# Patient Record
Sex: FEMALE | Race: BLACK OR AFRICAN AMERICAN | NOT HISPANIC OR LATINO | Employment: OTHER | URBAN - METROPOLITAN AREA
[De-identification: names, ages, dates, MRNs, and addresses within clinical notes are randomized per-mention and may not be internally consistent; named-entity substitution may affect disease eponyms.]

---

## 2017-01-17 ENCOUNTER — LAB VISIT (OUTPATIENT)
Dept: LAB | Facility: HOSPITAL | Age: 72
End: 2017-01-17
Attending: INTERNAL MEDICINE
Payer: MEDICARE

## 2017-01-17 DIAGNOSIS — M33.20 POLYMYOSITIS ASSOCIATED WITH AUTOIMMUNE DISEASE: ICD-10-CM

## 2017-01-17 DIAGNOSIS — M35.9 POLYMYOSITIS ASSOCIATED WITH AUTOIMMUNE DISEASE: ICD-10-CM

## 2017-01-17 LAB
ALBUMIN SERPL BCP-MCNC: 3.5 G/DL
ALP SERPL-CCNC: 73 U/L
ALT SERPL W/O P-5'-P-CCNC: 15 U/L
ANION GAP SERPL CALC-SCNC: 9 MMOL/L
AST SERPL-CCNC: 20 U/L
BASOPHILS # BLD AUTO: 0.02 K/UL
BASOPHILS NFR BLD: 0.6 %
BILIRUB SERPL-MCNC: 0.6 MG/DL
BUN SERPL-MCNC: 13 MG/DL
CALCIUM SERPL-MCNC: 9.5 MG/DL
CHLORIDE SERPL-SCNC: 107 MMOL/L
CK SERPL-CCNC: 89 U/L
CO2 SERPL-SCNC: 25 MMOL/L
CREAT SERPL-MCNC: 0.7 MG/DL
DIFFERENTIAL METHOD: ABNORMAL
EOSINOPHIL # BLD AUTO: 0.2 K/UL
EOSINOPHIL NFR BLD: 4.5 %
ERYTHROCYTE [DISTWIDTH] IN BLOOD BY AUTOMATED COUNT: 13.9 %
EST. GFR  (AFRICAN AMERICAN): >60 ML/MIN/1.73 M^2
EST. GFR  (NON AFRICAN AMERICAN): >60 ML/MIN/1.73 M^2
GLUCOSE SERPL-MCNC: 105 MG/DL
HCT VFR BLD AUTO: 39.7 %
HGB BLD-MCNC: 13.4 G/DL
LYMPHOCYTES # BLD AUTO: 1.2 K/UL
LYMPHOCYTES NFR BLD: 33 %
MCH RBC QN AUTO: 29.3 PG
MCHC RBC AUTO-ENTMCNC: 33.8 %
MCV RBC AUTO: 87 FL
MONOCYTES # BLD AUTO: 0.4 K/UL
MONOCYTES NFR BLD: 12.3 %
NEUTROPHILS # BLD AUTO: 1.8 K/UL
NEUTROPHILS NFR BLD: 49.3 %
PLATELET # BLD AUTO: 163 K/UL
PMV BLD AUTO: 10.9 FL
POTASSIUM SERPL-SCNC: 4 MMOL/L
PROT SERPL-MCNC: 7 G/DL
RBC # BLD AUTO: 4.58 M/UL
SODIUM SERPL-SCNC: 141 MMOL/L
WBC # BLD AUTO: 3.58 K/UL

## 2017-01-17 PROCEDURE — 80053 COMPREHEN METABOLIC PANEL: CPT

## 2017-01-17 PROCEDURE — 85025 COMPLETE CBC W/AUTO DIFF WBC: CPT

## 2017-01-17 PROCEDURE — 36415 COLL VENOUS BLD VENIPUNCTURE: CPT | Mod: PO

## 2017-01-17 PROCEDURE — 82550 ASSAY OF CK (CPK): CPT

## 2017-01-18 ENCOUNTER — TELEPHONE (OUTPATIENT)
Dept: RHEUMATOLOGY | Facility: CLINIC | Age: 72
End: 2017-01-18

## 2017-03-09 ENCOUNTER — OFFICE VISIT (OUTPATIENT)
Dept: DERMATOLOGY | Facility: CLINIC | Age: 72
End: 2017-03-09
Payer: MEDICARE

## 2017-03-09 DIAGNOSIS — L21.9 SEBORRHEIC DERMATITIS: Primary | ICD-10-CM

## 2017-03-09 DIAGNOSIS — L65.9 HAIR LOSS: ICD-10-CM

## 2017-03-09 PROCEDURE — 1157F ADVNC CARE PLAN IN RCRD: CPT | Mod: S$GLB,,, | Performed by: DERMATOLOGY

## 2017-03-09 PROCEDURE — 99999 PR PBB SHADOW E&M-EST. PATIENT-LVL II: CPT | Mod: PBBFAC,,, | Performed by: DERMATOLOGY

## 2017-03-09 PROCEDURE — 1159F MED LIST DOCD IN RCRD: CPT | Mod: S$GLB,,, | Performed by: DERMATOLOGY

## 2017-03-09 PROCEDURE — 99213 OFFICE O/P EST LOW 20 MIN: CPT | Mod: S$GLB,,, | Performed by: DERMATOLOGY

## 2017-03-09 PROCEDURE — 1160F RVW MEDS BY RX/DR IN RCRD: CPT | Mod: S$GLB,,, | Performed by: DERMATOLOGY

## 2017-03-09 NOTE — MR AVS SNAPSHOT
[facility] Dermatology  [address] [facility] [place] [address]  Phone: [phone]  Fax: [phone]                  Anca Mcclellan   3/9/2017 2:00 PM   Office Visit    Description:  Female : 1945   Provider:  Evelina Mcclure MD   Department:  [facility] - Dermatology           Reason for Visit     Follow-up           Diagnoses this Visit        Comments    Seborrheic dermatitis    -  Primary     Hair loss                To Do List           Future Appointments        Provider Department Dept Phone    2017 9:55 AM LABORATORY, [facility] [phone]    2017 10:00 AM Chaz Lock MD [facility] Rheumatology [phone]      Goals (5 Years of Data)     None      Follow-Up and Disposition     Return if symptoms worsen or fail to improve.      [facility]     [facility] Care Line - / Assistance  Registered nurses in the [facility] provide clinical advisement, health education, appointment booking, and other advisory services.  Call for this free service at [phone].             Medications           Message regarding Medications     Verify the changes and/or additions to your medication regime listed below are the same as discussed with your clinician today.  If any of these changes or additions are incorrect, please notify your healthcare provider.        STOP taking these medications     fluocinolone (DERMA-SMOOTHE) 0.01 % external oil Three drops to each ear twice daily as needed for ear itching    omeprazole (PRILOSEC) 20 MG capsule     calcipotriene-betamethasone (TACLONEX) ointment Use on scalp once daily           Verify that the below list of medications is an accurate representation of the medications you are currently taking.  If none reported, the list may be blank. If incorrect, please contact your healthcare provider. Carry this list with you in case of emergency.           Current Medications     aspirin (ECOTRIN)  81 MG EC tablet Take 81 mg by mouth once daily.    atorvastatin (LIPITOR) 40 MG tablet Take 40 mg by mouth once daily.    azathioprine (IMURAN) 50 mg Tab Take 1 tablet (50 mg total) by mouth once daily.    BIOTIN ORAL Take 4,000 mcg by mouth once daily.     FOLIC ACID/MULTIVIT-MIN/LUTEIN (CENTRUM SILVER ORAL) Take by mouth once daily.     ketoconazole (NIZORAL) 2 % cream AAA bid    ketoconazole (NIZORAL) 2 % shampoo Wash hair with medicated shampoo at least 2x/week - let sit on scalp at least 5 minutes prior to rinsing    metformin (GLUCOPHAGE-XR) 750 MG 24 hr tablet Take 750 mg by mouth daily with breakfast.    triamcinolone acetonide 0.025% (KENALOG) 0.025 % cream AAA bid as needed for flares.  Mild steroid.           Clinical Reference Information           Allergies as of 3/9/2017     No Known Allergies      Immunizations Administered on Date of Encounter - 3/9/2017     None      [facility] Sign-Up     Activating your [facility] account is as easy as 1-2-3!     1) Visit [email or web address], select Sign Up Now, enter this activation code and your date of birth, then select Next.  [email or web address]  Expires: 4/23/2017  3:13 PM      2) Create a username and password to use when you visit [facility] in the future and select a security question in case you lose your password and select Next.    3) Enter your e-mail address and click Sign Up!    Additional Information  If you have questions, please e-mail [email or web address] or call [phone] to talk to our [facility] staff. Remember, [facility] is NOT to be used for urgent needs. For medical emergencies, dial 911.         Language Assistance Services     ATTENTION: Language assistance services are available, free of charge. Please call [phone].      ATENCIÓN: Si habla gideon, tiene a norton disposición servicios gratuitos de asistencia lingüística. Llame al [phone].     CHÚ Ý: N?u b?n nói Ti?ng Vi?t, có các d?ch v? h? tr? ngôn ng? mi?n phí dành cho b?n. G?i s?  [phone].         [facility] complies with applicable Federal civil rights laws and does not discriminate on the basis of race, color, national origin, age, disability, or sex.

## 2017-03-09 NOTE — PROGRESS NOTES
Subjective:       Patient ID:  Anca Mcclellan is a 71 y.o. female who presents for   Chief Complaint   Patient presents with    Follow-up     HPI Comments: Hx of traction alopecia, seb derm and pruritus, last seen on 12/1/16.  She discontinued TAC 0.025% cream twice daily prn and ketoconazole cream twice daily.  + improvement in scalp c/ decreased scaling and hair loss.  She is currently on fluocinolone scalp oil twice daily and ketoconazole shampoo once/week.  She uses rogaine x 10 years, biotin 4000 mcg daily, and topical finasteride 1% daily.       Review of Systems   Constitutional: Negative for fever and chills.   Gastrointestinal: Negative for nausea and vomiting.   Skin: Negative for itching, rash, daily sunscreen use, activity-related sunscreen use and recent sunburn.   Hematologic/Lymphatic: Does not bruise/bleed easily.        Objective:    Physical Exam   Constitutional: She appears well-developed and well-nourished. No distress.   Neurological: She is alert and oriented to person, place, and time. She is not disoriented.   Psychiatric: She has a normal mood and affect.   Skin:   Areas Examined (abnormalities noted in diagram):   Scalp / Hair Palpated and Inspected  Head / Face Inspection Performed  Neck Inspection Performed  Back Inspection Performed  RUE Inspected  LUE Inspection Performed  Nails and Digits Inspection Performed               Assessment / Plan:        Seborrheic dermatitis  Marked improvement. Restart ketoconazole and TAC 0.025% cream prn.  Continue ketoconazole shampoo once/week    Hair loss  Currently improved.  Continue fluocinolone scalp oil twice daily, rogaine, biotin 4000 mcg daily, and topical finasteride 1% daily           Return if symptoms worsen or fail to improve.

## 2017-06-01 ENCOUNTER — OFFICE VISIT (OUTPATIENT)
Dept: RHEUMATOLOGY | Facility: CLINIC | Age: 72
End: 2017-06-01
Payer: MEDICARE

## 2017-06-01 ENCOUNTER — LAB VISIT (OUTPATIENT)
Dept: LAB | Facility: HOSPITAL | Age: 72
End: 2017-06-01
Attending: INTERNAL MEDICINE
Payer: MEDICARE

## 2017-06-01 VITALS
BODY MASS INDEX: 29.57 KG/M2 | SYSTOLIC BLOOD PRESSURE: 130 MMHG | WEIGHT: 194.44 LBS | HEART RATE: 95 BPM | DIASTOLIC BLOOD PRESSURE: 73 MMHG

## 2017-06-01 DIAGNOSIS — M33.20 POLYMYOSITIS ASSOCIATED WITH AUTOIMMUNE DISEASE: Primary | ICD-10-CM

## 2017-06-01 DIAGNOSIS — Z79.899 HIGH RISK MEDICATION USE: ICD-10-CM

## 2017-06-01 DIAGNOSIS — M35.9 POLYMYOSITIS ASSOCIATED WITH AUTOIMMUNE DISEASE: Primary | ICD-10-CM

## 2017-06-01 DIAGNOSIS — M35.9 POLYMYOSITIS ASSOCIATED WITH AUTOIMMUNE DISEASE: ICD-10-CM

## 2017-06-01 DIAGNOSIS — M33.20 POLYMYOSITIS ASSOCIATED WITH AUTOIMMUNE DISEASE: ICD-10-CM

## 2017-06-01 LAB
ALBUMIN SERPL BCP-MCNC: 3.8 G/DL
ALP SERPL-CCNC: 78 U/L
ALT SERPL W/O P-5'-P-CCNC: 17 U/L
ANION GAP SERPL CALC-SCNC: 10 MMOL/L
AST SERPL-CCNC: 22 U/L
BASOPHILS # BLD AUTO: 0.03 K/UL
BASOPHILS NFR BLD: 0.7 %
BILIRUB SERPL-MCNC: 0.6 MG/DL
BUN SERPL-MCNC: 14 MG/DL
CALCIUM SERPL-MCNC: 9.5 MG/DL
CHLORIDE SERPL-SCNC: 105 MMOL/L
CK SERPL-CCNC: 123 U/L
CO2 SERPL-SCNC: 25 MMOL/L
CREAT SERPL-MCNC: 0.8 MG/DL
DIFFERENTIAL METHOD: NORMAL
EOSINOPHIL # BLD AUTO: 0.2 K/UL
EOSINOPHIL NFR BLD: 5.5 %
ERYTHROCYTE [DISTWIDTH] IN BLOOD BY AUTOMATED COUNT: 13.2 %
EST. GFR  (AFRICAN AMERICAN): >60 ML/MIN/1.73 M^2
EST. GFR  (NON AFRICAN AMERICAN): >60 ML/MIN/1.73 M^2
GLUCOSE SERPL-MCNC: 103 MG/DL
HCT VFR BLD AUTO: 41.9 %
HGB BLD-MCNC: 13.9 G/DL
LYMPHOCYTES # BLD AUTO: 1.3 K/UL
LYMPHOCYTES NFR BLD: 28.9 %
MCH RBC QN AUTO: 30.2 PG
MCHC RBC AUTO-ENTMCNC: 33.2 %
MCV RBC AUTO: 91 FL
MONOCYTES # BLD AUTO: 0.6 K/UL
MONOCYTES NFR BLD: 12.5 %
NEUTROPHILS # BLD AUTO: 2.3 K/UL
NEUTROPHILS NFR BLD: 52.2 %
PLATELET # BLD AUTO: 176 K/UL
PMV BLD AUTO: 11.4 FL
POTASSIUM SERPL-SCNC: 3.9 MMOL/L
PROT SERPL-MCNC: 7.4 G/DL
RBC # BLD AUTO: 4.61 M/UL
SODIUM SERPL-SCNC: 140 MMOL/L
WBC # BLD AUTO: 4.4 K/UL

## 2017-06-01 PROCEDURE — 1159F MED LIST DOCD IN RCRD: CPT | Mod: S$GLB,,, | Performed by: INTERNAL MEDICINE

## 2017-06-01 PROCEDURE — 82550 ASSAY OF CK (CPK): CPT

## 2017-06-01 PROCEDURE — 1126F AMNT PAIN NOTED NONE PRSNT: CPT | Mod: S$GLB,,, | Performed by: INTERNAL MEDICINE

## 2017-06-01 PROCEDURE — 85025 COMPLETE CBC W/AUTO DIFF WBC: CPT

## 2017-06-01 PROCEDURE — 36415 COLL VENOUS BLD VENIPUNCTURE: CPT | Mod: PO

## 2017-06-01 PROCEDURE — 80053 COMPREHEN METABOLIC PANEL: CPT

## 2017-06-01 PROCEDURE — 99999 PR PBB SHADOW E&M-EST. PATIENT-LVL III: CPT | Mod: PBBFAC,,, | Performed by: INTERNAL MEDICINE

## 2017-06-01 PROCEDURE — 99213 OFFICE O/P EST LOW 20 MIN: CPT | Mod: S$GLB,,, | Performed by: INTERNAL MEDICINE

## 2017-06-01 RX ORDER — AZATHIOPRINE 50 MG/1
50 TABLET ORAL EVERY OTHER DAY
Qty: 45 TABLET | Refills: 1 | Status: SHIPPED | OUTPATIENT
Start: 2017-06-01 | End: 2018-02-20

## 2017-06-01 NOTE — PROGRESS NOTES
RHEUMATOLOGY CLINIC FOLLOW UP VISIT  Chief complaints:-  To follow up for muscle disease.      HPI:-  Anca Kirkland a 72 y.o. pleasant female comes in for a follow up visit with above chief complaints.   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 She established care with me for worsening fatigue which improved since initiating prednisone and Imuran. Normal muscle enzymes always. She reports doing well . No muscle weakness chest pain, palpitations, diaphoresis or leg swelling. No orthopnea/PND. Have some fatigue.     Review of Systems   Constitutional: Negative for chills, fever, malaise/fatigue and weight loss.   HENT: Negative for ear discharge, ear pain, hearing loss, nosebleeds and sore throat.    Eyes: Negative for blurred vision, double vision, photophobia, discharge and redness.   Respiratory: Negative for cough, hemoptysis, sputum production and shortness of breath.    Cardiovascular: Negative for chest pain, palpitations and claudication.   Gastrointestinal: Negative for abdominal pain, constipation, diarrhea, melena, nausea and vomiting.   Genitourinary: Negative for dysuria, frequency, hematuria and urgency.   Musculoskeletal: Negative for back pain, falls, joint pain, myalgias and neck pain.   Skin: Negative for itching and rash.   Neurological: Negative for dizziness, tremors, sensory change, speech change, focal weakness, seizures, loss of consciousness, weakness and headaches.   Endo/Heme/Allergies: Negative for environmental allergies. Does not bruise/bleed easily.   Psychiatric/Behavioral: Negative for hallucinations and memory loss. The patient does not have insomnia.        History reviewed. No pertinent past medical history.    Past Surgical History:   Procedure Laterality Date    HYSTERECTOMY  1988        Social History   Substance Use Topics    Smoking status: Never Smoker     Smokeless tobacco: Never Used    Alcohol use Yes       Family History   Problem Relation Age of Onset    Cataracts Mother     Migraines Neg Hx     Melanoma Neg Hx     Lupus Neg Hx     Psoriasis Neg Hx        No Known Allergies        Physical examination:-    Vitals:    06/01/17 1536   BP: 130/73   Pulse: 95   Weight: 88.2 kg (194 lb 7.1 oz)   PainSc: 0-No pain       Physical Exam   Constitutional: She is oriented to person, place, and time and well-developed, well-nourished, and in no distress. No distress.   HENT:   Head: Normocephalic and atraumatic.   Nose: Nose normal.   Mouth/Throat: Oropharynx is clear and moist. No oropharyngeal exudate.   Eyes: Conjunctivae and EOM are normal. Pupils are equal, round, and reactive to light. Right eye exhibits no discharge. Left eye exhibits no discharge. No scleral icterus.   Neck: Normal range of motion. Neck supple.   Cardiovascular: Normal rate and intact distal pulses.    Pulmonary/Chest: Effort normal. No respiratory distress. She exhibits no tenderness.   Abdominal: Soft. There is no tenderness.   Musculoskeletal:   Muscle strength 4+/5 bilateral upper and lower proximal and distal extremities.  No synovitis or effusion in small joints of hands or feet.  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  Bilateral knees showed crepitus with right more than left and joint line tenderness in both knees.  No effusion.   Lymphadenopathy:     She has no cervical adenopathy.   Neurological: She is alert and oriented to person, place, and time. No cranial nerve deficit.   Skin: Skin is warm. No rash noted. She is not diaphoretic. No erythema. No pallor.   Psychiatric: Affect and judgment normal.   Nursing note and vitals reviewed.      Labs:-  Results for VIDYA ALVAREZ (MRN [id number]) as of 7/20/2016 13:36   Ref. Range 4/21/2016 15:43   VALDEZ HEP-2 Titer Unknown Positive >=1:2560...   Anti-SSA Antibody Latest Ref Range: 0.00 - 19.99 EU 1.64   Anti-SSA  "Interpretation Latest Ref Range: Negative  Negative   Anti-SSB Antibody Latest Ref Range: 0.00 - 19.99 EU 0.72   Anti-SSB Interpretation Latest Ref Range: Negative  Negative   ds DNA Ab Latest Ref Range: Negative 1:10  Positive >=1:5120 (A)   Anti Sm Antibody Latest Ref Range: 0.00 - 19.99 EU 2.94   Anti-Sm Interpretation Latest Ref Range: Negative  Negative   Anti Sm/RNP Antibody Latest Ref Range: 0.00 - 19.99 EU 0.00   Anti-Sm/RNP Interpretation Latest Ref Range: Negative  Negative   Anti-Emilia-1 Antibody Latest Ref Range: <20 Units <20   Anti-PM/Scl Ab Latest Ref Range: <20 Units <20   Anti-SS-A 52 kD Ab, IgG Latest Ref Range: <20 Units <20   Anti-U1-RNP  Ab Latest Ref Range: <20 Units <20   EJ Latest Ref Range: Negative  Negative   Fibrillarin (U3 RNP) Latest Ref Range: Negative  Negative   Ku Latest Ref Range: Negative  Negative   MDA-5 (P140) (CADM-140) Latest Ref Range: <20 Units <20   MI-2 Latest Ref Range: Negative  Weak Positive (A)   NXP-2 (P140) Latest Ref Range: <20 Units <20   OJ Latest Ref Range: Negative  Negative   PL-12 Latest Ref Range: Negative  Negative   PL-7 Latest Ref Range: Negative  Negative   SRP Latest Ref Range: Negative  Negative   TIF1 GAMMA (P155/140) Latest Ref Range: <20 Units <20   U2 snRNP Latest Ref Range: Negative  Negative       Assessment/Plans:-  # Polymyositis associated with autoimmune disease:-  Improved. No flare up after prednisone tapered completely. Reduce imuran to every other day dosing. Check labs for muscle enzymes.     # High risk medication - "Imuran" use  CBC/CMP every 12 weeks while on imuran therapy.        # RTC in 6 months. Labs in 3 months.  Disclaimer: This note was prepared using voice recognition system and is likely to have sound alike errors .  Please call me with any questions    "

## 2017-06-02 ENCOUNTER — TELEPHONE (OUTPATIENT)
Dept: RHEUMATOLOGY | Facility: CLINIC | Age: 72
End: 2017-06-02

## 2017-06-02 NOTE — TELEPHONE ENCOUNTER
----- Message from Chaz Lock MD sent at 6/2/2017  7:40 AM CDT -----  [facility] labs.

## 2017-06-05 ENCOUNTER — DOCUMENTATION ONLY (OUTPATIENT)
Dept: RHEUMATOLOGY | Facility: CLINIC | Age: 72
End: 2017-06-05

## 2017-08-08 ENCOUNTER — OFFICE VISIT (OUTPATIENT)
Dept: OTOLARYNGOLOGY | Facility: CLINIC | Age: 72
End: 2017-08-08
Payer: MEDICARE

## 2017-08-08 VITALS
BODY MASS INDEX: 29.23 KG/M2 | HEART RATE: 85 BPM | DIASTOLIC BLOOD PRESSURE: 76 MMHG | WEIGHT: 192.25 LBS | TEMPERATURE: 98 F | SYSTOLIC BLOOD PRESSURE: 119 MMHG

## 2017-08-08 DIAGNOSIS — H60.8X3 CHRONIC ECZEMATOUS OTITIS EXTERNA OF BOTH EARS: ICD-10-CM

## 2017-08-08 DIAGNOSIS — H91.90 PERCEIVED HEARING LOSS: Primary | ICD-10-CM

## 2017-08-08 PROCEDURE — 1159F MED LIST DOCD IN RCRD: CPT | Mod: S$GLB,,, | Performed by: ORTHOPAEDIC SURGERY

## 2017-08-08 PROCEDURE — 1126F AMNT PAIN NOTED NONE PRSNT: CPT | Mod: S$GLB,,, | Performed by: ORTHOPAEDIC SURGERY

## 2017-08-08 PROCEDURE — 99999 PR PBB SHADOW E&M-EST. PATIENT-LVL III: CPT | Mod: PBBFAC,,, | Performed by: ORTHOPAEDIC SURGERY

## 2017-08-08 PROCEDURE — 99213 OFFICE O/P EST LOW 20 MIN: CPT | Mod: S$GLB,,, | Performed by: ORTHOPAEDIC SURGERY

## 2017-08-08 PROCEDURE — 3008F BODY MASS INDEX DOCD: CPT | Mod: S$GLB,,, | Performed by: ORTHOPAEDIC SURGERY

## 2017-08-08 RX ORDER — FLUOCINOLONE ACETONIDE 0.11 MG/ML
3 OIL AURICULAR (OTIC) 2 TIMES DAILY
Qty: 1 BOTTLE | Refills: 3 | Status: SHIPPED | OUTPATIENT
Start: 2017-08-08 | End: 2017-12-07

## 2017-08-08 NOTE — PROGRESS NOTES
"Subjective:       Patient ID: Anca Mcclellan is a 72 y.o. female.    Chief Complaint: Cerumen Impaction (Not hearing well)    Patient is a very pleasant 72 y.o. female here to see me today for the first time for evaluation of hearing loss.  He reports hearing loss that has been gradually progressing over the last 1 years.  She has not noted any difference in hearing between the ears, with either ear being the better hearing ear.  She has not noted any tinnitus in either ear.  She has not had any recent issues with ear pain or ear drainage.  She denies a family history of hearing loss, and has not had any previous otologic surgery.  She denies any history of significant loud noise exposure.  She denies issues with dizziness.  Her last audiogram was in 2014 and was overall normal at that time.  She is interested in restarting her Dermotic, as that has worked for her in the past.      Review of Systems   Constitutional: Negative for chills, fatigue (has not "felt herself" for the last week), fever and unexpected weight change.   HENT: Positive for hearing loss. Negative for congestion, dental problem, ear discharge, ear pain, facial swelling, nosebleeds, postnasal drip, rhinorrhea, sinus pressure, sneezing, sore throat, tinnitus, trouble swallowing and voice change.    Eyes: Negative for redness, itching and visual disturbance.   Respiratory: Negative for cough, choking, shortness of breath and wheezing.    Cardiovascular: Negative for chest pain and palpitations.   Gastrointestinal: Negative for abdominal pain.        No reflux.   Musculoskeletal: Negative for gait problem.   Skin: Negative for rash.   Neurological: Positive for dizziness (slight) and weakness. Negative for light-headedness and headaches.       Objective:      Physical Exam   Constitutional: She is oriented to person, place, and time. She appears well-developed and well-nourished. No distress.   HENT:   Head: Normocephalic and atraumatic.   Right " Ear: Tympanic membrane, external ear and ear canal normal.   Left Ear: Tympanic membrane, external ear and ear canal normal.   Nose: Nose normal. No mucosal edema, rhinorrhea, nasal deformity or septal deviation. No epistaxis. Right sinus exhibits no maxillary sinus tenderness and no frontal sinus tenderness. Left sinus exhibits no maxillary sinus tenderness and no frontal sinus tenderness.   Mouth/Throat: Uvula is midline, oropharynx is clear and moist and mucous membranes are normal. Mucous membranes are not pale and not dry. No dental caries. No oropharyngeal exudate or posterior oropharyngeal erythema.   Eyes: Conjunctivae, EOM and lids are normal. Pupils are equal, round, and reactive to light. Right eye exhibits no chemosis. Left eye exhibits no chemosis. Right conjunctiva is not injected. Left conjunctiva is not injected. No scleral icterus. Right eye exhibits normal extraocular motion and no nystagmus. Left eye exhibits normal extraocular motion and no nystagmus.   Neck: Trachea normal and phonation normal. No tracheal tenderness present. No tracheal deviation present. No thyroid mass and no thyromegaly present.   Cardiovascular: Intact distal pulses.    Pulmonary/Chest: Effort normal. No stridor. No respiratory distress.   Abdominal: She exhibits no distension.   Lymphadenopathy:        Head (right side): No submental, no submandibular, no preauricular, no posterior auricular and no occipital adenopathy present.        Head (left side): No submental, no submandibular, no preauricular, no posterior auricular and no occipital adenopathy present.     She has no cervical adenopathy.   Neurological: She is alert and oriented to person, place, and time. No cranial nerve deficit.   Skin: Skin is warm and dry. No rash noted. No erythema.   Psychiatric: She has a normal mood and affect. Her behavior is normal.       Assessment:       1. Perceived hearing loss    2. Chronic eczematous otitis externa of both ears         Plan:       1.  Perceived hearing loss:  Reassured the patient that there is no cerumen on exam today to explain her recent hearing loss.  I would recommend an audiogram, will schedule at her convenience.  2.  Chronic eczematous OE of both ears:  Will start on Dermotic to her ears.  Prescription sent to pharmacy.

## 2017-08-15 ENCOUNTER — CLINICAL SUPPORT (OUTPATIENT)
Dept: AUDIOLOGY | Facility: CLINIC | Age: 72
End: 2017-08-15
Payer: MEDICARE

## 2017-08-15 DIAGNOSIS — H91.90 PERCEIVED HEARING LOSS: Primary | ICD-10-CM

## 2017-08-15 PROCEDURE — 92567 TYMPANOMETRY: CPT | Mod: S$GLB,,, | Performed by: AUDIOLOGIST

## 2017-08-15 PROCEDURE — 92557 COMPREHENSIVE HEARING TEST: CPT | Mod: S$GLB,,, | Performed by: AUDIOLOGIST

## 2017-08-15 NOTE — PROGRESS NOTES
Anca Mcclellan was seen 08/15/2017 for an audiological evaluation.  Patient complains of decreased hearing over the past year, but more noticeably over the summer.  She denies tinnitus and dizziness.  She denies family history of hearing loss.  She denies noise exposure.    Results reveal normal hearing sensitivity 250-8000 Hz bilaterally.  Speech Reception Thresholds were  5 dBHL for the right ear and 10 dBHL for the left ear.   Word recognition scores were excellent bilaterally.  Tympanograms were Type A for the right ear and Type Ad for the left ear.    Patient was counseled on the above findings.    REC:  ENT consult due to abnormal tympanogram AS

## 2017-08-17 ENCOUNTER — OFFICE VISIT (OUTPATIENT)
Dept: OPHTHALMOLOGY | Facility: CLINIC | Age: 72
End: 2017-08-17
Payer: MEDICARE

## 2017-08-17 DIAGNOSIS — Z83.511 FAMILY HISTORY OF GLAUCOMA IN MOTHER: ICD-10-CM

## 2017-08-17 DIAGNOSIS — Z96.1 PSEUDOPHAKIA OF BOTH EYES: ICD-10-CM

## 2017-08-17 DIAGNOSIS — H40.003 GLAUCOMA SUSPECT OF BOTH EYES: Primary | ICD-10-CM

## 2017-08-17 DIAGNOSIS — H52.4 MYOPIA WITH PRESBYOPIA, BILATERAL: ICD-10-CM

## 2017-08-17 DIAGNOSIS — H52.13 MYOPIA WITH PRESBYOPIA, BILATERAL: ICD-10-CM

## 2017-08-17 PROCEDURE — 92014 COMPRE OPH EXAM EST PT 1/>: CPT | Mod: S$GLB,,, | Performed by: OPTOMETRIST

## 2017-08-17 PROCEDURE — 76514 ECHO EXAM OF EYE THICKNESS: CPT | Mod: S$GLB,,, | Performed by: OPTOMETRIST

## 2017-08-17 PROCEDURE — 92015 DETERMINE REFRACTIVE STATE: CPT | Mod: S$GLB,,, | Performed by: OPTOMETRIST

## 2017-08-17 PROCEDURE — 92133 CPTRZD OPH DX IMG PST SGM ON: CPT | Mod: S$GLB,,, | Performed by: OPTOMETRIST

## 2017-08-17 PROCEDURE — 92083 EXTENDED VISUAL FIELD XM: CPT | Mod: S$GLB,,, | Performed by: OPTOMETRIST

## 2017-08-17 PROCEDURE — 99999 PR PBB SHADOW E&M-EST. PATIENT-LVL I: CPT | Mod: PBBFAC,,, | Performed by: OPTOMETRIST

## 2017-08-17 RX ORDER — ESCITALOPRAM OXALATE 10 MG/1
TABLET ORAL
COMMUNITY
Start: 2017-08-13 | End: 2017-12-07

## 2017-08-17 NOTE — PROGRESS NOTES
HPI     Eye Exam    Additional comments: Yearly           Glaucoma Suspect    Additional comments: HVF,gOCT, Pach           Comments   Last seen by DNL on 6/2/16 for PVD OS. Patient here today for yearly eye   exam and glaucoma screening.   1. PVD OS  2. Vitreous Degeneration OU  3. Glaucoma Suspect OU  4. Pseudophakia OU  PT was last seen on 6/2/16 with DNL for PVD OS. PT was told to RTC  1 month for HVF, gOCT, Pach and DFE.  Medication eye drops if any: None  Last HVF: 8/17/17  Last gOCT: 8/17/17  Last SDPs: 6/2/16  HPI    Any vision changes since last exam: Yes  Eye pain: No  Other ocular symptoms: No    Do you wear currently wear glasses or contacts? None    Interested in contacts today? No    Do you plan on getting new glasses today? If needed       Last edited by Cecy Kothari, PCT on 8/17/2017 10:57 AM. (History)              Assessment /Plan     For exam results, see Encounter Report.    Glaucoma suspect of both eyes  -     Guzman Visual Field - OU - Extended - Both Eyes  -     Posterior Segment OCT Optic Nerve- Both eyes    IOP borderline but adjusts to normal (upper normal) with pachs OU        OS: TS defect on gOCT corresponds with inferior nasal defect OS but         questionable results secondary to decreased reliability-repeat before initiating treatment    Family history of glaucoma in mother    Pseudophakia of both eyes  Stable OU, monitor 12 months    Myopia with presbyopia, bilateral  Eyeglass Final Rx     Eyeglass Final Rx       Sphere Cylinder Axis Add    Right -1.00 +0.25 135 +2.50    Left -0.75 +0.25 155 +2.50    Expiration Date:  8/18/2018          Eyeglass Final Rx #2       Sphere Cylinder Axis Add    Right +1.50 +0.25 135     Left +1.75 +0.25 155     Type:  SVL    Expiration Date:  8/18/2018    Comments:  Reading only                RTC 6 months to repeat 24-2VF and check IOP  Discussed above and all questions were answered.

## 2017-12-07 ENCOUNTER — OFFICE VISIT (OUTPATIENT)
Dept: RHEUMATOLOGY | Facility: CLINIC | Age: 72
End: 2017-12-07
Payer: MEDICARE

## 2017-12-07 ENCOUNTER — LAB VISIT (OUTPATIENT)
Dept: LAB | Facility: HOSPITAL | Age: 72
End: 2017-12-07
Attending: INTERNAL MEDICINE
Payer: MEDICARE

## 2017-12-07 VITALS
DIASTOLIC BLOOD PRESSURE: 80 MMHG | SYSTOLIC BLOOD PRESSURE: 130 MMHG | BODY MASS INDEX: 29.67 KG/M2 | WEIGHT: 195.13 LBS | HEART RATE: 79 BPM

## 2017-12-07 DIAGNOSIS — M35.9 POLYMYOSITIS ASSOCIATED WITH AUTOIMMUNE DISEASE: Primary | ICD-10-CM

## 2017-12-07 DIAGNOSIS — M33.20 POLYMYOSITIS ASSOCIATED WITH AUTOIMMUNE DISEASE: Primary | ICD-10-CM

## 2017-12-07 DIAGNOSIS — Z79.899 HIGH RISK MEDICATION USE: ICD-10-CM

## 2017-12-07 DIAGNOSIS — M33.20 POLYMYOSITIS ASSOCIATED WITH AUTOIMMUNE DISEASE: ICD-10-CM

## 2017-12-07 DIAGNOSIS — M35.9 POLYMYOSITIS ASSOCIATED WITH AUTOIMMUNE DISEASE: ICD-10-CM

## 2017-12-07 LAB
ALBUMIN SERPL BCP-MCNC: 3.6 G/DL
ALP SERPL-CCNC: 90 U/L
ALT SERPL W/O P-5'-P-CCNC: 17 U/L
ANION GAP SERPL CALC-SCNC: 8 MMOL/L
AST SERPL-CCNC: 20 U/L
BASOPHILS # BLD AUTO: 0.06 K/UL
BASOPHILS NFR BLD: 1.5 %
BILIRUB SERPL-MCNC: 0.5 MG/DL
BUN SERPL-MCNC: 13 MG/DL
CALCIUM SERPL-MCNC: 9.7 MG/DL
CHLORIDE SERPL-SCNC: 106 MMOL/L
CK SERPL-CCNC: 98 U/L
CO2 SERPL-SCNC: 27 MMOL/L
CREAT SERPL-MCNC: 0.7 MG/DL
DIFFERENTIAL METHOD: NORMAL
EOSINOPHIL # BLD AUTO: 0.2 K/UL
EOSINOPHIL NFR BLD: 4.1 %
ERYTHROCYTE [DISTWIDTH] IN BLOOD BY AUTOMATED COUNT: 13.7 %
EST. GFR  (AFRICAN AMERICAN): >60 ML/MIN/1.73 M^2
EST. GFR  (NON AFRICAN AMERICAN): >60 ML/MIN/1.73 M^2
GLUCOSE SERPL-MCNC: 123 MG/DL
HCT VFR BLD AUTO: 40.5 %
HGB BLD-MCNC: 13.2 G/DL
IMM GRANULOCYTES # BLD AUTO: 0.01 K/UL
IMM GRANULOCYTES NFR BLD AUTO: 0.2 %
LYMPHOCYTES # BLD AUTO: 1.4 K/UL
LYMPHOCYTES NFR BLD: 32.7 %
MCH RBC QN AUTO: 29.1 PG
MCHC RBC AUTO-ENTMCNC: 32.6 G/DL
MCV RBC AUTO: 89 FL
MONOCYTES # BLD AUTO: 0.5 K/UL
MONOCYTES NFR BLD: 12.8 %
NEUTROPHILS # BLD AUTO: 2 K/UL
NEUTROPHILS NFR BLD: 48.7 %
NRBC BLD-RTO: 0 /100 WBC
PLATELET # BLD AUTO: 175 K/UL
PMV BLD AUTO: 11 FL
POTASSIUM SERPL-SCNC: 4.2 MMOL/L
PROT SERPL-MCNC: 7.3 G/DL
RBC # BLD AUTO: 4.53 M/UL
SODIUM SERPL-SCNC: 141 MMOL/L
WBC # BLD AUTO: 4.13 K/UL

## 2017-12-07 PROCEDURE — 36415 COLL VENOUS BLD VENIPUNCTURE: CPT | Mod: PO

## 2017-12-07 PROCEDURE — 80053 COMPREHEN METABOLIC PANEL: CPT

## 2017-12-07 PROCEDURE — 82550 ASSAY OF CK (CPK): CPT

## 2017-12-07 PROCEDURE — 99214 OFFICE O/P EST MOD 30 MIN: CPT | Mod: S$GLB,,, | Performed by: INTERNAL MEDICINE

## 2017-12-07 PROCEDURE — 99999 PR PBB SHADOW E&M-EST. PATIENT-LVL III: CPT | Mod: PBBFAC,,, | Performed by: INTERNAL MEDICINE

## 2017-12-07 PROCEDURE — 85025 COMPLETE CBC W/AUTO DIFF WBC: CPT

## 2017-12-07 RX ORDER — MULTIVITAMIN
1 TABLET ORAL DAILY
COMMUNITY

## 2017-12-07 NOTE — PROGRESS NOTES
RHEUMATOLOGY CLINIC FOLLOW UP VISIT  Chief complaints:-  To follow up for muscle disease.      HPI:-  Anca Kirkland a 72 y.o. pleasant female comes in for a follow up visit with above chief complaints.   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 She established care with me for worsening fatigue which improved since initiating prednisone and Imuran. Normal muscle enzymes always. She reports doing well . No muscle weakness chest pain, palpitations, diaphoresis or leg swelling. No orthopnea/PND. Have some fatigue.     Review of Systems   Constitutional: Negative for chills, fever, malaise/fatigue and weight loss.   HENT: Negative for ear discharge, ear pain, hearing loss, nosebleeds and sore throat.    Eyes: Negative for blurred vision, double vision, photophobia, discharge and redness.   Respiratory: Negative for cough, hemoptysis, sputum production and shortness of breath.    Cardiovascular: Negative for chest pain, palpitations and claudication.   Gastrointestinal: Negative for abdominal pain, constipation, diarrhea, melena, nausea and vomiting.   Genitourinary: Negative for dysuria, frequency, hematuria and urgency.   Musculoskeletal: Negative for back pain, falls, joint pain, myalgias and neck pain.   Skin: Negative for itching and rash.   Neurological: Negative for dizziness, tremors, sensory change, speech change, focal weakness, seizures, loss of consciousness, weakness and headaches.   Endo/Heme/Allergies: Negative for environmental allergies. Does not bruise/bleed easily.   Psychiatric/Behavioral: Negative for hallucinations and memory loss. The patient does not have insomnia.        No past medical history on file.    Past Surgical History:   Procedure Laterality Date    HYSTERECTOMY  1988        Social History   Substance Use Topics    Smoking status: Never Smoker    Smokeless tobacco:  Never Used    Alcohol use Yes       Family History   Problem Relation Age of Onset    Cataracts Mother     Migraines Neg Hx     Melanoma Neg Hx     Lupus Neg Hx     Psoriasis Neg Hx        No Known Allergies        Physical examination:-    Vitals:    12/07/17 1027   BP: 130/80   Pulse: 79   Weight: 88.5 kg (195 lb 1.7 oz)   PainSc: 0-No pain       Physical Exam   Constitutional: She is oriented to person, place, and time and well-developed, well-nourished, and in no distress. No distress.   HENT:   Head: Normocephalic and atraumatic.   Nose: Nose normal.   Mouth/Throat: Oropharynx is clear and moist. No oropharyngeal exudate.   Eyes: Conjunctivae and EOM are normal. Pupils are equal, round, and reactive to light. Right eye exhibits no discharge. Left eye exhibits no discharge. No scleral icterus.   Neck: Normal range of motion. Neck supple.   Cardiovascular: Normal rate and intact distal pulses.    Pulmonary/Chest: Effort normal. No respiratory distress. She exhibits no tenderness.   Abdominal: Soft. There is no tenderness.   Musculoskeletal:   Muscle strength 4+/5 bilateral upper and lower proximal and distal extremities.  No synovitis or effusion in small joints of hands or feet.  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  Bilateral knees showed crepitus with right more than left and joint line tenderness in both knees.  No effusion.   Lymphadenopathy:     She has no cervical adenopathy.   Neurological: She is alert and oriented to person, place, and time. No cranial nerve deficit.   Skin: Skin is warm. No rash noted. She is not diaphoretic. No erythema. No pallor.   Psychiatric: Affect and judgment normal.   Nursing note and vitals reviewed.      Labs:-  Results for VIDYA ALVAREZ (MRN [id number]) as of 7/20/2016 13:36   Ref. Range 4/21/2016 15:43   VALDEZ HEP-2 Titer Unknown Positive >=1:2560...   Anti-SSA Antibody Latest Ref Range: 0.00 - 19.99 EU 1.64   Anti-SSA Interpretation  "Latest Ref Range: Negative  Negative   Anti-SSB Antibody Latest Ref Range: 0.00 - 19.99 EU 0.72   Anti-SSB Interpretation Latest Ref Range: Negative  Negative   ds DNA Ab Latest Ref Range: Negative 1:10  Positive >=1:5120 (A)   Anti Sm Antibody Latest Ref Range: 0.00 - 19.99 EU 2.94   Anti-Sm Interpretation Latest Ref Range: Negative  Negative   Anti Sm/RNP Antibody Latest Ref Range: 0.00 - 19.99 EU 0.00   Anti-Sm/RNP Interpretation Latest Ref Range: Negative  Negative   Anti-Emilia-1 Antibody Latest Ref Range: <20 Units <20   Anti-PM/Scl Ab Latest Ref Range: <20 Units <20   Anti-SS-A 52 kD Ab, IgG Latest Ref Range: <20 Units <20   Anti-U1-RNP  Ab Latest Ref Range: <20 Units <20   EJ Latest Ref Range: Negative  Negative   Fibrillarin (U3 RNP) Latest Ref Range: Negative  Negative   Ku Latest Ref Range: Negative  Negative   MDA-5 (P140) (CADM-140) Latest Ref Range: <20 Units <20   MI-2 Latest Ref Range: Negative  Weak Positive (A)   NXP-2 (P140) Latest Ref Range: <20 Units <20   OJ Latest Ref Range: Negative  Negative   PL-12 Latest Ref Range: Negative  Negative   PL-7 Latest Ref Range: Negative  Negative   SRP Latest Ref Range: Negative  Negative   TIF1 GAMMA (P155/140) Latest Ref Range: <20 Units <20   U2 snRNP Latest Ref Range: Negative  Negative       Assessment/Plans:-  # Polymyositis associated with autoimmune disease:-  Stable.  No flare up after prednisone tapered completely and dose reduction of imuran to every other day dosing. Check labs for muscle enzymes. Continue every other day imuran.     # High risk medication - "Imuran" use  CBC/CMP every 12 weeks while on imuran therapy.        # RTC in 6 months. Labs in 3 months.  Disclaimer: This note was prepared using voice recognition system and is likely to have sound alike errors .  Please call me with any questions    "

## 2018-01-26 DIAGNOSIS — L65.8 TRACTION ALOPECIA: ICD-10-CM

## 2018-01-29 RX ORDER — FLUOCINOLONE ACETONIDE 0.11 MG/ML
OIL TOPICAL
Qty: 118.28 ML | Refills: 5 | Status: SHIPPED | OUTPATIENT
Start: 2018-01-29 | End: 2019-02-28

## 2018-02-19 ENCOUNTER — OFFICE VISIT (OUTPATIENT)
Dept: OPHTHALMOLOGY | Facility: CLINIC | Age: 73
End: 2018-02-19
Payer: MEDICARE

## 2018-02-19 DIAGNOSIS — Z83.511 FAMILY HISTORY OF GLAUCOMA IN MOTHER: ICD-10-CM

## 2018-02-19 DIAGNOSIS — H40.003 GLAUCOMA SUSPECT OF BOTH EYES: Primary | ICD-10-CM

## 2018-02-19 PROCEDURE — 92083 EXTENDED VISUAL FIELD XM: CPT | Mod: S$GLB,,, | Performed by: OPTOMETRIST

## 2018-02-19 PROCEDURE — 99999 PR PBB SHADOW E&M-EST. PATIENT-LVL I: CPT | Mod: PBBFAC,,, | Performed by: OPTOMETRIST

## 2018-02-19 PROCEDURE — 92012 INTRM OPH EXAM EST PATIENT: CPT | Mod: S$GLB,,, | Performed by: OPTOMETRIST

## 2018-02-19 NOTE — PROGRESS NOTES
HPI     Glaucoma Suspect    Additional comments: 24-2VF and IOP check           Comments   PT was last seen on 8/17/17 with DNL for DFE, HVF, gOCT and pach. PT was   told to rtc 6 months for IOP check and repeat 24-2VF.  \Medication eye drops if any: none  Last HVF: 2/19/18  Last gOCT: 8/17/17  Last SDP: 6/2/16          Last edited by Rosa Morrison MA on 2/19/2018  1:17 PM. (History)            Assessment /Plan     For exam results, see Encounter Report.    Glaucoma suspect of both eyes  -     Guzman Visual Field - OU - Extended - Both Eyes; Future    Family history of glaucoma in mother      IOP stable today and within upper normal limits OU  Better VF reliability today  OS defect did not repeat  Monitor 6 months    RTC 6 months for DFE and gOCT or PRN  Discussed above and all questions were answered.

## 2018-02-20 ENCOUNTER — OFFICE VISIT (OUTPATIENT)
Dept: CARDIOLOGY | Facility: CLINIC | Age: 73
End: 2018-02-20
Payer: MEDICARE

## 2018-02-20 ENCOUNTER — CLINICAL SUPPORT (OUTPATIENT)
Dept: CARDIOLOGY | Facility: CLINIC | Age: 73
End: 2018-02-20
Payer: MEDICARE

## 2018-02-20 VITALS
DIASTOLIC BLOOD PRESSURE: 74 MMHG | WEIGHT: 198 LBS | SYSTOLIC BLOOD PRESSURE: 120 MMHG | HEIGHT: 68 IN | BODY MASS INDEX: 30.01 KG/M2 | HEART RATE: 77 BPM

## 2018-02-20 DIAGNOSIS — I49.9 IRREGULAR HEART BEAT: ICD-10-CM

## 2018-02-20 DIAGNOSIS — I49.9 IRREGULAR HEART BEAT: Primary | ICD-10-CM

## 2018-02-20 DIAGNOSIS — E78.49 OTHER HYPERLIPIDEMIA: ICD-10-CM

## 2018-02-20 DIAGNOSIS — R55 SYNCOPE AND COLLAPSE: ICD-10-CM

## 2018-02-20 PROCEDURE — 3008F BODY MASS INDEX DOCD: CPT | Mod: S$GLB,,, | Performed by: INTERNAL MEDICINE

## 2018-02-20 PROCEDURE — 1159F MED LIST DOCD IN RCRD: CPT | Mod: S$GLB,,, | Performed by: INTERNAL MEDICINE

## 2018-02-20 PROCEDURE — 99999 PR PBB SHADOW E&M-EST. PATIENT-LVL III: CPT | Mod: PBBFAC,,, | Performed by: INTERNAL MEDICINE

## 2018-02-20 PROCEDURE — 93000 ELECTROCARDIOGRAM COMPLETE: CPT | Mod: S$GLB,,, | Performed by: INTERNAL MEDICINE

## 2018-02-20 PROCEDURE — 99204 OFFICE O/P NEW MOD 45 MIN: CPT | Mod: S$GLB,,, | Performed by: INTERNAL MEDICINE
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
## 2018-02-26 ENCOUNTER — CLINICAL SUPPORT (OUTPATIENT)
Dept: CARDIOLOGY | Facility: CLINIC | Age: 73
End: 2018-02-26
Attending: INTERNAL MEDICINE
Payer: MEDICARE

## 2018-02-26 DIAGNOSIS — I49.9 IRREGULAR HEART BEAT: ICD-10-CM

## 2018-02-26 DIAGNOSIS — R55 SYNCOPE AND COLLAPSE: ICD-10-CM

## 2018-02-26 LAB
ESTIMATED PA SYSTOLIC PRESSURE: 31.94
RETIRED EF AND QEF - SEE NOTES: 60 (ref 55–65)
TRICUSPID VALVE REGURGITATION: NORMAL

## 2018-02-26 PROCEDURE — 93306 TTE W/DOPPLER COMPLETE: CPT | Mod: S$GLB,,, | Performed by: NUCLEAR MEDICINE

## 2018-03-06 ENCOUNTER — CLINICAL SUPPORT (OUTPATIENT)
Dept: CARDIOLOGY | Facility: CLINIC | Age: 73
End: 2018-03-06
Payer: MEDICARE

## 2018-03-06 DIAGNOSIS — I49.9 IRREGULAR HEART BEAT: ICD-10-CM

## 2018-03-06 DIAGNOSIS — R55 SYNCOPE AND COLLAPSE: ICD-10-CM

## 2018-03-06 PROCEDURE — 93224 XTRNL ECG REC UP TO 48 HRS: CPT | Mod: S$GLB,,, | Performed by: INTERNAL MEDICINE

## 2018-03-07 ENCOUNTER — LAB VISIT (OUTPATIENT)
Dept: LAB | Facility: HOSPITAL | Age: 73
End: 2018-03-07
Attending: SPECIALIST
Payer: MEDICARE

## 2018-03-07 DIAGNOSIS — M35.9 POLYMYOSITIS ASSOCIATED WITH AUTOIMMUNE DISEASE: ICD-10-CM

## 2018-03-07 DIAGNOSIS — M33.20 POLYMYOSITIS ASSOCIATED WITH AUTOIMMUNE DISEASE: ICD-10-CM

## 2018-03-07 LAB
BASOPHILS # BLD AUTO: 0.05 K/UL
BASOPHILS NFR BLD: 1.2 %
DIFFERENTIAL METHOD: NORMAL
EOSINOPHIL # BLD AUTO: 0.2 K/UL
EOSINOPHIL NFR BLD: 4.8 %
ERYTHROCYTE [DISTWIDTH] IN BLOOD BY AUTOMATED COUNT: 13.2 %
HCT VFR BLD AUTO: 39.2 %
HGB BLD-MCNC: 12.9 G/DL
IMM GRANULOCYTES # BLD AUTO: 0 K/UL
IMM GRANULOCYTES NFR BLD AUTO: 0 %
LYMPHOCYTES # BLD AUTO: 1.6 K/UL
LYMPHOCYTES NFR BLD: 37.6 %
MCH RBC QN AUTO: 29.4 PG
MCHC RBC AUTO-ENTMCNC: 32.9 G/DL
MCV RBC AUTO: 89 FL
MONOCYTES # BLD AUTO: 0.5 K/UL
MONOCYTES NFR BLD: 12.7 %
NEUTROPHILS # BLD AUTO: 1.8 K/UL
NEUTROPHILS NFR BLD: 43.7 %
NRBC BLD-RTO: 0 /100 WBC
PLATELET # BLD AUTO: 177 K/UL
PMV BLD AUTO: 10.7 FL
RBC # BLD AUTO: 4.39 M/UL
WBC # BLD AUTO: 4.18 K/UL

## 2018-03-07 PROCEDURE — 36415 COLL VENOUS BLD VENIPUNCTURE: CPT

## 2018-03-07 PROCEDURE — 85025 COMPLETE CBC W/AUTO DIFF WBC: CPT

## 2018-03-16 ENCOUNTER — TELEPHONE (OUTPATIENT)
Dept: CARDIOLOGY | Facility: CLINIC | Age: 73
End: 2018-03-16

## 2018-03-16 NOTE — TELEPHONE ENCOUNTER
----- Message from Lloyd Francis MD sent at 3/13/2018  9:29 AM CDT -----  Please call patient regarding normal result of Holter which did not reveal any arrhythmias. If he/she has any questions please let me know or have him/her schedule an appt to see me soon to discuss. Thank you

## 2018-05-23 ENCOUNTER — OFFICE VISIT (OUTPATIENT)
Dept: CARDIOLOGY | Facility: CLINIC | Age: 73
End: 2018-05-23
Payer: MEDICARE

## 2018-05-23 VITALS
DIASTOLIC BLOOD PRESSURE: 64 MMHG | HEART RATE: 81 BPM | SYSTOLIC BLOOD PRESSURE: 118 MMHG | HEIGHT: 68 IN | WEIGHT: 185.63 LBS | BODY MASS INDEX: 28.13 KG/M2

## 2018-05-23 DIAGNOSIS — I49.9 IRREGULAR HEART BEAT: ICD-10-CM

## 2018-05-23 DIAGNOSIS — R09.89 BRUIT: ICD-10-CM

## 2018-05-23 DIAGNOSIS — E78.49 OTHER HYPERLIPIDEMIA: ICD-10-CM

## 2018-05-23 DIAGNOSIS — R55 SYNCOPE AND COLLAPSE: Primary | ICD-10-CM

## 2018-05-23 DIAGNOSIS — M35.9 POLYMYOSITIS ASSOCIATED WITH AUTOIMMUNE DISEASE: ICD-10-CM

## 2018-05-23 DIAGNOSIS — M33.20 POLYMYOSITIS ASSOCIATED WITH AUTOIMMUNE DISEASE: ICD-10-CM

## 2018-05-23 PROCEDURE — 99214 OFFICE O/P EST MOD 30 MIN: CPT | Mod: S$GLB,,, | Performed by: INTERNAL MEDICINE

## 2018-05-23 PROCEDURE — 99999 PR PBB SHADOW E&M-EST. PATIENT-LVL III: CPT | Mod: PBBFAC,,, | Performed by: INTERNAL MEDICINE
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
## 2018-06-15 LAB — INTERNAL CAROTID STENOSIS: ABNORMAL

## 2018-06-19 ENCOUNTER — TELEPHONE (OUTPATIENT)
Dept: CARDIOLOGY | Facility: CLINIC | Age: 73
End: 2018-06-19

## 2018-06-19 NOTE — TELEPHONE ENCOUNTER
----- Message from Lloyd Francis MD sent at 6/15/2018  4:36 PM CDT -----  Please call the patient regarding her abnormal result. Mild plaque bilaterally, continue current medications will monitor.

## 2018-06-19 NOTE — TELEPHONE ENCOUNTER
Called to patient to give results of carotid ultrasound--  left voice message to call our office back at [phone].

## 2018-07-16 ENCOUNTER — OFFICE VISIT (OUTPATIENT)
Dept: RHEUMATOLOGY | Facility: CLINIC | Age: 73
End: 2018-07-16
Payer: MEDICARE

## 2018-07-16 VITALS
WEIGHT: 192.88 LBS | SYSTOLIC BLOOD PRESSURE: 124 MMHG | DIASTOLIC BLOOD PRESSURE: 70 MMHG | HEART RATE: 77 BPM | HEIGHT: 68 IN | BODY MASS INDEX: 29.23 KG/M2

## 2018-07-16 DIAGNOSIS — M17.12 PRIMARY OSTEOARTHRITIS OF LEFT KNEE: ICD-10-CM

## 2018-07-16 DIAGNOSIS — M35.9 POLYMYOSITIS ASSOCIATED WITH AUTOIMMUNE DISEASE: Primary | ICD-10-CM

## 2018-07-16 DIAGNOSIS — M33.20 POLYMYOSITIS ASSOCIATED WITH AUTOIMMUNE DISEASE: Primary | ICD-10-CM

## 2018-07-16 PROCEDURE — 99214 OFFICE O/P EST MOD 30 MIN: CPT | Mod: 25,S$GLB,, | Performed by: INTERNAL MEDICINE

## 2018-07-16 PROCEDURE — 99999 PR PBB SHADOW E&M-EST. PATIENT-LVL III: CPT | Mod: PBBFAC,,, | Performed by: INTERNAL MEDICINE

## 2018-07-16 PROCEDURE — 20610 DRAIN/INJ JOINT/BURSA W/O US: CPT | Mod: LT,S$GLB,, | Performed by: INTERNAL MEDICINE

## 2018-07-16 RX ORDER — TRIAMCINOLONE ACETONIDE 40 MG/ML
40 INJECTION, SUSPENSION INTRA-ARTICULAR; INTRAMUSCULAR ONCE
Status: COMPLETED | OUTPATIENT
Start: 2018-07-16 | End: 2018-07-16

## 2018-07-16 RX ORDER — LIDOCAINE HYDROCHLORIDE 20 MG/ML
2 INJECTION, SOLUTION INFILTRATION; PERINEURAL ONCE
Status: COMPLETED | OUTPATIENT
Start: 2018-07-16 | End: 2018-07-16

## 2018-07-16 RX ORDER — OMEPRAZOLE 20 MG/1
CAPSULE, DELAYED RELEASE ORAL
Refills: 2 | COMMUNITY
Start: 2018-06-15 | End: 2018-11-27

## 2018-07-16 RX ADMIN — TRIAMCINOLONE ACETONIDE 40 MG: 40 INJECTION, SUSPENSION INTRA-ARTICULAR; INTRAMUSCULAR at 11:07

## 2018-07-16 RX ADMIN — LIDOCAINE HYDROCHLORIDE 2 ML: 20 INJECTION, SOLUTION INFILTRATION; PERINEURAL at 10:07

## 2018-07-16 NOTE — ASSESSMENT & PLAN NOTE
Biopsy-proven polymyositis diagnosed in 1990's with a recent flare responding to Imuran.  No flare-up since tapering of Imuran in more than 5 months.  Continue to monitor off medications.

## 2018-07-16 NOTE — PROGRESS NOTES
RHEUMATOLOGY CLINIC FOLLOW UP VISIT  Chief complaints:-  My knee hurts.      HPI:-  Anca Kirkland a 73 y.o. pleasant female comes in for a follow up visit with above chief complaints.   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 She established care with me for worsening fatigue which improved since initiating prednisone and Imuran. Normal muscle enzymes always.  Since she was reporting well slowly the Imuran was tapered off and she has been off Imuran since February of 2018.  She denies any flare-up in the last 5 months.  Other than activity worsening left knee pain she denies any other problems today.  The left knee gets stiff and swollen usually around the end of the day.  No significant morning stiffness.. No muscle weakness chest pain, palpitations, diaphoresis or leg swelling. No orthopnea/PND.   Review of Systems   Constitutional: Negative for chills, fever, malaise/fatigue and weight loss.   HENT: Negative for ear discharge, ear pain, hearing loss, nosebleeds and sore throat.    Eyes: Negative for blurred vision, double vision, photophobia, discharge and redness.   Respiratory: Negative for cough, hemoptysis, sputum production and shortness of breath.    Cardiovascular: Negative for chest pain, palpitations and claudication.   Gastrointestinal: Negative for abdominal pain, constipation, diarrhea, melena, nausea and vomiting.   Genitourinary: Negative for dysuria, frequency, hematuria and urgency.   Musculoskeletal: Positive for joint pain. Negative for back pain, falls, myalgias and neck pain.   Skin: Negative for itching and rash.   Neurological: Negative for dizziness, tremors, sensory change, speech change, focal weakness, seizures, loss of consciousness, weakness and headaches.   Endo/Heme/Allergies: Negative for environmental allergies. Does not bruise/bleed easily.  "  Psychiatric/Behavioral: Negative for hallucinations and memory loss. The patient does not have insomnia.        Past Medical History:   Diagnosis Date    Hyperlipidemia        Past Surgical History:   Procedure Laterality Date    HYSTERECTOMY  1988        Social History   Substance Use Topics    Smoking status: Never Smoker    Smokeless tobacco: Never Used    Alcohol use Yes      Comment: seldom       Family History   Problem Relation Age of Onset    Cataracts Mother     Migraines Neg Hx     Melanoma Neg Hx     Lupus Neg Hx     Psoriasis Neg Hx        No Known Allergies        Physical examination:-    Vitals:    07/16/18 1010   BP: 124/70   Pulse: 77   Weight: 87.5 kg (192 lb 14.4 oz)   Height: 5' 8" (1.727 m)   PainSc:   7   PainLoc: Knee       Physical Exam   Constitutional: She is oriented to person, place, and time and well-developed, well-nourished, and in no distress. No distress.   HENT:   Head: Normocephalic and atraumatic.   Nose: Nose normal.   Mouth/Throat: Oropharynx is clear and moist. No oropharyngeal exudate.   Eyes: Conjunctivae and EOM are normal. Pupils are equal, round, and reactive to light. Right eye exhibits no discharge. Left eye exhibits no discharge. No scleral icterus.   Neck: Normal range of motion. Neck supple.   Cardiovascular: Normal rate and intact distal pulses.    Pulmonary/Chest: Effort normal. No respiratory distress. She exhibits no tenderness.   Abdominal: Soft. There is no tenderness.   Musculoskeletal:   Muscle strength 4+/5 bilateral upper and lower proximal and distal extremities.  No synovitis or effusion in small joints of hands or feet.  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  Bilateral knees showed crepitus with right more than left and joint line tenderness in both knees.  No effusion.   Lymphadenopathy:     She has no cervical adenopathy.   Neurological: She is alert and oriented to person, place, and time. No cranial nerve " deficit.   Skin: Skin is warm. No rash noted. She is not diaphoretic. No erythema. No pallor.   Psychiatric: Affect and judgment normal.   Nursing note and vitals reviewed.      Labs:-  Results for VIDYA ALVAREZ (MRN [id number]) as of 7/20/2016 13:36   Ref. Range 4/21/2016 15:43   VALDEZ HEP-2 Titer Unknown Positive >=1:2560...   Anti-SSA Antibody Latest Ref Range: 0.00 - 19.99 EU 1.64   Anti-SSA Interpretation Latest Ref Range: Negative  Negative   Anti-SSB Antibody Latest Ref Range: 0.00 - 19.99 EU 0.72   Anti-SSB Interpretation Latest Ref Range: Negative  Negative   ds DNA Ab Latest Ref Range: Negative 1:10  Positive >=1:5120 (A)   Anti Sm Antibody Latest Ref Range: 0.00 - 19.99 EU 2.94   Anti-Sm Interpretation Latest Ref Range: Negative  Negative   Anti Sm/RNP Antibody Latest Ref Range: 0.00 - 19.99 EU 0.00   Anti-Sm/RNP Interpretation Latest Ref Range: Negative  Negative   Anti-Emilia-1 Antibody Latest Ref Range: <20 Units <20   Anti-PM/Scl Ab Latest Ref Range: <20 Units <20   Anti-SS-A 52 kD Ab, IgG Latest Ref Range: <20 Units <20   Anti-U1-RNP  Ab Latest Ref Range: <20 Units <20   EJ Latest Ref Range: Negative  Negative   Fibrillarin (U3 RNP) Latest Ref Range: Negative  Negative   Ku Latest Ref Range: Negative  Negative   MDA-5 (P140) (CADM-140) Latest Ref Range: <20 Units <20   MI-2 Latest Ref Range: Negative  Weak Positive (A)   NXP-2 (P140) Latest Ref Range: <20 Units <20   OJ Latest Ref Range: Negative  Negative   PL-12 Latest Ref Range: Negative  Negative   PL-7 Latest Ref Range: Negative  Negative   SRP Latest Ref Range: Negative  Negative   TIF1 GAMMA (P155/140) Latest Ref Range: <20 Units <20   U2 snRNP Latest Ref Range: Negative  Negative     Assessment/Plans:-  # Primary osteoarthritis of left knee  Osteoarthritis of left knee with mild synovitis.  Inject Kenalog.  - triamcinolone acetonide injection 40 mg; Inject 1 mL (40 mg total) into the articular space once.  - lidocaine HCL 20 mg/ml (2%) injection  2 mL; 2 mLs by Other route once.    # Polymyositis associated with autoimmune disease  Biopsy-proven polymyositis diagnosed in 1990's with a recent flare responding to Imuran.  No flare-up since tapering of Imuran in more than 5 months.  Continue to monitor off medications.    PROCEDURE NOTE:-  Name of the procedure: Injection of left knee with kenalog .   Patient consent:   Indication, risks(including skin depigmentation, fat atrophy, infection, bleeding, nerve or tendon injury) and alternative to the procedure were explained to to the patient. Patient was given opportunity to ask questions . Then patient gave a verbal consent for the procedure.   Pertinent lab values: None indicated  Type of anesthesia: 2% Lidocaine   Description of procedure : Using sterile technique, the skin over left knee was cleaned with alcohol swab and then with chlorhexidine solution. After applying cold spray , the needle was inserted into the skin and into the joint space without any resistance with intermittent lidocaine injection and then 40 mg kenalog was injected into the joint space without any resistance.   Complication: None  Estimated blood loss: None  Disposition: Patient tolerated the procedure without any complains and the site was dressed.There were no complications.  Discharge instructions of icing and stretching given.  # RTC in 6 months.     Disclaimer: This note was prepared using voice recognition system and is likely to have sound alike errors .  Please call me with any questions

## 2018-08-21 ENCOUNTER — OFFICE VISIT (OUTPATIENT)
Dept: OPHTHALMOLOGY | Facility: CLINIC | Age: 73
End: 2018-08-21
Payer: MEDICARE

## 2018-08-21 DIAGNOSIS — Z83.511 FAMILY HISTORY OF GLAUCOMA IN MOTHER: ICD-10-CM

## 2018-08-21 DIAGNOSIS — H40.013 AT LOW RISK FOR OPEN-ANGLE GLAUCOMA IN BOTH EYES: Primary | ICD-10-CM

## 2018-08-21 DIAGNOSIS — Z96.1 PSEUDOPHAKIA OF BOTH EYES: ICD-10-CM

## 2018-08-21 PROCEDURE — 92014 COMPRE OPH EXAM EST PT 1/>: CPT | Mod: S$GLB,,, | Performed by: OPTOMETRIST

## 2018-08-21 PROCEDURE — 99999 PR PBB SHADOW E&M-EST. PATIENT-LVL I: CPT | Mod: PBBFAC,,, | Performed by: OPTOMETRIST

## 2018-08-21 PROCEDURE — 92133 CPTRZD OPH DX IMG PST SGM ON: CPT | Mod: S$GLB,,, | Performed by: OPTOMETRIST

## 2018-08-21 NOTE — PROGRESS NOTES
HPI     Glaucoma Suspect      Additional comments: DFE and gOCT              Comments     PT was last seen on 2/19/18 with DNL for IOP check and 24-2VF. PT was   told to rtc 6 months for DFE and gOCT.  Medication eye drops if any: none  Last HVF: 2/19/18  Last gOCT: 8/21/18  Last SDP: 6/2/16  HPI    Any vision changes since last exam: no  Eye pain: no  Other ocular symptoms: tearing, no gtts    Do you wear currently wear glasses or contacts? no    Interested in contacts today? no    Do you plan on getting new glasses today? If needed               Last edited by Rosa Morrison MA on 8/21/2018  1:25 PM. (History)            Assessment /Plan     For exam results, see Encounter Report.    At low risk for open-angle glaucoma in both eyes  -     Cancel: Guzman Visual Field - OU - Extended - Both Eyes  -     Posterior Segment OCT Optic Nerve- Both eyes  IOP is stable OU today and within acceptable range OU  No progression on gOCT  Monitor 12 months    Family history of glaucoma in mother  No evidence of disease at this time  Monitor 12 months    Pseudophakia of both eyes  Doing well with otc readers, declines spec rx at this time  Monitor 12 months    RTC 1 yr for dilated exam with 24-2VF and gOCT or PRN  Discussed above and all questions were answered.

## 2018-11-26 DIAGNOSIS — I49.9 IRREGULAR HEART BEAT: Primary | ICD-10-CM

## 2018-11-27 ENCOUNTER — OFFICE VISIT (OUTPATIENT)
Dept: CARDIOLOGY | Facility: CLINIC | Age: 73
End: 2018-11-27
Payer: MEDICARE

## 2018-11-27 ENCOUNTER — CLINICAL SUPPORT (OUTPATIENT)
Dept: CARDIOLOGY | Facility: CLINIC | Age: 73
End: 2018-11-27
Payer: MEDICARE

## 2018-11-27 VITALS
SYSTOLIC BLOOD PRESSURE: 130 MMHG | WEIGHT: 194.25 LBS | HEART RATE: 57 BPM | BODY MASS INDEX: 29.44 KG/M2 | HEIGHT: 68 IN | DIASTOLIC BLOOD PRESSURE: 80 MMHG

## 2018-11-27 DIAGNOSIS — I49.9 IRREGULAR HEART BEAT: ICD-10-CM

## 2018-11-27 DIAGNOSIS — M33.20 POLYMYOSITIS ASSOCIATED WITH AUTOIMMUNE DISEASE: ICD-10-CM

## 2018-11-27 DIAGNOSIS — I49.9 IRREGULAR HEART BEAT: Primary | ICD-10-CM

## 2018-11-27 DIAGNOSIS — R55 SYNCOPE AND COLLAPSE: ICD-10-CM

## 2018-11-27 DIAGNOSIS — E78.49 OTHER HYPERLIPIDEMIA: ICD-10-CM

## 2018-11-27 DIAGNOSIS — M35.9 POLYMYOSITIS ASSOCIATED WITH AUTOIMMUNE DISEASE: ICD-10-CM

## 2018-11-27 PROCEDURE — 93000 ELECTROCARDIOGRAM COMPLETE: CPT | Mod: S$GLB,,, | Performed by: INTERNAL MEDICINE

## 2018-11-27 PROCEDURE — 99214 OFFICE O/P EST MOD 30 MIN: CPT | Mod: S$GLB,,, | Performed by: INTERNAL MEDICINE

## 2018-11-27 PROCEDURE — 99999 PR PBB SHADOW E&M-EST. PATIENT-LVL III: CPT | Mod: PBBFAC,,, | Performed by: INTERNAL MEDICINE
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
## 2018-12-27 ENCOUNTER — CLINICAL SUPPORT (OUTPATIENT)
Dept: CARDIOLOGY | Facility: CLINIC | Age: 73
End: 2018-12-27
Attending: INTERNAL MEDICINE
Payer: MEDICARE

## 2018-12-27 DIAGNOSIS — R55 SYNCOPE AND COLLAPSE: ICD-10-CM

## 2018-12-27 DIAGNOSIS — I49.9 IRREGULAR HEART BEAT: ICD-10-CM

## 2018-12-27 PROCEDURE — 0296T HOLTER MONITOR - 3-14 DAY ADULT: CPT | Mod: S$GLB,,, | Performed by: INTERNAL MEDICINE

## 2018-12-27 PROCEDURE — 0298T HOLTER MONITOR - 3-14 DAY ADULT: CPT | Mod: S$GLB,,, | Performed by: INTERNAL MEDICINE

## 2019-01-16 ENCOUNTER — OFFICE VISIT (OUTPATIENT)
Dept: RHEUMATOLOGY | Facility: CLINIC | Age: 74
End: 2019-01-16
Payer: MEDICARE

## 2019-01-16 VITALS
WEIGHT: 197.31 LBS | BODY MASS INDEX: 29.9 KG/M2 | HEART RATE: 70 BPM | HEIGHT: 68 IN | SYSTOLIC BLOOD PRESSURE: 129 MMHG | DIASTOLIC BLOOD PRESSURE: 83 MMHG

## 2019-01-16 DIAGNOSIS — M33.20 POLYMYOSITIS ASSOCIATED WITH AUTOIMMUNE DISEASE: ICD-10-CM

## 2019-01-16 DIAGNOSIS — M35.9 POLYMYOSITIS ASSOCIATED WITH AUTOIMMUNE DISEASE: ICD-10-CM

## 2019-01-16 DIAGNOSIS — M17.12 PRIMARY OSTEOARTHRITIS OF LEFT KNEE: ICD-10-CM

## 2019-01-16 DIAGNOSIS — M25.511 SEVERE PAIN OF RIGHT SHOULDER: Primary | ICD-10-CM

## 2019-01-16 PROCEDURE — 20610 PR DRAIN/INJECT LARGE JOINT/BURSA: ICD-10-PCS | Mod: 59,RT,S$GLB, | Performed by: INTERNAL MEDICINE

## 2019-01-16 PROCEDURE — 1101F PT FALLS ASSESS-DOCD LE1/YR: CPT | Mod: CPTII,S$GLB,, | Performed by: INTERNAL MEDICINE

## 2019-01-16 PROCEDURE — 99214 OFFICE O/P EST MOD 30 MIN: CPT | Mod: 25,S$GLB,, | Performed by: INTERNAL MEDICINE

## 2019-01-16 PROCEDURE — 20610 DRAIN/INJ JOINT/BURSA W/O US: CPT | Mod: 59,RT,S$GLB, | Performed by: INTERNAL MEDICINE

## 2019-01-16 PROCEDURE — 1101F PR PT FALLS ASSESS DOC 0-1 FALLS W/OUT INJ PAST YR: ICD-10-PCS | Mod: CPTII,S$GLB,, | Performed by: INTERNAL MEDICINE

## 2019-01-16 PROCEDURE — 99214 PR OFFICE/OUTPT VISIT, EST, LEVL IV, 30-39 MIN: ICD-10-PCS | Mod: 25,S$GLB,, | Performed by: INTERNAL MEDICINE

## 2019-01-16 PROCEDURE — 99999 PR PBB SHADOW E&M-EST. PATIENT-LVL III: CPT | Mod: PBBFAC,,, | Performed by: INTERNAL MEDICINE

## 2019-01-16 PROCEDURE — 99999 PR PBB SHADOW E&M-EST. PATIENT-LVL III: ICD-10-PCS | Mod: PBBFAC,,, | Performed by: INTERNAL MEDICINE

## 2019-01-16 RX ORDER — TRIAMCINOLONE ACETONIDE 40 MG/ML
40 INJECTION, SUSPENSION INTRA-ARTICULAR; INTRAMUSCULAR ONCE
Status: COMPLETED | OUTPATIENT
Start: 2019-01-16 | End: 2019-01-16

## 2019-01-16 RX ADMIN — TRIAMCINOLONE ACETONIDE 40 MG: 40 INJECTION, SUSPENSION INTRA-ARTICULAR; INTRAMUSCULAR at 11:01

## 2019-01-16 NOTE — ASSESSMENT & PLAN NOTE
Worsening severe pain of right shoulder joint associated with restricted range of motion suggestive of synovitis without any other systemic arthritic disease.  Inject her right shoulder with Kenalog.

## 2019-01-16 NOTE — ASSESSMENT & PLAN NOTE
Significant improvement with the last corticosteroid injection.  No active synovitis on examination today.  Monitor.

## 2019-01-16 NOTE — PROGRESS NOTES
RHEUMATOLOGY CLINIC FOLLOW UP VISIT  Chief complaints:-  My right shoulder hurts.      HPI:-  Anca Kirkland a 73 y.o. pleasant female comes in for a follow up visit with above chief complaints.   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 She established care with me for worsening fatigue which improved since initiating prednisone and Imuran. Normal muscle enzymes always.  Since she was reporting well slowly the Imuran was tapered off and she has been off Imuran since February of 2018.  She denies any flare-up since last visit.  Other than activity worsening right shoulder joint pain she denies any other problems today.  The right shoulder gets stiff and swollen usually around the end of the day.  No significant morning stiffness.. No muscle weakness chest pain, palpitations, diaphoresis or leg swelling. No orthopnea/PND.   Review of Systems   Constitutional: Negative for chills, fever, malaise/fatigue and weight loss.   HENT: Negative for ear discharge, ear pain, hearing loss, nosebleeds and sore throat.    Eyes: Negative for blurred vision, double vision, photophobia, discharge and redness.   Respiratory: Negative for cough, hemoptysis, sputum production and shortness of breath.    Cardiovascular: Negative for chest pain, palpitations and claudication.   Gastrointestinal: Negative for abdominal pain, constipation, diarrhea, melena, nausea and vomiting.   Genitourinary: Negative for dysuria, frequency, hematuria and urgency.   Musculoskeletal: Positive for joint pain. Negative for back pain, falls, myalgias and neck pain.   Skin: Negative for itching and rash.   Neurological: Negative for dizziness, tremors, sensory change, speech change, focal weakness, seizures, loss of consciousness, weakness and headaches.   Endo/Heme/Allergies: Negative for environmental allergies. Does not bruise/bleed easily.    Psychiatric/Behavioral: Negative for hallucinations and memory loss. The patient does not have insomnia.        Past Medical History:   Diagnosis Date    Hyperlipidemia        Past Surgical History:   Procedure Laterality Date    HYSTERECTOMY  1988        Social History     Tobacco Use    Smoking status: Never Smoker    Smokeless tobacco: Never Used   Substance Use Topics    Alcohol use: Yes     Comment: seldom    Drug use: Not on file       Family History   Problem Relation Age of Onset    Cataracts Mother     Migraines Neg Hx     Melanoma Neg Hx     Lupus Neg Hx     Psoriasis Neg Hx        No Known Allergies        Physical examination:-    There were no vitals filed for this visit.    Physical Exam   Constitutional: She is oriented to person, place, and time and well-developed, well-nourished, and in no distress. No distress.   HENT:   Head: Normocephalic and atraumatic.   Nose: Nose normal.   Mouth/Throat: Oropharynx is clear and moist. No oropharyngeal exudate.   Eyes: Conjunctivae and EOM are normal. Pupils are equal, round, and reactive to light. Right eye exhibits no discharge. Left eye exhibits no discharge. No scleral icterus.   Neck: Normal range of motion. Neck supple.   Cardiovascular: Normal rate and intact distal pulses.   Pulmonary/Chest: Effort normal. No respiratory distress. She exhibits no tenderness.   Abdominal: Soft. There is no tenderness.   Musculoskeletal:   Muscle strength 4+/5 bilateral upper and lower proximal and distal extremities.  No synovitis or effusion in small joints of hands or feet.  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  Bilateral knees showed crepitus with right more than left and joint line tenderness in both knees.  No effusion.   Lymphadenopathy:     She has no cervical adenopathy.   Neurological: She is alert and oriented to person, place, and time. No cranial nerve deficit.   Skin: Skin is warm. No rash noted. She is not  diaphoretic. No erythema. No pallor.   Psychiatric: Affect and judgment normal.   Nursing note and vitals reviewed.      Labs:-  Results for VIDYA ALVAREZ (MRN [id number]) as of 7/20/2016 13:36   Ref. Range 4/21/2016 15:43   VALDEZ HEP-2 Titer Unknown Positive >=1:2560...   Anti-SSA Antibody Latest Ref Range: 0.00 - 19.99 EU 1.64   Anti-SSA Interpretation Latest Ref Range: Negative  Negative   Anti-SSB Antibody Latest Ref Range: 0.00 - 19.99 EU 0.72   Anti-SSB Interpretation Latest Ref Range: Negative  Negative   ds DNA Ab Latest Ref Range: Negative 1:10  Positive >=1:5120 (A)   Anti Sm Antibody Latest Ref Range: 0.00 - 19.99 EU 2.94   Anti-Sm Interpretation Latest Ref Range: Negative  Negative   Anti Sm/RNP Antibody Latest Ref Range: 0.00 - 19.99 EU 0.00   Anti-Sm/RNP Interpretation Latest Ref Range: Negative  Negative   Anti-Emilia-1 Antibody Latest Ref Range: <20 Units <20   Anti-PM/Scl Ab Latest Ref Range: <20 Units <20   Anti-SS-A 52 kD Ab, IgG Latest Ref Range: <20 Units <20   Anti-U1-RNP  Ab Latest Ref Range: <20 Units <20   EJ Latest Ref Range: Negative  Negative   Fibrillarin (U3 RNP) Latest Ref Range: Negative  Negative   Ku Latest Ref Range: Negative  Negative   MDA-5 (P140) (CADM-140) Latest Ref Range: <20 Units <20   MI-2 Latest Ref Range: Negative  Weak Positive (A)   NXP-2 (P140) Latest Ref Range: <20 Units <20   OJ Latest Ref Range: Negative  Negative   PL-12 Latest Ref Range: Negative  Negative   PL-7 Latest Ref Range: Negative  Negative   SRP Latest Ref Range: Negative  Negative   TIF1 GAMMA (P155/140) Latest Ref Range: <20 Units <20   U2 snRNP Latest Ref Range: Negative  Negative     Assessment/Plans:-  Severe pain of right shoulder  Worsening severe pain of right shoulder joint associated with restricted range of motion suggestive of synovitis without any other systemic arthritic disease.  Inject her right shoulder with Kenalog.  - triamcinolone acetonide injection 40 mg    Primary osteoarthritis of  left knee  Significant improvement with the last corticosteroid injection.  No active synovitis on examination today.  Monitor.    Polymyositis associated with autoimmune disease  Biopsy-proven polymyositis diagnosed in 1990s without any recent flare after stopping Imuran last year.  5/5 muscle strength on examination today except right shoulder muscles.  Continue to monitor without medications.    PROCEDURE NOTE:-  Name of the procedure: Injection of right shoulder joint with kenalog .   Patient consent:   Indication, risks(including skin depigmentation, fat atrophy, infection, bleeding, nerve or tendon injury) and alternative to the procedure were explained to to the patient. Patient was given opportunity to ask questions . Then patient gave a verbal consent for the procedure.   Pertinent lab values: None indicated  Type of anesthesia: 2% Lidocaine   Description of procedure : Using sterile technique, the skin over posterior right shoulder joint was cleaned with alcohol swab and then with chlorhexidine solution. After applying cold spray , the needle was inserted into the skin and into the joint space without any resistance with intermittent lidocaine injection and then 40 mg kenalog was injected into the joint space without any resistance.   Complication: None  Estimated blood loss: None  Disposition: Patient tolerated the procedure without any complains and the site was dressed.There were no complications.  Discharge instructions of icing and stretching given.  # RTC in 6 months.     Disclaimer: This note was prepared using voice recognition system and is likely to have sound alike errors .  Please call me with any questions

## 2019-01-16 NOTE — ASSESSMENT & PLAN NOTE
Biopsy-proven polymyositis diagnosed in 1990s without any recent flare after stopping Imuran last year.  5/5 muscle strength on examination today except right shoulder muscles.  Continue to monitor without medications.

## 2019-02-13 ENCOUNTER — TELEPHONE (OUTPATIENT)
Dept: CARDIOLOGY | Facility: CLINIC | Age: 74
End: 2019-02-13

## 2019-02-13 NOTE — TELEPHONE ENCOUNTER
The patient has been notified of this information and all questions answered. Voiced understand

## 2019-02-13 NOTE — TELEPHONE ENCOUNTER
----- Message from Lloyd Francis MD sent at 2/13/2019 12:18 PM CST -----  Please call patient regarding normal result of overall monitor, one one fast episode of heart rate at 4 beats, continue to monitor and follow up. If he/she has any questions please let me know or have him/her schedule an appt to see me soon to discuss. Thank you

## 2019-02-28 ENCOUNTER — LAB VISIT (OUTPATIENT)
Dept: LAB | Facility: HOSPITAL | Age: 74
End: 2019-02-28
Attending: INTERNAL MEDICINE
Payer: MEDICARE

## 2019-02-28 ENCOUNTER — OFFICE VISIT (OUTPATIENT)
Dept: CARDIOLOGY | Facility: CLINIC | Age: 74
End: 2019-02-28
Payer: MEDICARE

## 2019-02-28 VITALS
WEIGHT: 192 LBS | DIASTOLIC BLOOD PRESSURE: 82 MMHG | HEIGHT: 68 IN | BODY MASS INDEX: 29.1 KG/M2 | SYSTOLIC BLOOD PRESSURE: 126 MMHG | HEART RATE: 64 BPM

## 2019-02-28 DIAGNOSIS — R55 SYNCOPE AND COLLAPSE: ICD-10-CM

## 2019-02-28 DIAGNOSIS — I49.9 IRREGULAR HEART BEAT: Primary | ICD-10-CM

## 2019-02-28 DIAGNOSIS — I49.9 IRREGULAR HEART BEAT: ICD-10-CM

## 2019-02-28 DIAGNOSIS — M35.9 POLYMYOSITIS ASSOCIATED WITH AUTOIMMUNE DISEASE: ICD-10-CM

## 2019-02-28 DIAGNOSIS — M33.20 POLYMYOSITIS ASSOCIATED WITH AUTOIMMUNE DISEASE: ICD-10-CM

## 2019-02-28 DIAGNOSIS — E78.49 OTHER HYPERLIPIDEMIA: ICD-10-CM

## 2019-02-28 LAB
CHOLEST SERPL-MCNC: 140 MG/DL
CHOLEST/HDLC SERPL: 2.9 {RATIO}
HDLC SERPL-MCNC: 49 MG/DL
HDLC SERPL: 35 %
LDLC SERPL CALC-MCNC: 79.6 MG/DL
NONHDLC SERPL-MCNC: 91 MG/DL
T4 FREE SERPL-MCNC: 0.89 NG/DL
TRIGL SERPL-MCNC: 57 MG/DL
TSH SERPL DL<=0.005 MIU/L-ACNC: 0.49 UIU/ML

## 2019-02-28 PROCEDURE — 1101F PR PT FALLS ASSESS DOC 0-1 FALLS W/OUT INJ PAST YR: ICD-10-PCS | Mod: CPTII,S$GLB,, | Performed by: INTERNAL MEDICINE

## 2019-02-28 PROCEDURE — 99999 PR PBB SHADOW E&M-EST. PATIENT-LVL III: CPT | Mod: PBBFAC,,, | Performed by: INTERNAL MEDICINE

## 2019-02-28 PROCEDURE — 99999 PR PBB SHADOW E&M-EST. PATIENT-LVL III: ICD-10-PCS | Mod: PBBFAC,,, | Performed by: INTERNAL MEDICINE

## 2019-02-28 PROCEDURE — 1101F PT FALLS ASSESS-DOCD LE1/YR: CPT | Mod: CPTII,S$GLB,, | Performed by: INTERNAL MEDICINE

## 2019-02-28 PROCEDURE — 36415 COLL VENOUS BLD VENIPUNCTURE: CPT

## 2019-02-28 PROCEDURE — 84439 ASSAY OF FREE THYROXINE: CPT

## 2019-02-28 PROCEDURE — 99214 PR OFFICE/OUTPT VISIT, EST, LEVL IV, 30-39 MIN: ICD-10-PCS | Mod: S$GLB,,, | Performed by: INTERNAL MEDICINE

## 2019-02-28 PROCEDURE — 99214 OFFICE O/P EST MOD 30 MIN: CPT | Mod: S$GLB,,, | Performed by: INTERNAL MEDICINE

## 2019-02-28 PROCEDURE — 84443 ASSAY THYROID STIM HORMONE: CPT

## 2019-02-28 PROCEDURE — 80061 LIPID PANEL: CPT
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
## 2019-03-01 ENCOUNTER — TELEPHONE (OUTPATIENT)
Dept: CARDIOLOGY | Facility: CLINIC | Age: 74
End: 2019-03-01

## 2019-03-01 NOTE — TELEPHONE ENCOUNTER
----- Message from Lloyd Francis MD sent at 3/1/2019  8:22 AM CST -----  Please call patient regarding normal result of thyroid function . If he/she has any questions please let me know or have him/her schedule an appt to see me soon to discuss. Thank you

## 2019-07-16 ENCOUNTER — OFFICE VISIT (OUTPATIENT)
Dept: RHEUMATOLOGY | Facility: CLINIC | Age: 74
End: 2019-07-16
Payer: MEDICARE

## 2019-07-16 VITALS
BODY MASS INDEX: 28.5 KG/M2 | WEIGHT: 188.06 LBS | HEART RATE: 77 BPM | DIASTOLIC BLOOD PRESSURE: 78 MMHG | SYSTOLIC BLOOD PRESSURE: 115 MMHG | HEIGHT: 68 IN

## 2019-07-16 DIAGNOSIS — M47.22 CERVICAL RADICULOPATHY DUE TO DEGENERATIVE JOINT DISEASE OF SPINE: ICD-10-CM

## 2019-07-16 DIAGNOSIS — M35.9 POLYMYOSITIS ASSOCIATED WITH AUTOIMMUNE DISEASE: Primary | ICD-10-CM

## 2019-07-16 DIAGNOSIS — M33.20 POLYMYOSITIS ASSOCIATED WITH AUTOIMMUNE DISEASE: Primary | ICD-10-CM

## 2019-07-16 PROCEDURE — 1101F PR PT FALLS ASSESS DOC 0-1 FALLS W/OUT INJ PAST YR: ICD-10-PCS | Mod: CPTII,S$GLB,, | Performed by: INTERNAL MEDICINE

## 2019-07-16 PROCEDURE — 99214 PR OFFICE/OUTPT VISIT, EST, LEVL IV, 30-39 MIN: ICD-10-PCS | Mod: S$GLB,,, | Performed by: INTERNAL MEDICINE

## 2019-07-16 PROCEDURE — 99999 PR PBB SHADOW E&M-EST. PATIENT-LVL III: CPT | Mod: PBBFAC,,, | Performed by: INTERNAL MEDICINE

## 2019-07-16 PROCEDURE — 1101F PT FALLS ASSESS-DOCD LE1/YR: CPT | Mod: CPTII,S$GLB,, | Performed by: INTERNAL MEDICINE

## 2019-07-16 PROCEDURE — 99214 OFFICE O/P EST MOD 30 MIN: CPT | Mod: S$GLB,,, | Performed by: INTERNAL MEDICINE

## 2019-07-16 PROCEDURE — 99999 PR PBB SHADOW E&M-EST. PATIENT-LVL III: ICD-10-PCS | Mod: PBBFAC,,, | Performed by: INTERNAL MEDICINE

## 2019-07-16 RX ORDER — TIZANIDINE 2 MG/1
2 TABLET ORAL EVERY 8 HOURS PRN
Qty: 30 TABLET | Refills: 0 | Status: SHIPPED | OUTPATIENT
Start: 2019-07-16 | End: 2019-08-12 | Stop reason: SDUPTHER

## 2019-07-16 RX ORDER — TRIAMCINOLONE ACETONIDE 1 MG/G
OINTMENT TOPICAL
Refills: 0 | COMMUNITY
Start: 2019-06-25

## 2019-07-16 NOTE — PROGRESS NOTES
"                                                     RHEUMATOLOGY CLINIC FOLLOW UP VISIT  Chief complaints:-  To follow up for myositis.    HPI:-  Anca Kirkland a 74 y.o. pleasant female comes in for a follow up visit.  She denies any muscle weakness.  No recurrence of myositis symptoms since last visit.  No skin rash.  Only problem bothering her is pain around her right shoulder region with some numbness tingling around her right forearm.  No significant neck pain.  Has tightness around the neck.  No injury.    Review of Systems   Constitutional: Negative for chills and fever.   HENT: Negative for congestion and sore throat.    Eyes: Negative for blurred vision and redness.   Respiratory: Negative for cough and shortness of breath.    Cardiovascular: Negative for chest pain and leg swelling.   Gastrointestinal: Negative for abdominal pain.   Genitourinary: Negative for dysuria.   Musculoskeletal: Positive for joint pain and neck pain. Negative for back pain, falls and myalgias.   Skin: Negative for rash.   Neurological: Negative for headaches.   Endo/Heme/Allergies: Does not bruise/bleed easily.   Psychiatric/Behavioral: Negative for memory loss. The patient does not have insomnia.        Past Medical History:   Diagnosis Date    Hyperlipidemia        Past Surgical History:   Procedure Laterality Date    HYSTERECTOMY  1988        Social History     Tobacco Use    Smoking status: Never Smoker    Smokeless tobacco: Never Used   Substance Use Topics    Alcohol use: Yes     Comment: seldom    Drug use: Not on file       Family History   Problem Relation Age of Onset    Cataracts Mother     Migraines Neg Hx     Melanoma Neg Hx     Lupus Neg Hx     Psoriasis Neg Hx        Review of patient's allergies indicates:  No Known Allergies    Vitals:    07/16/19 1025   BP: 115/78   Pulse: 77   Weight: 85.3 kg (188 lb 0.8 oz)   Height: 5' 8" (1.727 m)   PainSc: 0-No pain       Physical Exam   Constitutional: She " is oriented to person, place, and time and well-developed, well-nourished, and in no distress. No distress.   HENT:   Head: Normocephalic.   Mouth/Throat: Oropharynx is clear and moist.   Eyes: Pupils are equal, round, and reactive to light. Conjunctivae and EOM are normal.   Neck: Normal range of motion. Neck supple.   Cardiovascular: Normal rate and intact distal pulses.   Pulmonary/Chest: Effort normal. No respiratory distress.   Abdominal: Soft. There is no tenderness.   Musculoskeletal:   No synovitis over small joints of hands or feet.  No effusion over large joints.  Mild tenderness over right cervical paraspinal region.   Neurological: She is alert and oriented to person, place, and time. No cranial nerve deficit.   Skin: Skin is warm. No rash noted. No erythema.   Psychiatric: Mood and affect normal.   Nursing note and vitals reviewed.      Medication List with Changes/Refills   New Medications    TIZANIDINE (ZANAFLEX) 2 MG TABLET    Take 1 tablet (2 mg total) by mouth every 8 (eight) hours as needed (Muscle spasm).   Current Medications    ASPIRIN (ECOTRIN) 81 MG EC TABLET    Take 81 mg by mouth once daily.    ATORVASTATIN (LIPITOR) 40 MG TABLET    Take 40 mg by mouth once daily.    BIOTIN ORAL    Take 4,000 mcg by mouth once daily.     METFORMIN (GLUCOPHAGE-XR) 750 MG 24 HR TABLET    Take 750 mg by mouth daily with breakfast.    MULTIVITAMIN (ONE DAILY MULTIVITAMIN) PER TABLET    Take 1 tablet by mouth once daily.    TRIAMCINOLONE ACETONIDE 0.1% (KENALOG) 0.1 % OINTMENT    APPLY TO RASH TOPICALLY TWICE DAILY       Assessment/Plans:-  1. Polymyositis associated with autoimmune disease    2. Cervical radiculopathy due to degenerative joint disease of spine      Problem List Items Addressed This Visit        Neuro    Cervical radiculopathy due to degenerative joint disease of spine    Current Assessment & Plan     Paraspinal muscle spasm.  Try tizanidine.         Relevant Medications    tiZANidine (ZANAFLEX)  2 MG tablet       Immunology/Multi System    Polymyositis associated with autoimmune disease - Primary    Current Assessment & Plan     In remission.  Normal muscle strength.  Continue to monitor clinically.             # No follow-ups on file.      Disclaimer: This note was prepared using voice recognition system and is likely to have sound alike errors and is not proof read.  Please call me with any questions.

## 2019-08-12 DIAGNOSIS — M47.22 CERVICAL RADICULOPATHY DUE TO DEGENERATIVE JOINT DISEASE OF SPINE: ICD-10-CM

## 2019-08-12 RX ORDER — TIZANIDINE 2 MG/1
2 TABLET ORAL EVERY 8 HOURS PRN
Qty: 30 TABLET | Refills: 0 | Status: SHIPPED | OUTPATIENT
Start: 2019-08-12 | End: 2019-08-22

## 2019-08-27 ENCOUNTER — OFFICE VISIT (OUTPATIENT)
Dept: OPHTHALMOLOGY | Facility: CLINIC | Age: 74
End: 2019-08-27
Payer: MEDICARE

## 2019-08-27 DIAGNOSIS — Z96.1 PSEUDOPHAKIA OF BOTH EYES: ICD-10-CM

## 2019-08-27 DIAGNOSIS — Z83.511 FAMILY HISTORY OF GLAUCOMA IN MOTHER: ICD-10-CM

## 2019-08-27 DIAGNOSIS — H40.013 OPEN ANGLE WITH BORDERLINE FINDINGS OF BOTH EYES: Primary | ICD-10-CM

## 2019-08-27 PROCEDURE — 99999 PR PBB SHADOW E&M-EST. PATIENT-LVL I: ICD-10-PCS | Mod: PBBFAC,,, | Performed by: OPTOMETRIST

## 2019-08-27 PROCEDURE — 92083 HUMPHREY VISUAL FIELD - OU - BOTH EYES: ICD-10-PCS | Mod: S$GLB,,, | Performed by: OPTOMETRIST

## 2019-08-27 PROCEDURE — 92133 POSTERIOR SEGMENT OCT OPTIC NERVE(OCULAR COHERENCE TOMOGRAPHY) - OU - BOTH EYES: ICD-10-PCS | Mod: S$GLB,,, | Performed by: OPTOMETRIST

## 2019-08-27 PROCEDURE — 92014 COMPRE OPH EXAM EST PT 1/>: CPT | Mod: S$GLB,,, | Performed by: OPTOMETRIST

## 2019-08-27 PROCEDURE — 92133 CPTRZD OPH DX IMG PST SGM ON: CPT | Mod: S$GLB,,, | Performed by: OPTOMETRIST

## 2019-08-27 PROCEDURE — 99999 PR PBB SHADOW E&M-EST. PATIENT-LVL I: CPT | Mod: PBBFAC,,, | Performed by: OPTOMETRIST

## 2019-08-27 PROCEDURE — 92014 PR EYE EXAM, EST PATIENT,COMPREHESV: ICD-10-PCS | Mod: S$GLB,,, | Performed by: OPTOMETRIST

## 2019-08-27 PROCEDURE — 92083 EXTENDED VISUAL FIELD XM: CPT | Mod: S$GLB,,, | Performed by: OPTOMETRIST

## 2019-08-27 NOTE — PROGRESS NOTES
HPI     PT was last seen on 8/21/18 with DNL for full exam. PT was told to rtc 1   yr for dilated exam with 24-2VF and gOCT  Medication eye drops if any: none  Last HVF: 8/27/19  Last gOCT: 8/27/19  Last SDP: 6/2/16  HPI    Any vision changes since last exam: decreased near va  Eye pain: no  Other ocular symptoms: tearing, ATs prn with relief     Do you wear currently wear glasses or contacts? otc readers, nothing right   now     Interested in contacts today? no     Do you plan on getting new glasses today? If needed    Last edited by Rosa Morrison MA on 8/27/2019  2:06 PM. (History)            Assessment /Plan     For exam results, see Encounter Report.    Open angle with borderline findings of both eyes  -     Guzman Visual Field - OU - Extended - Both Eyes  -     Posterior Segment OCT Optic Nerve- Both eyes  Good IOP today  VF and gOCT stable with no progression OD, OS  Monitor 12 months    Family history of glaucoma in mother    Pseudophakia of both eyes  Doing well with no va complaints at this time  Ok to c/w OTC readers PRN    RTC 1 yr for dilated eye exam with 24-2VF and gOCT or PRN if any problems.   Discussed above and answered questions.

## 2019-08-29 ENCOUNTER — CLINICAL SUPPORT (OUTPATIENT)
Dept: CARDIOLOGY | Facility: CLINIC | Age: 74
End: 2019-08-29
Attending: INTERNAL MEDICINE
Payer: MEDICARE

## 2019-08-29 ENCOUNTER — OFFICE VISIT (OUTPATIENT)
Dept: CARDIOLOGY | Facility: CLINIC | Age: 74
End: 2019-08-29
Payer: MEDICARE

## 2019-08-29 VITALS
SYSTOLIC BLOOD PRESSURE: 120 MMHG | HEIGHT: 68 IN | BODY MASS INDEX: 28.07 KG/M2 | HEART RATE: 76 BPM | WEIGHT: 185.19 LBS | DIASTOLIC BLOOD PRESSURE: 78 MMHG

## 2019-08-29 DIAGNOSIS — M33.20 POLYMYOSITIS ASSOCIATED WITH AUTOIMMUNE DISEASE: ICD-10-CM

## 2019-08-29 DIAGNOSIS — R55 SYNCOPE AND COLLAPSE: ICD-10-CM

## 2019-08-29 DIAGNOSIS — I49.9 IRREGULAR HEART BEAT: ICD-10-CM

## 2019-08-29 DIAGNOSIS — R55 SYNCOPE AND COLLAPSE: Primary | ICD-10-CM

## 2019-08-29 DIAGNOSIS — M35.9 POLYMYOSITIS ASSOCIATED WITH AUTOIMMUNE DISEASE: ICD-10-CM

## 2019-08-29 DIAGNOSIS — E78.49 OTHER HYPERLIPIDEMIA: ICD-10-CM

## 2019-08-29 PROCEDURE — 1101F PT FALLS ASSESS-DOCD LE1/YR: CPT | Mod: CPTII,S$GLB,, | Performed by: INTERNAL MEDICINE

## 2019-08-29 PROCEDURE — 93005 EKG 12-LEAD: ICD-10-PCS | Mod: S$GLB,,, | Performed by: INTERNAL MEDICINE

## 2019-08-29 PROCEDURE — 93010 ELECTROCARDIOGRAM REPORT: CPT | Mod: S$GLB,,, | Performed by: INTERNAL MEDICINE

## 2019-08-29 PROCEDURE — 99999 PR PBB SHADOW E&M-EST. PATIENT-LVL III: ICD-10-PCS | Mod: PBBFAC,,, | Performed by: INTERNAL MEDICINE

## 2019-08-29 PROCEDURE — 93268 ECG RECORD/REVIEW: CPT | Mod: S$GLB,,, | Performed by: INTERNAL MEDICINE

## 2019-08-29 PROCEDURE — 99214 PR OFFICE/OUTPT VISIT, EST, LEVL IV, 30-39 MIN: ICD-10-PCS | Mod: S$GLB,,, | Performed by: INTERNAL MEDICINE

## 2019-08-29 PROCEDURE — 93010 EKG 12-LEAD: ICD-10-PCS | Mod: S$GLB,,, | Performed by: INTERNAL MEDICINE

## 2019-08-29 PROCEDURE — 99214 OFFICE O/P EST MOD 30 MIN: CPT | Mod: S$GLB,,, | Performed by: INTERNAL MEDICINE

## 2019-08-29 PROCEDURE — 93268 CARDIAC EVENT MONITOR - 30 DAYS: ICD-10-PCS | Mod: S$GLB,,, | Performed by: INTERNAL MEDICINE

## 2019-08-29 PROCEDURE — 1101F PR PT FALLS ASSESS DOC 0-1 FALLS W/OUT INJ PAST YR: ICD-10-PCS | Mod: CPTII,S$GLB,, | Performed by: INTERNAL MEDICINE

## 2019-08-29 PROCEDURE — 93005 ELECTROCARDIOGRAM TRACING: CPT | Mod: S$GLB,,, | Performed by: INTERNAL MEDICINE

## 2019-08-29 PROCEDURE — 99999 PR PBB SHADOW E&M-EST. PATIENT-LVL III: CPT | Mod: PBBFAC,,, | Performed by: INTERNAL MEDICINE

## 2019-08-29 RX ORDER — PNV NO.95/FERROUS FUM/FOLIC AC 28MG-0.8MG
100 TABLET ORAL DAILY
COMMUNITY
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
## 2019-09-06 DIAGNOSIS — R55 SYNCOPE AND COLLAPSE: Primary | ICD-10-CM

## 2019-09-09 ENCOUNTER — OFFICE VISIT (OUTPATIENT)
Dept: OTOLARYNGOLOGY | Facility: CLINIC | Age: 74
End: 2019-09-09
Payer: MEDICARE

## 2019-09-09 VITALS
BODY MASS INDEX: 28.53 KG/M2 | SYSTOLIC BLOOD PRESSURE: 103 MMHG | HEART RATE: 89 BPM | DIASTOLIC BLOOD PRESSURE: 71 MMHG | WEIGHT: 187.63 LBS

## 2019-09-09 DIAGNOSIS — H61.23 BILATERAL IMPACTED CERUMEN: Primary | ICD-10-CM

## 2019-09-09 DIAGNOSIS — H69.92 ETD (EUSTACHIAN TUBE DYSFUNCTION), LEFT: ICD-10-CM

## 2019-09-09 PROCEDURE — 69210 REMOVE IMPACTED EAR WAX UNI: CPT | Mod: S$GLB,,, | Performed by: PHYSICIAN ASSISTANT

## 2019-09-09 PROCEDURE — 69210 PR REMOVAL IMPACTED CERUMEN REQUIRING INSTRUMENTATION, UNILATERAL: ICD-10-PCS | Mod: S$GLB,,, | Performed by: PHYSICIAN ASSISTANT

## 2019-09-09 PROCEDURE — 99999 PR PBB SHADOW E&M-EST. PATIENT-LVL III: ICD-10-PCS | Mod: PBBFAC,,, | Performed by: PHYSICIAN ASSISTANT

## 2019-09-09 PROCEDURE — 99213 OFFICE O/P EST LOW 20 MIN: CPT | Mod: 25,S$GLB,, | Performed by: PHYSICIAN ASSISTANT

## 2019-09-09 PROCEDURE — 1101F PT FALLS ASSESS-DOCD LE1/YR: CPT | Mod: CPTII,S$GLB,, | Performed by: PHYSICIAN ASSISTANT

## 2019-09-09 PROCEDURE — 99213 PR OFFICE/OUTPT VISIT, EST, LEVL III, 20-29 MIN: ICD-10-PCS | Mod: 25,S$GLB,, | Performed by: PHYSICIAN ASSISTANT

## 2019-09-09 PROCEDURE — 1101F PR PT FALLS ASSESS DOC 0-1 FALLS W/OUT INJ PAST YR: ICD-10-PCS | Mod: CPTII,S$GLB,, | Performed by: PHYSICIAN ASSISTANT

## 2019-09-09 PROCEDURE — 99999 PR PBB SHADOW E&M-EST. PATIENT-LVL III: CPT | Mod: PBBFAC,,, | Performed by: PHYSICIAN ASSISTANT

## 2019-09-10 NOTE — PROGRESS NOTES
Subjective:       Patient ID: Anca Mcclellan is a 74 y.o. female.    Chief Complaint: Ear Fullness    Patient is here to see me today for evaluation of a possible wax impaction in bilateral ears.  She has complaints of hearing loss in the affected ears, but denies pain or drainage.  This has been an issue in the past.  The patient has not been using any sort of ear drop to soften the wax.    Review of Systems   HENT: Positive for hearing loss.        Objective:      Physical Exam   HENT:   Left Ear: Tympanic membrane is retracted.   Mild wax bialterally         Procedure Note    CHIEF COMPLAINT:  Cerumen Impaction    Description:  The patient was seated in an exam chair.  An ear speculum was placed in the right EAC and was examined under the microscope.  Suction and/or loop curettes were used to remove a large cerumen impaction.  The tympanic membrane was visualized and was normal in appearance.  The procedure was repeated on the left side in a similar fashion.  The TM was intact and normal on this side as well.  The patient tolerated the procedure well.  Assessment:       1. Bilateral impacted cerumen    2. ETD (Eustachian tube dysfunction), left        Plan:        Cerumen impaction:  Removed today without difficulty.  I would recommend the use of a wax softening drop, either over the counter Debrox or mineral oil, on a weekly basis.  I also instructed the patient to avoid Qtips.    We had a long discussion regarding the anatomy and function of the eustachian tube.  We discussed that the eustachian tube acts as a pump to keep the appropriate amount of pressure behind the ear drum.  I gave the patient a prescription for a nasal steroid spray to be used on a daily basis, and we discussed that it will take 2-3 weeks of daily use to achieve maximal effectiveness.

## 2019-09-11 ENCOUNTER — CLINICAL SUPPORT (OUTPATIENT)
Dept: AUDIOLOGY | Facility: CLINIC | Age: 74
End: 2019-09-11
Payer: MEDICARE

## 2019-09-11 ENCOUNTER — LAB VISIT (OUTPATIENT)
Dept: LAB | Facility: HOSPITAL | Age: 74
End: 2019-09-11
Payer: MEDICARE

## 2019-09-11 DIAGNOSIS — H90.42 SENSORINEURAL HEARING LOSS (SNHL) OF LEFT EAR WITH UNRESTRICTED HEARING OF RIGHT EAR: Primary | ICD-10-CM

## 2019-09-11 DIAGNOSIS — H93.12 TINNITUS, LEFT: ICD-10-CM

## 2019-09-11 LAB
BUN SERPL-MCNC: 14 MG/DL (ref 8–23)
CREAT SERPL-MCNC: 0.7 MG/DL (ref 0.5–1.4)
EST. GFR  (AFRICAN AMERICAN): >60 ML/MIN/1.73 M^2
EST. GFR  (NON AFRICAN AMERICAN): >60 ML/MIN/1.73 M^2

## 2019-09-11 PROCEDURE — 92557 COMPREHENSIVE HEARING TEST: CPT | Mod: S$GLB,,, | Performed by: AUDIOLOGIST-HEARING AID FITTER

## 2019-09-11 PROCEDURE — 92567 PR TYMPA2METRY: ICD-10-PCS | Mod: S$GLB,,, | Performed by: AUDIOLOGIST-HEARING AID FITTER

## 2019-09-11 PROCEDURE — 92557 PR COMPREHENSIVE HEARING TEST: ICD-10-PCS | Mod: S$GLB,,, | Performed by: AUDIOLOGIST-HEARING AID FITTER

## 2019-09-11 PROCEDURE — 82565 ASSAY OF CREATININE: CPT

## 2019-09-11 PROCEDURE — 92567 TYMPANOMETRY: CPT | Mod: S$GLB,,, | Performed by: AUDIOLOGIST-HEARING AID FITTER

## 2019-09-11 PROCEDURE — 84520 ASSAY OF UREA NITROGEN: CPT

## 2019-09-11 PROCEDURE — 36415 COLL VENOUS BLD VENIPUNCTURE: CPT

## 2019-09-11 NOTE — PROGRESS NOTES
Referring provider: YOLIS Navarro [facility] was seen 09/11/2019 for an audiological evaluation.  Patient complains of gradual progressive hearing loss in the left ear.  She has occasional tinnitus in her left ear.  No vertigo.  Has had trouble with lightheadedness at times, lasting a few seconds. She recently saw ENT for wax removal.      Results reveal normal hearing for the right ear, and a moderate low-frequency and mild high-frequency sensorineural hearing loss 250-8000 Hz for the left ear.   Speech Reception Thresholds were 20 dBHL for the right ear and 25 dBHL for the left ear.   Word recognition scores were excellent for the right ear and excellent for the left ear.   Tympanograms were Type A for the right ear and Type A for the left ear.    Patient was counseled on the above findings.    Recommendations:  1. ENT review following audiogram.  MRI is ordered.  Patient had not picked up her Flonase prescription.  She denies aural pressure.  No evidence of ETD from today's testing.  Discussed wait to start until after MRI.   2. Recheck per ENT

## 2019-09-20 ENCOUNTER — HOSPITAL ENCOUNTER (OUTPATIENT)
Dept: RADIOLOGY | Facility: HOSPITAL | Age: 74
Discharge: HOME | End: 2019-09-20
Attending: PHYSICIAN ASSISTANT
Payer: MEDICARE

## 2019-09-20 PROCEDURE — A9585 GADOBUTROL INJECTION: HCPCS | Performed by: PHYSICIAN ASSISTANT

## 2019-09-20 PROCEDURE — 25500020 PHARM REV CODE 255: Performed by: PHYSICIAN ASSISTANT

## 2019-09-20 PROCEDURE — 70553 MRI BRAIN STEM W/O & W/DYE: CPT | Mod: TC

## 2019-09-20 RX ORDER — GADOBUTROL 604.72 MG/ML
10 INJECTION INTRAVENOUS
Status: COMPLETED | OUTPATIENT
Start: 2019-09-20 | End: 2019-09-20

## 2019-09-20 RX ADMIN — GADOBUTROL 8 ML: 604.72 INJECTION INTRAVENOUS at 07:09

## 2019-10-15 ENCOUNTER — OFFICE VISIT (OUTPATIENT)
Dept: CARDIOLOGY | Facility: CLINIC | Age: 74
End: 2019-10-15
Payer: MEDICARE

## 2019-10-15 ENCOUNTER — LAB VISIT (OUTPATIENT)
Dept: LAB | Facility: HOSPITAL | Age: 74
End: 2019-10-15
Attending: INTERNAL MEDICINE
Payer: MEDICARE

## 2019-10-15 VITALS
DIASTOLIC BLOOD PRESSURE: 60 MMHG | BODY MASS INDEX: 28.36 KG/M2 | WEIGHT: 186.5 LBS | HEART RATE: 92 BPM | SYSTOLIC BLOOD PRESSURE: 102 MMHG

## 2019-10-15 DIAGNOSIS — I49.9 IRREGULAR HEART BEAT: ICD-10-CM

## 2019-10-15 DIAGNOSIS — R42 POSTURAL DIZZINESS WITH PRESYNCOPE: ICD-10-CM

## 2019-10-15 DIAGNOSIS — R55 POSTURAL DIZZINESS WITH PRESYNCOPE: ICD-10-CM

## 2019-10-15 DIAGNOSIS — I65.23 BILATERAL CAROTID ARTERY STENOSIS: ICD-10-CM

## 2019-10-15 DIAGNOSIS — M35.9 POLYMYOSITIS ASSOCIATED WITH AUTOIMMUNE DISEASE: ICD-10-CM

## 2019-10-15 DIAGNOSIS — R55 SYNCOPE AND COLLAPSE: ICD-10-CM

## 2019-10-15 DIAGNOSIS — R53.83 FATIGUE, UNSPECIFIED TYPE: ICD-10-CM

## 2019-10-15 DIAGNOSIS — E55.9 VITAMIN D DEFICIENCY, UNSPECIFIED: ICD-10-CM

## 2019-10-15 DIAGNOSIS — R07.89 OTHER CHEST PAIN: Primary | ICD-10-CM

## 2019-10-15 DIAGNOSIS — M33.20 POLYMYOSITIS ASSOCIATED WITH AUTOIMMUNE DISEASE: ICD-10-CM

## 2019-10-15 DIAGNOSIS — E78.49 OTHER HYPERLIPIDEMIA: ICD-10-CM

## 2019-10-15 LAB
25(OH)D3+25(OH)D2 SERPL-MCNC: 39 NG/ML (ref 30–96)
BASOPHILS # BLD AUTO: 0.05 K/UL (ref 0–0.2)
BASOPHILS NFR BLD: 1.3 % (ref 0–1.9)
DIFFERENTIAL METHOD: ABNORMAL
EOSINOPHIL # BLD AUTO: 0.1 K/UL (ref 0–0.5)
EOSINOPHIL NFR BLD: 3.5 % (ref 0–8)
ERYTHROCYTE [DISTWIDTH] IN BLOOD BY AUTOMATED COUNT: 14.8 % (ref 11.5–14.5)
HCT VFR BLD AUTO: 37.1 % (ref 37–48.5)
HGB BLD-MCNC: 11.2 G/DL (ref 12–16)
IMM GRANULOCYTES # BLD AUTO: 0.01 K/UL (ref 0–0.04)
IMM GRANULOCYTES NFR BLD AUTO: 0.3 % (ref 0–0.5)
LYMPHOCYTES # BLD AUTO: 1.4 K/UL (ref 1–4.8)
LYMPHOCYTES NFR BLD: 34 % (ref 18–48)
MCH RBC QN AUTO: 27.3 PG (ref 27–31)
MCHC RBC AUTO-ENTMCNC: 30.2 G/DL (ref 32–36)
MCV RBC AUTO: 90 FL (ref 82–98)
MONOCYTES # BLD AUTO: 0.6 K/UL (ref 0.3–1)
MONOCYTES NFR BLD: 13.8 % (ref 4–15)
NEUTROPHILS # BLD AUTO: 1.9 K/UL (ref 1.8–7.7)
NEUTROPHILS NFR BLD: 47.1 % (ref 38–73)
NRBC BLD-RTO: 0 /100 WBC
PLATELET # BLD AUTO: 215 K/UL (ref 150–350)
PMV BLD AUTO: 10.8 FL (ref 9.2–12.9)
RBC # BLD AUTO: 4.11 M/UL (ref 4–5.4)
WBC # BLD AUTO: 4 K/UL (ref 3.9–12.7)

## 2019-10-15 PROCEDURE — 82652 VIT D 1 25-DIHYDROXY: CPT

## 2019-10-15 PROCEDURE — 82607 VITAMIN B-12: CPT

## 2019-10-15 PROCEDURE — 99215 OFFICE O/P EST HI 40 MIN: CPT | Mod: S$GLB,,, | Performed by: INTERNAL MEDICINE

## 2019-10-15 PROCEDURE — 1101F PR PT FALLS ASSESS DOC 0-1 FALLS W/OUT INJ PAST YR: ICD-10-PCS | Mod: CPTII,S$GLB,, | Performed by: INTERNAL MEDICINE

## 2019-10-15 PROCEDURE — 36415 COLL VENOUS BLD VENIPUNCTURE: CPT

## 2019-10-15 PROCEDURE — 99999 PR PBB SHADOW E&M-EST. PATIENT-LVL III: CPT | Mod: PBBFAC,,, | Performed by: INTERNAL MEDICINE

## 2019-10-15 PROCEDURE — 1101F PT FALLS ASSESS-DOCD LE1/YR: CPT | Mod: CPTII,S$GLB,, | Performed by: INTERNAL MEDICINE

## 2019-10-15 PROCEDURE — 99999 PR PBB SHADOW E&M-EST. PATIENT-LVL III: ICD-10-PCS | Mod: PBBFAC,,, | Performed by: INTERNAL MEDICINE

## 2019-10-15 PROCEDURE — 85025 COMPLETE CBC W/AUTO DIFF WBC: CPT

## 2019-10-15 PROCEDURE — 99215 PR OFFICE/OUTPT VISIT, EST, LEVL V, 40-54 MIN: ICD-10-PCS | Mod: S$GLB,,, | Performed by: INTERNAL MEDICINE

## 2019-10-15 PROCEDURE — 80053 COMPREHEN METABOLIC PANEL: CPT

## 2019-10-15 PROCEDURE — 82306 VITAMIN D 25 HYDROXY: CPT
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
## 2019-10-16 ENCOUNTER — TELEPHONE (OUTPATIENT)
Dept: RHEUMATOLOGY | Facility: CLINIC | Age: 74
End: 2019-10-16

## 2019-10-16 LAB
ALBUMIN SERPL BCP-MCNC: 3.9 G/DL (ref 3.5–5.2)
ALP SERPL-CCNC: 71 U/L (ref 55–135)
ALT SERPL W/O P-5'-P-CCNC: 13 U/L (ref 10–44)
ANION GAP SERPL CALC-SCNC: 6 MMOL/L (ref 8–16)
AST SERPL-CCNC: 20 U/L (ref 10–40)
BILIRUB SERPL-MCNC: 0.4 MG/DL (ref 0.1–1)
BUN SERPL-MCNC: 14 MG/DL (ref 8–23)
CALCIUM SERPL-MCNC: 9.3 MG/DL (ref 8.7–10.5)
CHLORIDE SERPL-SCNC: 106 MMOL/L (ref 95–110)
CO2 SERPL-SCNC: 27 MMOL/L (ref 23–29)
CREAT SERPL-MCNC: 0.8 MG/DL (ref 0.5–1.4)
EST. GFR  (AFRICAN AMERICAN): >60 ML/MIN/1.73 M^2
EST. GFR  (NON AFRICAN AMERICAN): >60 ML/MIN/1.73 M^2
GLUCOSE SERPL-MCNC: 91 MG/DL (ref 70–110)
POTASSIUM SERPL-SCNC: 3.9 MMOL/L (ref 3.5–5.1)
PROT SERPL-MCNC: 7.5 G/DL (ref 6–8.4)
SODIUM SERPL-SCNC: 139 MMOL/L (ref 136–145)

## 2019-10-16 NOTE — TELEPHONE ENCOUNTER
Called patient regarding appointment on 1.16.20 needing to be rescheduled due to Dr. Lock being out of clinic that day. Rescheduled appointment to 10.31.19 at 12.15 pm. Pt verbalized understanding.

## 2019-10-17 LAB
1,25(OH)2D3 SERPL-MCNC: 67 PG/ML (ref 20–79)
VIT B12 SERPL-MCNC: 471 NG/L (ref 180–914)

## 2019-10-31 ENCOUNTER — OFFICE VISIT (OUTPATIENT)
Dept: RHEUMATOLOGY | Facility: CLINIC | Age: 74
End: 2019-10-31
Payer: MEDICARE

## 2019-10-31 ENCOUNTER — LAB VISIT (OUTPATIENT)
Dept: LAB | Facility: HOSPITAL | Age: 74
End: 2019-10-31
Attending: INTERNAL MEDICINE
Payer: MEDICARE

## 2019-10-31 VITALS
DIASTOLIC BLOOD PRESSURE: 73 MMHG | HEIGHT: 68 IN | HEART RATE: 82 BPM | BODY MASS INDEX: 28.13 KG/M2 | WEIGHT: 185.63 LBS | SYSTOLIC BLOOD PRESSURE: 114 MMHG

## 2019-10-31 DIAGNOSIS — M25.511 CHRONIC RIGHT SHOULDER PAIN: ICD-10-CM

## 2019-10-31 DIAGNOSIS — M35.9 POLYMYOSITIS ASSOCIATED WITH AUTOIMMUNE DISEASE: ICD-10-CM

## 2019-10-31 DIAGNOSIS — R53.83 FATIGUE, UNSPECIFIED TYPE: ICD-10-CM

## 2019-10-31 DIAGNOSIS — M33.20 POLYMYOSITIS ASSOCIATED WITH AUTOIMMUNE DISEASE: ICD-10-CM

## 2019-10-31 DIAGNOSIS — G89.29 CHRONIC RIGHT SHOULDER PAIN: ICD-10-CM

## 2019-10-31 DIAGNOSIS — M33.20 POLYMYOSITIS ASSOCIATED WITH AUTOIMMUNE DISEASE: Primary | ICD-10-CM

## 2019-10-31 DIAGNOSIS — M17.12 PRIMARY OSTEOARTHRITIS OF LEFT KNEE: ICD-10-CM

## 2019-10-31 DIAGNOSIS — M35.9 POLYMYOSITIS ASSOCIATED WITH AUTOIMMUNE DISEASE: Primary | ICD-10-CM

## 2019-10-31 LAB
ALBUMIN SERPL BCP-MCNC: 4.1 G/DL (ref 3.5–5.2)
ALP SERPL-CCNC: 80 U/L (ref 55–135)
ALT SERPL W/O P-5'-P-CCNC: 18 U/L (ref 10–44)
ANION GAP SERPL CALC-SCNC: 9 MMOL/L (ref 8–16)
AST SERPL-CCNC: 25 U/L (ref 10–40)
BASOPHILS # BLD AUTO: 0.02 K/UL (ref 0–0.2)
BASOPHILS NFR BLD: 0.7 % (ref 0–1.9)
BILIRUB SERPL-MCNC: 0.5 MG/DL (ref 0.1–1)
BUN SERPL-MCNC: 13 MG/DL (ref 8–23)
C3 SERPL-MCNC: 139 MG/DL (ref 50–180)
C4 SERPL-MCNC: 35 MG/DL (ref 11–44)
CALCIUM SERPL-MCNC: 9.8 MG/DL (ref 8.7–10.5)
CHLORIDE SERPL-SCNC: 105 MMOL/L (ref 95–110)
CK SERPL-CCNC: 82 U/L (ref 20–180)
CO2 SERPL-SCNC: 26 MMOL/L (ref 23–29)
CREAT SERPL-MCNC: 0.7 MG/DL (ref 0.5–1.4)
CRP SERPL-MCNC: 0.7 MG/L (ref 0–8.2)
DIFFERENTIAL METHOD: ABNORMAL
EOSINOPHIL # BLD AUTO: 0.1 K/UL (ref 0–0.5)
EOSINOPHIL NFR BLD: 3.3 % (ref 0–8)
ERYTHROCYTE [DISTWIDTH] IN BLOOD BY AUTOMATED COUNT: 14.9 % (ref 11.5–14.5)
ERYTHROCYTE [SEDIMENTATION RATE] IN BLOOD BY WESTERGREN METHOD: 18 MM/HR (ref 0–36)
EST. GFR  (AFRICAN AMERICAN): >60 ML/MIN/1.73 M^2
EST. GFR  (NON AFRICAN AMERICAN): >60 ML/MIN/1.73 M^2
GLUCOSE SERPL-MCNC: 99 MG/DL (ref 70–110)
HCT VFR BLD AUTO: 38.7 % (ref 37–48.5)
HGB BLD-MCNC: 12.1 G/DL (ref 12–16)
IMM GRANULOCYTES # BLD AUTO: 0.01 K/UL (ref 0–0.04)
IMM GRANULOCYTES NFR BLD AUTO: 0.3 % (ref 0–0.5)
LYMPHOCYTES # BLD AUTO: 1.4 K/UL (ref 1–4.8)
LYMPHOCYTES NFR BLD: 44.4 % (ref 18–48)
MCH RBC QN AUTO: 27.1 PG (ref 27–31)
MCHC RBC AUTO-ENTMCNC: 31.3 G/DL (ref 32–36)
MCV RBC AUTO: 87 FL (ref 82–98)
MONOCYTES # BLD AUTO: 0.4 K/UL (ref 0.3–1)
MONOCYTES NFR BLD: 11.5 % (ref 4–15)
NEUTROPHILS # BLD AUTO: 1.2 K/UL (ref 1.8–7.7)
NEUTROPHILS NFR BLD: 40.1 % (ref 38–73)
NRBC BLD-RTO: 0 /100 WBC
PLATELET # BLD AUTO: 182 K/UL (ref 150–350)
PMV BLD AUTO: 9.3 FL (ref 9.2–12.9)
POTASSIUM SERPL-SCNC: 4.1 MMOL/L (ref 3.5–5.1)
PROT SERPL-MCNC: 7.9 G/DL (ref 6–8.4)
RBC # BLD AUTO: 4.47 M/UL (ref 4–5.4)
SODIUM SERPL-SCNC: 140 MMOL/L (ref 136–145)
WBC # BLD AUTO: 3.04 K/UL (ref 3.9–12.7)

## 2019-10-31 PROCEDURE — 82550 ASSAY OF CK (CPK): CPT

## 2019-10-31 PROCEDURE — 85652 RBC SED RATE AUTOMATED: CPT

## 2019-10-31 PROCEDURE — 86039 ANTINUCLEAR ANTIBODIES (ANA): CPT

## 2019-10-31 PROCEDURE — 86235 NUCLEAR ANTIGEN ANTIBODY: CPT | Mod: 59

## 2019-10-31 PROCEDURE — 86038 ANTINUCLEAR ANTIBODIES: CPT

## 2019-10-31 PROCEDURE — 1101F PR PT FALLS ASSESS DOC 0-1 FALLS W/OUT INJ PAST YR: ICD-10-PCS | Mod: CPTII,S$GLB,, | Performed by: INTERNAL MEDICINE

## 2019-10-31 PROCEDURE — 96372 THER/PROPH/DIAG INJ SC/IM: CPT | Mod: S$GLB,,, | Performed by: INTERNAL MEDICINE

## 2019-10-31 PROCEDURE — 86160 COMPLEMENT ANTIGEN: CPT

## 2019-10-31 PROCEDURE — 36415 COLL VENOUS BLD VENIPUNCTURE: CPT

## 2019-10-31 PROCEDURE — 99999 PR PBB SHADOW E&M-EST. PATIENT-LVL III: ICD-10-PCS | Mod: PBBFAC,,, | Performed by: INTERNAL MEDICINE

## 2019-10-31 PROCEDURE — 99214 OFFICE O/P EST MOD 30 MIN: CPT | Mod: 25,S$GLB,, | Performed by: INTERNAL MEDICINE

## 2019-10-31 PROCEDURE — 80053 COMPREHEN METABOLIC PANEL: CPT

## 2019-10-31 PROCEDURE — 86160 COMPLEMENT ANTIGEN: CPT | Mod: 59

## 2019-10-31 PROCEDURE — 99214 PR OFFICE/OUTPT VISIT, EST, LEVL IV, 30-39 MIN: ICD-10-PCS | Mod: 25,S$GLB,, | Performed by: INTERNAL MEDICINE

## 2019-10-31 PROCEDURE — 99999 PR PBB SHADOW E&M-EST. PATIENT-LVL III: CPT | Mod: PBBFAC,,, | Performed by: INTERNAL MEDICINE

## 2019-10-31 PROCEDURE — 85025 COMPLETE CBC W/AUTO DIFF WBC: CPT

## 2019-10-31 PROCEDURE — 1101F PT FALLS ASSESS-DOCD LE1/YR: CPT | Mod: CPTII,S$GLB,, | Performed by: INTERNAL MEDICINE

## 2019-10-31 PROCEDURE — 82085 ASSAY OF ALDOLASE: CPT

## 2019-10-31 PROCEDURE — 86140 C-REACTIVE PROTEIN: CPT

## 2019-10-31 PROCEDURE — 96372 PR INJECTION,THERAP/PROPH/DIAG2ST, IM OR SUBCUT: ICD-10-PCS | Mod: S$GLB,,, | Performed by: INTERNAL MEDICINE

## 2019-10-31 RX ORDER — FLUOCINOLONE ACETONIDE 0.11 MG/ML
OIL TOPICAL
COMMUNITY
Start: 2019-07-24 | End: 2022-08-05

## 2019-10-31 RX ORDER — KETOCONAZOLE 20 MG/ML
SHAMPOO, SUSPENSION TOPICAL
COMMUNITY
Start: 2019-08-27 | End: 2022-08-05

## 2019-10-31 RX ORDER — BETAMETHASONE SODIUM PHOSPHATE AND BETAMETHASONE ACETATE 3; 3 MG/ML; MG/ML
6 INJECTION, SUSPENSION INTRA-ARTICULAR; INTRALESIONAL; INTRAMUSCULAR; SOFT TISSUE
Status: COMPLETED | OUTPATIENT
Start: 2019-10-31 | End: 2019-10-31

## 2019-10-31 RX ADMIN — BETAMETHASONE SODIUM PHOSPHATE AND BETAMETHASONE ACETATE 6 MG: 3; 3 INJECTION, SUSPENSION INTRA-ARTICULAR; INTRALESIONAL; INTRAMUSCULAR; SOFT TISSUE at 01:10

## 2019-10-31 NOTE — ASSESSMENT & PLAN NOTE
Polymyositis in remission.  Last dose of Imuran was 18 months ago.  No suspicion for any recurrence other than as explained above in the fatigue.  Serologies and activity markers today.

## 2019-10-31 NOTE — ASSESSMENT & PLAN NOTE
Responded very well to intra-articular corticosteroid injection in the past.  No synovitis on examination today.  Reinject if needed in future.

## 2019-10-31 NOTE — PROGRESS NOTES
RHEUMATOLOGY CLINIC FOLLOW UP VISIT  Chief complaints:-  My knee hurts.  I have been having fatigue lately.    HPI:-  Anca Kirkland a 74 y.o. pleasant female comes in for a follow up visit.  She has been doing well except mild pain in her left knee and right shoulder.  Her granddaughter has moved in with her few weeks ago.  She has an infant and toddler.  This has changed her life significantly with disturbed sleep and excessive exertion.  She has been feeling fatigued lately.  She denies any significant pain in the knee like before that she needed injection but it still aches and pains after prolonged activities.  No swelling or stiffness.  No skin rash.  No sicca syndrome, Raynaud's phenomenon, treatment resistant headache, prolonged morning stiffness, muscle weakness, difficulty getting in and out of the car are difficulty washing her hair.  Mild pain around the right shoulder after prolonged activities.    Review of Systems   Constitutional: Positive for malaise/fatigue. Negative for chills and fever.   HENT: Negative for congestion and sore throat.    Eyes: Negative for blurred vision and redness.   Respiratory: Negative for cough and shortness of breath.    Cardiovascular: Negative for chest pain and leg swelling.   Gastrointestinal: Negative for abdominal pain.   Genitourinary: Negative for dysuria.   Musculoskeletal: Positive for joint pain. Negative for back pain, falls, myalgias and neck pain.   Skin: Negative for rash.   Neurological: Negative for headaches.   Endo/Heme/Allergies: Does not bruise/bleed easily.   Psychiatric/Behavioral: Negative for memory loss. The patient does not have insomnia.        Past Medical History:   Diagnosis Date    Bilateral carotid artery stenosis 10/15/2019    Hyperlipidemia        Past Surgical History:   Procedure Laterality Date    HYSTERECTOMY  1988        Social History     Tobacco Use    Smoking status:  "Never Smoker    Smokeless tobacco: Never Used   Substance Use Topics    Alcohol use: Yes     Comment: seldom    Drug use: Not on file       Family History   Problem Relation Age of Onset    Cataracts Mother     Migraines Neg Hx     Melanoma Neg Hx     Lupus Neg Hx     Psoriasis Neg Hx        Review of patient's allergies indicates:  No Known Allergies    Vitals:    10/31/19 1240   BP: 114/73   Pulse: 82   Weight: 84.2 kg (185 lb 10 oz)   Height: 5' 8" (1.727 m)   PainSc:   3   PainLoc: Knee       Physical Exam   Constitutional: She is oriented to person, place, and time and well-developed, well-nourished, and in no distress. No distress.   HENT:   Head: Normocephalic.   Mouth/Throat: Oropharynx is clear and moist.   Eyes: Pupils are equal, round, and reactive to light. Conjunctivae and EOM are normal.   Neck: Normal range of motion. Neck supple.   Cardiovascular: Normal rate and intact distal pulses.   Pulmonary/Chest: Effort normal. No respiratory distress.   Abdominal: Soft. There is no tenderness.   Musculoskeletal:   No synovitis over small joints of hands or feet.  No effusion over large joints.  Muscle strength 5 x 5 upper and lower proximal and distal extremities.   Neurological: She is alert and oriented to person, place, and time. No cranial nerve deficit.   Skin: Skin is warm. No rash noted. No erythema.   Psychiatric: Mood and affect normal.   Nursing note and vitals reviewed.      Medication List with Changes/Refills   Current Medications    ASPIRIN (ECOTRIN) 81 MG EC TABLET    Take 81 mg by mouth once daily.    ATORVASTATIN (LIPITOR) 40 MG TABLET    Take 40 mg by mouth once daily.    BIOTIN ORAL    Take 4,000 mcg by mouth once daily.     CYANOCOBALAMIN (VITAMIN B-12) 100 MCG TABLET    Take 100 mcg by mouth once daily.    FLUOCINOLONE AND SHOWER CAP 0.01 % OIL        KETOCONAZOLE (NIZORAL) 2 % SHAMPOO        METFORMIN (GLUCOPHAGE-XR) 750 MG 24 HR TABLET    Take 750 mg by mouth daily with " breakfast.    MULTIVITAMIN (ONE DAILY MULTIVITAMIN) PER TABLET    Take 1 tablet by mouth once daily.    TRIAMCINOLONE ACETONIDE 0.1% (KENALOG) 0.1 % OINTMENT    APPLY TO RASH TOPICALLY TWICE DAILY    VITAMIN E 100 UNIT CAPSULE    Take 100 Units by mouth once daily.       Assessment/Plans:-  1. Polymyositis associated with autoimmune disease    2. Primary osteoarthritis of left knee    3. Chronic right shoulder pain    4. Fatigue, unspecified type      Problem List Items Addressed This Visit        Immunology/Multi System    Polymyositis associated with autoimmune disease - Primary    Current Assessment & Plan     Polymyositis in remission.  Last dose of Imuran was 18 months ago.  No suspicion for any recurrence other than as explained above in the fatigue.  Serologies and activity markers today.         Relevant Medications    betamethasone acetate-betamethasone sodium phosphate injection 6 mg (Completed)    Other Relevant Orders    VALDEZ Screen w/Reflex    C3 complement    C4 complement    Sedimentation rate    C-reactive protein    Comprehensive metabolic panel    CBC auto differential    CK    Aldolase       Orthopedic    Primary osteoarthritis of left knee    Current Assessment & Plan     Responded very well to intra-articular corticosteroid injection in the past.  No synovitis on examination today.  Reinject if needed in future.         Chronic right shoulder pain    Current Assessment & Plan     Mild.  Secondary to osteoarthritis and excessive exertion.  Advised to take extra strength Tylenol p.r.n. for pain.  Monitor.            Other    Fatigue    Current Assessment & Plan     Recent fatigue likely secondary to excessive exertion and stress at home.  Her granddaughter has moved in with her infant and toddler.  Low suspicion for any recurrence of autoimmune inflammatory connective tissue disease.  Since she has been asymptomatic for several years, repeat serologies and activity markers just to make sure that  there is no recurrence.         Relevant Medications    betamethasone acetate-betamethasone sodium phosphate injection 6 mg (Completed)    Other Relevant Orders    VALDEZ Screen w/Reflex    C3 complement    C4 complement    Sedimentation rate    C-reactive protein    Comprehensive metabolic panel    CBC auto differential    CK    Aldolase        # Follow up in about 3 months (around 1/31/2020).      Disclaimer: This note was prepared using voice recognition system and is likely to have sound alike errors and is not proof read.  Please call me with any questions.

## 2019-10-31 NOTE — ASSESSMENT & PLAN NOTE
Mild.  Secondary to osteoarthritis and excessive exertion.  Advised to take extra strength Tylenol p.r.n. for pain.  Monitor.

## 2019-10-31 NOTE — ASSESSMENT & PLAN NOTE
Recent fatigue likely secondary to excessive exertion and stress at home.  Her granddaughter has moved in with her infant and toddler.  Low suspicion for any recurrence of autoimmune inflammatory connective tissue disease.  Since she has been asymptomatic for several years, repeat serologies and activity markers just to make sure that there is no recurrence.

## 2019-10-31 NOTE — PROGRESS NOTES
Administered 1 cc Betamethasone  6mg/cc toLeft Ventrogluteal. Pt tolerated well. No acute reaction noted to site. Pt instructed on S/S to report. Pt verbalized understanding. Patient waited 15 minutes post injection    Lot:[id number]  Exp:5/2020  Manu:[employer]

## 2019-11-01 LAB
ALDOLASE SERPL-CCNC: 3.1 U/L (ref 1.2–7.6)
ANA SER QL IF: POSITIVE
ANA TITR SER IF: NORMAL {TITER}

## 2019-11-05 LAB
ANTI SM ANTIBODY: 2.16 EU (ref 0–19.99)
ANTI SM/RNP ANTIBODY: 2.09 EU (ref 0–19.99)
ANTI-SM INTERPRETATION: NEGATIVE
ANTI-SM/RNP INTERPRETATION: NEGATIVE
ANTI-SSA ANTIBODY: 3.19 EU (ref 0–19.99)
ANTI-SSA INTERPRETATION: NEGATIVE
ANTI-SSB ANTIBODY: 3.36 EU (ref 0–19.99)
ANTI-SSB INTERPRETATION: NEGATIVE
DSDNA AB SER-ACNC: ABNORMAL [IU]/ML

## 2019-11-06 ENCOUNTER — CLINICAL SUPPORT (OUTPATIENT)
Dept: CARDIOLOGY | Facility: CLINIC | Age: 74
End: 2019-11-06
Attending: INTERNAL MEDICINE
Payer: MEDICARE

## 2019-11-06 DIAGNOSIS — R42 POSTURAL DIZZINESS WITH PRESYNCOPE: ICD-10-CM

## 2019-11-06 DIAGNOSIS — I65.23 BILATERAL CAROTID ARTERY STENOSIS: ICD-10-CM

## 2019-11-06 DIAGNOSIS — R55 POSTURAL DIZZINESS WITH PRESYNCOPE: ICD-10-CM

## 2019-11-06 LAB — INTERNAL CAROTID STENOSIS: ABNORMAL

## 2019-11-06 PROCEDURE — 93880 CAR US DOPPLER CAROTID BILATERAL: ICD-10-PCS | Mod: S$GLB,,, | Performed by: INTERNAL MEDICINE

## 2019-11-06 PROCEDURE — 93880 EXTRACRANIAL BILAT STUDY: CPT | Mod: S$GLB,,, | Performed by: INTERNAL MEDICINE

## 2019-11-12 ENCOUNTER — HOSPITAL ENCOUNTER (OUTPATIENT)
Dept: RADIOLOGY | Facility: HOSPITAL | Age: 74
Discharge: HOME | End: 2019-11-12
Attending: INTERNAL MEDICINE
Payer: MEDICARE

## 2019-11-12 ENCOUNTER — HOSPITAL ENCOUNTER (OUTPATIENT)
Dept: CARDIOLOGY | Facility: HOSPITAL | Age: 74
Discharge: HOME | End: 2019-11-12
Attending: INTERNAL MEDICINE
Payer: MEDICARE

## 2019-11-12 ENCOUNTER — HOSPITAL ENCOUNTER (OUTPATIENT)
Dept: PULMONOLOGY | Facility: HOSPITAL | Age: 74
Discharge: HOME | End: 2019-11-12
Attending: INTERNAL MEDICINE
Payer: MEDICARE

## 2019-11-12 DIAGNOSIS — R07.89 OTHER CHEST PAIN: ICD-10-CM

## 2019-11-12 DIAGNOSIS — R55 SYNCOPE AND COLLAPSE: ICD-10-CM

## 2019-11-12 DIAGNOSIS — R42 POSTURAL DIZZINESS WITH PRESYNCOPE: ICD-10-CM

## 2019-11-12 DIAGNOSIS — R55 POSTURAL DIZZINESS WITH PRESYNCOPE: ICD-10-CM

## 2019-11-12 DIAGNOSIS — R53.83 FATIGUE, UNSPECIFIED TYPE: ICD-10-CM

## 2019-11-12 LAB
DIASTOLIC DYSFUNCTION: NO
DIASTOLIC DYSFUNCTION: NO
RETIRED EF AND QEF - SEE NOTES: 55 (ref 55–65)
TRICUSPID VALVE REGURGITATION: NORMAL

## 2019-11-12 PROCEDURE — 93016 NM MULTI PHARM STRESS CARDIAC COMPONENT: ICD-10-PCS | Mod: ,,, | Performed by: INTERNAL MEDICINE

## 2019-11-12 PROCEDURE — 93306 2D ECHO WITH COLOR FLOW DOPPLER: ICD-10-PCS | Mod: 26,,, | Performed by: INTERNAL MEDICINE

## 2019-11-12 PROCEDURE — 78452 NM MULTI PHARM STRESS CARDIAC COMPONENT: ICD-10-PCS | Mod: 26,,, | Performed by: INTERNAL MEDICINE

## 2019-11-12 PROCEDURE — 78452 HT MUSCLE IMAGE SPECT MULT: CPT | Mod: 26,,, | Performed by: INTERNAL MEDICINE

## 2019-11-12 PROCEDURE — A9502 TC99M TETROFOSMIN: HCPCS

## 2019-11-12 PROCEDURE — 93306 TTE W/DOPPLER COMPLETE: CPT | Mod: 26,,, | Performed by: INTERNAL MEDICINE

## 2019-11-12 PROCEDURE — 93016 CV STRESS TEST SUPVJ ONLY: CPT | Mod: ,,, | Performed by: INTERNAL MEDICINE

## 2019-11-12 PROCEDURE — 93018 NM MULTI PHARM STRESS CARDIAC COMPONENT: ICD-10-PCS | Mod: ,,, | Performed by: INTERNAL MEDICINE

## 2019-11-12 PROCEDURE — 93306 TTE W/DOPPLER COMPLETE: CPT

## 2019-11-12 PROCEDURE — 93017 CV STRESS TEST TRACING ONLY: CPT

## 2019-11-12 PROCEDURE — 93018 CV STRESS TEST I&R ONLY: CPT | Mod: ,,, | Performed by: INTERNAL MEDICINE

## 2019-11-12 PROCEDURE — 63600175 PHARM REV CODE 636 W HCPCS: Performed by: PHYSICIAN ASSISTANT

## 2019-11-12 RX ORDER — REGADENOSON 0.08 MG/ML
0.4 INJECTION, SOLUTION INTRAVENOUS ONCE
Status: COMPLETED | OUTPATIENT
Start: 2019-11-12 | End: 2019-11-12

## 2019-11-12 RX ADMIN — REGADENOSON 0.4 MG: 0.08 INJECTION, SOLUTION INTRAVENOUS at 04:11

## 2019-12-12 ENCOUNTER — OFFICE VISIT (OUTPATIENT)
Dept: CARDIOLOGY | Facility: CLINIC | Age: 74
End: 2019-12-12
Payer: MEDICARE

## 2019-12-12 VITALS
DIASTOLIC BLOOD PRESSURE: 80 MMHG | HEIGHT: 68 IN | SYSTOLIC BLOOD PRESSURE: 118 MMHG | HEART RATE: 84 BPM | WEIGHT: 185.63 LBS | BODY MASS INDEX: 28.13 KG/M2

## 2019-12-12 DIAGNOSIS — R42 POSTURAL DIZZINESS WITH PRESYNCOPE: ICD-10-CM

## 2019-12-12 DIAGNOSIS — R55 POSTURAL DIZZINESS WITH PRESYNCOPE: ICD-10-CM

## 2019-12-12 DIAGNOSIS — R55 SYNCOPE AND COLLAPSE: ICD-10-CM

## 2019-12-12 DIAGNOSIS — M35.9 POLYMYOSITIS ASSOCIATED WITH AUTOIMMUNE DISEASE: ICD-10-CM

## 2019-12-12 DIAGNOSIS — E78.49 OTHER HYPERLIPIDEMIA: ICD-10-CM

## 2019-12-12 DIAGNOSIS — M33.20 POLYMYOSITIS ASSOCIATED WITH AUTOIMMUNE DISEASE: ICD-10-CM

## 2019-12-12 DIAGNOSIS — I65.23 BILATERAL CAROTID ARTERY STENOSIS: ICD-10-CM

## 2019-12-12 DIAGNOSIS — I49.9 IRREGULAR HEART BEAT: Primary | ICD-10-CM

## 2019-12-12 PROCEDURE — 99214 OFFICE O/P EST MOD 30 MIN: CPT | Mod: S$GLB,,, | Performed by: INTERNAL MEDICINE

## 2019-12-12 PROCEDURE — 99999 PR PBB SHADOW E&M-EST. PATIENT-LVL III: ICD-10-PCS | Mod: PBBFAC,,, | Performed by: INTERNAL MEDICINE

## 2019-12-12 PROCEDURE — 99999 PR PBB SHADOW E&M-EST. PATIENT-LVL III: CPT | Mod: PBBFAC,,, | Performed by: INTERNAL MEDICINE

## 2019-12-12 PROCEDURE — 1101F PT FALLS ASSESS-DOCD LE1/YR: CPT | Mod: CPTII,S$GLB,, | Performed by: INTERNAL MEDICINE

## 2019-12-12 PROCEDURE — 1101F PR PT FALLS ASSESS DOC 0-1 FALLS W/OUT INJ PAST YR: ICD-10-PCS | Mod: CPTII,S$GLB,, | Performed by: INTERNAL MEDICINE

## 2019-12-12 PROCEDURE — 1159F PR MEDICATION LIST DOCUMENTED IN MEDICAL RECORD: ICD-10-PCS | Mod: S$GLB,,, | Performed by: INTERNAL MEDICINE

## 2019-12-12 PROCEDURE — 1126F AMNT PAIN NOTED NONE PRSNT: CPT | Mod: S$GLB,,, | Performed by: INTERNAL MEDICINE

## 2019-12-12 PROCEDURE — 99214 PR OFFICE/OUTPT VISIT, EST, LEVL IV, 30-39 MIN: ICD-10-PCS | Mod: S$GLB,,, | Performed by: INTERNAL MEDICINE

## 2019-12-12 PROCEDURE — 1159F MED LIST DOCD IN RCRD: CPT | Mod: S$GLB,,, | Performed by: INTERNAL MEDICINE

## 2019-12-12 PROCEDURE — 1126F PR PAIN SEVERITY QUANTIFIED, NO PAIN PRESENT: ICD-10-PCS | Mod: S$GLB,,, | Performed by: INTERNAL MEDICINE
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
## 2020-01-27 ENCOUNTER — TELEPHONE (OUTPATIENT)
Dept: CARDIOLOGY | Facility: CLINIC | Age: 75
End: 2020-01-27

## 2020-02-05 ENCOUNTER — TELEPHONE (OUTPATIENT)
Dept: CARDIOLOGY | Facility: CLINIC | Age: 75
End: 2020-02-05

## 2020-02-11 ENCOUNTER — OFFICE VISIT (OUTPATIENT)
Dept: CARDIOLOGY | Facility: CLINIC | Age: 75
End: 2020-02-11
Payer: MEDICARE

## 2020-02-11 VITALS
WEIGHT: 182.13 LBS | SYSTOLIC BLOOD PRESSURE: 112 MMHG | BODY MASS INDEX: 27.6 KG/M2 | OXYGEN SATURATION: 98 % | HEIGHT: 68 IN | HEART RATE: 87 BPM | DIASTOLIC BLOOD PRESSURE: 62 MMHG

## 2020-02-11 DIAGNOSIS — R55 SYNCOPE AND COLLAPSE: ICD-10-CM

## 2020-02-11 DIAGNOSIS — M33.20 POLYMYOSITIS ASSOCIATED WITH AUTOIMMUNE DISEASE: ICD-10-CM

## 2020-02-11 DIAGNOSIS — R42 POSTURAL DIZZINESS WITH PRESYNCOPE: ICD-10-CM

## 2020-02-11 DIAGNOSIS — E78.49 OTHER HYPERLIPIDEMIA: ICD-10-CM

## 2020-02-11 DIAGNOSIS — I65.23 BILATERAL CAROTID ARTERY STENOSIS: ICD-10-CM

## 2020-02-11 DIAGNOSIS — R53.83 FATIGUE, UNSPECIFIED TYPE: ICD-10-CM

## 2020-02-11 DIAGNOSIS — Z01.810 PREOP CARDIOVASCULAR EXAM: Primary | ICD-10-CM

## 2020-02-11 DIAGNOSIS — I49.9 IRREGULAR HEART BEAT: ICD-10-CM

## 2020-02-11 DIAGNOSIS — M35.9 POLYMYOSITIS ASSOCIATED WITH AUTOIMMUNE DISEASE: ICD-10-CM

## 2020-02-11 DIAGNOSIS — R55 POSTURAL DIZZINESS WITH PRESYNCOPE: ICD-10-CM

## 2020-02-11 PROCEDURE — 99999 PR PBB SHADOW E&M-EST. PATIENT-LVL III: CPT | Mod: PBBFAC,,, | Performed by: INTERNAL MEDICINE

## 2020-02-11 PROCEDURE — 1101F PR PT FALLS ASSESS DOC 0-1 FALLS W/OUT INJ PAST YR: ICD-10-PCS | Mod: CPTII,S$GLB,, | Performed by: INTERNAL MEDICINE

## 2020-02-11 PROCEDURE — 99214 OFFICE O/P EST MOD 30 MIN: CPT | Mod: S$GLB,,, | Performed by: INTERNAL MEDICINE

## 2020-02-11 PROCEDURE — 99214 PR OFFICE/OUTPT VISIT, EST, LEVL IV, 30-39 MIN: ICD-10-PCS | Mod: S$GLB,,, | Performed by: INTERNAL MEDICINE

## 2020-02-11 PROCEDURE — 99999 PR PBB SHADOW E&M-EST. PATIENT-LVL III: ICD-10-PCS | Mod: PBBFAC,,, | Performed by: INTERNAL MEDICINE

## 2020-02-11 PROCEDURE — 1159F PR MEDICATION LIST DOCUMENTED IN MEDICAL RECORD: ICD-10-PCS | Mod: S$GLB,,, | Performed by: INTERNAL MEDICINE

## 2020-02-11 PROCEDURE — 1159F MED LIST DOCD IN RCRD: CPT | Mod: S$GLB,,, | Performed by: INTERNAL MEDICINE

## 2020-02-11 PROCEDURE — 1101F PT FALLS ASSESS-DOCD LE1/YR: CPT | Mod: CPTII,S$GLB,, | Performed by: INTERNAL MEDICINE

## 2020-02-11 RX ORDER — FERROUS SULFATE 325(65) MG
TABLET ORAL
COMMUNITY
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
## 2020-06-16 ENCOUNTER — OFFICE VISIT (OUTPATIENT)
Dept: CARDIOLOGY | Facility: CLINIC | Age: 75
End: 2020-06-16
Payer: MEDICARE

## 2020-06-16 VITALS
HEART RATE: 83 BPM | DIASTOLIC BLOOD PRESSURE: 70 MMHG | OXYGEN SATURATION: 97 % | BODY MASS INDEX: 27.11 KG/M2 | WEIGHT: 178.88 LBS | HEIGHT: 68 IN | SYSTOLIC BLOOD PRESSURE: 112 MMHG

## 2020-06-16 DIAGNOSIS — I65.23 BILATERAL CAROTID ARTERY STENOSIS: ICD-10-CM

## 2020-06-16 DIAGNOSIS — E78.5 HYPERLIPIDEMIA: ICD-10-CM

## 2020-06-16 DIAGNOSIS — E78.49 OTHER HYPERLIPIDEMIA: ICD-10-CM

## 2020-06-16 DIAGNOSIS — M33.20 POLYMYOSITIS ASSOCIATED WITH AUTOIMMUNE DISEASE: ICD-10-CM

## 2020-06-16 DIAGNOSIS — R55 POSTURAL DIZZINESS WITH PRESYNCOPE: ICD-10-CM

## 2020-06-16 DIAGNOSIS — R53.83 FATIGUE, UNSPECIFIED TYPE: ICD-10-CM

## 2020-06-16 DIAGNOSIS — R42 POSTURAL DIZZINESS WITH PRESYNCOPE: ICD-10-CM

## 2020-06-16 DIAGNOSIS — M35.9 POLYMYOSITIS ASSOCIATED WITH AUTOIMMUNE DISEASE: ICD-10-CM

## 2020-06-16 DIAGNOSIS — I49.9 IRREGULAR HEART BEAT: Primary | ICD-10-CM

## 2020-06-16 PROCEDURE — 1159F MED LIST DOCD IN RCRD: CPT | Mod: S$GLB,,, | Performed by: INTERNAL MEDICINE

## 2020-06-16 PROCEDURE — 93010 ELECTROCARDIOGRAM REPORT: CPT | Mod: S$GLB,,, | Performed by: INTERNAL MEDICINE

## 2020-06-16 PROCEDURE — 93010 EKG 12-LEAD: ICD-10-PCS | Mod: S$GLB,,, | Performed by: INTERNAL MEDICINE

## 2020-06-16 PROCEDURE — 93005 ELECTROCARDIOGRAM TRACING: CPT

## 2020-06-16 PROCEDURE — 99999 PR PBB SHADOW E&M-EST. PATIENT-LVL IV: ICD-10-PCS | Mod: PBBFAC,,, | Performed by: INTERNAL MEDICINE

## 2020-06-16 PROCEDURE — 99999 PR PBB SHADOW E&M-EST. PATIENT-LVL IV: CPT | Mod: PBBFAC,,, | Performed by: INTERNAL MEDICINE

## 2020-06-16 PROCEDURE — 1101F PT FALLS ASSESS-DOCD LE1/YR: CPT | Mod: CPTII,S$GLB,, | Performed by: INTERNAL MEDICINE

## 2020-06-16 PROCEDURE — 1126F PR PAIN SEVERITY QUANTIFIED, NO PAIN PRESENT: ICD-10-PCS | Mod: S$GLB,,, | Performed by: INTERNAL MEDICINE

## 2020-06-16 PROCEDURE — 99214 PR OFFICE/OUTPT VISIT, EST, LEVL IV, 30-39 MIN: ICD-10-PCS | Mod: S$GLB,,, | Performed by: INTERNAL MEDICINE

## 2020-06-16 PROCEDURE — 99214 OFFICE O/P EST MOD 30 MIN: CPT | Mod: S$GLB,,, | Performed by: INTERNAL MEDICINE

## 2020-06-16 PROCEDURE — 1126F AMNT PAIN NOTED NONE PRSNT: CPT | Mod: S$GLB,,, | Performed by: INTERNAL MEDICINE

## 2020-06-16 PROCEDURE — 1101F PR PT FALLS ASSESS DOC 0-1 FALLS W/OUT INJ PAST YR: ICD-10-PCS | Mod: CPTII,S$GLB,, | Performed by: INTERNAL MEDICINE

## 2020-06-16 PROCEDURE — 1159F PR MEDICATION LIST DOCUMENTED IN MEDICAL RECORD: ICD-10-PCS | Mod: S$GLB,,, | Performed by: INTERNAL MEDICINE
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
## 2020-07-01 NOTE — PROGRESS NOTES
Referring Provider:    Lloyd Francis Md  [address] [facility]  [place]  [place] [address]  Subjective:   Patient: Anca Mcclellan [id number], :1945   Visit date:2020 9:23 AM    Chief Complaint:  Dizziness    HPI:  Anca is a 75 y.o. female who I was asked to see in consultation for evaluation of the following issue(s):    For the 1st time I am seeing the patient.  Reports that she has had a few months intermittent dizziness.  This less her for fairly brief periods of time.  She reports this occurs when she gets overheated.  Additionally can be while she is sitting still.  She has a remote history of syncope.  She reports that the symptoms or lightheaded in nature.  She denies any spinning or in sensation that the room is moving.  She denies any postural precipitating affects.  She does have a history anemia and states that she started on iron last week.  She has had extensive cardiology workup was not found to have a source of her dizziness from this.  Review of her audiogram demonstrates stable hearing with mild asymmetry on the left.  She previously underwent MRI for evaluation this.  There was no evidence of a vestibular neuroma or other inner ear pathology.      Review of Systems:  -     Allergic/Immunologic: is allergic to amoxicillin-pot clavulanate..  -     Constitutional: Current temp: 97.9 °F (36.6 °C) (Tympanic)      Her meds, allergies, medical, surgical, social & family histories were reviewed & updated:  -     She has a current medication list which includes the following prescription(s): aspirin, atorvastatin, biotin, ferrous sulfate, fluocinolone and shower cap, fluticasone propionate, metformin, multivitamin, vitamin e, cyanocobalamin, ketoconazole, and triamcinolone acetonide 0.1%.  -     She  has a past medical history of Bilateral carotid artery stenosis (10/15/2019) and Hyperlipidemia.   -     She does not have any pertinent problems on file.   -     She  has a past surgical history that  includes Hysterectomy (1988).  -     She  reports that she has never smoked. She has never used smokeless tobacco. She reports current alcohol use.  -     Her family history includes Cataracts in her mother.  -     She is allergic to amoxicillin-pot clavulanate.    Objective:     Physical Exam: (abnormal findings indicated in BOLD)    Physical Exam:  Vitals:  /80   Pulse 79   Temp 97.9 °F (36.6 °C) (Tympanic)   Wt 82 kg (180 lb 12.4 oz)   BMI 27.49 kg/m²   General appearance:  Well developed, well nourished    Eyes:  Extraocular motions intact, PERRL    Communication:  no hoarseness, no dysphonia    Ears:  Otoscopy of external auditory canals and tympanic membranes was normal, clinical speech reception thresholds grossly intact, no mass/lesion of auricle.  Nose:  No masses/lesions of external nose, nasal mucosa, septum, and turbinates were within normal limits.  Mouth:  No mass/lesion of lips, teeth, gums, hard/soft palate, tongue, tonsils, or oropharynx.    Cardiovascular:  No pedal edema; Radial Pulses +2     Neck & Lymphatics:  No cervical lymphadenopathy, no neck mass/crepitus/ asymmetry, trachea is midline, no thyroid enlargement/tenderness/mass.    Psych: Oriented x3,  Alert with normal mood and affect.     Respiration/Chest:  Symmetric expansion during respiration, normal respiratory effort.    Skin:  Warm and intact. No ulcerations of face, scalp, neck.    CRANIAL NERVES:  III, IV, VI:  Extraocular muscles intact.  Pupils equally reactive to light and accommodation.  V: Facial sensory function to light touch intact and symmetrical.  No evidence of atrophy of the masseter and temporal muscles.  VII:  Facial strength intact and symmetrical.  IX:  Soft palate elevates in the midline.  XI:  Shoulders motility and strength intact and symmetrical. No atrophy of sternocleidomastoid and trapezius muscles.  XII:  Tongue motility and strength intact.  No spontaneous or gaze nystagmus.    Carine-Hallpike test:   No vertigo or nystagmus.    Head-shaking test:  No nystagmus.  Vestibulo-ocular reflex:  No evidence of catch-up saccades to bilateral head turns.  Oculo-counter torsion:  Intact to bilateral head tilts.  Romberg test:  No abnormal sway or falls with eyes open and closed.    Fukuda stepping test:  No abnormal sway.    Gait:  No ataxia and can tandem gait 6 steps.    Finger-to-nose testing intact without dysmetria.  Deviation of index.  Eyes closed:  No deviation.  MOTOR STRENGTH:  Strength 5/5 throughout.  SENSORY:  Sensory function to light touch and proprioception intact throughout.      Assessment & Plan:   Anca was seen today for dizziness.    Diagnoses and all orders for this visit:    Dizziness          DIZZINESS-   Dizziness is an extremely complex problem that may arise from many sources.  I requires the coordination between the visual system, the vestibular system as well as the proprioreceptive system.  Additionally, balance is compromised in the setting of musculoskeletal, cerebral, cardiac, and numerous physiologic disorders.  The complex interplay between these systems may also lead to dizziness if there is dysynchrony between the bilateral vestibular symptoms.    Central vestibular symptoms can generally be distinguished from peripheral vestibulopathies by the presence of other non vestibular neurologic symptoms (focal weakness, headache), light headedness (rather than true vertigo), near syncope, weak limbs, panic, fuzziness/cloudiness in mentation, and clumsiness.  Peripheral vestibulopathies are generally, at some point during their course, characterized by a true vertiginous sensation of movement, difficulty with sudden head movements, nausea, difficulty with sudden head movement, and possibly oscicllopsia.    BPPV is the most common cause of episodic vertigo.  Symptoms generally last for seconds then resolve when motionless, otitic symptoms are absent and may be provoked with sudden head motion.   This may occur spontaneously, but frequently follow head trauma or recent vestibular neuronitis.  Nystagmus is typically geotropic and directed toward the affected ear (hyperactive input to the inferior vestibular nerve via the Singular nerve). Canalith repositioning maneuvers are the method of choice for treating this condition.  Mild imbalance following is common and may last 1-2 weeks.    Vestibular neuronitis and labyrinthitis can be distinguished by the presence of sensorineural hearing loss in labyrinthitis, but during their active phase, vertigo is constant.   MRI may be necessary during this phase to exclude CNS pathology.  Frequently this is preceded by a viral illness. Both result in permanent partial end organ weakness of the affected vestibular nerve (usually superior).  Otherwise, they are essentially the same.  The early (vertiginous, 1-3 days) phase is characterized by acute onset of vertigo that is essentially constant and present even in the absence of motion.  Nystagmus is directed away from the affected ear (hypoactive).  In the acute phase, steroids, limited use of vestibular suppressants and hydration are the most effective treatment. Patients then enter the second phase of uncompensated vestibulopathy where they have a general sense of imbalance.  The duration of this phase depends on several factors, but is generally delayed in the presence of vestibular suppressants, advanced age, central/systemic balance issues and sessile behavior.   Phase 3 is compensated vestibulopathy where symptoms generally only occur with sudden head movements and can be seen with catch up saccades on head thrust.   However, in the presence of bilateral VN, oscillopsia is the hallmark of the disease and compensation is frequently very delayed and poor.    Other causes of vertigo that are typically constant are vestibulotoxic medications and autoimmune inner ear disease and these are generally bilateral in  nature.    Meniere's disease is typically (85%) unilateral and defined by 2 spontaneous vertigo attacks lasting 20 min or more, documented hearing loss (typically low tone, frequently fluctuating) and otic fullness or tinnitus in the affected ear. However, the presence of endolymphatic hydrops may result in similar symptoms, not meeting these strict criteria.  This disease can be difficult to distinguish from vestibular migraines, which are not always associated with headaches.    Superior canal dehiscence presents with episodic vertigo lasting seconds to minutes, aural fullness, autophony, hyperacusis and tinnitus (soham pulsatile).  Aural pressure is the most common presenting symptom.  Symptoms can be provoked with Valsalva.  Bone conduction thresholds are supranormal.    Perilymph fistula presents with fluctuating hearing loss, episodic vertigo lasting seconds to minutes and frequently is associated with prior barotrauma or otologic surgery.  Conservative measures such as stool softeners and bedrest frequently lead to resolution.  In cases of persistent symptoms, unfortunately there is no noninvasive diagnostic test with high specificity, and surgical exploration is sometimes necessary when this is expected.    Vestibular schwannomas may have constant or episodic vertigo, frequently SNHL and sometimes may be accompanied by headache or facial numbness.    The diagnosis and options of management were discussed at length with the patient, including hearing, balance function and the risks associated with my recommendations. We spent a considerable amount of time discussing strategies to cope with dizziness. I emphasized the great importance of fall avoidance and activities that may be dangerous if Anca has an episode of dizziness.  I answered all questions in layman's terms. Based on the history, physical exam and imaging studies there is no evidence of a vestibular dysfunction.  I recommend further evaluation with  neurology if symptoms persist.       We discussed her medical conditions, treatments and plan.  Anca should return to clinic if any issues arise (symptoms worsen or persist), otherwise we will see her back in the clinic only as needed.      Thank you for allowing me to participate in the care of Anca.         Mauro Washburn MD, FACS [facility]   [facility]  [address]  [place] [address]  P: [phone]  F: [phone]

## 2020-07-02 ENCOUNTER — CLINICAL SUPPORT (OUTPATIENT)
Dept: AUDIOLOGY | Facility: CLINIC | Age: 75
End: 2020-07-02
Payer: MEDICARE

## 2020-07-02 ENCOUNTER — OFFICE VISIT (OUTPATIENT)
Dept: OTOLARYNGOLOGY | Facility: CLINIC | Age: 75
End: 2020-07-02
Payer: MEDICARE

## 2020-07-02 VITALS
BODY MASS INDEX: 27.49 KG/M2 | HEART RATE: 79 BPM | DIASTOLIC BLOOD PRESSURE: 80 MMHG | TEMPERATURE: 98 F | WEIGHT: 180.75 LBS | SYSTOLIC BLOOD PRESSURE: 121 MMHG

## 2020-07-02 DIAGNOSIS — R55 POSTURAL DIZZINESS WITH PRESYNCOPE: ICD-10-CM

## 2020-07-02 DIAGNOSIS — R42 DIZZINESS: Primary | ICD-10-CM

## 2020-07-02 DIAGNOSIS — H90.5 HEARING LOSS, SENSORINEURAL, COMBINED TYPES: Primary | ICD-10-CM

## 2020-07-02 DIAGNOSIS — R42 POSTURAL DIZZINESS WITH PRESYNCOPE: ICD-10-CM

## 2020-07-02 PROCEDURE — 1101F PT FALLS ASSESS-DOCD LE1/YR: CPT | Mod: CPTII,S$GLB,, | Performed by: OTOLARYNGOLOGY

## 2020-07-02 PROCEDURE — 99999 PR PBB SHADOW E&M-EST. PATIENT-LVL III: CPT | Mod: PBBFAC,,, | Performed by: OTOLARYNGOLOGY

## 2020-07-02 PROCEDURE — 1101F PR PT FALLS ASSESS DOC 0-1 FALLS W/OUT INJ PAST YR: ICD-10-PCS | Mod: CPTII,S$GLB,, | Performed by: OTOLARYNGOLOGY

## 2020-07-02 PROCEDURE — 99214 OFFICE O/P EST MOD 30 MIN: CPT | Mod: S$GLB,,, | Performed by: OTOLARYNGOLOGY

## 2020-07-02 PROCEDURE — 99999 PR PBB SHADOW E&M-EST. PATIENT-LVL III: ICD-10-PCS | Mod: PBBFAC,,, | Performed by: OTOLARYNGOLOGY

## 2020-07-02 PROCEDURE — 1159F MED LIST DOCD IN RCRD: CPT | Mod: S$GLB,,, | Performed by: OTOLARYNGOLOGY

## 2020-07-02 PROCEDURE — 92557 COMPREHENSIVE HEARING TEST: CPT | Mod: S$GLB,,, | Performed by: AUDIOLOGIST

## 2020-07-02 PROCEDURE — 92567 TYMPANOMETRY: CPT | Mod: S$GLB,,, | Performed by: AUDIOLOGIST

## 2020-07-02 PROCEDURE — 1126F AMNT PAIN NOTED NONE PRSNT: CPT | Mod: S$GLB,,, | Performed by: OTOLARYNGOLOGY

## 2020-07-02 PROCEDURE — 99999 PR PBB SHADOW E&M-EST. PATIENT-LVL I: CPT | Mod: PBBFAC,,, | Performed by: AUDIOLOGIST

## 2020-07-02 PROCEDURE — 92567 PR TYMPA2METRY: ICD-10-PCS | Mod: S$GLB,,, | Performed by: AUDIOLOGIST

## 2020-07-02 PROCEDURE — 99214 PR OFFICE/OUTPT VISIT, EST, LEVL IV, 30-39 MIN: ICD-10-PCS | Mod: S$GLB,,, | Performed by: OTOLARYNGOLOGY

## 2020-07-02 PROCEDURE — 1159F PR MEDICATION LIST DOCUMENTED IN MEDICAL RECORD: ICD-10-PCS | Mod: S$GLB,,, | Performed by: OTOLARYNGOLOGY

## 2020-07-02 PROCEDURE — 1126F PR PAIN SEVERITY QUANTIFIED, NO PAIN PRESENT: ICD-10-PCS | Mod: S$GLB,,, | Performed by: OTOLARYNGOLOGY

## 2020-07-02 PROCEDURE — 99999 PR PBB SHADOW E&M-EST. PATIENT-LVL I: ICD-10-PCS | Mod: PBBFAC,,, | Performed by: AUDIOLOGIST

## 2020-07-02 PROCEDURE — 92557 PR COMPREHENSIVE HEARING TEST: ICD-10-PCS | Mod: S$GLB,,, | Performed by: AUDIOLOGIST

## 2020-07-02 RX ORDER — FLUTICASONE PROPIONATE 50 MCG
SPRAY, SUSPENSION (ML) NASAL
COMMUNITY
Start: 2020-06-18 | End: 2022-08-05

## 2020-07-02 NOTE — PROGRESS NOTES
Anca Mcclellan was seen 2020 for an audiological evaluation.  Patient complains of almost daily lightheadedness with onset years ago but more frequent in the last 6 months. It occurs upon rising or siting. She denies  in hearing and roomspinning dizziness.  She reports a mild tinnitus in her left ear. She also reports that she is anemic.     Results reveal a mild sensorineural hearing loss 8000 Hz for the right ear, and  mild sensorineural hearing loss 2000 and 5684-1935 Hz for the left ear.   Speech Reception Thresholds were  15 dBHL for the right ear and 20 dBHL for the left ear.   Word recognition scores were excellent for the right ear and excellent for the left ear.   Tympanograms were Type A, normal for the right ear and Type Ad, hypermobile for the left ear.    Patient was counseled on the above findings.    Recommendations include:    1.  ENT followup  2.  Wear hearing protective devices around loud noise  3.  Annual audiograms

## 2020-07-02 NOTE — LETTER
July 2, 2020      Lloyd Francis MD  [address] [facility] [address]           [facility]  [address]  [place] [address]  Phone: [phone]  Fax: [phone]          Patient: Anca Mcclellan   MR Number: [id number]   YOB: 1945   Date of Visit: 7/2/2020       Dear Dr. Lloyd Francis:    Thank you for referring Anca Davidson to me for evaluation. Attached you will find relevant portions of my assessment and plan of care.    If you have questions, please do not hesitate to call me. I look forward to following Anca Davidson along with you.    Sincerely,    Mauro Washburn MD    Enclosure  CC:  No Recipients    If you would like to receive this communication electronically, please contact [email or web address] or [phone] to request more information on [facility] access.    For providers and/or their staff who would like to refer a patient to [facility], please contact us through our one-stop-shop provider referral line, [facility], at [phone].    If you feel you have received this communication in error or would no longer like to receive these types of communications, please e-mail [email or web address]

## 2020-08-11 ENCOUNTER — OFFICE VISIT (OUTPATIENT)
Dept: CARDIOLOGY | Facility: CLINIC | Age: 75
End: 2020-08-11
Payer: MEDICARE

## 2020-08-11 VITALS
SYSTOLIC BLOOD PRESSURE: 124 MMHG | HEART RATE: 92 BPM | DIASTOLIC BLOOD PRESSURE: 70 MMHG | BODY MASS INDEX: 24.07 KG/M2 | OXYGEN SATURATION: 98 % | WEIGHT: 158.31 LBS

## 2020-08-11 DIAGNOSIS — M33.20 POLYMYOSITIS ASSOCIATED WITH AUTOIMMUNE DISEASE: ICD-10-CM

## 2020-08-11 DIAGNOSIS — R55 SYNCOPE AND COLLAPSE: ICD-10-CM

## 2020-08-11 DIAGNOSIS — E78.49 OTHER HYPERLIPIDEMIA: ICD-10-CM

## 2020-08-11 DIAGNOSIS — I65.23 BILATERAL CAROTID ARTERY STENOSIS: ICD-10-CM

## 2020-08-11 DIAGNOSIS — I49.9 IRREGULAR HEART BEAT: Primary | ICD-10-CM

## 2020-08-11 DIAGNOSIS — R55 POSTURAL DIZZINESS WITH PRESYNCOPE: ICD-10-CM

## 2020-08-11 DIAGNOSIS — M35.9 POLYMYOSITIS ASSOCIATED WITH AUTOIMMUNE DISEASE: ICD-10-CM

## 2020-08-11 DIAGNOSIS — R42 POSTURAL DIZZINESS WITH PRESYNCOPE: ICD-10-CM

## 2020-08-11 PROCEDURE — 99999 PR PBB SHADOW E&M-EST. PATIENT-LVL III: CPT | Mod: PBBFAC,,, | Performed by: INTERNAL MEDICINE

## 2020-08-11 PROCEDURE — 99214 OFFICE O/P EST MOD 30 MIN: CPT | Mod: S$GLB,,, | Performed by: INTERNAL MEDICINE

## 2020-08-11 PROCEDURE — 1101F PT FALLS ASSESS-DOCD LE1/YR: CPT | Mod: CPTII,S$GLB,, | Performed by: INTERNAL MEDICINE

## 2020-08-11 PROCEDURE — 99214 PR OFFICE/OUTPT VISIT, EST, LEVL IV, 30-39 MIN: ICD-10-PCS | Mod: S$GLB,,, | Performed by: INTERNAL MEDICINE

## 2020-08-11 PROCEDURE — 1101F PR PT FALLS ASSESS DOC 0-1 FALLS W/OUT INJ PAST YR: ICD-10-PCS | Mod: CPTII,S$GLB,, | Performed by: INTERNAL MEDICINE

## 2020-08-11 PROCEDURE — 1159F PR MEDICATION LIST DOCUMENTED IN MEDICAL RECORD: ICD-10-PCS | Mod: S$GLB,,, | Performed by: INTERNAL MEDICINE

## 2020-08-11 PROCEDURE — 99999 PR PBB SHADOW E&M-EST. PATIENT-LVL III: ICD-10-PCS | Mod: PBBFAC,,, | Performed by: INTERNAL MEDICINE

## 2020-08-11 PROCEDURE — 1159F MED LIST DOCD IN RCRD: CPT | Mod: S$GLB,,, | Performed by: INTERNAL MEDICINE
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
## 2020-08-31 ENCOUNTER — OFFICE VISIT (OUTPATIENT)
Dept: OPHTHALMOLOGY | Facility: CLINIC | Age: 75
End: 2020-08-31
Payer: MEDICARE

## 2020-08-31 DIAGNOSIS — H40.013 OPEN ANGLE WITH BORDERLINE FINDINGS OF BOTH EYES: Primary | ICD-10-CM

## 2020-08-31 DIAGNOSIS — Z96.1 PSEUDOPHAKIA OF BOTH EYES: ICD-10-CM

## 2020-08-31 DIAGNOSIS — Z83.511 FAMILY HISTORY OF GLAUCOMA IN MOTHER: ICD-10-CM

## 2020-08-31 PROCEDURE — 92083 HUMPHREY VISUAL FIELD - OU - BOTH EYES: ICD-10-PCS | Mod: S$GLB,,, | Performed by: OPTOMETRIST

## 2020-08-31 PROCEDURE — 92014 PR EYE EXAM, EST PATIENT,COMPREHESV: ICD-10-PCS | Mod: S$GLB,,, | Performed by: OPTOMETRIST

## 2020-08-31 PROCEDURE — 92083 EXTENDED VISUAL FIELD XM: CPT | Mod: S$GLB,,, | Performed by: OPTOMETRIST

## 2020-08-31 PROCEDURE — 92133 POSTERIOR SEGMENT OCT OPTIC NERVE(OCULAR COHERENCE TOMOGRAPHY) - OU - BOTH EYES: ICD-10-PCS | Mod: S$GLB,,, | Performed by: OPTOMETRIST

## 2020-08-31 PROCEDURE — 99999 PR PBB SHADOW E&M-EST. PATIENT-LVL II: CPT | Mod: PBBFAC,,, | Performed by: OPTOMETRIST

## 2020-08-31 PROCEDURE — 92014 COMPRE OPH EXAM EST PT 1/>: CPT | Mod: S$GLB,,, | Performed by: OPTOMETRIST

## 2020-08-31 PROCEDURE — 99999 PR PBB SHADOW E&M-EST. PATIENT-LVL II: ICD-10-PCS | Mod: PBBFAC,,, | Performed by: OPTOMETRIST

## 2020-08-31 PROCEDURE — 92133 CPTRZD OPH DX IMG PST SGM ON: CPT | Mod: S$GLB,,, | Performed by: OPTOMETRIST

## 2020-09-02 NOTE — PROGRESS NOTES
HPI     Patient last visit with DNL on 08/27/2019 for 1 year dilated exam,   24-2VF, and gOCT.  Was told to rtc 1 year for dilated eye exam, 24-2VF, and gOCT.  Medication eye drops if any: None   Last HVF: 08/31/2020  Last gOCT: 08/31/2020  Last SDP: 6/2/16    HPI    Any vision changes since last exam: Yes, decrease with overall vision  Eye pain: No  Other ocular symptoms: Watery and itchy eyes, using otc tears prn    Do you wear currently wear glasses or contacts? None    Interested in contacts today? No    Do you plan on getting new glasses today? If Needed        Last edited by Saida Gaspar on 8/31/2020  2:39 PM. (History)            Assessment /Plan     For exam results, see Encounter Report.    Open angle with borderline findings of both eyes  -     Guzman Visual Field - OU - Extended - Both Eyes  -     Posterior Segment OCT Optic Nerve- Both eyes    Family history of glaucoma in mother  Stable gOCT and VF today with good IOP  No evidence of disease at this time  Monitor 12 months      Pseudophakia of both eyes  Doing well with excellent va OU  Monitor 12 months    RTC 1 yr for dilated eye exam and gOCT or PRN if any problems.   Discussed above and answered questions.

## 2020-12-01 ENCOUNTER — LAB VISIT (OUTPATIENT)
Dept: LAB | Facility: HOSPITAL | Age: 75
End: 2020-12-01
Attending: INTERNAL MEDICINE
Payer: MEDICARE

## 2020-12-01 ENCOUNTER — OFFICE VISIT (OUTPATIENT)
Dept: CARDIOLOGY | Facility: CLINIC | Age: 75
End: 2020-12-01
Payer: MEDICARE

## 2020-12-01 VITALS
HEIGHT: 68 IN | OXYGEN SATURATION: 99 % | HEART RATE: 78 BPM | BODY MASS INDEX: 27.93 KG/M2 | SYSTOLIC BLOOD PRESSURE: 122 MMHG | DIASTOLIC BLOOD PRESSURE: 80 MMHG | WEIGHT: 184.31 LBS

## 2020-12-01 DIAGNOSIS — I65.23 BILATERAL CAROTID ARTERY STENOSIS: ICD-10-CM

## 2020-12-01 DIAGNOSIS — R94.31 ABNORMAL ECG: ICD-10-CM

## 2020-12-01 DIAGNOSIS — I49.9 IRREGULAR HEART BEAT: ICD-10-CM

## 2020-12-01 DIAGNOSIS — I45.10 RBBB: ICD-10-CM

## 2020-12-01 DIAGNOSIS — R06.09 DYSPNEA ON EXERTION: ICD-10-CM

## 2020-12-01 DIAGNOSIS — R55 SYNCOPE AND COLLAPSE: ICD-10-CM

## 2020-12-01 DIAGNOSIS — R06.09 DYSPNEA ON EXERTION: Primary | ICD-10-CM

## 2020-12-01 DIAGNOSIS — I44.4 LAFB (LEFT ANTERIOR FASCICULAR BLOCK): ICD-10-CM

## 2020-12-01 DIAGNOSIS — I44.0 FIRST DEGREE AV BLOCK: ICD-10-CM

## 2020-12-01 DIAGNOSIS — E78.49 OTHER HYPERLIPIDEMIA: ICD-10-CM

## 2020-12-01 LAB
ALBUMIN SERPL BCP-MCNC: 3.9 G/DL (ref 3.5–5.2)
ALP SERPL-CCNC: 89 U/L (ref 55–135)
ALT SERPL W/O P-5'-P-CCNC: 22 U/L (ref 10–44)
ANION GAP SERPL CALC-SCNC: 8 MMOL/L (ref 8–16)
AST SERPL-CCNC: 32 U/L (ref 10–40)
BASOPHILS # BLD AUTO: 0.04 K/UL (ref 0–0.2)
BASOPHILS NFR BLD: 1 % (ref 0–1.9)
BILIRUB SERPL-MCNC: 0.4 MG/DL (ref 0.1–1)
BNP SERPL-MCNC: 26 PG/ML (ref 0–99)
BUN SERPL-MCNC: 17 MG/DL (ref 8–23)
CALCIUM SERPL-MCNC: 9.7 MG/DL (ref 8.7–10.5)
CHLORIDE SERPL-SCNC: 104 MMOL/L (ref 95–110)
CO2 SERPL-SCNC: 28 MMOL/L (ref 23–29)
CREAT SERPL-MCNC: 0.7 MG/DL (ref 0.5–1.4)
DIFFERENTIAL METHOD: ABNORMAL
EOSINOPHIL # BLD AUTO: 0.2 K/UL (ref 0–0.5)
EOSINOPHIL NFR BLD: 4.6 % (ref 0–8)
ERYTHROCYTE [DISTWIDTH] IN BLOOD BY AUTOMATED COUNT: 14 % (ref 11.5–14.5)
EST. GFR  (AFRICAN AMERICAN): >60 ML/MIN/1.73 M^2
EST. GFR  (NON AFRICAN AMERICAN): >60 ML/MIN/1.73 M^2
GLUCOSE SERPL-MCNC: 107 MG/DL (ref 70–110)
HCT VFR BLD AUTO: 44.4 % (ref 37–48.5)
HGB BLD-MCNC: 14 G/DL (ref 12–16)
IMM GRANULOCYTES # BLD AUTO: 0 K/UL (ref 0–0.04)
IMM GRANULOCYTES NFR BLD AUTO: 0 % (ref 0–0.5)
LYMPHOCYTES # BLD AUTO: 1.5 K/UL (ref 1–4.8)
LYMPHOCYTES NFR BLD: 36.9 % (ref 18–48)
MAGNESIUM SERPL-MCNC: 2.1 MG/DL (ref 1.6–2.6)
MCH RBC QN AUTO: 29.6 PG (ref 27–31)
MCHC RBC AUTO-ENTMCNC: 31.5 G/DL (ref 32–36)
MCV RBC AUTO: 94 FL (ref 82–98)
MONOCYTES # BLD AUTO: 0.5 K/UL (ref 0.3–1)
MONOCYTES NFR BLD: 11.3 % (ref 4–15)
NEUTROPHILS # BLD AUTO: 1.9 K/UL (ref 1.8–7.7)
NEUTROPHILS NFR BLD: 46.2 % (ref 38–73)
NRBC BLD-RTO: 0 /100 WBC
PLATELET # BLD AUTO: 165 K/UL (ref 150–350)
PMV BLD AUTO: 11.9 FL (ref 9.2–12.9)
POTASSIUM SERPL-SCNC: 4.2 MMOL/L (ref 3.5–5.1)
PROT SERPL-MCNC: 8 G/DL (ref 6–8.4)
RBC # BLD AUTO: 4.73 M/UL (ref 4–5.4)
SODIUM SERPL-SCNC: 140 MMOL/L (ref 136–145)
TSH SERPL DL<=0.005 MIU/L-ACNC: 0.65 UIU/ML (ref 0.4–4)
WBC # BLD AUTO: 4.15 K/UL (ref 3.9–12.7)

## 2020-12-01 PROCEDURE — 1126F PR PAIN SEVERITY QUANTIFIED, NO PAIN PRESENT: ICD-10-PCS | Mod: S$GLB,,, | Performed by: INTERNAL MEDICINE

## 2020-12-01 PROCEDURE — 85025 COMPLETE CBC W/AUTO DIFF WBC: CPT

## 2020-12-01 PROCEDURE — 99999 PR PBB SHADOW E&M-EST. PATIENT-LVL III: CPT | Mod: PBBFAC,,, | Performed by: INTERNAL MEDICINE

## 2020-12-01 PROCEDURE — 80053 COMPREHEN METABOLIC PANEL: CPT

## 2020-12-01 PROCEDURE — 1126F AMNT PAIN NOTED NONE PRSNT: CPT | Mod: S$GLB,,, | Performed by: INTERNAL MEDICINE

## 2020-12-01 PROCEDURE — 1159F PR MEDICATION LIST DOCUMENTED IN MEDICAL RECORD: ICD-10-PCS | Mod: S$GLB,,, | Performed by: INTERNAL MEDICINE

## 2020-12-01 PROCEDURE — 99214 PR OFFICE/OUTPT VISIT, EST, LEVL IV, 30-39 MIN: ICD-10-PCS | Mod: S$GLB,,, | Performed by: INTERNAL MEDICINE

## 2020-12-01 PROCEDURE — 1159F MED LIST DOCD IN RCRD: CPT | Mod: S$GLB,,, | Performed by: INTERNAL MEDICINE

## 2020-12-01 PROCEDURE — 83735 ASSAY OF MAGNESIUM: CPT

## 2020-12-01 PROCEDURE — 99999 PR PBB SHADOW E&M-EST. PATIENT-LVL III: ICD-10-PCS | Mod: PBBFAC,,, | Performed by: INTERNAL MEDICINE

## 2020-12-01 PROCEDURE — 84443 ASSAY THYROID STIM HORMONE: CPT

## 2020-12-01 PROCEDURE — 83880 ASSAY OF NATRIURETIC PEPTIDE: CPT

## 2020-12-01 PROCEDURE — 36415 COLL VENOUS BLD VENIPUNCTURE: CPT

## 2020-12-01 PROCEDURE — 99214 OFFICE O/P EST MOD 30 MIN: CPT | Mod: S$GLB,,, | Performed by: INTERNAL MEDICINE
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
## 2020-12-02 ENCOUNTER — TELEPHONE (OUTPATIENT)
Dept: CARDIOLOGY | Facility: HOSPITAL | Age: 75
End: 2020-12-02

## 2020-12-03 NOTE — TELEPHONE ENCOUNTER
Spoke with pt and informed her,    Labs all look good.   F/u with consult with Dr. Martinez.    Message sent to Dr. Francis staff to schedule 1 mon f/u    Understanding was verbalized with no questions/concerns.

## 2020-12-03 NOTE — TELEPHONE ENCOUNTER
Please call pt.  Labs all look good.  F/u with consult with Dr. Martinez.  Needs to f/u with Dr. Francis next month.  Please schedule appt.    Dr Taylor

## 2021-01-13 DIAGNOSIS — I65.23 BILATERAL CAROTID ARTERY STENOSIS: ICD-10-CM

## 2021-01-13 DIAGNOSIS — I44.4 LAFB (LEFT ANTERIOR FASCICULAR BLOCK): Primary | ICD-10-CM

## 2021-01-13 DIAGNOSIS — I45.10 RBBB: ICD-10-CM

## 2021-01-14 ENCOUNTER — IMMUNIZATION (OUTPATIENT)
Dept: INTERNAL MEDICINE | Facility: CLINIC | Age: 76
End: 2021-01-14
Payer: MEDICARE

## 2021-01-14 DIAGNOSIS — Z23 NEED FOR VACCINATION: ICD-10-CM

## 2021-01-14 PROCEDURE — 91300 COVID-19, MRNA, LNP-S, PF, 30 MCG/0.3 ML DOSE VACCINE: CPT | Mod: PBBFAC | Performed by: FAMILY MEDICINE

## 2021-02-04 ENCOUNTER — IMMUNIZATION (OUTPATIENT)
Dept: INTERNAL MEDICINE | Facility: CLINIC | Age: 76
End: 2021-02-04
Payer: MEDICARE

## 2021-02-04 DIAGNOSIS — Z23 NEED FOR VACCINATION: Primary | ICD-10-CM

## 2021-02-04 PROCEDURE — 91300 COVID-19, MRNA, LNP-S, PF, 30 MCG/0.3 ML DOSE VACCINE: CPT | Mod: PBBFAC | Performed by: FAMILY MEDICINE

## 2021-02-04 PROCEDURE — 0002A COVID-19, MRNA, LNP-S, PF, 30 MCG/0.3 ML DOSE VACCINE: CPT | Mod: PBBFAC | Performed by: FAMILY MEDICINE

## 2021-02-09 ENCOUNTER — OFFICE VISIT (OUTPATIENT)
Dept: CARDIOLOGY | Facility: CLINIC | Age: 76
End: 2021-02-09
Payer: MEDICARE

## 2021-02-09 ENCOUNTER — TELEPHONE (OUTPATIENT)
Dept: CARDIOLOGY | Facility: CLINIC | Age: 76
End: 2021-02-09

## 2021-02-09 DIAGNOSIS — I65.23 BILATERAL CAROTID ARTERY STENOSIS: ICD-10-CM

## 2021-02-09 DIAGNOSIS — M33.20 POLYMYOSITIS ASSOCIATED WITH AUTOIMMUNE DISEASE: ICD-10-CM

## 2021-02-09 DIAGNOSIS — I44.4 LAFB (LEFT ANTERIOR FASCICULAR BLOCK): ICD-10-CM

## 2021-02-09 DIAGNOSIS — R55 NEAR SYNCOPE: Primary | ICD-10-CM

## 2021-02-09 DIAGNOSIS — R55 SYNCOPE AND COLLAPSE: ICD-10-CM

## 2021-02-09 DIAGNOSIS — E78.49 OTHER HYPERLIPIDEMIA: ICD-10-CM

## 2021-02-09 DIAGNOSIS — M35.9 POLYMYOSITIS ASSOCIATED WITH AUTOIMMUNE DISEASE: ICD-10-CM

## 2021-02-09 DIAGNOSIS — I44.0 FIRST DEGREE AV BLOCK: ICD-10-CM

## 2021-02-09 DIAGNOSIS — R00.2 PALPITATIONS: ICD-10-CM

## 2021-02-09 DIAGNOSIS — I45.10 RBBB: ICD-10-CM

## 2021-02-09 DIAGNOSIS — I35.9 AORTIC ANNULAR CALCIFICATION: ICD-10-CM

## 2021-02-09 PROCEDURE — 99205 OFFICE O/P NEW HI 60 MIN: CPT | Mod: 95,,, | Performed by: INTERNAL MEDICINE

## 2021-02-09 PROCEDURE — 1159F MED LIST DOCD IN RCRD: CPT | Mod: 95,,, | Performed by: INTERNAL MEDICINE

## 2021-02-09 PROCEDURE — 1159F PR MEDICATION LIST DOCUMENTED IN MEDICAL RECORD: ICD-10-PCS | Mod: 95,,, | Performed by: INTERNAL MEDICINE

## 2021-02-09 PROCEDURE — 99205 PR OFFICE/OUTPT VISIT, NEW, LEVL V, 60-74 MIN: ICD-10-PCS | Mod: 95,,, | Performed by: INTERNAL MEDICINE

## 2021-02-09 RX ORDER — METFORMIN HYDROCHLORIDE 500 MG/1
500 TABLET, EXTENDED RELEASE ORAL DAILY
COMMUNITY
Start: 2020-09-17

## 2021-02-10 ENCOUNTER — TELEPHONE (OUTPATIENT)
Dept: RHEUMATOLOGY | Facility: CLINIC | Age: 76
End: 2021-02-10

## 2021-02-10 ENCOUNTER — TELEPHONE (OUTPATIENT)
Dept: CARDIOLOGY | Facility: CLINIC | Age: 76
End: 2021-02-10

## 2021-02-10 DIAGNOSIS — R55 NEAR SYNCOPE: ICD-10-CM

## 2021-02-10 DIAGNOSIS — I44.4 LAFB (LEFT ANTERIOR FASCICULAR BLOCK): Primary | ICD-10-CM

## 2021-02-11 ENCOUNTER — TELEPHONE (OUTPATIENT)
Dept: RHEUMATOLOGY | Facility: CLINIC | Age: 76
End: 2021-02-11

## 2021-02-12 ENCOUNTER — OFFICE VISIT (OUTPATIENT)
Dept: RHEUMATOLOGY | Facility: CLINIC | Age: 76
End: 2021-02-12
Payer: MEDICARE

## 2021-02-12 VITALS
SYSTOLIC BLOOD PRESSURE: 126 MMHG | BODY MASS INDEX: 28.06 KG/M2 | WEIGHT: 184.5 LBS | HEART RATE: 84 BPM | DIASTOLIC BLOOD PRESSURE: 80 MMHG

## 2021-02-12 DIAGNOSIS — M33.20 POLYMYOSITIS ASSOCIATED WITH AUTOIMMUNE DISEASE: ICD-10-CM

## 2021-02-12 DIAGNOSIS — Z71.89 COUNSELING ON HEALTH PROMOTION AND DISEASE PREVENTION: ICD-10-CM

## 2021-02-12 DIAGNOSIS — M15.9 PRIMARY OSTEOARTHRITIS INVOLVING MULTIPLE JOINTS: Primary | ICD-10-CM

## 2021-02-12 DIAGNOSIS — M35.9 POLYMYOSITIS ASSOCIATED WITH AUTOIMMUNE DISEASE: ICD-10-CM

## 2021-02-12 PROCEDURE — 1101F PR PT FALLS ASSESS DOC 0-1 FALLS W/OUT INJ PAST YR: ICD-10-PCS | Mod: CPTII,S$GLB,, | Performed by: INTERNAL MEDICINE

## 2021-02-12 PROCEDURE — 99215 PR OFFICE/OUTPT VISIT, EST, LEVL V, 40-54 MIN: ICD-10-PCS | Mod: 25,S$GLB,, | Performed by: INTERNAL MEDICINE

## 2021-02-12 PROCEDURE — 20610 LARGE JOINT ASPIRATION/INJECTION: R KNEE: ICD-10-PCS | Mod: RT,S$GLB,, | Performed by: INTERNAL MEDICINE

## 2021-02-12 PROCEDURE — 99215 OFFICE O/P EST HI 40 MIN: CPT | Mod: 25,S$GLB,, | Performed by: INTERNAL MEDICINE

## 2021-02-12 PROCEDURE — 20610 DRAIN/INJ JOINT/BURSA W/O US: CPT | Mod: RT,S$GLB,, | Performed by: INTERNAL MEDICINE

## 2021-02-12 PROCEDURE — 99999 PR PBB SHADOW E&M-EST. PATIENT-LVL III: ICD-10-PCS | Mod: PBBFAC,,, | Performed by: INTERNAL MEDICINE

## 2021-02-12 PROCEDURE — 1125F PR PAIN SEVERITY QUANTIFIED, PAIN PRESENT: ICD-10-PCS | Mod: S$GLB,,, | Performed by: INTERNAL MEDICINE

## 2021-02-12 PROCEDURE — 1159F MED LIST DOCD IN RCRD: CPT | Mod: S$GLB,,, | Performed by: INTERNAL MEDICINE

## 2021-02-12 PROCEDURE — 3288F PR FALLS RISK ASSESSMENT DOCUMENTED: ICD-10-PCS | Mod: CPTII,S$GLB,, | Performed by: INTERNAL MEDICINE

## 2021-02-12 PROCEDURE — 1159F PR MEDICATION LIST DOCUMENTED IN MEDICAL RECORD: ICD-10-PCS | Mod: S$GLB,,, | Performed by: INTERNAL MEDICINE

## 2021-02-12 PROCEDURE — 1101F PT FALLS ASSESS-DOCD LE1/YR: CPT | Mod: CPTII,S$GLB,, | Performed by: INTERNAL MEDICINE

## 2021-02-12 PROCEDURE — 1125F AMNT PAIN NOTED PAIN PRSNT: CPT | Mod: S$GLB,,, | Performed by: INTERNAL MEDICINE

## 2021-02-12 PROCEDURE — 3288F FALL RISK ASSESSMENT DOCD: CPT | Mod: CPTII,S$GLB,, | Performed by: INTERNAL MEDICINE

## 2021-02-12 PROCEDURE — 99999 PR PBB SHADOW E&M-EST. PATIENT-LVL III: CPT | Mod: PBBFAC,,, | Performed by: INTERNAL MEDICINE

## 2021-02-12 RX ORDER — TRIAMCINOLONE ACETONIDE 40 MG/ML
40 INJECTION, SUSPENSION INTRA-ARTICULAR; INTRAMUSCULAR
Status: DISCONTINUED | OUTPATIENT
Start: 2021-02-12 | End: 2021-02-12 | Stop reason: HOSPADM

## 2021-02-12 RX ADMIN — TRIAMCINOLONE ACETONIDE 40 MG: 40 INJECTION, SUSPENSION INTRA-ARTICULAR; INTRAMUSCULAR at 07:02

## 2021-02-23 ENCOUNTER — OFFICE VISIT (OUTPATIENT)
Dept: RHEUMATOLOGY | Facility: CLINIC | Age: 76
End: 2021-02-23
Payer: MEDICARE

## 2021-02-23 ENCOUNTER — HOSPITAL ENCOUNTER (OUTPATIENT)
Dept: RADIOLOGY | Facility: HOSPITAL | Age: 76
Discharge: HOME | End: 2021-02-23
Attending: PHYSICIAN ASSISTANT
Payer: MEDICARE

## 2021-02-23 ENCOUNTER — TELEPHONE (OUTPATIENT)
Dept: RHEUMATOLOGY | Facility: CLINIC | Age: 76
End: 2021-02-23

## 2021-02-23 VITALS
SYSTOLIC BLOOD PRESSURE: 134 MMHG | HEART RATE: 78 BPM | HEIGHT: 68 IN | BODY MASS INDEX: 27.46 KG/M2 | WEIGHT: 181.19 LBS | DIASTOLIC BLOOD PRESSURE: 82 MMHG

## 2021-02-23 DIAGNOSIS — M54.9 DORSALGIA, UNSPECIFIED: ICD-10-CM

## 2021-02-23 DIAGNOSIS — M54.50 ACUTE BILATERAL LOW BACK PAIN WITHOUT SCIATICA: Primary | ICD-10-CM

## 2021-02-23 DIAGNOSIS — M35.9 POLYMYOSITIS ASSOCIATED WITH AUTOIMMUNE DISEASE: ICD-10-CM

## 2021-02-23 DIAGNOSIS — M85.89 OTHER SPECIFIED DISORDERS OF BONE DENSITY AND STRUCTURE, MULTIPLE SITES: Primary | ICD-10-CM

## 2021-02-23 DIAGNOSIS — M15.9 PRIMARY OSTEOARTHRITIS INVOLVING MULTIPLE JOINTS: ICD-10-CM

## 2021-02-23 DIAGNOSIS — M33.20 POLYMYOSITIS ASSOCIATED WITH AUTOIMMUNE DISEASE: ICD-10-CM

## 2021-02-23 PROCEDURE — 99999 PR PBB SHADOW E&M-EST. PATIENT-LVL III: CPT | Mod: PBBFAC,,, | Performed by: PHYSICIAN ASSISTANT

## 2021-02-23 PROCEDURE — 1159F MED LIST DOCD IN RCRD: CPT | Mod: S$GLB,,, | Performed by: PHYSICIAN ASSISTANT

## 2021-02-23 PROCEDURE — 99214 PR OFFICE/OUTPT VISIT, EST, LEVL IV, 30-39 MIN: ICD-10-PCS | Mod: 25,S$GLB,, | Performed by: PHYSICIAN ASSISTANT

## 2021-02-23 PROCEDURE — 99999 PR PBB SHADOW E&M-EST. PATIENT-LVL III: ICD-10-PCS | Mod: PBBFAC,,, | Performed by: PHYSICIAN ASSISTANT

## 2021-02-23 PROCEDURE — 72110 X-RAY EXAM L-2 SPINE 4/>VWS: CPT | Mod: TC

## 2021-02-23 PROCEDURE — 1159F PR MEDICATION LIST DOCUMENTED IN MEDICAL RECORD: ICD-10-PCS | Mod: S$GLB,,, | Performed by: PHYSICIAN ASSISTANT

## 2021-02-23 PROCEDURE — 96372 THER/PROPH/DIAG INJ SC/IM: CPT | Mod: S$GLB,,, | Performed by: PHYSICIAN ASSISTANT

## 2021-02-23 PROCEDURE — 72110 X-RAY EXAM L-2 SPINE 4/>VWS: CPT | Mod: 26,,, | Performed by: RADIOLOGY

## 2021-02-23 PROCEDURE — 72110 XR LUMBAR SPINE COMPLETE 5 VIEW: ICD-10-PCS | Mod: 26,,, | Performed by: RADIOLOGY

## 2021-02-23 PROCEDURE — 99214 OFFICE O/P EST MOD 30 MIN: CPT | Mod: 25,S$GLB,, | Performed by: PHYSICIAN ASSISTANT

## 2021-02-23 PROCEDURE — 96372 PR INJECTION,THERAP/PROPH/DIAG2ST, IM OR SUBCUT: ICD-10-PCS | Mod: S$GLB,,, | Performed by: PHYSICIAN ASSISTANT

## 2021-02-23 RX ORDER — KETOROLAC TROMETHAMINE 30 MG/ML
30 INJECTION, SOLUTION INTRAMUSCULAR; INTRAVENOUS ONCE
Status: COMPLETED | OUTPATIENT
Start: 2021-02-23 | End: 2021-02-23

## 2021-02-23 RX ORDER — KETOROLAC TROMETHAMINE 30 MG/ML
30 INJECTION, SOLUTION INTRAMUSCULAR; INTRAVENOUS
Status: DISCONTINUED | OUTPATIENT
Start: 2021-02-23 | End: 2021-02-23

## 2021-02-23 RX ORDER — TIZANIDINE 2 MG/1
4 TABLET ORAL EVERY 8 HOURS PRN
Qty: 60 TABLET | Refills: 0 | Status: SHIPPED | OUTPATIENT
Start: 2021-02-23 | End: 2021-03-17

## 2021-02-23 RX ORDER — METHYLPREDNISOLONE 4 MG/1
TABLET ORAL
Qty: 1 PACKAGE | Refills: 0 | Status: SHIPPED | OUTPATIENT
Start: 2021-02-23 | End: 2021-03-10

## 2021-02-23 RX ADMIN — KETOROLAC TROMETHAMINE 30 MG: 30 INJECTION, SOLUTION INTRAMUSCULAR; INTRAVENOUS at 08:02

## 2021-03-04 ENCOUNTER — TELEPHONE (OUTPATIENT)
Dept: CARDIOLOGY | Facility: CLINIC | Age: 76
End: 2021-03-04

## 2021-03-04 DIAGNOSIS — R55 SYNCOPE AND COLLAPSE: ICD-10-CM

## 2021-03-04 DIAGNOSIS — Z95.0 CARDIAC PACEMAKER IN SITU: Primary | ICD-10-CM

## 2021-03-04 DIAGNOSIS — I44.0 FIRST DEGREE AV BLOCK: ICD-10-CM

## 2021-03-09 ENCOUNTER — TELEPHONE (OUTPATIENT)
Dept: RHEUMATOLOGY | Facility: CLINIC | Age: 76
End: 2021-03-09

## 2021-03-10 ENCOUNTER — APPOINTMENT (OUTPATIENT)
Dept: RADIOLOGY | Facility: HOSPITAL | Age: 76
End: 2021-03-10
Attending: PHYSICIAN ASSISTANT
Payer: MEDICARE

## 2021-03-10 ENCOUNTER — OFFICE VISIT (OUTPATIENT)
Dept: RHEUMATOLOGY | Facility: CLINIC | Age: 76
End: 2021-03-10
Payer: MEDICARE

## 2021-03-10 VITALS — WEIGHT: 184.06 LBS | BODY MASS INDEX: 27.9 KG/M2 | HEIGHT: 68 IN

## 2021-03-10 DIAGNOSIS — M17.12 PRIMARY OSTEOARTHRITIS OF LEFT KNEE: ICD-10-CM

## 2021-03-10 DIAGNOSIS — M33.20 POLYMYOSITIS ASSOCIATED WITH AUTOIMMUNE DISEASE: Primary | ICD-10-CM

## 2021-03-10 DIAGNOSIS — M17.0 BILATERAL PRIMARY OSTEOARTHRITIS OF KNEE: ICD-10-CM

## 2021-03-10 DIAGNOSIS — M35.9 POLYMYOSITIS ASSOCIATED WITH AUTOIMMUNE DISEASE: Primary | ICD-10-CM

## 2021-03-10 DIAGNOSIS — M47.816 LUMBAR SPONDYLOSIS: ICD-10-CM

## 2021-03-10 DIAGNOSIS — M85.89 OTHER SPECIFIED DISORDERS OF BONE DENSITY AND STRUCTURE, MULTIPLE SITES: ICD-10-CM

## 2021-03-10 PROCEDURE — 77080 DXA BONE DENSITY AXIAL: CPT | Mod: 26,,, | Performed by: RADIOLOGY

## 2021-03-10 PROCEDURE — 1101F PR PT FALLS ASSESS DOC 0-1 FALLS W/OUT INJ PAST YR: ICD-10-PCS | Mod: CPTII,S$GLB,, | Performed by: PHYSICIAN ASSISTANT

## 2021-03-10 PROCEDURE — 20610 PR DRAIN/INJECT LARGE JOINT/BURSA: ICD-10-PCS | Mod: 50,S$GLB,, | Performed by: PHYSICIAN ASSISTANT

## 2021-03-10 PROCEDURE — 1159F MED LIST DOCD IN RCRD: CPT | Mod: S$GLB,,, | Performed by: PHYSICIAN ASSISTANT

## 2021-03-10 PROCEDURE — 77080 DXA BONE DENSITY AXIAL: CPT | Mod: TC

## 2021-03-10 PROCEDURE — 99999 PR PBB SHADOW E&M-EST. PATIENT-LVL III: CPT | Mod: PBBFAC,,, | Performed by: PHYSICIAN ASSISTANT

## 2021-03-10 PROCEDURE — 1125F AMNT PAIN NOTED PAIN PRSNT: CPT | Mod: S$GLB,,, | Performed by: PHYSICIAN ASSISTANT

## 2021-03-10 PROCEDURE — 20610 DRAIN/INJ JOINT/BURSA W/O US: CPT | Mod: 50,S$GLB,, | Performed by: PHYSICIAN ASSISTANT

## 2021-03-10 PROCEDURE — 77080 DEXA BONE DENSITY SPINE HIP: ICD-10-PCS | Mod: 26,,, | Performed by: RADIOLOGY

## 2021-03-10 PROCEDURE — 99214 PR OFFICE/OUTPT VISIT, EST, LEVL IV, 30-39 MIN: ICD-10-PCS | Mod: 25,S$GLB,, | Performed by: PHYSICIAN ASSISTANT

## 2021-03-10 PROCEDURE — 3288F FALL RISK ASSESSMENT DOCD: CPT | Mod: CPTII,S$GLB,, | Performed by: PHYSICIAN ASSISTANT

## 2021-03-10 PROCEDURE — 1159F PR MEDICATION LIST DOCUMENTED IN MEDICAL RECORD: ICD-10-PCS | Mod: S$GLB,,, | Performed by: PHYSICIAN ASSISTANT

## 2021-03-10 PROCEDURE — 3288F PR FALLS RISK ASSESSMENT DOCUMENTED: ICD-10-PCS | Mod: CPTII,S$GLB,, | Performed by: PHYSICIAN ASSISTANT

## 2021-03-10 PROCEDURE — 1125F PR PAIN SEVERITY QUANTIFIED, PAIN PRESENT: ICD-10-PCS | Mod: S$GLB,,, | Performed by: PHYSICIAN ASSISTANT

## 2021-03-10 PROCEDURE — 99999 PR PBB SHADOW E&M-EST. PATIENT-LVL III: ICD-10-PCS | Mod: PBBFAC,,, | Performed by: PHYSICIAN ASSISTANT

## 2021-03-10 PROCEDURE — 99214 OFFICE O/P EST MOD 30 MIN: CPT | Mod: 25,S$GLB,, | Performed by: PHYSICIAN ASSISTANT

## 2021-03-10 PROCEDURE — 1101F PT FALLS ASSESS-DOCD LE1/YR: CPT | Mod: CPTII,S$GLB,, | Performed by: PHYSICIAN ASSISTANT

## 2021-03-10 RX ORDER — TRIAMCINOLONE ACETONIDE 40 MG/ML
40 INJECTION, SUSPENSION INTRA-ARTICULAR; INTRAMUSCULAR
Status: COMPLETED | OUTPATIENT
Start: 2021-03-10 | End: 2021-03-10

## 2021-03-10 RX ADMIN — TRIAMCINOLONE ACETONIDE 40 MG: 40 INJECTION, SUSPENSION INTRA-ARTICULAR; INTRAMUSCULAR at 02:03

## 2021-05-06 ENCOUNTER — TELEPHONE (OUTPATIENT)
Dept: CARDIOLOGY | Facility: CLINIC | Age: 76
End: 2021-05-06

## 2021-05-17 DIAGNOSIS — I44.0 FIRST DEGREE AV BLOCK: ICD-10-CM

## 2021-05-17 DIAGNOSIS — I44.4 LAFB (LEFT ANTERIOR FASCICULAR BLOCK): ICD-10-CM

## 2021-05-17 DIAGNOSIS — I44.0 AV BLOCK, 1ST DEGREE: ICD-10-CM

## 2021-05-17 DIAGNOSIS — R55 SYNCOPE AND COLLAPSE: Primary | ICD-10-CM

## 2021-05-18 ENCOUNTER — LAB VISIT (OUTPATIENT)
Dept: LAB | Facility: HOSPITAL | Age: 76
End: 2021-05-18
Attending: INTERNAL MEDICINE
Payer: MEDICARE

## 2021-05-18 DIAGNOSIS — R55 NEAR SYNCOPE: ICD-10-CM

## 2021-05-18 DIAGNOSIS — I44.4 LAFB (LEFT ANTERIOR FASCICULAR BLOCK): ICD-10-CM

## 2021-05-18 LAB
ANION GAP SERPL CALC-SCNC: 9 MMOL/L (ref 8–16)
APTT BLDCRRT: 25.2 SEC (ref 21–32)
BASOPHILS # BLD AUTO: 0.05 K/UL (ref 0–0.2)
BASOPHILS NFR BLD: 1.1 % (ref 0–1.9)
BUN SERPL-MCNC: 12 MG/DL (ref 8–23)
CALCIUM SERPL-MCNC: 9.7 MG/DL (ref 8.7–10.5)
CHLORIDE SERPL-SCNC: 105 MMOL/L (ref 95–110)
CO2 SERPL-SCNC: 25 MMOL/L (ref 23–29)
CREAT SERPL-MCNC: 0.7 MG/DL (ref 0.5–1.4)
DIFFERENTIAL METHOD: ABNORMAL
EOSINOPHIL # BLD AUTO: 0.2 K/UL (ref 0–0.5)
EOSINOPHIL NFR BLD: 4.2 % (ref 0–8)
ERYTHROCYTE [DISTWIDTH] IN BLOOD BY AUTOMATED COUNT: 13.9 % (ref 11.5–14.5)
EST. GFR  (AFRICAN AMERICAN): >60 ML/MIN/1.73 M^2
EST. GFR  (NON AFRICAN AMERICAN): >60 ML/MIN/1.73 M^2
GLUCOSE SERPL-MCNC: 158 MG/DL (ref 70–110)
HCT VFR BLD AUTO: 40.6 % (ref 37–48.5)
HGB BLD-MCNC: 12.8 G/DL (ref 12–16)
IMM GRANULOCYTES # BLD AUTO: 0.02 K/UL (ref 0–0.04)
IMM GRANULOCYTES NFR BLD AUTO: 0.4 % (ref 0–0.5)
INR PPP: 1 (ref 0.8–1.2)
LYMPHOCYTES # BLD AUTO: 1.3 K/UL (ref 1–4.8)
LYMPHOCYTES NFR BLD: 26.6 % (ref 18–48)
MCH RBC QN AUTO: 28.7 PG (ref 27–31)
MCHC RBC AUTO-ENTMCNC: 31.5 G/DL (ref 32–36)
MCV RBC AUTO: 91 FL (ref 82–98)
MONOCYTES # BLD AUTO: 0.4 K/UL (ref 0.3–1)
MONOCYTES NFR BLD: 8 % (ref 4–15)
NEUTROPHILS # BLD AUTO: 2.8 K/UL (ref 1.8–7.7)
NEUTROPHILS NFR BLD: 59.7 % (ref 38–73)
NRBC BLD-RTO: 0 /100 WBC
PLATELET # BLD AUTO: 261 K/UL (ref 150–450)
PMV BLD AUTO: 10.6 FL (ref 9.2–12.9)
POTASSIUM SERPL-SCNC: 4 MMOL/L (ref 3.5–5.1)
PROTHROMBIN TIME: 11 SEC (ref 9–12.5)
RBC # BLD AUTO: 4.46 M/UL (ref 4–5.4)
SODIUM SERPL-SCNC: 139 MMOL/L (ref 136–145)
WBC # BLD AUTO: 4.73 K/UL (ref 3.9–12.7)

## 2021-05-18 PROCEDURE — 85610 PROTHROMBIN TIME: CPT | Performed by: INTERNAL MEDICINE

## 2021-05-18 PROCEDURE — 85730 THROMBOPLASTIN TIME PARTIAL: CPT | Performed by: INTERNAL MEDICINE

## 2021-05-18 PROCEDURE — 36415 COLL VENOUS BLD VENIPUNCTURE: CPT | Performed by: INTERNAL MEDICINE

## 2021-05-18 PROCEDURE — 80048 BASIC METABOLIC PNL TOTAL CA: CPT | Performed by: INTERNAL MEDICINE

## 2021-05-18 PROCEDURE — 85025 COMPLETE CBC W/AUTO DIFF WBC: CPT | Performed by: INTERNAL MEDICINE

## 2021-05-24 DIAGNOSIS — R55 NEAR SYNCOPE: ICD-10-CM

## 2021-05-24 DIAGNOSIS — R55 SYNCOPE AND COLLAPSE: Primary | ICD-10-CM

## 2021-05-24 DIAGNOSIS — I44.4 LAFB (LEFT ANTERIOR FASCICULAR BLOCK): ICD-10-CM

## 2021-05-24 DIAGNOSIS — R06.09 DYSPNEA ON EXERTION: ICD-10-CM

## 2021-05-27 RX ORDER — METFORMIN HYDROCHLORIDE 500 MG/1
500 TABLET ORAL DAILY
COMMUNITY
Start: 2021-03-05 | End: 2021-10-06 | Stop reason: SDUPTHER

## 2021-06-03 ENCOUNTER — ANESTHESIA (OUTPATIENT)
Dept: CARDIOLOGY | Facility: HOSPITAL | Age: 76
End: 2021-06-03
Payer: MEDICARE

## 2021-06-03 ENCOUNTER — HOSPITAL ENCOUNTER (OUTPATIENT)
Facility: HOSPITAL | Age: 76
Discharge: HOME | End: 2021-06-03
Attending: INTERNAL MEDICINE | Admitting: INTERNAL MEDICINE
Payer: MEDICARE

## 2021-06-03 ENCOUNTER — ANESTHESIA EVENT (OUTPATIENT)
Dept: CARDIOLOGY | Facility: HOSPITAL | Age: 76
End: 2021-06-03
Payer: MEDICARE

## 2021-06-03 VITALS
HEART RATE: 73 BPM | RESPIRATION RATE: 17 BRPM | HEIGHT: 68 IN | DIASTOLIC BLOOD PRESSURE: 82 MMHG | TEMPERATURE: 98 F | SYSTOLIC BLOOD PRESSURE: 125 MMHG | WEIGHT: 184 LBS | OXYGEN SATURATION: 100 % | BODY MASS INDEX: 27.89 KG/M2

## 2021-06-03 DIAGNOSIS — Z95.0 PACEMAKER: ICD-10-CM

## 2021-06-03 DIAGNOSIS — I44.0 FIRST DEGREE AV BLOCK: ICD-10-CM

## 2021-06-03 DIAGNOSIS — R55 SYNCOPE AND COLLAPSE: ICD-10-CM

## 2021-06-03 DIAGNOSIS — I44.0 AV BLOCK, 1ST DEGREE: Primary | ICD-10-CM

## 2021-06-03 DIAGNOSIS — I44.4 LAFB (LEFT ANTERIOR FASCICULAR BLOCK): ICD-10-CM

## 2021-06-03 DIAGNOSIS — R55 NEAR SYNCOPE: ICD-10-CM

## 2021-06-03 PROBLEM — I45.3 TRIFASCICULAR BLOCK: Status: ACTIVE | Noted: 2021-06-03

## 2021-06-03 LAB — POCT GLUCOSE: 101 MG/DL (ref 70–110)

## 2021-06-03 PROCEDURE — C1898 LEAD, PMKR, OTHER THAN TRANS: HCPCS | Performed by: INTERNAL MEDICINE

## 2021-06-03 PROCEDURE — 99220 PR INITIAL OBSERVATION CARE,LEVL III: ICD-10-PCS | Mod: ,,, | Performed by: INTERNAL MEDICINE

## 2021-06-03 PROCEDURE — 63600175 PHARM REV CODE 636 W HCPCS: Performed by: ANESTHESIOLOGY

## 2021-06-03 PROCEDURE — C1892 INTRO/SHEATH,FIXED,PEEL-AWAY: HCPCS | Performed by: INTERNAL MEDICINE

## 2021-06-03 PROCEDURE — C1785 PMKR, DUAL, RATE-RESP: HCPCS | Performed by: INTERNAL MEDICINE

## 2021-06-03 PROCEDURE — 33208 INSRT HEART PM ATRIAL & VENT: CPT | Mod: KX | Performed by: INTERNAL MEDICINE

## 2021-06-03 PROCEDURE — C1769 GUIDE WIRE: HCPCS | Performed by: INTERNAL MEDICINE

## 2021-06-03 PROCEDURE — 25500020 PHARM REV CODE 255: Performed by: INTERNAL MEDICINE

## 2021-06-03 PROCEDURE — 63600175 PHARM REV CODE 636 W HCPCS: Performed by: INTERNAL MEDICINE

## 2021-06-03 PROCEDURE — 93010 ELECTROCARDIOGRAM REPORT: CPT | Mod: ,,, | Performed by: INTERNAL MEDICINE

## 2021-06-03 PROCEDURE — 63600175 PHARM REV CODE 636 W HCPCS: Performed by: NURSE ANESTHETIST, CERTIFIED REGISTERED

## 2021-06-03 PROCEDURE — 99024 PR POST-OP FOLLOW-UP VISIT: ICD-10-PCS | Mod: ,,, | Performed by: INTERNAL MEDICINE

## 2021-06-03 PROCEDURE — 25000003 PHARM REV CODE 250: Performed by: NURSE ANESTHETIST, CERTIFIED REGISTERED

## 2021-06-03 PROCEDURE — 93010 EKG 12-LEAD: ICD-10-PCS | Mod: 76,,, | Performed by: INTERNAL MEDICINE

## 2021-06-03 PROCEDURE — 99024 POSTOP FOLLOW-UP VISIT: CPT | Mod: ,,, | Performed by: INTERNAL MEDICINE

## 2021-06-03 PROCEDURE — C1894 INTRO/SHEATH, NON-LASER: HCPCS | Performed by: INTERNAL MEDICINE

## 2021-06-03 PROCEDURE — 25000003 PHARM REV CODE 250: Performed by: INTERNAL MEDICINE

## 2021-06-03 PROCEDURE — 33208 INSRT HEART PM ATRIAL & VENT: CPT | Mod: KX,,, | Performed by: INTERNAL MEDICINE

## 2021-06-03 PROCEDURE — 33208 PR INSER HART PACER XVENOUS ATR/VENTR: ICD-10-PCS | Mod: KX,,, | Performed by: INTERNAL MEDICINE

## 2021-06-03 PROCEDURE — 37000009 HC ANESTHESIA EA ADD 15 MINS: Performed by: INTERNAL MEDICINE

## 2021-06-03 PROCEDURE — 99220 PR INITIAL OBSERVATION CARE,LEVL III: CPT | Mod: ,,, | Performed by: INTERNAL MEDICINE

## 2021-06-03 PROCEDURE — 93005 ELECTROCARDIOGRAM TRACING: CPT | Mod: 59

## 2021-06-03 PROCEDURE — 37000008 HC ANESTHESIA 1ST 15 MINUTES: Performed by: INTERNAL MEDICINE

## 2021-06-03 DEVICE — IMPLANTABLE DEVICE
Type: IMPLANTABLE DEVICE | Site: HEART | Status: FUNCTIONAL
Brand: SOLIA

## 2021-06-03 DEVICE — LEAD SOLIA S 45 PROMRI: Type: IMPLANTABLE DEVICE | Site: HEART | Status: FUNCTIONAL

## 2021-06-03 DEVICE — IMPLANTABLE DEVICE
Type: IMPLANTABLE DEVICE | Site: CHEST  WALL | Status: FUNCTIONAL
Brand: EDORA 8 DR-T

## 2021-06-03 RX ORDER — DICLOFENAC POTASSIUM 50 MG/1
50 TABLET, FILM COATED ORAL 3 TIMES DAILY
Qty: 30 TABLET | Refills: 0 | Status: SHIPPED | OUTPATIENT
Start: 2021-06-03 | End: 2021-08-03

## 2021-06-03 RX ORDER — FENTANYL CITRATE 50 UG/ML
INJECTION, SOLUTION INTRAMUSCULAR; INTRAVENOUS
Status: DISCONTINUED | OUTPATIENT
Start: 2021-06-03 | End: 2021-06-03

## 2021-06-03 RX ORDER — ONDANSETRON 2 MG/ML
INJECTION INTRAMUSCULAR; INTRAVENOUS
Status: DISCONTINUED | OUTPATIENT
Start: 2021-06-03 | End: 2021-06-03

## 2021-06-03 RX ORDER — VANCOMYCIN HYDROCHLORIDE 1 G/20ML
INJECTION, POWDER, LYOPHILIZED, FOR SOLUTION INTRAVENOUS
Status: DISCONTINUED | OUTPATIENT
Start: 2021-06-03 | End: 2021-06-03 | Stop reason: HOSPADM

## 2021-06-03 RX ORDER — HYDROCODONE BITARTRATE AND ACETAMINOPHEN 10; 325 MG/1; MG/1
1 TABLET ORAL EVERY 4 HOURS PRN
Status: DISCONTINUED | OUTPATIENT
Start: 2021-06-03 | End: 2021-06-03 | Stop reason: HOSPADM

## 2021-06-03 RX ORDER — PROPOFOL 10 MG/ML
VIAL (ML) INTRAVENOUS CONTINUOUS PRN
Status: DISCONTINUED | OUTPATIENT
Start: 2021-06-03 | End: 2021-06-03

## 2021-06-03 RX ORDER — ACETAMINOPHEN 325 MG/1
650 TABLET ORAL EVERY 4 HOURS PRN
Status: DISCONTINUED | OUTPATIENT
Start: 2021-06-03 | End: 2021-06-03 | Stop reason: HOSPADM

## 2021-06-03 RX ORDER — HYDROCODONE BITARTRATE AND ACETAMINOPHEN 5; 325 MG/1; MG/1
1 TABLET ORAL EVERY 4 HOURS PRN
Status: DISCONTINUED | OUTPATIENT
Start: 2021-06-03 | End: 2021-06-03 | Stop reason: HOSPADM

## 2021-06-03 RX ORDER — PHENYLEPHRINE HYDROCHLORIDE 10 MG/ML
INJECTION INTRAVENOUS
Status: DISCONTINUED | OUTPATIENT
Start: 2021-06-03 | End: 2021-06-03

## 2021-06-03 RX ORDER — LIDOCAINE HYDROCHLORIDE 10 MG/ML
INJECTION, SOLUTION EPIDURAL; INFILTRATION; INTRACAUDAL; PERINEURAL
Status: DISCONTINUED | OUTPATIENT
Start: 2021-06-03 | End: 2021-06-03

## 2021-06-03 RX ORDER — PROPOFOL 10 MG/ML
VIAL (ML) INTRAVENOUS
Status: DISCONTINUED | OUTPATIENT
Start: 2021-06-03 | End: 2021-06-03

## 2021-06-03 RX ORDER — KETAMINE HYDROCHLORIDE 50 MG/ML
INJECTION, SOLUTION INTRAMUSCULAR; INTRAVENOUS
Status: DISCONTINUED | OUTPATIENT
Start: 2021-06-03 | End: 2021-06-03

## 2021-06-03 RX ADMIN — FENTANYL CITRATE 25 MCG: 50 INJECTION, SOLUTION INTRAMUSCULAR; INTRAVENOUS at 09:06

## 2021-06-03 RX ADMIN — PROPOFOL 30 MG: 10 INJECTION, EMULSION INTRAVENOUS at 08:06

## 2021-06-03 RX ADMIN — PROPOFOL 35 MCG/KG/MIN: 10 INJECTION, EMULSION INTRAVENOUS at 08:06

## 2021-06-03 RX ADMIN — VANCOMYCIN HYDROCHLORIDE 1750 MG: 10 INJECTION, POWDER, LYOPHILIZED, FOR SOLUTION INTRAVENOUS at 08:06

## 2021-06-03 RX ADMIN — PHENYLEPHRINE HYDROCHLORIDE 100 MCG: 10 INJECTION INTRAVENOUS at 09:06

## 2021-06-03 RX ADMIN — GLYCOPYRROLATE 0.2 MCG: 0.2 INJECTION, SOLUTION INTRAMUSCULAR; INTRAVITREAL at 08:06

## 2021-06-03 RX ADMIN — KETAMINE HYDROCHLORIDE 20 MG: 50 INJECTION INTRAMUSCULAR; INTRAVENOUS at 08:06

## 2021-06-03 RX ADMIN — FENTANYL CITRATE 25 MCG: 50 INJECTION, SOLUTION INTRAMUSCULAR; INTRAVENOUS at 08:06

## 2021-06-03 RX ADMIN — KETAMINE HYDROCHLORIDE 10 MG: 50 INJECTION INTRAMUSCULAR; INTRAVENOUS at 08:06

## 2021-06-03 RX ADMIN — LIDOCAINE HYDROCHLORIDE 50 MG: 10 INJECTION, SOLUTION EPIDURAL; INFILTRATION; INTRACAUDAL; PERINEURAL at 08:06

## 2021-06-03 RX ADMIN — SODIUM CHLORIDE: 9 INJECTION, SOLUTION INTRAVENOUS at 08:06

## 2021-06-03 RX ADMIN — ONDANSETRON 4 MG: 2 INJECTION, SOLUTION INTRAMUSCULAR; INTRAVENOUS at 09:06

## 2021-06-03 RX ADMIN — PHENYLEPHRINE HYDROCHLORIDE 50 MCG: 10 INJECTION INTRAVENOUS at 09:06

## 2021-06-07 ENCOUNTER — OFFICE VISIT (OUTPATIENT)
Dept: CARDIOLOGY | Facility: CLINIC | Age: 76
End: 2021-06-07
Payer: MEDICARE

## 2021-06-07 ENCOUNTER — TELEPHONE (OUTPATIENT)
Dept: CARDIOLOGY | Facility: CLINIC | Age: 76
End: 2021-06-07

## 2021-06-07 ENCOUNTER — CLINICAL SUPPORT (OUTPATIENT)
Dept: CARDIOLOGY | Facility: HOSPITAL | Age: 76
End: 2021-06-07
Attending: INTERNAL MEDICINE
Payer: MEDICARE

## 2021-06-07 VITALS
DIASTOLIC BLOOD PRESSURE: 78 MMHG | WEIGHT: 182.31 LBS | OXYGEN SATURATION: 96 % | BODY MASS INDEX: 27.72 KG/M2 | SYSTOLIC BLOOD PRESSURE: 122 MMHG | HEART RATE: 77 BPM

## 2021-06-07 DIAGNOSIS — Z95.0 CARDIAC PACEMAKER IN SITU: ICD-10-CM

## 2021-06-07 DIAGNOSIS — R55 SYNCOPE AND COLLAPSE: Primary | ICD-10-CM

## 2021-06-07 DIAGNOSIS — R94.31 ABNORMAL ECG: ICD-10-CM

## 2021-06-07 DIAGNOSIS — I44.0 FIRST DEGREE AV BLOCK: ICD-10-CM

## 2021-06-07 DIAGNOSIS — R55 SYNCOPE AND COLLAPSE: ICD-10-CM

## 2021-06-07 PROCEDURE — 99214 OFFICE O/P EST MOD 30 MIN: CPT | Mod: S$GLB,,, | Performed by: NURSE PRACTITIONER

## 2021-06-07 PROCEDURE — 3288F FALL RISK ASSESSMENT DOCD: CPT | Mod: CPTII,S$GLB,, | Performed by: NURSE PRACTITIONER

## 2021-06-07 PROCEDURE — 99999 PR PBB SHADOW E&M-EST. PATIENT-LVL I: CPT | Mod: PBBFAC,,,

## 2021-06-07 PROCEDURE — 93280 CARDIAC DEVICE CHECK - IN CLINIC & HOSPITAL: ICD-10-PCS | Mod: 26,,, | Performed by: INTERNAL MEDICINE

## 2021-06-07 PROCEDURE — 1101F PR PT FALLS ASSESS DOC 0-1 FALLS W/OUT INJ PAST YR: ICD-10-PCS | Mod: CPTII,S$GLB,, | Performed by: NURSE PRACTITIONER

## 2021-06-07 PROCEDURE — 99999 PR PBB SHADOW E&M-EST. PATIENT-LVL III: ICD-10-PCS | Mod: PBBFAC,,, | Performed by: NURSE PRACTITIONER

## 2021-06-07 PROCEDURE — 1159F MED LIST DOCD IN RCRD: CPT | Mod: S$GLB,,, | Performed by: NURSE PRACTITIONER

## 2021-06-07 PROCEDURE — 99214 PR OFFICE/OUTPT VISIT, EST, LEVL IV, 30-39 MIN: ICD-10-PCS | Mod: S$GLB,,, | Performed by: NURSE PRACTITIONER

## 2021-06-07 PROCEDURE — 93280 PM DEVICE PROGR EVAL DUAL: CPT | Mod: 26,,, | Performed by: INTERNAL MEDICINE

## 2021-06-07 PROCEDURE — 3288F PR FALLS RISK ASSESSMENT DOCUMENTED: ICD-10-PCS | Mod: CPTII,S$GLB,, | Performed by: NURSE PRACTITIONER

## 2021-06-07 PROCEDURE — 99999 PR PBB SHADOW E&M-EST. PATIENT-LVL I: ICD-10-PCS | Mod: PBBFAC,,,

## 2021-06-07 PROCEDURE — 99999 PR PBB SHADOW E&M-EST. PATIENT-LVL III: CPT | Mod: PBBFAC,,, | Performed by: NURSE PRACTITIONER

## 2021-06-07 PROCEDURE — 1101F PT FALLS ASSESS-DOCD LE1/YR: CPT | Mod: CPTII,S$GLB,, | Performed by: NURSE PRACTITIONER

## 2021-06-07 PROCEDURE — 1159F PR MEDICATION LIST DOCUMENTED IN MEDICAL RECORD: ICD-10-PCS | Mod: S$GLB,,, | Performed by: NURSE PRACTITIONER

## 2021-06-07 PROCEDURE — 93280 PM DEVICE PROGR EVAL DUAL: CPT

## 2021-06-15 ENCOUNTER — TELEPHONE (OUTPATIENT)
Dept: CARDIOLOGY | Facility: CLINIC | Age: 76
End: 2021-06-15

## 2021-06-16 ENCOUNTER — OFFICE VISIT (OUTPATIENT)
Dept: CARDIOLOGY | Facility: CLINIC | Age: 76
End: 2021-06-16
Payer: MEDICARE

## 2021-06-16 ENCOUNTER — HOSPITAL ENCOUNTER (OUTPATIENT)
Dept: RADIOLOGY | Facility: HOSPITAL | Age: 76
Discharge: HOME | End: 2021-06-16
Attending: INTERNAL MEDICINE
Payer: MEDICARE

## 2021-06-16 VITALS
HEART RATE: 79 BPM | DIASTOLIC BLOOD PRESSURE: 76 MMHG | HEIGHT: 68 IN | OXYGEN SATURATION: 97 % | BODY MASS INDEX: 27.1 KG/M2 | WEIGHT: 178.81 LBS | SYSTOLIC BLOOD PRESSURE: 112 MMHG | RESPIRATION RATE: 16 BRPM

## 2021-06-16 DIAGNOSIS — E61.1 IRON DEFICIENCY: ICD-10-CM

## 2021-06-16 DIAGNOSIS — I45.10 RBBB: ICD-10-CM

## 2021-06-16 DIAGNOSIS — M35.9 POLYMYOSITIS ASSOCIATED WITH AUTOIMMUNE DISEASE: ICD-10-CM

## 2021-06-16 DIAGNOSIS — I44.0 FIRST DEGREE AV BLOCK: ICD-10-CM

## 2021-06-16 DIAGNOSIS — Z95.0 CARDIAC PACEMAKER IN SITU: ICD-10-CM

## 2021-06-16 DIAGNOSIS — R42 POSTURAL DIZZINESS WITH PRESYNCOPE: ICD-10-CM

## 2021-06-16 DIAGNOSIS — R55 POSTURAL DIZZINESS WITH PRESYNCOPE: ICD-10-CM

## 2021-06-16 DIAGNOSIS — R53.83 FATIGUE, UNSPECIFIED TYPE: ICD-10-CM

## 2021-06-16 DIAGNOSIS — I49.9 IRREGULAR HEART BEAT: ICD-10-CM

## 2021-06-16 DIAGNOSIS — I45.3 TRIFASCICULAR BLOCK: ICD-10-CM

## 2021-06-16 DIAGNOSIS — R55 NEAR SYNCOPE: ICD-10-CM

## 2021-06-16 DIAGNOSIS — R94.31 ABNORMAL ECG: ICD-10-CM

## 2021-06-16 DIAGNOSIS — R55 SYNCOPE AND COLLAPSE: ICD-10-CM

## 2021-06-16 DIAGNOSIS — G47.30 SLEEP APNEA, UNSPECIFIED TYPE: ICD-10-CM

## 2021-06-16 DIAGNOSIS — I44.4 LAFB (LEFT ANTERIOR FASCICULAR BLOCK): ICD-10-CM

## 2021-06-16 DIAGNOSIS — M33.20 POLYMYOSITIS ASSOCIATED WITH AUTOIMMUNE DISEASE: ICD-10-CM

## 2021-06-16 DIAGNOSIS — Z95.0 CARDIAC PACEMAKER IN SITU: Primary | ICD-10-CM

## 2021-06-16 PROCEDURE — 99214 OFFICE O/P EST MOD 30 MIN: CPT | Mod: S$GLB,,, | Performed by: INTERNAL MEDICINE

## 2021-06-16 PROCEDURE — 99999 PR PBB SHADOW E&M-EST. PATIENT-LVL V: CPT | Mod: PBBFAC,,, | Performed by: INTERNAL MEDICINE

## 2021-06-16 PROCEDURE — 1126F AMNT PAIN NOTED NONE PRSNT: CPT | Mod: S$GLB,,, | Performed by: INTERNAL MEDICINE

## 2021-06-16 PROCEDURE — 71046 XR CHEST PA AND LATERAL: ICD-10-PCS | Mod: 26,,, | Performed by: RADIOLOGY

## 2021-06-16 PROCEDURE — 1159F MED LIST DOCD IN RCRD: CPT | Mod: S$GLB,,, | Performed by: INTERNAL MEDICINE

## 2021-06-16 PROCEDURE — 1126F PR PAIN SEVERITY QUANTIFIED, NO PAIN PRESENT: ICD-10-PCS | Mod: S$GLB,,, | Performed by: INTERNAL MEDICINE

## 2021-06-16 PROCEDURE — 71046 X-RAY EXAM CHEST 2 VIEWS: CPT | Mod: TC

## 2021-06-16 PROCEDURE — 71046 X-RAY EXAM CHEST 2 VIEWS: CPT | Mod: 26,,, | Performed by: RADIOLOGY

## 2021-06-16 PROCEDURE — 1159F PR MEDICATION LIST DOCUMENTED IN MEDICAL RECORD: ICD-10-PCS | Mod: S$GLB,,, | Performed by: INTERNAL MEDICINE

## 2021-06-16 PROCEDURE — 99214 PR OFFICE/OUTPT VISIT, EST, LEVL IV, 30-39 MIN: ICD-10-PCS | Mod: S$GLB,,, | Performed by: INTERNAL MEDICINE

## 2021-06-16 PROCEDURE — 99999 PR PBB SHADOW E&M-EST. PATIENT-LVL V: ICD-10-PCS | Mod: PBBFAC,,, | Performed by: INTERNAL MEDICINE

## 2021-06-17 ENCOUNTER — TELEPHONE (OUTPATIENT)
Dept: HEMATOLOGY/ONCOLOGY | Facility: CLINIC | Age: 76
End: 2021-06-17

## 2021-06-17 DIAGNOSIS — D50.9 IRON DEFICIENCY ANEMIA, UNSPECIFIED IRON DEFICIENCY ANEMIA TYPE: Primary | ICD-10-CM

## 2021-06-18 ENCOUNTER — TELEPHONE (OUTPATIENT)
Dept: CARDIOLOGY | Facility: CLINIC | Age: 76
End: 2021-06-18

## 2021-07-07 ENCOUNTER — LAB VISIT (OUTPATIENT)
Dept: LAB | Facility: HOSPITAL | Age: 76
End: 2021-07-07
Attending: INTERNAL MEDICINE
Payer: MEDICARE

## 2021-07-07 ENCOUNTER — OFFICE VISIT (OUTPATIENT)
Dept: HEMATOLOGY/ONCOLOGY | Facility: CLINIC | Age: 76
End: 2021-07-07
Payer: MEDICARE

## 2021-07-07 VITALS
OXYGEN SATURATION: 99 % | HEIGHT: 68 IN | SYSTOLIC BLOOD PRESSURE: 120 MMHG | TEMPERATURE: 97 F | HEART RATE: 77 BPM | DIASTOLIC BLOOD PRESSURE: 72 MMHG | BODY MASS INDEX: 27.93 KG/M2 | WEIGHT: 184.31 LBS

## 2021-07-07 DIAGNOSIS — D50.9 IRON DEFICIENCY ANEMIA, UNSPECIFIED IRON DEFICIENCY ANEMIA TYPE: ICD-10-CM

## 2021-07-07 LAB
BASOPHILS # BLD AUTO: 0.03 K/UL (ref 0–0.2)
BASOPHILS NFR BLD: 0.8 % (ref 0–1.9)
DIFFERENTIAL METHOD: ABNORMAL
EOSINOPHIL # BLD AUTO: 0.2 K/UL (ref 0–0.5)
EOSINOPHIL NFR BLD: 6.2 % (ref 0–8)
ERYTHROCYTE [DISTWIDTH] IN BLOOD BY AUTOMATED COUNT: 14.9 % (ref 11.5–14.5)
FERRITIN SERPL-MCNC: 80 NG/ML (ref 20–300)
HCT VFR BLD AUTO: 39.8 % (ref 37–48.5)
HGB BLD-MCNC: 12.8 G/DL (ref 12–16)
IMM GRANULOCYTES # BLD AUTO: 0.01 K/UL (ref 0–0.04)
IMM GRANULOCYTES NFR BLD AUTO: 0.3 % (ref 0–0.5)
IRON SERPL-MCNC: 225 UG/DL (ref 30–160)
LYMPHOCYTES # BLD AUTO: 1.3 K/UL (ref 1–4.8)
LYMPHOCYTES NFR BLD: 35.5 % (ref 18–48)
MCH RBC QN AUTO: 29.3 PG (ref 27–31)
MCHC RBC AUTO-ENTMCNC: 32.2 G/DL (ref 32–36)
MCV RBC AUTO: 91 FL (ref 82–98)
MONOCYTES # BLD AUTO: 0.5 K/UL (ref 0.3–1)
MONOCYTES NFR BLD: 13.8 % (ref 4–15)
NEUTROPHILS # BLD AUTO: 1.6 K/UL (ref 1.8–7.7)
NEUTROPHILS NFR BLD: 43.4 % (ref 38–73)
NRBC BLD-RTO: 0 /100 WBC
PLATELET # BLD AUTO: 142 K/UL (ref 150–450)
PMV BLD AUTO: 9.6 FL (ref 9.2–12.9)
RBC # BLD AUTO: 4.37 M/UL (ref 4–5.4)
SATURATED IRON: 58 % (ref 20–50)
TOTAL IRON BINDING CAPACITY: 386 UG/DL (ref 250–450)
TRANSFERRIN SERPL-MCNC: 261 MG/DL (ref 200–375)
WBC # BLD AUTO: 3.69 K/UL (ref 3.9–12.7)

## 2021-07-07 PROCEDURE — 1126F AMNT PAIN NOTED NONE PRSNT: CPT | Mod: S$GLB,,, | Performed by: INTERNAL MEDICINE

## 2021-07-07 PROCEDURE — 83540 ASSAY OF IRON: CPT | Performed by: INTERNAL MEDICINE

## 2021-07-07 PROCEDURE — 1159F PR MEDICATION LIST DOCUMENTED IN MEDICAL RECORD: ICD-10-PCS | Mod: S$GLB,,, | Performed by: INTERNAL MEDICINE

## 2021-07-07 PROCEDURE — 1159F MED LIST DOCD IN RCRD: CPT | Mod: S$GLB,,, | Performed by: INTERNAL MEDICINE

## 2021-07-07 PROCEDURE — 85025 COMPLETE CBC W/AUTO DIFF WBC: CPT | Performed by: INTERNAL MEDICINE

## 2021-07-07 PROCEDURE — 36415 COLL VENOUS BLD VENIPUNCTURE: CPT | Performed by: INTERNAL MEDICINE

## 2021-07-07 PROCEDURE — 82728 ASSAY OF FERRITIN: CPT | Performed by: INTERNAL MEDICINE

## 2021-07-07 PROCEDURE — 99204 PR OFFICE/OUTPT VISIT, NEW, LEVL IV, 45-59 MIN: ICD-10-PCS | Mod: S$GLB,,, | Performed by: INTERNAL MEDICINE

## 2021-07-07 PROCEDURE — 99204 OFFICE O/P NEW MOD 45 MIN: CPT | Mod: S$GLB,,, | Performed by: INTERNAL MEDICINE

## 2021-07-07 PROCEDURE — 99999 PR PBB SHADOW E&M-EST. PATIENT-LVL IV: CPT | Mod: PBBFAC,,, | Performed by: INTERNAL MEDICINE

## 2021-07-07 PROCEDURE — 1126F PR PAIN SEVERITY QUANTIFIED, NO PAIN PRESENT: ICD-10-PCS | Mod: S$GLB,,, | Performed by: INTERNAL MEDICINE

## 2021-07-07 PROCEDURE — 99999 PR PBB SHADOW E&M-EST. PATIENT-LVL IV: ICD-10-PCS | Mod: PBBFAC,,, | Performed by: INTERNAL MEDICINE

## 2021-07-12 ENCOUNTER — LAB VISIT (OUTPATIENT)
Dept: LAB | Facility: HOSPITAL | Age: 76
End: 2021-07-12
Attending: INTERNAL MEDICINE
Payer: MEDICARE

## 2021-07-12 DIAGNOSIS — D50.9 IRON DEFICIENCY ANEMIA, UNSPECIFIED IRON DEFICIENCY ANEMIA TYPE: ICD-10-CM

## 2021-07-12 PROCEDURE — 82272 OCCULT BLD FECES 1-3 TESTS: CPT | Performed by: INTERNAL MEDICINE

## 2021-07-13 ENCOUNTER — TELEPHONE (OUTPATIENT)
Dept: RHEUMATOLOGY | Facility: CLINIC | Age: 76
End: 2021-07-13

## 2021-07-13 LAB
OB PNL STL: NEGATIVE
OB PNL STL: POSITIVE
OB PNL STL: POSITIVE

## 2021-07-14 ENCOUNTER — HOSPITAL ENCOUNTER (OUTPATIENT)
Dept: RADIOLOGY | Facility: HOSPITAL | Age: 76
Discharge: HOME | End: 2021-07-14
Attending: INTERNAL MEDICINE
Payer: MEDICARE

## 2021-07-14 ENCOUNTER — TELEPHONE (OUTPATIENT)
Dept: HEMATOLOGY/ONCOLOGY | Facility: CLINIC | Age: 76
End: 2021-07-14

## 2021-07-14 ENCOUNTER — HOSPITAL ENCOUNTER (OUTPATIENT)
Dept: CARDIOLOGY | Facility: HOSPITAL | Age: 76
Discharge: HOME | End: 2021-07-14
Attending: INTERNAL MEDICINE
Payer: MEDICARE

## 2021-07-14 ENCOUNTER — OFFICE VISIT (OUTPATIENT)
Dept: RHEUMATOLOGY | Facility: CLINIC | Age: 76
End: 2021-07-14
Payer: MEDICARE

## 2021-07-14 VITALS
WEIGHT: 183.44 LBS | SYSTOLIC BLOOD PRESSURE: 118 MMHG | HEIGHT: 68 IN | HEART RATE: 80 BPM | BODY MASS INDEX: 27.8 KG/M2 | DIASTOLIC BLOOD PRESSURE: 79 MMHG

## 2021-07-14 VITALS — HEIGHT: 68 IN | BODY MASS INDEX: 27.89 KG/M2 | WEIGHT: 184 LBS

## 2021-07-14 DIAGNOSIS — R55 POSTURAL DIZZINESS WITH PRESYNCOPE: ICD-10-CM

## 2021-07-14 DIAGNOSIS — M17.12 PRIMARY OSTEOARTHRITIS OF LEFT KNEE: ICD-10-CM

## 2021-07-14 DIAGNOSIS — R55 SYNCOPE AND COLLAPSE: ICD-10-CM

## 2021-07-14 DIAGNOSIS — R53.83 FATIGUE, UNSPECIFIED TYPE: ICD-10-CM

## 2021-07-14 DIAGNOSIS — M33.20 POLYMYOSITIS ASSOCIATED WITH AUTOIMMUNE DISEASE: ICD-10-CM

## 2021-07-14 DIAGNOSIS — M35.9 POLYMYOSITIS ASSOCIATED WITH AUTOIMMUNE DISEASE: Primary | ICD-10-CM

## 2021-07-14 DIAGNOSIS — M35.9 POLYMYOSITIS ASSOCIATED WITH AUTOIMMUNE DISEASE: ICD-10-CM

## 2021-07-14 DIAGNOSIS — M33.20 POLYMYOSITIS ASSOCIATED WITH AUTOIMMUNE DISEASE: Primary | ICD-10-CM

## 2021-07-14 DIAGNOSIS — R42 POSTURAL DIZZINESS WITH PRESYNCOPE: ICD-10-CM

## 2021-07-14 LAB
AORTIC ROOT ANNULUS: 3.46 CM
AV INDEX (PROSTH): 0.4
AV MEAN GRADIENT: 6 MMHG
AV PEAK GRADIENT: 10 MMHG
AV VALVE AREA: 1.31 CM2
AV VELOCITY RATIO: 0.44
BSA FOR ECHO PROCEDURE: 2 M2
CV ECHO LV RWT: 0.67 CM
DOP CALC AO PEAK VEL: 1.6 M/S
DOP CALC AO VTI: 33.52 CM
DOP CALC LVOT AREA: 3.3 CM2
DOP CALC LVOT DIAMETER: 2.05 CM
DOP CALC LVOT PEAK VEL: 0.7 M/S
DOP CALC LVOT STROKE VOLUME: 43.81 CM3
DOP CALC RVOT PEAK VEL: 0.67 M/S
DOP CALC RVOT VTI: 11.35 CM
DOP CALCLVOT PEAK VEL VTI: 13.28 CM
E WAVE DECELERATION TIME: 228.63 MSEC
E/A RATIO: 0.84
E/E' RATIO: 8.67 M/S
ECHO LV POSTERIOR WALL: 1.26 CM (ref 0.6–1.1)
EJECTION FRACTION: 60 %
FRACTIONAL SHORTENING: 22 % (ref 28–44)
INTERVENTRICULAR SEPTUM: 1.21 CM (ref 0.6–1.1)
IVRT: 91.34 MSEC
LA MAJOR: 4.81 CM
LA MINOR: 5.24 CM
LA WIDTH: 3.62 CM
LEFT ATRIUM SIZE: 4.67 CM
LEFT ATRIUM VOLUME INDEX: 36.6 ML/M2
LEFT ATRIUM VOLUME: 72.08 CM3
LEFT INTERNAL DIMENSION IN SYSTOLE: 2.93 CM (ref 2.1–4)
LEFT VENTRICLE DIASTOLIC VOLUME INDEX: 30.76 ML/M2
LEFT VENTRICLE DIASTOLIC VOLUME: 60.6 ML
LEFT VENTRICLE MASS INDEX: 80 G/M2
LEFT VENTRICLE SYSTOLIC VOLUME INDEX: 16.8 ML/M2
LEFT VENTRICLE SYSTOLIC VOLUME: 33.12 ML
LEFT VENTRICULAR INTERNAL DIMENSION IN DIASTOLE: 3.77 CM (ref 3.5–6)
LEFT VENTRICULAR MASS: 158.17 G
LV LATERAL E/E' RATIO: 10.83 M/S
LV SEPTAL E/E' RATIO: 7.22 M/S
MV A" WAVE DURATION": 11.13 MSEC
MV PEAK A VEL: 0.77 M/S
MV PEAK E VEL: 0.65 M/S
PISA TR MAX VEL: 2.83 M/S
PULM VEIN S/D RATIO: 1.04
PV MEAN GRADIENT: 1 MMHG
PV PEAK D VEL: 0.48 M/S
PV PEAK S VEL: 0.5 M/S
PV PEAK VELOCITY: 0.88 CM/S
RA MAJOR: 4.67 CM
RA PRESSURE: 3 MMHG
RA WIDTH: 3.61 CM
SINUS: 2.82 CM
STJ: 2.98 CM
TDI LATERAL: 0.06 M/S
TDI SEPTAL: 0.09 M/S
TDI: 0.08 M/S
TR MAX PG: 32 MMHG
TRICUSPID ANNULAR PLANE SYSTOLIC EXCURSION: 1.79 CM
TV REST PULMONARY ARTERY PRESSURE: 35 MMHG

## 2021-07-14 PROCEDURE — 93306 TTE W/DOPPLER COMPLETE: CPT

## 2021-07-14 PROCEDURE — 93306 TTE W/DOPPLER COMPLETE: CPT | Mod: 26,,, | Performed by: INTERNAL MEDICINE

## 2021-07-14 PROCEDURE — 73562 XR KNEE ORTHO BILAT: ICD-10-PCS | Mod: 26,50,, | Performed by: RADIOLOGY

## 2021-07-14 PROCEDURE — 20610 DRAIN/INJ JOINT/BURSA W/O US: CPT | Mod: LT,S$GLB,, | Performed by: INTERNAL MEDICINE

## 2021-07-14 PROCEDURE — 3288F PR FALLS RISK ASSESSMENT DOCUMENTED: ICD-10-PCS | Mod: CPTII,S$GLB,, | Performed by: INTERNAL MEDICINE

## 2021-07-14 PROCEDURE — 93306 ECHO (CUPID ONLY): ICD-10-PCS | Mod: 26,,, | Performed by: INTERNAL MEDICINE

## 2021-07-14 PROCEDURE — 1159F MED LIST DOCD IN RCRD: CPT | Mod: S$GLB,,, | Performed by: INTERNAL MEDICINE

## 2021-07-14 PROCEDURE — 73562 X-RAY EXAM OF KNEE 3: CPT | Mod: TC,50

## 2021-07-14 PROCEDURE — 99214 PR OFFICE/OUTPT VISIT, EST, LEVL IV, 30-39 MIN: ICD-10-PCS | Mod: 25,S$GLB,, | Performed by: INTERNAL MEDICINE

## 2021-07-14 PROCEDURE — 1101F PR PT FALLS ASSESS DOC 0-1 FALLS W/OUT INJ PAST YR: ICD-10-PCS | Mod: CPTII,S$GLB,, | Performed by: INTERNAL MEDICINE

## 2021-07-14 PROCEDURE — 73562 X-RAY EXAM OF KNEE 3: CPT | Mod: 26,50,, | Performed by: RADIOLOGY

## 2021-07-14 PROCEDURE — 20610 LARGE JOINT ASPIRATION/INJECTION: L KNEE: ICD-10-PCS | Mod: LT,S$GLB,, | Performed by: INTERNAL MEDICINE

## 2021-07-14 PROCEDURE — 1126F AMNT PAIN NOTED NONE PRSNT: CPT | Mod: S$GLB,,, | Performed by: INTERNAL MEDICINE

## 2021-07-14 PROCEDURE — 99999 PR PBB SHADOW E&M-EST. PATIENT-LVL III: CPT | Mod: PBBFAC,,, | Performed by: INTERNAL MEDICINE

## 2021-07-14 PROCEDURE — 1126F PR PAIN SEVERITY QUANTIFIED, NO PAIN PRESENT: ICD-10-PCS | Mod: S$GLB,,, | Performed by: INTERNAL MEDICINE

## 2021-07-14 PROCEDURE — 1159F PR MEDICATION LIST DOCUMENTED IN MEDICAL RECORD: ICD-10-PCS | Mod: S$GLB,,, | Performed by: INTERNAL MEDICINE

## 2021-07-14 PROCEDURE — 99999 PR PBB SHADOW E&M-EST. PATIENT-LVL III: ICD-10-PCS | Mod: PBBFAC,,, | Performed by: INTERNAL MEDICINE

## 2021-07-14 PROCEDURE — 99214 OFFICE O/P EST MOD 30 MIN: CPT | Mod: 25,S$GLB,, | Performed by: INTERNAL MEDICINE

## 2021-07-14 PROCEDURE — 1101F PT FALLS ASSESS-DOCD LE1/YR: CPT | Mod: CPTII,S$GLB,, | Performed by: INTERNAL MEDICINE

## 2021-07-14 PROCEDURE — 3288F FALL RISK ASSESSMENT DOCD: CPT | Mod: CPTII,S$GLB,, | Performed by: INTERNAL MEDICINE

## 2021-07-14 RX ORDER — NEOMYCIN SULFATE, POLYMYXIN B SULFATE, HYDROCORTISONE 3.5; 10000; 1 MG/ML; [USP'U]/ML; MG/ML
SOLUTION/ DROPS AURICULAR (OTIC)
COMMUNITY
Start: 2021-04-27 | End: 2022-10-04

## 2021-07-14 RX ORDER — TRIAMCINOLONE ACETONIDE 40 MG/ML
40 INJECTION, SUSPENSION INTRA-ARTICULAR; INTRAMUSCULAR
Status: DISCONTINUED | OUTPATIENT
Start: 2021-07-14 | End: 2021-07-14 | Stop reason: HOSPADM

## 2021-07-14 RX ADMIN — TRIAMCINOLONE ACETONIDE 40 MG: 40 INJECTION, SUSPENSION INTRA-ARTICULAR; INTRAMUSCULAR at 11:07

## 2021-07-16 ENCOUNTER — OFFICE VISIT (OUTPATIENT)
Dept: HEMATOLOGY/ONCOLOGY | Facility: CLINIC | Age: 76
End: 2021-07-16
Payer: MEDICARE

## 2021-07-16 VITALS
WEIGHT: 182.56 LBS | SYSTOLIC BLOOD PRESSURE: 121 MMHG | TEMPERATURE: 98 F | BODY MASS INDEX: 27.67 KG/M2 | OXYGEN SATURATION: 98 % | DIASTOLIC BLOOD PRESSURE: 73 MMHG | HEIGHT: 68 IN | HEART RATE: 82 BPM

## 2021-07-16 DIAGNOSIS — D50.9 IRON DEFICIENCY ANEMIA, UNSPECIFIED IRON DEFICIENCY ANEMIA TYPE: Primary | ICD-10-CM

## 2021-07-16 PROCEDURE — 99214 PR OFFICE/OUTPT VISIT, EST, LEVL IV, 30-39 MIN: ICD-10-PCS | Mod: S$GLB,,, | Performed by: INTERNAL MEDICINE

## 2021-07-16 PROCEDURE — 3288F PR FALLS RISK ASSESSMENT DOCUMENTED: ICD-10-PCS | Mod: CPTII,S$GLB,, | Performed by: INTERNAL MEDICINE

## 2021-07-16 PROCEDURE — 99214 OFFICE O/P EST MOD 30 MIN: CPT | Mod: S$GLB,,, | Performed by: INTERNAL MEDICINE

## 2021-07-16 PROCEDURE — 1159F PR MEDICATION LIST DOCUMENTED IN MEDICAL RECORD: ICD-10-PCS | Mod: S$GLB,,, | Performed by: INTERNAL MEDICINE

## 2021-07-16 PROCEDURE — 1101F PR PT FALLS ASSESS DOC 0-1 FALLS W/OUT INJ PAST YR: ICD-10-PCS | Mod: CPTII,S$GLB,, | Performed by: INTERNAL MEDICINE

## 2021-07-16 PROCEDURE — 99499 UNLISTED E&M SERVICE: CPT | Mod: S$GLB,,, | Performed by: INTERNAL MEDICINE

## 2021-07-16 PROCEDURE — 1126F PR PAIN SEVERITY QUANTIFIED, NO PAIN PRESENT: ICD-10-PCS | Mod: S$GLB,,, | Performed by: INTERNAL MEDICINE

## 2021-07-16 PROCEDURE — 99999 PR PBB SHADOW E&M-EST. PATIENT-LVL IV: CPT | Mod: PBBFAC,,, | Performed by: INTERNAL MEDICINE

## 2021-07-16 PROCEDURE — 1126F AMNT PAIN NOTED NONE PRSNT: CPT | Mod: S$GLB,,, | Performed by: INTERNAL MEDICINE

## 2021-07-16 PROCEDURE — 1159F MED LIST DOCD IN RCRD: CPT | Mod: S$GLB,,, | Performed by: INTERNAL MEDICINE

## 2021-07-16 PROCEDURE — 3288F FALL RISK ASSESSMENT DOCD: CPT | Mod: CPTII,S$GLB,, | Performed by: INTERNAL MEDICINE

## 2021-07-16 PROCEDURE — 1101F PT FALLS ASSESS-DOCD LE1/YR: CPT | Mod: CPTII,S$GLB,, | Performed by: INTERNAL MEDICINE

## 2021-07-16 PROCEDURE — 99499 RISK ADDL DX/OHS AUDIT: ICD-10-PCS | Mod: S$GLB,,, | Performed by: INTERNAL MEDICINE

## 2021-07-16 PROCEDURE — 99999 PR PBB SHADOW E&M-EST. PATIENT-LVL IV: ICD-10-PCS | Mod: PBBFAC,,, | Performed by: INTERNAL MEDICINE

## 2021-07-16 RX ORDER — IRON,CARBONYL/ASCORBIC ACID 100-250 MG
1 TABLET ORAL DAILY
Qty: 30 TABLET | Refills: 3 | Status: SHIPPED | OUTPATIENT
Start: 2021-07-16 | End: 2021-08-03

## 2021-07-21 ENCOUNTER — TELEPHONE (OUTPATIENT)
Dept: HEMATOLOGY/ONCOLOGY | Facility: CLINIC | Age: 76
End: 2021-07-21

## 2021-08-03 ENCOUNTER — OFFICE VISIT (OUTPATIENT)
Dept: GASTROENTEROLOGY | Facility: CLINIC | Age: 76
End: 2021-08-03
Payer: MEDICARE

## 2021-08-03 VITALS
WEIGHT: 182.19 LBS | HEART RATE: 74 BPM | SYSTOLIC BLOOD PRESSURE: 122 MMHG | BODY MASS INDEX: 27.61 KG/M2 | OXYGEN SATURATION: 98 % | HEIGHT: 68 IN | DIASTOLIC BLOOD PRESSURE: 70 MMHG

## 2021-08-03 DIAGNOSIS — D50.0 IRON DEFICIENCY ANEMIA DUE TO CHRONIC BLOOD LOSS: Primary | ICD-10-CM

## 2021-08-03 DIAGNOSIS — D50.9 IRON DEFICIENCY ANEMIA, UNSPECIFIED IRON DEFICIENCY ANEMIA TYPE: ICD-10-CM

## 2021-08-03 DIAGNOSIS — Z86.010 HX OF ADENOMATOUS COLONIC POLYPS: ICD-10-CM

## 2021-08-03 PROCEDURE — 1126F PR PAIN SEVERITY QUANTIFIED, NO PAIN PRESENT: ICD-10-PCS | Mod: CPTII,S$GLB,, | Performed by: INTERNAL MEDICINE

## 2021-08-03 PROCEDURE — 1160F PR REVIEW ALL MEDS BY PRESCRIBER/CLIN PHARMACIST DOCUMENTED: ICD-10-PCS | Mod: CPTII,S$GLB,, | Performed by: INTERNAL MEDICINE

## 2021-08-03 PROCEDURE — 1101F PR PT FALLS ASSESS DOC 0-1 FALLS W/OUT INJ PAST YR: ICD-10-PCS | Mod: CPTII,S$GLB,, | Performed by: INTERNAL MEDICINE

## 2021-08-03 PROCEDURE — 1159F MED LIST DOCD IN RCRD: CPT | Mod: CPTII,S$GLB,, | Performed by: INTERNAL MEDICINE

## 2021-08-03 PROCEDURE — 1126F AMNT PAIN NOTED NONE PRSNT: CPT | Mod: CPTII,S$GLB,, | Performed by: INTERNAL MEDICINE

## 2021-08-03 PROCEDURE — 3288F FALL RISK ASSESSMENT DOCD: CPT | Mod: CPTII,S$GLB,, | Performed by: INTERNAL MEDICINE

## 2021-08-03 PROCEDURE — 99999 PR PBB SHADOW E&M-EST. PATIENT-LVL IV: CPT | Mod: PBBFAC,,, | Performed by: INTERNAL MEDICINE

## 2021-08-03 PROCEDURE — 99203 OFFICE O/P NEW LOW 30 MIN: CPT | Mod: S$GLB,,, | Performed by: INTERNAL MEDICINE

## 2021-08-03 PROCEDURE — 3078F PR MOST RECENT DIASTOLIC BLOOD PRESSURE < 80 MM HG: ICD-10-PCS | Mod: CPTII,S$GLB,, | Performed by: INTERNAL MEDICINE

## 2021-08-03 PROCEDURE — 1159F PR MEDICATION LIST DOCUMENTED IN MEDICAL RECORD: ICD-10-PCS | Mod: CPTII,S$GLB,, | Performed by: INTERNAL MEDICINE

## 2021-08-03 PROCEDURE — 99203 PR OFFICE/OUTPT VISIT, NEW, LEVL III, 30-44 MIN: ICD-10-PCS | Mod: S$GLB,,, | Performed by: INTERNAL MEDICINE

## 2021-08-03 PROCEDURE — 3074F PR MOST RECENT SYSTOLIC BLOOD PRESSURE < 130 MM HG: ICD-10-PCS | Mod: CPTII,S$GLB,, | Performed by: INTERNAL MEDICINE

## 2021-08-03 PROCEDURE — 99999 PR PBB SHADOW E&M-EST. PATIENT-LVL IV: ICD-10-PCS | Mod: PBBFAC,,, | Performed by: INTERNAL MEDICINE

## 2021-08-03 PROCEDURE — 1101F PT FALLS ASSESS-DOCD LE1/YR: CPT | Mod: CPTII,S$GLB,, | Performed by: INTERNAL MEDICINE

## 2021-08-03 PROCEDURE — 1160F RVW MEDS BY RX/DR IN RCRD: CPT | Mod: CPTII,S$GLB,, | Performed by: INTERNAL MEDICINE

## 2021-08-03 PROCEDURE — 3074F SYST BP LT 130 MM HG: CPT | Mod: CPTII,S$GLB,, | Performed by: INTERNAL MEDICINE

## 2021-08-03 PROCEDURE — 3288F PR FALLS RISK ASSESSMENT DOCUMENTED: ICD-10-PCS | Mod: CPTII,S$GLB,, | Performed by: INTERNAL MEDICINE

## 2021-08-03 PROCEDURE — 3078F DIAST BP <80 MM HG: CPT | Mod: CPTII,S$GLB,, | Performed by: INTERNAL MEDICINE

## 2021-08-31 ENCOUNTER — OFFICE VISIT (OUTPATIENT)
Dept: OPHTHALMOLOGY | Facility: CLINIC | Age: 76
End: 2021-08-31
Payer: MEDICARE

## 2021-08-31 DIAGNOSIS — Z83.511 FAMILY HISTORY OF GLAUCOMA IN MOTHER: ICD-10-CM

## 2021-08-31 DIAGNOSIS — H04.123 DRY EYES, BILATERAL: ICD-10-CM

## 2021-08-31 DIAGNOSIS — Z96.1 PSEUDOPHAKIA OF BOTH EYES: ICD-10-CM

## 2021-08-31 DIAGNOSIS — H40.013 OPEN ANGLE WITH BORDERLINE FINDINGS OF BOTH EYES: Primary | ICD-10-CM

## 2021-08-31 PROCEDURE — 92014 COMPRE OPH EXAM EST PT 1/>: CPT | Mod: S$GLB,,, | Performed by: OPTOMETRIST

## 2021-08-31 PROCEDURE — 99999 PR PBB SHADOW E&M-EST. PATIENT-LVL III: CPT | Mod: PBBFAC,,, | Performed by: OPTOMETRIST

## 2021-08-31 PROCEDURE — 92133 CPTRZD OPH DX IMG PST SGM ON: CPT | Mod: S$GLB,,, | Performed by: OPTOMETRIST

## 2021-08-31 PROCEDURE — 92014 PR EYE EXAM, EST PATIENT,COMPREHESV: ICD-10-PCS | Mod: S$GLB,,, | Performed by: OPTOMETRIST

## 2021-08-31 PROCEDURE — 99999 PR PBB SHADOW E&M-EST. PATIENT-LVL III: ICD-10-PCS | Mod: PBBFAC,,, | Performed by: OPTOMETRIST

## 2021-08-31 PROCEDURE — 92133 POSTERIOR SEGMENT OCT OPTIC NERVE(OCULAR COHERENCE TOMOGRAPHY) - OU - BOTH EYES: ICD-10-PCS | Mod: S$GLB,,, | Performed by: OPTOMETRIST

## 2021-08-31 PROCEDURE — 1159F MED LIST DOCD IN RCRD: CPT | Mod: CPTII,S$GLB,, | Performed by: OPTOMETRIST

## 2021-08-31 PROCEDURE — 1159F PR MEDICATION LIST DOCUMENTED IN MEDICAL RECORD: ICD-10-PCS | Mod: CPTII,S$GLB,, | Performed by: OPTOMETRIST

## 2021-09-10 ENCOUNTER — OFFICE VISIT (OUTPATIENT)
Dept: OPHTHALMOLOGY | Facility: CLINIC | Age: 76
End: 2021-09-10
Payer: MEDICARE

## 2021-09-10 DIAGNOSIS — H04.123 DRY EYES, BILATERAL: ICD-10-CM

## 2021-09-10 DIAGNOSIS — H40.013 OPEN ANGLE WITH BORDERLINE FINDINGS OF BOTH EYES: Primary | ICD-10-CM

## 2021-09-10 DIAGNOSIS — Z83.511 FAMILY HISTORY OF GLAUCOMA IN MOTHER: ICD-10-CM

## 2021-09-10 PROCEDURE — 1159F PR MEDICATION LIST DOCUMENTED IN MEDICAL RECORD: ICD-10-PCS | Mod: CPTII,S$GLB,, | Performed by: OPTOMETRIST

## 2021-09-10 PROCEDURE — 99213 PR OFFICE/OUTPT VISIT, EST, LEVL III, 20-29 MIN: ICD-10-PCS | Mod: S$GLB,,, | Performed by: OPTOMETRIST

## 2021-09-10 PROCEDURE — 99213 OFFICE O/P EST LOW 20 MIN: CPT | Mod: S$GLB,,, | Performed by: OPTOMETRIST

## 2021-09-10 PROCEDURE — 99999 PR PBB SHADOW E&M-EST. PATIENT-LVL III: CPT | Mod: PBBFAC,,, | Performed by: OPTOMETRIST

## 2021-09-10 PROCEDURE — 1159F MED LIST DOCD IN RCRD: CPT | Mod: CPTII,S$GLB,, | Performed by: OPTOMETRIST

## 2021-09-10 PROCEDURE — 99999 PR PBB SHADOW E&M-EST. PATIENT-LVL III: ICD-10-PCS | Mod: PBBFAC,,, | Performed by: OPTOMETRIST

## 2021-09-14 ENCOUNTER — LAB VISIT (OUTPATIENT)
Dept: LAB | Facility: HOSPITAL | Age: 76
End: 2021-09-14
Attending: FAMILY MEDICINE
Payer: MEDICARE

## 2021-09-14 DIAGNOSIS — D50.9 IRON DEFICIENCY ANEMIA, UNSPECIFIED IRON DEFICIENCY ANEMIA TYPE: ICD-10-CM

## 2021-09-14 LAB
BASOPHILS # BLD AUTO: 0.03 K/UL (ref 0–0.2)
BASOPHILS NFR BLD: 0.8 % (ref 0–1.9)
DIFFERENTIAL METHOD: ABNORMAL
EOSINOPHIL # BLD AUTO: 0.2 K/UL (ref 0–0.5)
EOSINOPHIL NFR BLD: 5.4 % (ref 0–8)
ERYTHROCYTE [DISTWIDTH] IN BLOOD BY AUTOMATED COUNT: 14.5 % (ref 11.5–14.5)
HCT VFR BLD AUTO: 43.3 % (ref 37–48.5)
HGB BLD-MCNC: 14.2 G/DL (ref 12–16)
IMM GRANULOCYTES # BLD AUTO: 0.01 K/UL (ref 0–0.04)
IMM GRANULOCYTES NFR BLD AUTO: 0.3 % (ref 0–0.5)
LYMPHOCYTES # BLD AUTO: 1.6 K/UL (ref 1–4.8)
LYMPHOCYTES NFR BLD: 43.8 % (ref 18–48)
MCH RBC QN AUTO: 29.9 PG (ref 27–31)
MCHC RBC AUTO-ENTMCNC: 32.8 G/DL (ref 32–36)
MCV RBC AUTO: 91 FL (ref 82–98)
MONOCYTES # BLD AUTO: 0.4 K/UL (ref 0.3–1)
MONOCYTES NFR BLD: 10.9 % (ref 4–15)
NEUTROPHILS # BLD AUTO: 1.4 K/UL (ref 1.8–7.7)
NEUTROPHILS NFR BLD: 38.8 % (ref 38–73)
NRBC BLD-RTO: 0 /100 WBC
PLATELET # BLD AUTO: 153 K/UL (ref 150–450)
PMV BLD AUTO: 10.1 FL (ref 9.2–12.9)
RBC # BLD AUTO: 4.75 M/UL (ref 4–5.4)
WBC # BLD AUTO: 3.68 K/UL (ref 3.9–12.7)

## 2021-09-14 PROCEDURE — 36415 COLL VENOUS BLD VENIPUNCTURE: CPT | Performed by: INTERNAL MEDICINE

## 2021-09-14 PROCEDURE — 84466 ASSAY OF TRANSFERRIN: CPT | Performed by: INTERNAL MEDICINE

## 2021-09-14 PROCEDURE — 82728 ASSAY OF FERRITIN: CPT | Performed by: INTERNAL MEDICINE

## 2021-09-14 PROCEDURE — 85025 COMPLETE CBC W/AUTO DIFF WBC: CPT | Performed by: INTERNAL MEDICINE

## 2021-09-15 ENCOUNTER — TELEPHONE (OUTPATIENT)
Dept: GASTROENTEROLOGY | Facility: CLINIC | Age: 76
End: 2021-09-15

## 2021-09-15 ENCOUNTER — OFFICE VISIT (OUTPATIENT)
Dept: HEMATOLOGY/ONCOLOGY | Facility: CLINIC | Age: 76
End: 2021-09-15
Payer: MEDICARE

## 2021-09-15 VITALS
HEART RATE: 94 BPM | DIASTOLIC BLOOD PRESSURE: 73 MMHG | WEIGHT: 184.31 LBS | BODY MASS INDEX: 27.93 KG/M2 | HEIGHT: 68 IN | TEMPERATURE: 98 F | OXYGEN SATURATION: 98 % | SYSTOLIC BLOOD PRESSURE: 104 MMHG

## 2021-09-15 DIAGNOSIS — Z01.818 PRE-OP TESTING: ICD-10-CM

## 2021-09-15 DIAGNOSIS — D50.8 OTHER IRON DEFICIENCY ANEMIA: ICD-10-CM

## 2021-09-15 DIAGNOSIS — D50.8 OTHER IRON DEFICIENCY ANEMIA: Primary | ICD-10-CM

## 2021-09-15 PROBLEM — D50.9 IRON DEFICIENCY ANEMIA: Status: ACTIVE | Noted: 2021-09-15

## 2021-09-15 LAB
FERRITIN SERPL-MCNC: 110 NG/ML (ref 20–300)
IRON SERPL-MCNC: 91 UG/DL (ref 30–160)
SATURATED IRON: 25 % (ref 20–50)
TOTAL IRON BINDING CAPACITY: 366 UG/DL (ref 250–450)
TRANSFERRIN SERPL-MCNC: 247 MG/DL (ref 200–375)

## 2021-09-15 PROCEDURE — 1159F PR MEDICATION LIST DOCUMENTED IN MEDICAL RECORD: ICD-10-PCS | Mod: CPTII,S$GLB,, | Performed by: INTERNAL MEDICINE

## 2021-09-15 PROCEDURE — 1101F PT FALLS ASSESS-DOCD LE1/YR: CPT | Mod: CPTII,S$GLB,, | Performed by: INTERNAL MEDICINE

## 2021-09-15 PROCEDURE — 3288F FALL RISK ASSESSMENT DOCD: CPT | Mod: CPTII,S$GLB,, | Performed by: INTERNAL MEDICINE

## 2021-09-15 PROCEDURE — 1126F AMNT PAIN NOTED NONE PRSNT: CPT | Mod: CPTII,S$GLB,, | Performed by: INTERNAL MEDICINE

## 2021-09-15 PROCEDURE — 99214 OFFICE O/P EST MOD 30 MIN: CPT | Mod: S$GLB,,, | Performed by: INTERNAL MEDICINE

## 2021-09-15 PROCEDURE — 1126F PR PAIN SEVERITY QUANTIFIED, NO PAIN PRESENT: ICD-10-PCS | Mod: CPTII,S$GLB,, | Performed by: INTERNAL MEDICINE

## 2021-09-15 PROCEDURE — 1101F PR PT FALLS ASSESS DOC 0-1 FALLS W/OUT INJ PAST YR: ICD-10-PCS | Mod: CPTII,S$GLB,, | Performed by: INTERNAL MEDICINE

## 2021-09-15 PROCEDURE — 3288F PR FALLS RISK ASSESSMENT DOCUMENTED: ICD-10-PCS | Mod: CPTII,S$GLB,, | Performed by: INTERNAL MEDICINE

## 2021-09-15 PROCEDURE — 99999 PR PBB SHADOW E&M-EST. PATIENT-LVL III: CPT | Mod: PBBFAC,,, | Performed by: INTERNAL MEDICINE

## 2021-09-15 PROCEDURE — 3074F SYST BP LT 130 MM HG: CPT | Mod: CPTII,S$GLB,, | Performed by: INTERNAL MEDICINE

## 2021-09-15 PROCEDURE — 99214 PR OFFICE/OUTPT VISIT, EST, LEVL IV, 30-39 MIN: ICD-10-PCS | Mod: S$GLB,,, | Performed by: INTERNAL MEDICINE

## 2021-09-15 PROCEDURE — 3078F DIAST BP <80 MM HG: CPT | Mod: CPTII,S$GLB,, | Performed by: INTERNAL MEDICINE

## 2021-09-15 PROCEDURE — 99999 PR PBB SHADOW E&M-EST. PATIENT-LVL III: ICD-10-PCS | Mod: PBBFAC,,, | Performed by: INTERNAL MEDICINE

## 2021-09-15 PROCEDURE — 1159F MED LIST DOCD IN RCRD: CPT | Mod: CPTII,S$GLB,, | Performed by: INTERNAL MEDICINE

## 2021-09-15 PROCEDURE — 3078F PR MOST RECENT DIASTOLIC BLOOD PRESSURE < 80 MM HG: ICD-10-PCS | Mod: CPTII,S$GLB,, | Performed by: INTERNAL MEDICINE

## 2021-09-15 PROCEDURE — 3074F PR MOST RECENT SYSTOLIC BLOOD PRESSURE < 130 MM HG: ICD-10-PCS | Mod: CPTII,S$GLB,, | Performed by: INTERNAL MEDICINE

## 2021-09-15 RX ORDER — CHOLECALCIFEROL (VITAMIN D3) 50 MCG
TABLET ORAL
COMMUNITY

## 2021-09-17 ENCOUNTER — HOSPITAL ENCOUNTER (OUTPATIENT)
Facility: HOSPITAL | Age: 76
Discharge: HOME | End: 2021-09-17
Attending: INTERNAL MEDICINE | Admitting: INTERNAL MEDICINE
Payer: MEDICARE

## 2021-09-17 ENCOUNTER — ANESTHESIA EVENT (OUTPATIENT)
Dept: ENDOSCOPY | Facility: HOSPITAL | Age: 76
End: 2021-09-17
Payer: MEDICARE

## 2021-09-17 ENCOUNTER — ANESTHESIA (OUTPATIENT)
Dept: ENDOSCOPY | Facility: HOSPITAL | Age: 76
End: 2021-09-17
Payer: MEDICARE

## 2021-09-17 VITALS
HEART RATE: 68 BPM | DIASTOLIC BLOOD PRESSURE: 67 MMHG | TEMPERATURE: 98 F | RESPIRATION RATE: 16 BRPM | SYSTOLIC BLOOD PRESSURE: 110 MMHG | OXYGEN SATURATION: 98 % | WEIGHT: 182 LBS | BODY MASS INDEX: 27.67 KG/M2

## 2021-09-17 DIAGNOSIS — D50.0 IRON DEFICIENCY ANEMIA DUE TO CHRONIC BLOOD LOSS: Primary | ICD-10-CM

## 2021-09-17 LAB — SARS-COV-2 RDRP RESP QL NAA+PROBE: NEGATIVE

## 2021-09-17 PROCEDURE — 88342 CHG IMMUNOCYTOCHEMISTRY: ICD-10-PCS | Mod: 26,,, | Performed by: STUDENT IN AN ORGANIZED HEALTH CARE EDUCATION/TRAINING PROGRAM

## 2021-09-17 PROCEDURE — 25000003 PHARM REV CODE 250: Performed by: NURSE ANESTHETIST, CERTIFIED REGISTERED

## 2021-09-17 PROCEDURE — 63600175 PHARM REV CODE 636 W HCPCS: Performed by: NURSE ANESTHETIST, CERTIFIED REGISTERED

## 2021-09-17 PROCEDURE — 88342 IMHCHEM/IMCYTCHM 1ST ANTB: CPT | Mod: 26,,, | Performed by: STUDENT IN AN ORGANIZED HEALTH CARE EDUCATION/TRAINING PROGRAM

## 2021-09-17 PROCEDURE — 37000008 HC ANESTHESIA 1ST 15 MINUTES: Performed by: INTERNAL MEDICINE

## 2021-09-17 PROCEDURE — 43239 PR EGD, FLEX, W/BIOPSY, SGL/MULTI: ICD-10-PCS | Mod: ,,, | Performed by: INTERNAL MEDICINE

## 2021-09-17 PROCEDURE — 43239 EGD BIOPSY SINGLE/MULTIPLE: CPT | Performed by: INTERNAL MEDICINE

## 2021-09-17 PROCEDURE — 88305 TISSUE EXAM BY PATHOLOGIST: CPT | Performed by: STUDENT IN AN ORGANIZED HEALTH CARE EDUCATION/TRAINING PROGRAM

## 2021-09-17 PROCEDURE — U0002 COVID-19 LAB TEST NON-CDC: HCPCS | Performed by: INTERNAL MEDICINE

## 2021-09-17 PROCEDURE — 88305 TISSUE EXAM BY PATHOLOGIST: ICD-10-PCS | Mod: 26,,, | Performed by: STUDENT IN AN ORGANIZED HEALTH CARE EDUCATION/TRAINING PROGRAM

## 2021-09-17 PROCEDURE — 88342 IMHCHEM/IMCYTCHM 1ST ANTB: CPT | Performed by: STUDENT IN AN ORGANIZED HEALTH CARE EDUCATION/TRAINING PROGRAM

## 2021-09-17 PROCEDURE — 43239 EGD BIOPSY SINGLE/MULTIPLE: CPT | Mod: ,,, | Performed by: INTERNAL MEDICINE

## 2021-09-17 PROCEDURE — 88305 TISSUE EXAM BY PATHOLOGIST: CPT | Mod: 26,,, | Performed by: STUDENT IN AN ORGANIZED HEALTH CARE EDUCATION/TRAINING PROGRAM

## 2021-09-17 PROCEDURE — 27201012 HC FORCEPS, HOT/COLD, DISP: Performed by: INTERNAL MEDICINE

## 2021-09-17 RX ORDER — PROPOFOL 10 MG/ML
VIAL (ML) INTRAVENOUS
Status: DISCONTINUED | OUTPATIENT
Start: 2021-09-17 | End: 2021-09-17

## 2021-09-17 RX ORDER — SODIUM CHLORIDE 9 MG/ML
INJECTION, SOLUTION INTRAVENOUS CONTINUOUS
Status: DISCONTINUED | OUTPATIENT
Start: 2021-09-17 | End: 2021-09-17 | Stop reason: HOSPADM

## 2021-09-17 RX ORDER — SODIUM CHLORIDE, SODIUM LACTATE, POTASSIUM CHLORIDE, CALCIUM CHLORIDE 600; 310; 30; 20 MG/100ML; MG/100ML; MG/100ML; MG/100ML
INJECTION, SOLUTION INTRAVENOUS CONTINUOUS PRN
Status: DISCONTINUED | OUTPATIENT
Start: 2021-09-17 | End: 2021-09-17

## 2021-09-17 RX ORDER — LIDOCAINE HYDROCHLORIDE 10 MG/ML
INJECTION, SOLUTION EPIDURAL; INFILTRATION; INTRACAUDAL; PERINEURAL
Status: DISCONTINUED | OUTPATIENT
Start: 2021-09-17 | End: 2021-09-17

## 2021-09-17 RX ADMIN — SODIUM CHLORIDE, SODIUM LACTATE, POTASSIUM CHLORIDE, AND CALCIUM CHLORIDE: .6; .31; .03; .02 INJECTION, SOLUTION INTRAVENOUS at 10:09

## 2021-09-17 RX ADMIN — LIDOCAINE HYDROCHLORIDE 50 MG: 10 INJECTION, SOLUTION EPIDURAL; INFILTRATION; INTRACAUDAL; PERINEURAL at 10:09

## 2021-09-17 RX ADMIN — PROPOFOL 160 MG: 10 INJECTION, EMULSION INTRAVENOUS at 10:09

## 2021-09-21 ENCOUNTER — OFFICE VISIT (OUTPATIENT)
Dept: CARDIOLOGY | Facility: CLINIC | Age: 76
End: 2021-09-21
Payer: MEDICARE

## 2021-09-21 VITALS
RESPIRATION RATE: 16 BRPM | SYSTOLIC BLOOD PRESSURE: 108 MMHG | WEIGHT: 185.19 LBS | HEIGHT: 68 IN | OXYGEN SATURATION: 96 % | HEART RATE: 107 BPM | BODY MASS INDEX: 28.07 KG/M2 | DIASTOLIC BLOOD PRESSURE: 72 MMHG

## 2021-09-21 DIAGNOSIS — R53.83 FATIGUE, UNSPECIFIED TYPE: ICD-10-CM

## 2021-09-21 DIAGNOSIS — M35.9 POLYMYOSITIS ASSOCIATED WITH AUTOIMMUNE DISEASE: ICD-10-CM

## 2021-09-21 DIAGNOSIS — I49.9 IRREGULAR HEART BEAT: ICD-10-CM

## 2021-09-21 DIAGNOSIS — G47.30 SLEEP APNEA, UNSPECIFIED TYPE: ICD-10-CM

## 2021-09-21 DIAGNOSIS — I45.10 RBBB: ICD-10-CM

## 2021-09-21 DIAGNOSIS — R42 POSTURAL DIZZINESS WITH PRESYNCOPE: ICD-10-CM

## 2021-09-21 DIAGNOSIS — M33.20 POLYMYOSITIS ASSOCIATED WITH AUTOIMMUNE DISEASE: ICD-10-CM

## 2021-09-21 DIAGNOSIS — I44.4 LAFB (LEFT ANTERIOR FASCICULAR BLOCK): Primary | ICD-10-CM

## 2021-09-21 DIAGNOSIS — R06.09 DYSPNEA ON EXERTION: ICD-10-CM

## 2021-09-21 DIAGNOSIS — I65.23 BILATERAL CAROTID ARTERY STENOSIS: ICD-10-CM

## 2021-09-21 DIAGNOSIS — R00.2 PALPITATIONS: ICD-10-CM

## 2021-09-21 DIAGNOSIS — D50.0 IRON DEFICIENCY ANEMIA DUE TO CHRONIC BLOOD LOSS: ICD-10-CM

## 2021-09-21 DIAGNOSIS — E78.49 OTHER HYPERLIPIDEMIA: ICD-10-CM

## 2021-09-21 DIAGNOSIS — I35.9 AORTIC ANNULAR CALCIFICATION: ICD-10-CM

## 2021-09-21 DIAGNOSIS — R55 POSTURAL DIZZINESS WITH PRESYNCOPE: ICD-10-CM

## 2021-09-21 PROCEDURE — 3078F DIAST BP <80 MM HG: CPT | Mod: CPTII,S$GLB,, | Performed by: INTERNAL MEDICINE

## 2021-09-21 PROCEDURE — 1160F RVW MEDS BY RX/DR IN RCRD: CPT | Mod: CPTII,S$GLB,, | Performed by: INTERNAL MEDICINE

## 2021-09-21 PROCEDURE — 99999 PR PBB SHADOW E&M-EST. PATIENT-LVL V: CPT | Mod: PBBFAC,,, | Performed by: INTERNAL MEDICINE

## 2021-09-21 PROCEDURE — 3078F PR MOST RECENT DIASTOLIC BLOOD PRESSURE < 80 MM HG: ICD-10-PCS | Mod: CPTII,S$GLB,, | Performed by: INTERNAL MEDICINE

## 2021-09-21 PROCEDURE — 1159F MED LIST DOCD IN RCRD: CPT | Mod: CPTII,S$GLB,, | Performed by: INTERNAL MEDICINE

## 2021-09-21 PROCEDURE — 1160F PR REVIEW ALL MEDS BY PRESCRIBER/CLIN PHARMACIST DOCUMENTED: ICD-10-PCS | Mod: CPTII,S$GLB,, | Performed by: INTERNAL MEDICINE

## 2021-09-21 PROCEDURE — 1126F AMNT PAIN NOTED NONE PRSNT: CPT | Mod: CPTII,S$GLB,, | Performed by: INTERNAL MEDICINE

## 2021-09-21 PROCEDURE — 1126F PR PAIN SEVERITY QUANTIFIED, NO PAIN PRESENT: ICD-10-PCS | Mod: CPTII,S$GLB,, | Performed by: INTERNAL MEDICINE

## 2021-09-21 PROCEDURE — 1159F PR MEDICATION LIST DOCUMENTED IN MEDICAL RECORD: ICD-10-PCS | Mod: CPTII,S$GLB,, | Performed by: INTERNAL MEDICINE

## 2021-09-21 PROCEDURE — 99999 PR PBB SHADOW E&M-EST. PATIENT-LVL V: ICD-10-PCS | Mod: PBBFAC,,, | Performed by: INTERNAL MEDICINE

## 2021-09-21 PROCEDURE — 99214 PR OFFICE/OUTPT VISIT, EST, LEVL IV, 30-39 MIN: ICD-10-PCS | Mod: S$GLB,,, | Performed by: INTERNAL MEDICINE

## 2021-09-21 PROCEDURE — 99214 OFFICE O/P EST MOD 30 MIN: CPT | Mod: S$GLB,,, | Performed by: INTERNAL MEDICINE

## 2021-09-21 PROCEDURE — 3074F SYST BP LT 130 MM HG: CPT | Mod: CPTII,S$GLB,, | Performed by: INTERNAL MEDICINE

## 2021-09-21 PROCEDURE — 3074F PR MOST RECENT SYSTOLIC BLOOD PRESSURE < 130 MM HG: ICD-10-PCS | Mod: CPTII,S$GLB,, | Performed by: INTERNAL MEDICINE

## 2021-09-22 LAB
FINAL PATHOLOGIC DIAGNOSIS: NORMAL
GROSS: NORMAL
Lab: NORMAL
MICROSCOPIC EXAM: NORMAL

## 2021-09-29 ENCOUNTER — LAB VISIT (OUTPATIENT)
Dept: LAB | Facility: HOSPITAL | Age: 76
End: 2021-09-29
Attending: INTERNAL MEDICINE
Payer: MEDICARE

## 2021-09-29 DIAGNOSIS — E78.49 OTHER HYPERLIPIDEMIA: ICD-10-CM

## 2021-09-29 LAB
CHOLEST SERPL-MCNC: 148 MG/DL (ref 120–199)
CHOLEST/HDLC SERPL: 3 {RATIO} (ref 2–5)
HDLC SERPL-MCNC: 49 MG/DL (ref 40–75)
HDLC SERPL: 33.1 % (ref 20–50)
LDLC SERPL CALC-MCNC: 82.2 MG/DL (ref 63–159)
NONHDLC SERPL-MCNC: 99 MG/DL
TRIGL SERPL-MCNC: 84 MG/DL (ref 30–150)

## 2021-09-29 PROCEDURE — 80061 LIPID PANEL: CPT | Performed by: INTERNAL MEDICINE

## 2021-09-29 PROCEDURE — 36415 COLL VENOUS BLD VENIPUNCTURE: CPT | Performed by: INTERNAL MEDICINE

## 2021-10-06 ENCOUNTER — OFFICE VISIT (OUTPATIENT)
Dept: CARDIOLOGY | Facility: CLINIC | Age: 76
End: 2021-10-06
Payer: MEDICARE

## 2021-10-06 ENCOUNTER — HOSPITAL ENCOUNTER (OUTPATIENT)
Dept: CARDIOLOGY | Facility: HOSPITAL | Age: 76
Discharge: HOME | End: 2021-10-06
Attending: INTERNAL MEDICINE
Payer: MEDICARE

## 2021-10-06 VITALS
BODY MASS INDEX: 28.37 KG/M2 | HEART RATE: 83 BPM | HEIGHT: 68 IN | DIASTOLIC BLOOD PRESSURE: 74 MMHG | WEIGHT: 187.19 LBS | SYSTOLIC BLOOD PRESSURE: 116 MMHG

## 2021-10-06 DIAGNOSIS — Z95.0 CARDIAC PACEMAKER IN SITU: ICD-10-CM

## 2021-10-06 DIAGNOSIS — I35.9 AORTIC ANNULAR CALCIFICATION: ICD-10-CM

## 2021-10-06 DIAGNOSIS — R55 NEAR SYNCOPE: ICD-10-CM

## 2021-10-06 DIAGNOSIS — R55 SYNCOPE AND COLLAPSE: ICD-10-CM

## 2021-10-06 DIAGNOSIS — R06.09 DYSPNEA ON EXERTION: ICD-10-CM

## 2021-10-06 DIAGNOSIS — R53.83 FATIGUE, UNSPECIFIED TYPE: ICD-10-CM

## 2021-10-06 DIAGNOSIS — R55 SYNCOPE AND COLLAPSE: Primary | ICD-10-CM

## 2021-10-06 DIAGNOSIS — I44.0 FIRST DEGREE AV BLOCK: ICD-10-CM

## 2021-10-06 DIAGNOSIS — I65.23 BILATERAL CAROTID ARTERY STENOSIS: ICD-10-CM

## 2021-10-06 DIAGNOSIS — I45.3 TRIFASCICULAR BLOCK: ICD-10-CM

## 2021-10-06 PROCEDURE — 93280 PM DEVICE PROGR EVAL DUAL: CPT | Mod: 26,,, | Performed by: INTERNAL MEDICINE

## 2021-10-06 PROCEDURE — 3078F DIAST BP <80 MM HG: CPT | Mod: CPTII,95,, | Performed by: INTERNAL MEDICINE

## 2021-10-06 PROCEDURE — 93280 PM DEVICE PROGR EVAL DUAL: CPT

## 2021-10-06 PROCEDURE — 3074F PR MOST RECENT SYSTOLIC BLOOD PRESSURE < 130 MM HG: ICD-10-PCS | Mod: CPTII,95,, | Performed by: INTERNAL MEDICINE

## 2021-10-06 PROCEDURE — 3078F PR MOST RECENT DIASTOLIC BLOOD PRESSURE < 80 MM HG: ICD-10-PCS | Mod: CPTII,95,, | Performed by: INTERNAL MEDICINE

## 2021-10-06 PROCEDURE — 1159F MED LIST DOCD IN RCRD: CPT | Mod: CPTII,95,, | Performed by: INTERNAL MEDICINE

## 2021-10-06 PROCEDURE — 99215 PR OFFICE/OUTPT VISIT, EST, LEVL V, 40-54 MIN: ICD-10-PCS | Mod: 95,,, | Performed by: INTERNAL MEDICINE

## 2021-10-06 PROCEDURE — 1160F PR REVIEW ALL MEDS BY PRESCRIBER/CLIN PHARMACIST DOCUMENTED: ICD-10-PCS | Mod: CPTII,95,, | Performed by: INTERNAL MEDICINE

## 2021-10-06 PROCEDURE — 93280 CARDIAC DEVICE CHECK - IN CLINIC & HOSPITAL: ICD-10-PCS | Mod: 26,,, | Performed by: INTERNAL MEDICINE

## 2021-10-06 PROCEDURE — 1159F PR MEDICATION LIST DOCUMENTED IN MEDICAL RECORD: ICD-10-PCS | Mod: CPTII,95,, | Performed by: INTERNAL MEDICINE

## 2021-10-06 PROCEDURE — 99215 OFFICE O/P EST HI 40 MIN: CPT | Mod: 95,,, | Performed by: INTERNAL MEDICINE

## 2021-10-06 PROCEDURE — 1160F RVW MEDS BY RX/DR IN RCRD: CPT | Mod: CPTII,95,, | Performed by: INTERNAL MEDICINE

## 2021-10-06 PROCEDURE — 3074F SYST BP LT 130 MM HG: CPT | Mod: CPTII,95,, | Performed by: INTERNAL MEDICINE

## 2021-10-07 ENCOUNTER — IMMUNIZATION (OUTPATIENT)
Dept: PRIMARY CARE CLINIC | Facility: CLINIC | Age: 76
End: 2021-10-07
Payer: MEDICARE

## 2021-10-07 DIAGNOSIS — Z23 NEED FOR VACCINATION: Primary | ICD-10-CM

## 2021-10-07 PROCEDURE — 0003A COVID-19, MRNA, LNP-S, PF, 30 MCG/0.3 ML DOSE VACCINE: CPT | Mod: CV19,PBBFAC | Performed by: FAMILY MEDICINE

## 2021-10-07 PROCEDURE — 91300 COVID-19, MRNA, LNP-S, PF, 30 MCG/0.3 ML DOSE VACCINE: CPT | Mod: PBBFAC | Performed by: FAMILY MEDICINE

## 2021-10-14 LAB
AV DELAY - LONGEST: 340 MSEC
AV DELAY - SHORTEST: 300 MSEC
IMPEDANCE RA LEAD (NATIVE): 682 OHMS
IMPEDANCE RA LEAD: 429 OHMS
OHS CV DC PP MS1: 0.4 MS
OHS CV DC PP MS2: 0.4 MS
OHS CV DC PP V1: NORMAL V
OHS CV DC PP V2: NORMAL V
P/R-WAVE RA LEAD (NATIVE): 9.7 MV
P/R-WAVE RA LEAD: 2.3 MV
THRESHOLD MS RA LEAD (DONOR): 0.4 MS
THRESHOLD MS RA LEAD (NATIVE): 0.4 MS
THRESHOLD MS RA LEAD: 0.4 MS
THRESHOLD MS RV LEAD: 0.4 V
THRESHOLD V RA LEAD (DONOR): 1.4 V
THRESHOLD V RA LEAD (NATIVE): 1.2 V
THRESHOLD V RA LEAD: 0.6 V
THRESHOLD V RV LEAD: 1.4 V

## 2021-10-18 ENCOUNTER — OFFICE VISIT (OUTPATIENT)
Dept: SURGERY | Facility: CLINIC | Age: 76
End: 2021-10-18
Payer: MEDICARE

## 2021-10-18 VITALS
WEIGHT: 187 LBS | HEART RATE: 87 BPM | HEIGHT: 68 IN | BODY MASS INDEX: 28.34 KG/M2 | DIASTOLIC BLOOD PRESSURE: 84 MMHG | SYSTOLIC BLOOD PRESSURE: 134 MMHG

## 2021-10-18 DIAGNOSIS — K44.9 HIATAL HERNIA: ICD-10-CM

## 2021-10-18 DIAGNOSIS — K25.9 CAMERON ULCER, UNSPECIFIED ULCER CHRONICITY: ICD-10-CM

## 2021-10-18 DIAGNOSIS — K21.9 GASTROESOPHAGEAL REFLUX DISEASE, UNSPECIFIED WHETHER ESOPHAGITIS PRESENT: Primary | ICD-10-CM

## 2021-10-18 PROBLEM — I20.9 ANGINA PECTORIS: Status: ACTIVE | Noted: 2021-10-18

## 2021-10-18 PROBLEM — Z79.82 LONG TERM (CURRENT) USE OF ASPIRIN: Status: ACTIVE | Noted: 2021-10-18

## 2021-10-18 PROBLEM — H91.90 HEARING LOSS: Status: ACTIVE | Noted: 2021-10-18

## 2021-10-18 PROBLEM — E11.69 TYPE 2 DIABETES MELLITUS WITH OTHER SPECIFIED COMPLICATION: Status: ACTIVE | Noted: 2021-10-18

## 2021-10-18 PROBLEM — M35.9 DISORDER OF CONNECTIVE TISSUE: Status: ACTIVE | Noted: 2021-02-09

## 2021-10-18 PROBLEM — J30.2 SEASONAL ALLERGIES: Status: ACTIVE | Noted: 2021-10-18

## 2021-10-18 PROBLEM — I70.0 ATHEROSCLEROSIS OF AORTA: Status: ACTIVE | Noted: 2021-10-18

## 2021-10-18 PROBLEM — I49.5 SINUS NODE DYSFUNCTION: Status: ACTIVE | Noted: 2021-10-18

## 2021-10-18 PROBLEM — E78.5 HYPERLIPIDEMIA: Status: ACTIVE | Noted: 2018-02-20

## 2021-10-18 PROBLEM — D64.9 ANEMIA: Status: ACTIVE | Noted: 2021-10-18

## 2021-10-18 PROCEDURE — 1126F AMNT PAIN NOTED NONE PRSNT: CPT | Mod: CPTII,S$GLB,, | Performed by: SURGERY

## 2021-10-18 PROCEDURE — 3075F PR MOST RECENT SYSTOLIC BLOOD PRESS GE 130-139MM HG: ICD-10-PCS | Mod: CPTII,S$GLB,, | Performed by: SURGERY

## 2021-10-18 PROCEDURE — 99999 PR PBB SHADOW E&M-EST. PATIENT-LVL III: CPT | Mod: PBBFAC,,, | Performed by: SURGERY

## 2021-10-18 PROCEDURE — 3079F PR MOST RECENT DIASTOLIC BLOOD PRESSURE 80-89 MM HG: ICD-10-PCS | Mod: CPTII,S$GLB,, | Performed by: SURGERY

## 2021-10-18 PROCEDURE — 3075F SYST BP GE 130 - 139MM HG: CPT | Mod: CPTII,S$GLB,, | Performed by: SURGERY

## 2021-10-18 PROCEDURE — 99204 OFFICE O/P NEW MOD 45 MIN: CPT | Mod: S$GLB,,, | Performed by: SURGERY

## 2021-10-18 PROCEDURE — 99999 PR PBB SHADOW E&M-EST. PATIENT-LVL III: ICD-10-PCS | Mod: PBBFAC,,, | Performed by: SURGERY

## 2021-10-18 PROCEDURE — 3079F DIAST BP 80-89 MM HG: CPT | Mod: CPTII,S$GLB,, | Performed by: SURGERY

## 2021-10-18 PROCEDURE — 1126F PR PAIN SEVERITY QUANTIFIED, NO PAIN PRESENT: ICD-10-PCS | Mod: CPTII,S$GLB,, | Performed by: SURGERY

## 2021-10-18 PROCEDURE — 99204 PR OFFICE/OUTPT VISIT, NEW, LEVL IV, 45-59 MIN: ICD-10-PCS | Mod: S$GLB,,, | Performed by: SURGERY

## 2021-10-18 PROCEDURE — 1159F PR MEDICATION LIST DOCUMENTED IN MEDICAL RECORD: ICD-10-PCS | Mod: CPTII,S$GLB,, | Performed by: SURGERY

## 2021-10-18 PROCEDURE — 1159F MED LIST DOCD IN RCRD: CPT | Mod: CPTII,S$GLB,, | Performed by: SURGERY

## 2021-10-21 ENCOUNTER — TELEPHONE (OUTPATIENT)
Dept: CARDIOLOGY | Facility: CLINIC | Age: 76
End: 2021-10-21

## 2021-12-05 ENCOUNTER — CLINICAL SUPPORT (OUTPATIENT)
Dept: CARDIOLOGY | Facility: HOSPITAL | Age: 76
End: 2021-12-05
Payer: MEDICARE

## 2021-12-05 DIAGNOSIS — Z95.0 PRESENCE OF CARDIAC PACEMAKER: ICD-10-CM

## 2021-12-05 PROCEDURE — 93296 REM INTERROG EVL PM/IDS: CPT | Performed by: INTERNAL MEDICINE

## 2021-12-05 PROCEDURE — 93294 REM INTERROG EVL PM/LDLS PM: CPT | Mod: S$GLB,,, | Performed by: INTERNAL MEDICINE

## 2021-12-05 PROCEDURE — 93294 CARDIAC DEVICE CHECK - REMOTE: ICD-10-PCS | Mod: S$GLB,,, | Performed by: INTERNAL MEDICINE

## 2022-01-11 DIAGNOSIS — I44.4 LAFB (LEFT ANTERIOR FASCICULAR BLOCK): ICD-10-CM

## 2022-01-11 DIAGNOSIS — I49.9 IRREGULAR HEART BEAT: Primary | ICD-10-CM

## 2022-01-11 DIAGNOSIS — R94.31 ABNORMAL ECG: ICD-10-CM

## 2022-01-12 ENCOUNTER — HOSPITAL ENCOUNTER (OUTPATIENT)
Dept: CARDIOLOGY | Facility: HOSPITAL | Age: 77
Discharge: HOME | End: 2022-01-12
Attending: INTERNAL MEDICINE
Payer: MEDICARE

## 2022-01-12 ENCOUNTER — OFFICE VISIT (OUTPATIENT)
Dept: CARDIOLOGY | Facility: CLINIC | Age: 77
End: 2022-01-12
Payer: MEDICARE

## 2022-01-12 VITALS
HEART RATE: 94 BPM | WEIGHT: 185 LBS | DIASTOLIC BLOOD PRESSURE: 72 MMHG | SYSTOLIC BLOOD PRESSURE: 112 MMHG | OXYGEN SATURATION: 99 % | BODY MASS INDEX: 28.13 KG/M2

## 2022-01-12 DIAGNOSIS — R55 POSTURAL DIZZINESS WITH PRESYNCOPE: ICD-10-CM

## 2022-01-12 DIAGNOSIS — R94.31 ABNORMAL ECG: ICD-10-CM

## 2022-01-12 DIAGNOSIS — R53.83 FATIGUE, UNSPECIFIED TYPE: ICD-10-CM

## 2022-01-12 DIAGNOSIS — I45.3 TRIFASCICULAR BLOCK: ICD-10-CM

## 2022-01-12 DIAGNOSIS — I45.10 RBBB: ICD-10-CM

## 2022-01-12 DIAGNOSIS — Z95.0 CARDIAC PACEMAKER IN SITU: ICD-10-CM

## 2022-01-12 DIAGNOSIS — R55 NEAR SYNCOPE: ICD-10-CM

## 2022-01-12 DIAGNOSIS — I49.9 IRREGULAR HEART BEAT: ICD-10-CM

## 2022-01-12 DIAGNOSIS — I65.23 BILATERAL CAROTID ARTERY STENOSIS: ICD-10-CM

## 2022-01-12 DIAGNOSIS — G47.30 SLEEP APNEA, UNSPECIFIED TYPE: ICD-10-CM

## 2022-01-12 DIAGNOSIS — I44.4 LAFB (LEFT ANTERIOR FASCICULAR BLOCK): Primary | ICD-10-CM

## 2022-01-12 DIAGNOSIS — R55 SYNCOPE AND COLLAPSE: ICD-10-CM

## 2022-01-12 DIAGNOSIS — E11.69 TYPE 2 DIABETES MELLITUS WITH OTHER SPECIFIED COMPLICATION, UNSPECIFIED WHETHER LONG TERM INSULIN USE: ICD-10-CM

## 2022-01-12 DIAGNOSIS — R42 POSTURAL DIZZINESS WITH PRESYNCOPE: ICD-10-CM

## 2022-01-12 DIAGNOSIS — Z95.0 PRESENCE OF CARDIAC PACEMAKER: ICD-10-CM

## 2022-01-12 DIAGNOSIS — R06.09 DYSPNEA ON EXERTION: ICD-10-CM

## 2022-01-12 DIAGNOSIS — I44.0 FIRST DEGREE AV BLOCK: ICD-10-CM

## 2022-01-12 PROCEDURE — 99214 OFFICE O/P EST MOD 30 MIN: CPT | Mod: S$GLB,,, | Performed by: INTERNAL MEDICINE

## 2022-01-12 PROCEDURE — 3078F DIAST BP <80 MM HG: CPT | Mod: CPTII,S$GLB,, | Performed by: INTERNAL MEDICINE

## 2022-01-12 PROCEDURE — 93005 ELECTROCARDIOGRAM TRACING: CPT

## 2022-01-12 PROCEDURE — 1160F PR REVIEW ALL MEDS BY PRESCRIBER/CLIN PHARMACIST DOCUMENTED: ICD-10-PCS | Mod: CPTII,S$GLB,, | Performed by: INTERNAL MEDICINE

## 2022-01-12 PROCEDURE — 3078F PR MOST RECENT DIASTOLIC BLOOD PRESSURE < 80 MM HG: ICD-10-PCS | Mod: CPTII,S$GLB,, | Performed by: INTERNAL MEDICINE

## 2022-01-12 PROCEDURE — 1159F PR MEDICATION LIST DOCUMENTED IN MEDICAL RECORD: ICD-10-PCS | Mod: CPTII,S$GLB,, | Performed by: INTERNAL MEDICINE

## 2022-01-12 PROCEDURE — 1126F AMNT PAIN NOTED NONE PRSNT: CPT | Mod: CPTII,S$GLB,, | Performed by: INTERNAL MEDICINE

## 2022-01-12 PROCEDURE — 93010 EKG 12-LEAD: ICD-10-PCS | Mod: ,,, | Performed by: STUDENT IN AN ORGANIZED HEALTH CARE EDUCATION/TRAINING PROGRAM

## 2022-01-12 PROCEDURE — 3074F PR MOST RECENT SYSTOLIC BLOOD PRESSURE < 130 MM HG: ICD-10-PCS | Mod: CPTII,S$GLB,, | Performed by: INTERNAL MEDICINE

## 2022-01-12 PROCEDURE — 93010 ELECTROCARDIOGRAM REPORT: CPT | Mod: ,,, | Performed by: STUDENT IN AN ORGANIZED HEALTH CARE EDUCATION/TRAINING PROGRAM

## 2022-01-12 PROCEDURE — 3074F SYST BP LT 130 MM HG: CPT | Mod: CPTII,S$GLB,, | Performed by: INTERNAL MEDICINE

## 2022-01-12 PROCEDURE — 99214 PR OFFICE/OUTPT VISIT, EST, LEVL IV, 30-39 MIN: ICD-10-PCS | Mod: S$GLB,,, | Performed by: INTERNAL MEDICINE

## 2022-01-12 PROCEDURE — 1160F RVW MEDS BY RX/DR IN RCRD: CPT | Mod: CPTII,S$GLB,, | Performed by: INTERNAL MEDICINE

## 2022-01-12 PROCEDURE — 99999 PR PBB SHADOW E&M-EST. PATIENT-LVL IV: CPT | Mod: PBBFAC,,, | Performed by: INTERNAL MEDICINE

## 2022-01-12 PROCEDURE — 1159F MED LIST DOCD IN RCRD: CPT | Mod: CPTII,S$GLB,, | Performed by: INTERNAL MEDICINE

## 2022-01-12 PROCEDURE — 1126F PR PAIN SEVERITY QUANTIFIED, NO PAIN PRESENT: ICD-10-PCS | Mod: CPTII,S$GLB,, | Performed by: INTERNAL MEDICINE

## 2022-01-12 PROCEDURE — 99999 PR PBB SHADOW E&M-EST. PATIENT-LVL IV: ICD-10-PCS | Mod: PBBFAC,,, | Performed by: INTERNAL MEDICINE
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
## 2022-01-18 ENCOUNTER — OFFICE VISIT (OUTPATIENT)
Dept: RHEUMATOLOGY | Facility: CLINIC | Age: 77
End: 2022-01-18
Payer: MEDICARE

## 2022-01-18 VITALS
BODY MASS INDEX: 28.1 KG/M2 | WEIGHT: 185.44 LBS | SYSTOLIC BLOOD PRESSURE: 111 MMHG | HEIGHT: 68 IN | DIASTOLIC BLOOD PRESSURE: 77 MMHG | HEART RATE: 105 BPM

## 2022-01-18 DIAGNOSIS — M35.9 POLYMYOSITIS ASSOCIATED WITH AUTOIMMUNE DISEASE: Primary | ICD-10-CM

## 2022-01-18 DIAGNOSIS — M47.816 LUMBAR SPONDYLOSIS: ICD-10-CM

## 2022-01-18 DIAGNOSIS — M33.20 POLYMYOSITIS ASSOCIATED WITH AUTOIMMUNE DISEASE: Primary | ICD-10-CM

## 2022-01-18 DIAGNOSIS — M17.12 PRIMARY OSTEOARTHRITIS OF LEFT KNEE: ICD-10-CM

## 2022-01-18 DIAGNOSIS — M11.20 CHONDROCALCINOSIS: ICD-10-CM

## 2022-01-18 PROCEDURE — 3288F FALL RISK ASSESSMENT DOCD: CPT | Mod: CPTII,S$GLB,, | Performed by: INTERNAL MEDICINE

## 2022-01-18 PROCEDURE — 99214 OFFICE O/P EST MOD 30 MIN: CPT | Mod: S$GLB,,, | Performed by: INTERNAL MEDICINE

## 2022-01-18 PROCEDURE — 3074F SYST BP LT 130 MM HG: CPT | Mod: CPTII,S$GLB,, | Performed by: INTERNAL MEDICINE

## 2022-01-18 PROCEDURE — 3288F PR FALLS RISK ASSESSMENT DOCUMENTED: ICD-10-PCS | Mod: CPTII,S$GLB,, | Performed by: INTERNAL MEDICINE

## 2022-01-18 PROCEDURE — 1160F RVW MEDS BY RX/DR IN RCRD: CPT | Mod: CPTII,S$GLB,, | Performed by: INTERNAL MEDICINE

## 2022-01-18 PROCEDURE — 3078F PR MOST RECENT DIASTOLIC BLOOD PRESSURE < 80 MM HG: ICD-10-PCS | Mod: CPTII,S$GLB,, | Performed by: INTERNAL MEDICINE

## 2022-01-18 PROCEDURE — 3074F PR MOST RECENT SYSTOLIC BLOOD PRESSURE < 130 MM HG: ICD-10-PCS | Mod: CPTII,S$GLB,, | Performed by: INTERNAL MEDICINE

## 2022-01-18 PROCEDURE — 1126F PR PAIN SEVERITY QUANTIFIED, NO PAIN PRESENT: ICD-10-PCS | Mod: CPTII,S$GLB,, | Performed by: INTERNAL MEDICINE

## 2022-01-18 PROCEDURE — 99214 PR OFFICE/OUTPT VISIT, EST, LEVL IV, 30-39 MIN: ICD-10-PCS | Mod: S$GLB,,, | Performed by: INTERNAL MEDICINE

## 2022-01-18 PROCEDURE — 99999 PR PBB SHADOW E&M-EST. PATIENT-LVL III: CPT | Mod: PBBFAC,,, | Performed by: INTERNAL MEDICINE

## 2022-01-18 PROCEDURE — 1160F PR REVIEW ALL MEDS BY PRESCRIBER/CLIN PHARMACIST DOCUMENTED: ICD-10-PCS | Mod: CPTII,S$GLB,, | Performed by: INTERNAL MEDICINE

## 2022-01-18 PROCEDURE — 1159F MED LIST DOCD IN RCRD: CPT | Mod: CPTII,S$GLB,, | Performed by: INTERNAL MEDICINE

## 2022-01-18 PROCEDURE — 3078F DIAST BP <80 MM HG: CPT | Mod: CPTII,S$GLB,, | Performed by: INTERNAL MEDICINE

## 2022-01-18 PROCEDURE — 99999 PR PBB SHADOW E&M-EST. PATIENT-LVL III: ICD-10-PCS | Mod: PBBFAC,,, | Performed by: INTERNAL MEDICINE

## 2022-01-18 PROCEDURE — 1126F AMNT PAIN NOTED NONE PRSNT: CPT | Mod: CPTII,S$GLB,, | Performed by: INTERNAL MEDICINE

## 2022-01-18 PROCEDURE — 1159F PR MEDICATION LIST DOCUMENTED IN MEDICAL RECORD: ICD-10-PCS | Mod: CPTII,S$GLB,, | Performed by: INTERNAL MEDICINE

## 2022-01-18 PROCEDURE — 1101F PT FALLS ASSESS-DOCD LE1/YR: CPT | Mod: CPTII,S$GLB,, | Performed by: INTERNAL MEDICINE

## 2022-01-18 PROCEDURE — 1101F PR PT FALLS ASSESS DOC 0-1 FALLS W/OUT INJ PAST YR: ICD-10-PCS | Mod: CPTII,S$GLB,, | Performed by: INTERNAL MEDICINE

## 2022-01-18 RX ORDER — METHYLPREDNISOLONE 4 MG/1
TABLET ORAL
Qty: 21 TABLET | Refills: 0 | Status: SHIPPED | OUTPATIENT
Start: 2022-01-18 | End: 2022-04-29

## 2022-01-18 NOTE — PROGRESS NOTES
RHEUMATOLOGY CLINIC FOLLOW UP VISIT  Chief complaints, HPI, ROS, EXAM, Assessment & Plans:-  Anca Kirkland a 76 y.o. pleasant female comes in for follow-up visit.  She follows up in the Rheumatology Clinic for polymyositis which has been in remission.  Last visit she was seen with worsening left knee joint pain associated with stiffness and swelling and synovitis which was injected with Kenalog.  She reports doing well except mild activity-related lower back pain and intermittent knee pain especially since they elevated her house and she had to go up around 13 stairs.  Rheumatological review of system negative otherwise.  Exam shows chronic deformities but no active synovitis of bilateral knees.  No photosensitive rash.    1. Polymyositis associated with autoimmune disease    2. Primary osteoarthritis of left knee    3. Lumbar spondylosis    4. Chondrocalcinosis      Problem List Items Addressed This Visit     Polymyositis associated with autoimmune disease - Primary    Primary osteoarthritis of left knee    Relevant Medications    methylPREDNISolone (MEDROL DOSEPACK) 4 mg tablet    Lumbar spondylosis      Other Visit Diagnoses     Chondrocalcinosis        Relevant Medications    methylPREDNISolone (MEDROL DOSEPACK) 4 mg tablet           Polymyositis in remission.  Monitor clinically.     High titer ds DNA in the past without any evidence of lupus arthritis or cutaneous lupus or any other lupus manifestations.  Monitor clinically.   Severe chondrocalcinosis of bilateral knee associated with intermittent rare episodes of pseudogout.  Medrol Dosepak p.r.n..  Inject left knee with corticosteroid if needed in future.   Tylenol p.r.n. for chronic lower back pain.  Referral to back and spine clinic in future if any worsening symptoms.  # Follow up in about 1 year (around 1/18/2023), or if symptoms worsen or fail to improve.      Disclaimer: This note was  prepared using voice recognition system and is likely to have sound alike errors and is not proof read.  Please call me with any questions.

## 2022-01-19 ENCOUNTER — TELEPHONE (OUTPATIENT)
Dept: PULMONOLOGY | Facility: CLINIC | Age: 77
End: 2022-01-19
Payer: MEDICARE

## 2022-01-20 ENCOUNTER — HOSPITAL ENCOUNTER (OUTPATIENT)
Dept: CARDIOLOGY | Facility: HOSPITAL | Age: 77
Discharge: HOME | End: 2022-01-20
Attending: INTERNAL MEDICINE
Payer: MEDICARE

## 2022-01-20 VITALS — WEIGHT: 185 LBS | HEIGHT: 68 IN | BODY MASS INDEX: 28.04 KG/M2

## 2022-01-20 DIAGNOSIS — I65.23 BILATERAL CAROTID ARTERY STENOSIS: ICD-10-CM

## 2022-01-20 LAB
LEFT ARM DIASTOLIC BLOOD PRESSURE: 71 MMHG
LEFT ARM SYSTOLIC BLOOD PRESSURE: 111 MMHG
LEFT CBA DIAS: 14 CM/S
LEFT CBA SYS: 55 CM/S
LEFT CCA DIST DIAS: 20 CM/S
LEFT CCA DIST SYS: 79 CM/S
LEFT CCA MID DIAS: 15 CM/S
LEFT CCA MID SYS: 69 CM/S
LEFT CCA PROX DIAS: 13 CM/S
LEFT CCA PROX SYS: 62 CM/S
LEFT ECA DIAS: 2 CM/S
LEFT ECA SYS: 53 CM/S
LEFT ICA DIST DIAS: 22 CM/S
LEFT ICA DIST SYS: 59 CM/S
LEFT ICA MID DIAS: 35 CM/S
LEFT ICA MID SYS: 101 CM/S
LEFT ICA PROX DIAS: 13 CM/S
LEFT ICA PROX SYS: 68 CM/S
LEFT VERTEBRAL DIAS: 21 CM/S
LEFT VERTEBRAL SYS: 81 CM/S
OHS CV CAROTID RIGHT ICA EDV HIGHEST: 19
OHS CV CAROTID ULTRASOUND LEFT ICA/CCA RATIO: 1.28
OHS CV CAROTID ULTRASOUND RIGHT ICA/CCA RATIO: 1.01
OHS CV PV CAROTID LEFT HIGHEST CCA: 79
OHS CV PV CAROTID LEFT HIGHEST ICA: 101
OHS CV PV CAROTID RIGHT HIGHEST CCA: 92
OHS CV PV CAROTID RIGHT HIGHEST ICA: 93
OHS CV US CAROTID LEFT HIGHEST EDV: 35
RIGHT ARM DIASTOLIC BLOOD PRESSURE: 71 MMHG
RIGHT ARM SYSTOLIC BLOOD PRESSURE: 111 MMHG
RIGHT CBA DIAS: 14 CM/S
RIGHT CBA SYS: 64 CM/S
RIGHT CCA DIST DIAS: 19 CM/S
RIGHT CCA DIST SYS: 92 CM/S
RIGHT CCA MID DIAS: 17 CM/S
RIGHT CCA MID SYS: 85 CM/S
RIGHT CCA PROX DIAS: 7 CM/S
RIGHT CCA PROX SYS: 50 CM/S
RIGHT ECA DIAS: 7 CM/S
RIGHT ECA SYS: 56 CM/S
RIGHT ICA DIST DIAS: 19 CM/S
RIGHT ICA DIST SYS: 58 CM/S
RIGHT ICA MID DIAS: 8 CM/S
RIGHT ICA MID SYS: 45 CM/S
RIGHT ICA PROX DIAS: 13 CM/S
RIGHT ICA PROX SYS: 93 CM/S
RIGHT VERTEBRAL DIAS: 13 CM/S
RIGHT VERTEBRAL SYS: 47 CM/S

## 2022-01-20 PROCEDURE — 93880 EXTRACRANIAL BILAT STUDY: CPT

## 2022-01-20 PROCEDURE — 93880 CV US DOPPLER CAROTID (CUPID ONLY): ICD-10-PCS | Mod: 26,,, | Performed by: INTERNAL MEDICINE

## 2022-01-20 PROCEDURE — 93880 EXTRACRANIAL BILAT STUDY: CPT | Mod: 26,,, | Performed by: INTERNAL MEDICINE

## 2022-03-05 ENCOUNTER — CLINICAL SUPPORT (OUTPATIENT)
Dept: CARDIOLOGY | Facility: HOSPITAL | Age: 77
End: 2022-03-05
Payer: MEDICARE

## 2022-03-05 DIAGNOSIS — Z95.0 PRESENCE OF CARDIAC PACEMAKER: ICD-10-CM

## 2022-03-05 PROCEDURE — 93296 REM INTERROG EVL PM/IDS: CPT | Performed by: INTERNAL MEDICINE

## 2022-03-14 ENCOUNTER — TELEPHONE (OUTPATIENT)
Dept: PULMONOLOGY | Facility: CLINIC | Age: 77
End: 2022-03-14
Payer: MEDICARE

## 2022-03-14 NOTE — TELEPHONE ENCOUNTER
Spoke to pt, pt will not be in town day of apt. Apt needing to be rescheduled. Apt rescheduled to 5/17 at 3 at [facility]. Pt added to waiting list for sooner consult  ----- Message from Ashley Bernabe sent at 3/14/2022  2:18 PM CDT -----  Contact: Anca  Patient would like a call back at [phone] in regards to rescheduling her appointment. She will not be able to make it for Wednesday.    Thanks

## 2022-03-24 NOTE — PROGRESS NOTES
Subjective:   Patient ID:  Anca Mcclellan is a 76 y.o. female who presents for evaluation of LAFB (left anterior fascicular block)        HPI    Anca Mcclellan is a 76 year old female who presents to cardiology clinic for post device 1 week wound check s/p PPM implant. Her current medical conditions include HTN, near syncope, s/p PPM implant (Biotronik), LAFB, HLP. She returns today and states she is doing well.     Denies chest pain or anginal equivalents. No shortness of breath, [place] or palpitations. Denies orthopnea, PND or abdominal bloating. Reports regular walking without any issues lately. NO leg swelling or claudications. No recent falls, syncope or near syncopal events. Reports compliance with medications and dietary restrictions. NO CNS complaints to suggest TIA or CVA today. No signs of abnormal bleeding on ASA.     Device check completed in office today. Wound reviewed with Dr Martinez, no need for additional dressing today.     Reviewed post device instructions in detail with patient, all questions answered in detail.       3/25/2022 update    She returns today and states she is doing ok.   On Mardi [place]as, she was at a parade and had a syncopal event and was brought to ED for IV fluids. Since that time she has felt weak and dizzy. On that AM, she had not eaten breakfast that AM.     BP at home 115-120/70s    Tries to stay hydrated throughout the day. Has issues with dizziness upon standing.     She has never started wearing compression socks as were previously recommended in October 2021.     She does not feel any better since changing her device settings to DDDR in October as well. Continues to have issues with daily fatigue and felt better when she was on her previous settings. Will change her back to DDD CLS 70 today and assess response at next visit        Past Medical History:   Diagnosis Date    Bilateral carotid artery stenosis 10/15/2019    Hyperlipidemia        Past Surgical History:    Procedure Laterality Date    A-V CARDIAC PACEMAKER INSERTION Left 6/3/2021    Procedure: INSERTION, CARDIAC PACEMAKER, DUAL CHAMBER;  Surgeon: Arnold Martinez MD;  Location: [facility] CATH LAB;  Service: Cardiology;  Laterality: Left;  COVID-19, MRNA, LN-S, PF (Pfizer) 2/4/2021, 1/14/2021//Gil ching notified    ESOPHAGOGASTRODUODENOSCOPY N/A 9/17/2021    Procedure: EGD (ESOPHAGOGASTRODUODENOSCOPY);  Surgeon: Rimma Mccracken MD;  Location: [facility] ENDO;  Service: Endoscopy;  Laterality: N/A;    HYSTERECTOMY  1988    INSERTION OF PACEMAKER  06/03/2021       Social History     Tobacco Use    Smoking status: Never Smoker    Smokeless tobacco: Never Used   Substance Use Topics    Alcohol use: Not Currently     Comment: seldom    Drug use: Never       Family History   Problem Relation Age of Onset    Cataracts Mother     Heart disease Mother     Diabetes type II Mother     Dementia Father     Blindness Father     Heart disease Brother     Migraines Neg Hx     Melanoma Neg Hx     Lupus Neg Hx     Psoriasis Neg Hx      Wt Readings from Last 3 Encounters:   03/25/22 85 kg (187 lb 6.3 oz)   01/20/22 83.9 kg (185 lb)   01/18/22 84.1 kg (185 lb 6.5 oz)     Temp Readings from Last 3 Encounters:   09/17/21 98.2 °F (36.8 °C)   09/15/21 97.7 °F (36.5 °C) (Temporal)   07/16/21 97.7 °F (36.5 °C) (Temporal)     BP Readings from Last 3 Encounters:   03/25/22 120/70   01/18/22 111/77   01/12/22 112/72     Pulse Readings from Last 3 Encounters:   03/25/22 73   01/18/22 105   01/12/22 94     Current Outpatient Medications on File Prior to Visit   Medication Sig Dispense Refill    aspirin (ECOTRIN) 81 MG EC tablet Take 81 mg by mouth once daily.      atorvastatin (LIPITOR) 40 MG tablet Take 40 mg by mouth once daily.      BIOTIN ORAL Take 4,000 mcg by mouth once daily.       cholecalciferol, vitamin D3, (VITAMIN D3) 50 mcg (2,000 unit) Tab 1 capsule      cyanocobalamin (VITAMIN B-12) 100 MCG tablet Take 100  mcg by mouth once daily.      ferrous sulfate (FEOSOL) 325 mg (65 mg iron) Tab tablet 1 tablet      fluocinolone and shower cap 0.01 % Oil       fluticasone propionate (FLONASE) 50 mcg/actuation nasal spray SPRAY 2 SPRAYS INTO EACH NOSTRIL EVERY DAY      ketoconazole (NIZORAL) 2 % shampoo       metFORMIN (GLUCOPHAGE-XR) 500 MG ER 24hr tablet Take 500 mg by mouth once daily.      multivitamin (THERAGRAN) per tablet Take 1 tablet by mouth once daily.      neomycin-polymyxin-hydrocortisone (CORTISPORIN) otic solution 4 drops into affected ear      triamcinolone acetonide 0.1% (KENALOG) 0.1 % ointment APPLY TO RASH TOPICALLY TWICE DAILY  0    vitamin E 100 UNIT capsule Take 100 Units by mouth once daily.      vitamin E 100 UNIT capsule Take 100 Units by mouth once daily.      methylPREDNISolone (MEDROL DOSEPACK) 4 mg tablet use as directed (Patient not taking: Reported on 3/25/2022) 21 tablet 0     No current facility-administered medications on file prior to visit.         Review of Systems   Constitutional: Negative for malaise/fatigue.   HENT: Negative for hearing loss and hoarse voice.    Eyes: Negative for blurred vision and visual disturbance.   Cardiovascular: Positive for syncope. Negative for chest pain, claudication, dyspnea on exertion, irregular heartbeat, leg swelling, near-syncope, orthopnea, palpitations and paroxysmal nocturnal dyspnea.   Respiratory: Negative for cough, hemoptysis, shortness of breath, sleep disturbances due to breathing, snoring and wheezing.    Endocrine: Negative for cold intolerance and heat intolerance.   Hematologic/Lymphatic: Does not bruise/bleed easily.   Skin: Negative for color change, dry skin and nail changes.   Musculoskeletal: Positive for arthritis. Negative for back pain, joint pain and myalgias.   Gastrointestinal: Negative for bloating, abdominal pain, constipation, nausea and vomiting.   Genitourinary: Negative for dysuria, flank pain, hematuria and  "hesitancy.   Neurological: Positive for dizziness. Negative for headaches, light-headedness, loss of balance, numbness, paresthesias and weakness.   Psychiatric/Behavioral: Negative for altered mental status.   Allergic/Immunologic: Negative for environmental allergies.     /70 (BP Location: Left arm, Patient Position: Sitting)   Pulse 73   Ht 5' 8" (1.727 m)   Wt 85 kg (187 lb 6.3 oz)   SpO2 99%   BMI 28.49 kg/m²     Objective:   Physical Exam  Vitals and nursing note reviewed.   Constitutional:       General: She is not in acute distress.     Appearance: Normal appearance. She is well-developed. She is not ill-appearing.   HENT:      Head: Normocephalic and atraumatic.      Nose: Nose normal.      Mouth/Throat:      Mouth: Mucous membranes are moist.   Eyes:      Pupils: Pupils are equal, round, and reactive to light.   Neck:      Thyroid: No thyromegaly.      Vascular: No JVD.      Trachea: No tracheal deviation.   Cardiovascular:      Rate and Rhythm: Normal rate and regular rhythm.      Chest Wall: PMI is not displaced.      Pulses: Intact distal pulses.           Radial pulses are 2+ on the right side and 2+ on the left side.        Dorsalis pedis pulses are 2+ on the right side and 2+ on the left side.      Heart sounds: S1 normal and S2 normal. Heart sounds not distant. No murmur heard.     Comments: Left chest wall PPM site well healed, no signs of infection noted.   Pulmonary:      Effort: Pulmonary effort is normal. No respiratory distress.      Breath sounds: Normal breath sounds. No decreased breath sounds, wheezing or rales.   Abdominal:      General: Bowel sounds are normal. There is no distension.      Palpations: Abdomen is soft.      Tenderness: There is no abdominal tenderness.   Musculoskeletal:         General: Normal range of motion.      Cervical back: Full passive range of motion without pain, normal range of motion and neck supple.      Right ankle: No swelling.      Left ankle: " No swelling.   Skin:     General: Skin is warm and dry.      Capillary Refill: Capillary refill takes less than 2 seconds.      Nails: There is no clubbing.   Neurological:      General: No focal deficit present.      Mental Status: She is alert and oriented to person, place, and time.   Psychiatric:         Mood and Affect: Mood normal.         Speech: Speech normal.         Behavior: Behavior normal.         Thought Content: Thought content normal.         Judgment: Judgment normal.         Lab Results   Component Value Date    CHOL 148 09/29/2021    CHOL 140 02/28/2019     Lab Results   Component Value Date    HDL 49 09/29/2021    HDL 49 02/28/2019     Lab Results   Component Value Date    LDLCALC 82.2 09/29/2021    LDLCALC 79.6 02/28/2019     Lab Results   Component Value Date    TRIG 84 09/29/2021    TRIG 57 02/28/2019     Lab Results   Component Value Date    CHOLHDL 33.1 09/29/2021    CHOLHDL 35.0 02/28/2019       Chemistry        Component Value Date/Time     06/16/2021 1523    K 3.9 06/16/2021 1523     06/16/2021 1523    CO2 23 06/16/2021 1523    BUN 9 06/16/2021 1523    CREATININE 0.7 06/16/2021 1523    GLU 95 06/16/2021 1523        Component Value Date/Time    CALCIUM 10.4 06/16/2021 1523    ALKPHOS 89 12/01/2020 0932    AST 32 12/01/2020 0932    ALT 22 12/01/2020 0932    BILITOT 0.4 12/01/2020 0932    ESTGFRAFRICA >60.0 06/16/2021 1523    EGFRNONAA >60.0 06/16/2021 1523          Lab Results   Component Value Date    TSH 0.652 12/01/2020     Lab Results   Component Value Date    INR 1.0 05/18/2021     Lab Results   Component Value Date    WBC 3.68 (L) 09/14/2021    HGB 14.2 09/14/2021    HCT 43.3 09/14/2021    MCV 91 09/14/2021     09/14/2021        Assessment:      1. Syncope and collapse    2. Cardiac pacemaker in situ    3. Sinus node dysfunction    4. Atherosclerosis of aorta        Plan:   Check ECHO  Obtain labs from PCP to review   Add Midodrine 2.5 mg BID  Log BP at home, bring  log to next visit  Wear support stockings -   knee highs, toe +/- heel cut outs +/- side zippers.   For best performance,apply in am, after shower while lying in bed: Massage legs towards abdomen then roll stockings up towards the knees making sure that the pressure is higher at the ankles than at the shins and knees.  Buy at least 4 pairs and rotate them each day, hand wash, cold water, gentle detergent, drip dry.    RTC in 3-4 weeks after testing    Nicole May, FNP-C [facility]

## 2022-03-25 ENCOUNTER — OFFICE VISIT (OUTPATIENT)
Dept: CARDIOLOGY | Facility: CLINIC | Age: 77
End: 2022-03-25
Payer: MEDICARE

## 2022-03-25 VITALS
HEIGHT: 68 IN | DIASTOLIC BLOOD PRESSURE: 70 MMHG | WEIGHT: 187.38 LBS | BODY MASS INDEX: 28.4 KG/M2 | HEART RATE: 73 BPM | OXYGEN SATURATION: 99 % | SYSTOLIC BLOOD PRESSURE: 120 MMHG

## 2022-03-25 DIAGNOSIS — Z95.0 CARDIAC PACEMAKER IN SITU: ICD-10-CM

## 2022-03-25 DIAGNOSIS — I49.5 SINUS NODE DYSFUNCTION: ICD-10-CM

## 2022-03-25 DIAGNOSIS — I70.0 ATHEROSCLEROSIS OF AORTA: ICD-10-CM

## 2022-03-25 DIAGNOSIS — R55 SYNCOPE AND COLLAPSE: Primary | ICD-10-CM

## 2022-03-25 PROCEDURE — 1101F PR PT FALLS ASSESS DOC 0-1 FALLS W/OUT INJ PAST YR: ICD-10-PCS | Mod: CPTII,S$GLB,, | Performed by: NURSE PRACTITIONER

## 2022-03-25 PROCEDURE — 99999 PR PBB SHADOW E&M-EST. PATIENT-LVL IV: CPT | Mod: PBBFAC,,, | Performed by: NURSE PRACTITIONER

## 2022-03-25 PROCEDURE — 3078F PR MOST RECENT DIASTOLIC BLOOD PRESSURE < 80 MM HG: ICD-10-PCS | Mod: CPTII,S$GLB,, | Performed by: NURSE PRACTITIONER

## 2022-03-25 PROCEDURE — 1159F MED LIST DOCD IN RCRD: CPT | Mod: CPTII,S$GLB,, | Performed by: NURSE PRACTITIONER

## 2022-03-25 PROCEDURE — 3074F SYST BP LT 130 MM HG: CPT | Mod: CPTII,S$GLB,, | Performed by: NURSE PRACTITIONER

## 2022-03-25 PROCEDURE — 1159F PR MEDICATION LIST DOCUMENTED IN MEDICAL RECORD: ICD-10-PCS | Mod: CPTII,S$GLB,, | Performed by: NURSE PRACTITIONER

## 2022-03-25 PROCEDURE — 99999 PR PBB SHADOW E&M-EST. PATIENT-LVL IV: ICD-10-PCS | Mod: PBBFAC,,, | Performed by: NURSE PRACTITIONER

## 2022-03-25 PROCEDURE — 99215 PR OFFICE/OUTPT VISIT, EST, LEVL V, 40-54 MIN: ICD-10-PCS | Mod: S$GLB,,, | Performed by: NURSE PRACTITIONER

## 2022-03-25 PROCEDURE — 1101F PT FALLS ASSESS-DOCD LE1/YR: CPT | Mod: CPTII,S$GLB,, | Performed by: NURSE PRACTITIONER

## 2022-03-25 PROCEDURE — 3074F PR MOST RECENT SYSTOLIC BLOOD PRESSURE < 130 MM HG: ICD-10-PCS | Mod: CPTII,S$GLB,, | Performed by: NURSE PRACTITIONER

## 2022-03-25 PROCEDURE — 99215 OFFICE O/P EST HI 40 MIN: CPT | Mod: S$GLB,,, | Performed by: NURSE PRACTITIONER

## 2022-03-25 PROCEDURE — 3288F PR FALLS RISK ASSESSMENT DOCUMENTED: ICD-10-PCS | Mod: CPTII,S$GLB,, | Performed by: NURSE PRACTITIONER

## 2022-03-25 PROCEDURE — 3288F FALL RISK ASSESSMENT DOCD: CPT | Mod: CPTII,S$GLB,, | Performed by: NURSE PRACTITIONER

## 2022-03-25 PROCEDURE — 3078F DIAST BP <80 MM HG: CPT | Mod: CPTII,S$GLB,, | Performed by: NURSE PRACTITIONER

## 2022-03-25 PROCEDURE — 1126F AMNT PAIN NOTED NONE PRSNT: CPT | Mod: CPTII,S$GLB,, | Performed by: NURSE PRACTITIONER

## 2022-03-25 PROCEDURE — 1126F PR PAIN SEVERITY QUANTIFIED, NO PAIN PRESENT: ICD-10-PCS | Mod: CPTII,S$GLB,, | Performed by: NURSE PRACTITIONER

## 2022-03-25 RX ORDER — MIDODRINE HYDROCHLORIDE 2.5 MG/1
2.5 TABLET ORAL EVERY 12 HOURS
Qty: 60 TABLET | Refills: 11 | Status: SHIPPED | OUTPATIENT
Start: 2022-03-25 | End: 2023-01-13 | Stop reason: SDUPTHER

## 2022-04-08 ENCOUNTER — TELEPHONE (OUTPATIENT)
Dept: PULMONOLOGY | Facility: CLINIC | Age: 77
End: 2022-04-08
Payer: MEDICARE

## 2022-04-08 ENCOUNTER — OFFICE VISIT (OUTPATIENT)
Dept: PULMONOLOGY | Facility: CLINIC | Age: 77
End: 2022-04-08
Payer: MEDICARE

## 2022-04-08 VITALS
HEIGHT: 68 IN | RESPIRATION RATE: 16 BRPM | OXYGEN SATURATION: 97 % | DIASTOLIC BLOOD PRESSURE: 90 MMHG | WEIGHT: 190.38 LBS | BODY MASS INDEX: 28.85 KG/M2 | HEART RATE: 87 BPM | SYSTOLIC BLOOD PRESSURE: 122 MMHG

## 2022-04-08 DIAGNOSIS — I70.0 ATHEROSCLEROSIS OF AORTA: ICD-10-CM

## 2022-04-08 DIAGNOSIS — E78.49 OTHER HYPERLIPIDEMIA: ICD-10-CM

## 2022-04-08 DIAGNOSIS — G47.30 SLEEP-DISORDERED BREATHING: Primary | ICD-10-CM

## 2022-04-08 DIAGNOSIS — Z95.0 CARDIAC PACEMAKER IN SITU: ICD-10-CM

## 2022-04-08 PROCEDURE — 3074F PR MOST RECENT SYSTOLIC BLOOD PRESSURE < 130 MM HG: ICD-10-PCS | Mod: CPTII,S$GLB,, | Performed by: PHYSICIAN ASSISTANT

## 2022-04-08 PROCEDURE — 1160F PR REVIEW ALL MEDS BY PRESCRIBER/CLIN PHARMACIST DOCUMENTED: ICD-10-PCS | Mod: CPTII,S$GLB,, | Performed by: PHYSICIAN ASSISTANT

## 2022-04-08 PROCEDURE — 1159F MED LIST DOCD IN RCRD: CPT | Mod: CPTII,S$GLB,, | Performed by: PHYSICIAN ASSISTANT

## 2022-04-08 PROCEDURE — 3074F SYST BP LT 130 MM HG: CPT | Mod: CPTII,S$GLB,, | Performed by: PHYSICIAN ASSISTANT

## 2022-04-08 PROCEDURE — 99999 PR PBB SHADOW E&M-EST. PATIENT-LVL IV: ICD-10-PCS | Mod: PBBFAC,,, | Performed by: PHYSICIAN ASSISTANT

## 2022-04-08 PROCEDURE — 99999 PR PBB SHADOW E&M-EST. PATIENT-LVL IV: CPT | Mod: PBBFAC,,, | Performed by: PHYSICIAN ASSISTANT

## 2022-04-08 PROCEDURE — 1159F PR MEDICATION LIST DOCUMENTED IN MEDICAL RECORD: ICD-10-PCS | Mod: CPTII,S$GLB,, | Performed by: PHYSICIAN ASSISTANT

## 2022-04-08 PROCEDURE — 3080F DIAST BP >= 90 MM HG: CPT | Mod: CPTII,S$GLB,, | Performed by: PHYSICIAN ASSISTANT

## 2022-04-08 PROCEDURE — 99204 PR OFFICE/OUTPT VISIT, NEW, LEVL IV, 45-59 MIN: ICD-10-PCS | Mod: S$GLB,,, | Performed by: PHYSICIAN ASSISTANT

## 2022-04-08 PROCEDURE — 3080F PR MOST RECENT DIASTOLIC BLOOD PRESSURE >= 90 MM HG: ICD-10-PCS | Mod: CPTII,S$GLB,, | Performed by: PHYSICIAN ASSISTANT

## 2022-04-08 PROCEDURE — 99204 OFFICE O/P NEW MOD 45 MIN: CPT | Mod: S$GLB,,, | Performed by: PHYSICIAN ASSISTANT

## 2022-04-08 PROCEDURE — 1160F RVW MEDS BY RX/DR IN RCRD: CPT | Mod: CPTII,S$GLB,, | Performed by: PHYSICIAN ASSISTANT

## 2022-04-08 NOTE — ASSESSMENT & PLAN NOTE
[place] 5, [place] 2, Ep 8  Home sleep study  LUCRETIA and implications on health discussed  Follow up in 4 weeks after sleep study

## 2022-04-08 NOTE — PROGRESS NOTES
Subjective:       Patient ID: Anca Mcclellan is a 76 y.o. female.    Chief Complaint: Apnea      77yo female referred by Lloyd Francis MD for LUCRETIA  History of heart block s/p DC PPM 6/2021 carotid artery disease, HLD, DM, polymyositis   In bed about 1:30am (naps in the early evening prior to this), sleeps well throughout the night, she says if she goes to sleep earlier than this she cannot stay asleep  Gets up about 9:30am  No sleep aids, alcohol or caffeine  No restless legs, no sleepwalking, no concerns for narcolepsy  Snores, no waking up gasping  Some daytime fatigue, stays very busy with [employer]/volunteering that she does not notice, but would like to takes naps if she had time  Taking midodrine for dizziness     BP Readings from Last 3 Encounters:   04/08/22 (!) 122/90   03/25/22 120/70   01/18/22 111/77     Snoring / Sleep:     [place] Questionnaire (validated LUCRETIA screening questionnaire)    yes -- Snoring/apnea    yes -- Fatigue    Body mass index is 28.95 kg/m².  (>25 is overweight, >30 is obese)    Blood Pressure = normal blood pressure  (PreHTN 120-139/80-89, Stg1 140-159/90-99, Stg2 >160/>100)  [place] = 2 of three LUCRETIA categories are positive (high risk is 2-3 positive categories)     [place] Sleepiness Scale TOTAL = 8    (validated sleepiness questionnaire with a higher score indicating greater sleepiness; range 0-24)  [place] SLEEPINESS SCALE 4/8/2022   Sitting and reading 1   Watching TV 1   Sitting, inactive in a public place (e.g. a theatre or a meeting) 0   As a passenger in a car for an hour without a break 1   Lying down to rest in the afternoon when circumstances permit 2   Sitting and talking to someone 0   Sitting quietly after a lunch without alcohol 3   In a car, while stopped for a few minutes in traffic 0   Total score 8       STOP-Bang Questionnaire (validated LUCRETIA screening questionnaire)  Negative unless checked off.  [x] Snoring    [x]  Tired/Fatigued/Sleepy  [x] Obstruction  (apneas/choking)  [] Pressure (HTN)  [] BMI >35  [x] Age >50  [x] Neck >40 cm  [] Gender male    STOP-Bang = 5 (low risk 0-2,high risk 3-8)      Immunization History   Administered Date(s) Administered    COVID-19, MRNA, LN-S, PF (Pfizer) (Purple Cap) 01/14/2021, 02/04/2021, 10/07/2021    Influenza - High Dose - PF (65 years and older) 01/16/2019    Influenza - Quadrivalent - High Dose - PF (65 years and older) 09/24/2020    Influenza - Trivalent (ADULT) 10/25/2007    Influenza - Trivalent - PF (ADULT) 09/23/2015, 10/19/2017, 10/01/2019    Pneumococcal Conjugate - 13 Valent 10/21/2014    Tetanus 12/10/2014    Zoster 07/01/2019    Zoster Recombinant 06/10/2019, 06/10/2019, 09/28/2019      Tobacco Use: Low Risk     Smoking Tobacco Use: Never Smoker    Smokeless Tobacco Use: Never Used      Past Medical History:   Diagnosis Date    Bilateral carotid artery stenosis 10/15/2019    Hyperlipidemia       Current Outpatient Medications on File Prior to Visit   Medication Sig Dispense Refill    aspirin (ECOTRIN) 81 MG EC tablet Take 81 mg by mouth once daily.      atorvastatin (LIPITOR) 40 MG tablet Take 40 mg by mouth once daily.      BIOTIN ORAL Take 4,000 mcg by mouth once daily.       cholecalciferol, vitamin D3, (VITAMIN D3) 50 mcg (2,000 unit) Tab 1 capsule      cyanocobalamin (VITAMIN B-12) 100 MCG tablet Take 100 mcg by mouth once daily.      ferrous sulfate (FEOSOL) 325 mg (65 mg iron) Tab tablet 1 tablet      fluocinolone and shower cap 0.01 % Oil       fluticasone propionate (FLONASE) 50 mcg/actuation nasal spray SPRAY 2 SPRAYS INTO EACH NOSTRIL EVERY DAY      ketoconazole (NIZORAL) 2 % shampoo       metFORMIN (GLUCOPHAGE-XR) 500 MG ER 24hr tablet Take 500 mg by mouth once daily.      methylPREDNISolone (MEDROL DOSEPACK) 4 mg tablet use as directed 21 tablet 0    midodrine (PROAMATINE) 2.5 MG Tab Take 1 tablet (2.5 mg total) by mouth every 12 (twelve) hours. 60 tablet 11    multivitamin  "(THERAGRAN) per tablet Take 1 tablet by mouth once daily.      neomycin-polymyxin-hydrocortisone (CORTISPORIN) otic solution 4 drops into affected ear      triamcinolone acetonide 0.1% (KENALOG) 0.1 % ointment APPLY TO RASH TOPICALLY TWICE DAILY  0    vitamin E 100 UNIT capsule Take 100 Units by mouth once daily.      vitamin E 100 UNIT capsule Take 100 Units by mouth once daily.       No current facility-administered medications on file prior to visit.        Review of Systems   Constitutional: Negative for fever, weight loss, appetite change, fatigue and weakness.   HENT: Negative for postnasal drip, rhinorrhea, sinus pressure, trouble swallowing and congestion.    Respiratory: Positive for snoring and somnolence. Negative for cough, sputum production, choking, chest tightness, shortness of breath, wheezing and dyspnea on extertion.    Cardiovascular: Negative for chest pain and leg swelling.   Musculoskeletal: Negative for arthralgias, gait problem and joint swelling.   Gastrointestinal: Negative for nausea, vomiting and abdominal pain.   Neurological: Negative for dizziness, weakness and headaches.   All other systems reviewed and are negative.      Objective:       Vitals:    04/08/22 0835   BP: (!) 122/90   Pulse: 87   Resp: 16   SpO2: 97%   Weight: 86.4 kg (190 lb 5.9 oz)   Height: 5' 8" (1.727 m)       Physical Exam   Constitutional: She is oriented to person, place, and time. She appears well-developed and well-nourished. No distress.   HENT:   Head: Normocephalic.   Nose: Nose normal.   Mouth/Throat: Oropharynx is clear and moist. Mallampati Score: IV.   Cardiovascular: Normal rate and regular rhythm.   Pulmonary/Chest: Effort normal and breath sounds normal. No respiratory distress. She has no wheezes. She has no rhonchi. She has no rales.   Musculoskeletal:         General: No edema.      Cervical back: Normal range of motion and neck supple.   Lymphadenopathy: No supraclavicular adenopathy is " present.     She has no cervical adenopathy.   Neurological: She is alert and oriented to person, place, and time. Gait normal.   Skin: Skin is warm and dry.   Psychiatric: She has a normal mood and affect.   Vitals reviewed.    Personal Diagnostic Review    EXAMINATION:  XR CHEST PA AND LATERAL 6/16/2021     CLINICAL HISTORY:  Presence of cardiac pacemaker     TECHNIQUE:  PA and lateral views of the chest were performed.     COMPARISON:  Chest x-ray 06/03/2021     FINDINGS:  Mild prominence of the pulmonary station, slightly improved from prior.  No focal consolidation.  Mild scarring or atelectasis at the bases.  Tortuosity of the aorta.  Cardiopericardial silhouette remains mildly enlarged.  Degenerative changes of the spine.  Dual lead left-sided pacer device is unchanged     Impression:     As above        Electronically signed by: Arjun Mullins MD  Date:                                            06/16/2021  Time:                                           16:17        Assessment/Plan:       Problem List Items Addressed This Visit        Cardiac/Vascular    Hyperlipidemia     Stable on lipitor           Cardiac pacemaker in situ     F/u regularly with cardiology             Atherosclerosis of aorta     Heart healthy diet and exercise encouraged              Other    Sleep-disordered breathing - Primary     STOPbang 5, [place] 2, Ep 8  Home sleep study  LUCRETIA and implications on health discussed  Follow up in 4 weeks after sleep study             Relevant Orders    Home Sleep Studies        Follow up in 4 weeks after sleep study.    Discussed diagnosis, its evaluation, treatment and usual course. All questions answered.    Patient verbalized understanding of plan and left in no acute distress    Thank you for the courtesy of participating in the care of this patient    Jaymie Kinney PA-C

## 2022-04-20 PROCEDURE — 95800 PR SLEEP STUDY, UNATTENDED, RECORD HEART RATE/O2 SAT/RESP ANAL/SLEEP TIME: ICD-10-PCS | Mod: 26,,, | Performed by: INTERNAL MEDICINE

## 2022-04-20 PROCEDURE — 95800 SLP STDY UNATTENDED: CPT | Mod: 26,,, | Performed by: INTERNAL MEDICINE

## 2022-04-25 ENCOUNTER — PROCEDURE VISIT (OUTPATIENT)
Dept: SLEEP MEDICINE | Facility: CLINIC | Age: 77
End: 2022-04-25
Payer: MEDICARE

## 2022-04-25 ENCOUNTER — HOSPITAL ENCOUNTER (OUTPATIENT)
Dept: CARDIOLOGY | Facility: HOSPITAL | Age: 77
Discharge: HOME | End: 2022-04-25
Attending: NURSE PRACTITIONER
Payer: MEDICARE

## 2022-04-25 VITALS
SYSTOLIC BLOOD PRESSURE: 120 MMHG | DIASTOLIC BLOOD PRESSURE: 70 MMHG | HEIGHT: 68 IN | WEIGHT: 190 LBS | BODY MASS INDEX: 28.79 KG/M2

## 2022-04-25 DIAGNOSIS — G47.30 SLEEP-DISORDERED BREATHING: ICD-10-CM

## 2022-04-25 DIAGNOSIS — R55 SYNCOPE AND COLLAPSE: ICD-10-CM

## 2022-04-25 LAB
AORTIC ROOT ANNULUS: 3.37 CM
AV INDEX (PROSTH): 0.81
AV MEAN GRADIENT: 3 MMHG
AV PEAK GRADIENT: 6 MMHG
AV REGURGITATION PRESSURE HALF TIME: 1054.01 MS
AV VALVE AREA: 2.57 CM2
AV VELOCITY RATIO: 0.77
BSA FOR ECHO PROCEDURE: 2.03 M2
CV ECHO LV RWT: 0.41 CM
DOP CALC AO PEAK VEL: 1.21 M/S
DOP CALC AO VTI: 25.4 CM
DOP CALC LVOT AREA: 3.2 CM2
DOP CALC LVOT DIAMETER: 2.01 CM
DOP CALC LVOT PEAK VEL: 0.93 M/S
DOP CALC LVOT STROKE VOLUME: 65.33 CM3
DOP CALC RVOT PEAK VEL: 0.79 M/S
DOP CALC RVOT VTI: 17.5 CM
DOP CALCLVOT PEAK VEL VTI: 20.6 CM
E WAVE DECELERATION TIME: 306.64 MSEC
E/A RATIO: 0.79
ECHO EF ESTIMATED: 69 %
ECHO LV POSTERIOR WALL: 0.91 CM (ref 0.6–1.1)
EJECTION FRACTION: 55 %
FRACTIONAL SHORTENING: 39 % (ref 28–44)
INTERVENTRICULAR SEPTUM: 1.16 CM (ref 0.6–1.1)
IVRT: 102.76 MSEC
LEFT ATRIUM SIZE: 4.17 CM
LEFT ATRIUM VOLUME INDEX MOD: 11.1 ML/M2
LEFT ATRIUM VOLUME MOD: 22.24 CM3
LEFT INTERNAL DIMENSION IN SYSTOLE: 2.7 CM (ref 2.1–4)
LEFT VENTRICLE DIASTOLIC VOLUME INDEX: 44.1 ML/M2
LEFT VENTRICLE DIASTOLIC VOLUME: 88.19 ML
LEFT VENTRICLE MASS INDEX: 78 G/M2
LEFT VENTRICLE SYSTOLIC VOLUME INDEX: 13.5 ML/M2
LEFT VENTRICLE SYSTOLIC VOLUME: 27.09 ML
LEFT VENTRICULAR INTERNAL DIMENSION IN DIASTOLE: 4.41 CM (ref 3.5–6)
LEFT VENTRICULAR MASS: 155.63 G
LVOT MG: 2.11 MMHG
LVOT MV: 0.68 CM/S
MV PEAK A VEL: 0.89 M/S
MV PEAK E VEL: 0.7 M/S
MV STENOSIS PRESSURE HALF TIME: 55.57 MS
MV VALVE AREA P 1/2 METHOD: 3.96 CM2
PISA AR MAX VEL: 2.97 M/S
PISA TR MAX VEL: 2.67 M/S
PV MEAN GRADIENT: 1.64 MMHG
PV MV: 0.64 M/S
PV PEAK VELOCITY: 0.95 CM/S
RA MAJOR: 5.3 CM
RA PRESSURE: 3 MMHG
STJ: 2.93 CM
TR MAX PG: 29 MMHG
TV REST PULMONARY ARTERY PRESSURE: 32 MMHG

## 2022-04-25 PROCEDURE — 93306 TTE W/DOPPLER COMPLETE: CPT | Mod: 26,,, | Performed by: INTERNAL MEDICINE

## 2022-04-25 PROCEDURE — 93306 TTE W/DOPPLER COMPLETE: CPT

## 2022-04-25 PROCEDURE — 93306 ECHO (CUPID ONLY): ICD-10-PCS | Mod: 26,,, | Performed by: INTERNAL MEDICINE

## 2022-04-25 PROCEDURE — 95800 SLP STDY UNATTENDED: CPT

## 2022-04-25 NOTE — Clinical Note
PHYSICIAN INTERPRETATION AND COMMENTS: Findings are consistent with moderate obstructive sleep apnea (LUCRETIA). CPAP therapy indicated. CLINICAL HISTORY: 77 year old female presented with: 13.5 inch neck, BMI of 28.1, an [place] sleepiness score of 8, history of heart disease, diabetes and symptoms of nocturnal waking up choking. Based on the clinical history, the patient has a high pre-test probability of having Mild LUCRETIA.

## 2022-04-28 NOTE — PROGRESS NOTES
Subjective:   Patient ID:  Anca Mcclellan is a 77 y.o. female who presents for evaluation of Syncope and collapse        HPI    Anca Mcclellan is a 76 year old female who presents to cardiology clinic for post device 1 week wound check s/p PPM implant. Her current medical conditions include HTN, near syncope, s/p PPM implant (Biotronik), LAFB, HLP. She returns today and states she is doing well.     Denies chest pain or anginal equivalents. No shortness of breath, [place] or palpitations. Denies orthopnea, PND or abdominal bloating. Reports regular walking without any issues lately. NO leg swelling or claudications. No recent falls, syncope or near syncopal events. Reports compliance with medications and dietary restrictions. NO CNS complaints to suggest TIA or CVA today. No signs of abnormal bleeding on ASA.     Device check completed in office today. Wound reviewed with Dr Martinez, no need for additional dressing today.     Reviewed post device instructions in detail with patient, all questions answered in detail.       3/25/2022 update    She returns today and states she is doing ok.   On Mardi [place]as, she was at a parade and had a syncopal event and was brought to ED for IV fluids. Since that time she has felt weak and dizzy. On that AM, she had not eaten breakfast that AM.     BP at home 115-120/70s    Tries to stay hydrated throughout the day. Has issues with dizziness upon standing.     She has never started wearing compression socks as were previously recommended in October 2021.     She does not feel any better since changing her device settings to DDDR in October as well. Continues to have issues with daily fatigue and felt better when she was on her previous settings. Will change her back to DDD CLS 70 today and assess response at next visit    4/29/2022 update    Anca Mcclellan returns today for 4 week follow up and is doing ok.   Most of the time, if she has to make any sudden movement then she  is very dizzy.   As the day progresses, she reports worsening dizziness episodes. Is staying hydrated and drinking >2 L of fluids daily.     Still has issues with dizziness. Discussed with Dr Martinez today.   Device spot check completed, 0% RV pacing. No arrhythmias. Well functioning device today.    Will plan for further evaluation with labs and urine to rule out amyloidosis.     Past Medical History:   Diagnosis Date    Bilateral carotid artery stenosis 10/15/2019    Hyperlipidemia        Past Surgical History:   Procedure Laterality Date    A-V CARDIAC PACEMAKER INSERTION Left 6/3/2021    Procedure: INSERTION, CARDIAC PACEMAKER, DUAL CHAMBER;  Surgeon: Arnold Martinez MD;  Location: [facility] CATH LAB;  Service: Cardiology;  Laterality: Left;  COVID-19, MRNA, LN-S, PF (Pfizer) 2/4/2021, 1/14/2021//Gil ching notified    ESOPHAGOGASTRODUODENOSCOPY N/A 9/17/2021    Procedure: EGD (ESOPHAGOGASTRODUODENOSCOPY);  Surgeon: Rimma Mccracken MD;  Location: [facility] ENDO;  Service: Endoscopy;  Laterality: N/A;    HYSTERECTOMY  1988    INSERTION OF PACEMAKER  06/03/2021       Social History     Tobacco Use    Smoking status: Never Smoker    Smokeless tobacco: Never Used   Substance Use Topics    Alcohol use: Not Currently     Comment: seldom    Drug use: Never       Family History   Problem Relation Age of Onset    Cataracts Mother     Heart disease Mother     Diabetes type II Mother     Dementia Father     Blindness Father     Heart disease Brother     Migraines Neg Hx     Melanoma Neg Hx     Lupus Neg Hx     Psoriasis Neg Hx      Wt Readings from Last 3 Encounters:   04/29/22 85.1 kg (187 lb 9.8 oz)   04/25/22 86.2 kg (190 lb)   04/08/22 86.4 kg (190 lb 5.9 oz)     Temp Readings from Last 3 Encounters:   09/17/21 98.2 °F (36.8 °C)   09/15/21 97.7 °F (36.5 °C) (Temporal)   07/16/21 97.7 °F (36.5 °C) (Temporal)     BP Readings from Last 3 Encounters:   04/29/22 116/78   04/25/22 120/70   04/08/22  (!) 122/90     Pulse Readings from Last 3 Encounters:   04/29/22 81   04/08/22 87   03/25/22 73     Current Outpatient Medications on File Prior to Visit   Medication Sig Dispense Refill    aspirin (ECOTRIN) 81 MG EC tablet Take 81 mg by mouth once daily.      atorvastatin (LIPITOR) 40 MG tablet Take 40 mg by mouth once daily.      BIOTIN ORAL Take 4,000 mcg by mouth once daily.       cholecalciferol, vitamin D3, (VITAMIN D3) 50 mcg (2,000 unit) Tab 1 capsule      cyanocobalamin (VITAMIN B-12) 100 MCG tablet Take 100 mcg by mouth once daily.      ferrous sulfate (FEOSOL) 325 mg (65 mg iron) Tab tablet 1 tablet      metFORMIN (GLUCOPHAGE-XR) 500 MG ER 24hr tablet Take 500 mg by mouth once daily.      midodrine (PROAMATINE) 2.5 MG Tab Take 1 tablet (2.5 mg total) by mouth every 12 (twelve) hours. 60 tablet 11    multivitamin (THERAGRAN) per tablet Take 1 tablet by mouth once daily.      vitamin E 100 UNIT capsule Take 100 Units by mouth once daily.      vitamin E 100 UNIT capsule Take 100 Units by mouth once daily.      [DISCONTINUED] methylPREDNISolone (MEDROL DOSEPACK) 4 mg tablet use as directed 21 tablet 0    fluocinolone and shower cap 0.01 % Oil       fluticasone propionate (FLONASE) 50 mcg/actuation nasal spray SPRAY 2 SPRAYS INTO EACH NOSTRIL EVERY DAY      ketoconazole (NIZORAL) 2 % shampoo       neomycin-polymyxin-hydrocortisone (CORTISPORIN) otic solution 4 drops into affected ear      triamcinolone acetonide 0.1% (KENALOG) 0.1 % ointment APPLY TO RASH TOPICALLY TWICE DAILY  0     No current facility-administered medications on file prior to visit.         Review of Systems   Constitutional: Negative for malaise/fatigue.   HENT: Negative for hearing loss and hoarse voice.    Eyes: Negative for blurred vision and visual disturbance.   Cardiovascular: Positive for syncope. Negative for chest pain, claudication, dyspnea on exertion, irregular heartbeat, leg swelling, near-syncope, orthopnea,  "palpitations and paroxysmal nocturnal dyspnea.   Respiratory: Negative for cough, hemoptysis, shortness of breath, sleep disturbances due to breathing, snoring and wheezing.    Endocrine: Negative for cold intolerance and heat intolerance.   Hematologic/Lymphatic: Does not bruise/bleed easily.   Skin: Negative for color change, dry skin and nail changes.   Musculoskeletal: Positive for arthritis. Negative for back pain, joint pain and myalgias.   Gastrointestinal: Negative for bloating, abdominal pain, constipation, nausea and vomiting.   Genitourinary: Negative for dysuria, flank pain, hematuria and hesitancy.   Neurological: Positive for dizziness. Negative for headaches, light-headedness, loss of balance, numbness, paresthesias and weakness.   Psychiatric/Behavioral: Negative for altered mental status.   Allergic/Immunologic: Negative for environmental allergies.     /78 (BP Location: Left arm, Patient Position: Sitting)   Pulse 81   Ht 5' 8" (1.727 m)   Wt 85.1 kg (187 lb 9.8 oz)   SpO2 98%   BMI 28.53 kg/m²     Objective:   Physical Exam  Vitals and nursing note reviewed.   Constitutional:       General: She is not in acute distress.     Appearance: Normal appearance. She is well-developed. She is not ill-appearing.   HENT:      Head: Normocephalic and atraumatic.      Nose: Nose normal.      Mouth/Throat:      Mouth: Mucous membranes are moist.   Eyes:      Pupils: Pupils are equal, round, and reactive to light.   Neck:      Thyroid: No thyromegaly.      Vascular: No JVD.      Trachea: No tracheal deviation.   Cardiovascular:      Rate and Rhythm: Normal rate and regular rhythm.      Chest Wall: PMI is not displaced.      Pulses: Intact distal pulses.           Radial pulses are 2+ on the right side and 2+ on the left side.        Dorsalis pedis pulses are 2+ on the right side and 2+ on the left side.      Heart sounds: S1 normal and S2 normal. Heart sounds not distant. No murmur heard.     " Comments: Left chest wall PPM site well healed, no signs of infection noted.   Pulmonary:      Effort: Pulmonary effort is normal. No respiratory distress.      Breath sounds: Normal breath sounds. No decreased breath sounds, wheezing or rales.   Abdominal:      General: Bowel sounds are normal. There is no distension.      Palpations: Abdomen is soft.      Tenderness: There is no abdominal tenderness.   Musculoskeletal:         General: Normal range of motion.      Cervical back: Full passive range of motion without pain, normal range of motion and neck supple.      Right ankle: No swelling.      Left ankle: No swelling.   Skin:     General: Skin is warm and dry.      Capillary Refill: Capillary refill takes less than 2 seconds.      Nails: There is no clubbing.   Neurological:      General: No focal deficit present.      Mental Status: She is alert and oriented to person, place, and time.   Psychiatric:         Mood and Affect: Mood normal.         Speech: Speech normal.         Behavior: Behavior normal.         Thought Content: Thought content normal.         Judgment: Judgment normal.         Lab Results   Component Value Date    CHOL 148 09/29/2021    CHOL 140 02/28/2019     Lab Results   Component Value Date    HDL 49 09/29/2021    HDL 49 02/28/2019     Lab Results   Component Value Date    LDLCALC 82.2 09/29/2021    LDLCALC 79.6 02/28/2019     Lab Results   Component Value Date    TRIG 84 09/29/2021    TRIG 57 02/28/2019     Lab Results   Component Value Date    CHOLHDL 33.1 09/29/2021    CHOLHDL 35.0 02/28/2019       Chemistry        Component Value Date/Time     06/16/2021 1523    K 3.9 06/16/2021 1523     06/16/2021 1523    CO2 23 06/16/2021 1523    BUN 9 06/16/2021 1523    CREATININE 0.7 06/16/2021 1523    GLU 95 06/16/2021 1523        Component Value Date/Time    CALCIUM 10.4 06/16/2021 1523    ALKPHOS 89 12/01/2020 0932    AST 32 12/01/2020 0932    ALT 22 12/01/2020 0932    BILITOT 0.4  12/01/2020 0932    ESTGFRAFRICA >60.0 06/16/2021 1523    EGFRNONAA >60.0 06/16/2021 1523          Lab Results   Component Value Date    TSH 0.652 12/01/2020     Lab Results   Component Value Date    INR 1.0 05/18/2021     Lab Results   Component Value Date    WBC 3.68 (L) 09/14/2021    HGB 14.2 09/14/2021    HCT 43.3 09/14/2021    MCV 91 09/14/2021     09/14/2021   1.TTE  Results for orders placed during the hospital encounter of 04/25/22    Echo    Interpretation Summary  · Mild tricuspid regurgitation.  · Normal systolic function.  · The estimated ejection fraction is 55%.  · Normal left ventricular diastolic function.  · Normal right ventricular size.  · Normal central venous pressure (3 mmHg).  · The estimated PA systolic pressure is 32 mmHg.       Assessment:      1. Postural dizziness with presyncope    2. Type 2 diabetes mellitus with other specified complication, unspecified whether long term insulin use    3. Fatigue, unspecified type    4. RBBB    5. LAFB (left anterior fascicular block)    6. Atherosclerosis of aorta    7. Cardiac pacemaker in situ        Plan:   Check 24 hour urine for protein  Protein electropheresis  Assess Echo for apical strain  Check prealbumin, cmp, bnp  Phone review to discuss labs afterwards.       JEAN MARIE Obregon  [facility]

## 2022-04-29 ENCOUNTER — OFFICE VISIT (OUTPATIENT)
Dept: CARDIOLOGY | Facility: CLINIC | Age: 77
End: 2022-04-29
Payer: MEDICARE

## 2022-04-29 ENCOUNTER — LAB VISIT (OUTPATIENT)
Dept: LAB | Facility: HOSPITAL | Age: 77
End: 2022-04-29
Attending: NURSE PRACTITIONER
Payer: MEDICARE

## 2022-04-29 VITALS
DIASTOLIC BLOOD PRESSURE: 78 MMHG | WEIGHT: 187.63 LBS | BODY MASS INDEX: 28.44 KG/M2 | HEART RATE: 81 BPM | SYSTOLIC BLOOD PRESSURE: 116 MMHG | OXYGEN SATURATION: 98 % | HEIGHT: 68 IN

## 2022-04-29 DIAGNOSIS — R42 POSTURAL DIZZINESS WITH PRESYNCOPE: Primary | ICD-10-CM

## 2022-04-29 DIAGNOSIS — I44.4 LAFB (LEFT ANTERIOR FASCICULAR BLOCK): ICD-10-CM

## 2022-04-29 DIAGNOSIS — Z95.0 CARDIAC PACEMAKER IN SITU: ICD-10-CM

## 2022-04-29 DIAGNOSIS — E11.69 TYPE 2 DIABETES MELLITUS WITH OTHER SPECIFIED COMPLICATION, UNSPECIFIED WHETHER LONG TERM INSULIN USE: ICD-10-CM

## 2022-04-29 DIAGNOSIS — R53.83 FATIGUE, UNSPECIFIED TYPE: ICD-10-CM

## 2022-04-29 DIAGNOSIS — I45.10 RBBB: ICD-10-CM

## 2022-04-29 DIAGNOSIS — I70.0 ATHEROSCLEROSIS OF AORTA: ICD-10-CM

## 2022-04-29 DIAGNOSIS — R55 POSTURAL DIZZINESS WITH PRESYNCOPE: ICD-10-CM

## 2022-04-29 DIAGNOSIS — R55 POSTURAL DIZZINESS WITH PRESYNCOPE: Primary | ICD-10-CM

## 2022-04-29 DIAGNOSIS — R42 POSTURAL DIZZINESS WITH PRESYNCOPE: ICD-10-CM

## 2022-04-29 LAB
ALBUMIN SERPL BCP-MCNC: 3.7 G/DL (ref 3.5–5.2)
ALP SERPL-CCNC: 71 U/L (ref 55–135)
ALT SERPL W/O P-5'-P-CCNC: 18 U/L (ref 10–44)
ANION GAP SERPL CALC-SCNC: 9 MMOL/L (ref 8–16)
AST SERPL-CCNC: 19 U/L (ref 10–40)
BILIRUB SERPL-MCNC: 0.6 MG/DL (ref 0.1–1)
BNP SERPL-MCNC: 17 PG/ML (ref 0–99)
BUN SERPL-MCNC: 10 MG/DL (ref 8–23)
CALCIUM SERPL-MCNC: 9.2 MG/DL (ref 8.7–10.5)
CHLORIDE SERPL-SCNC: 103 MMOL/L (ref 95–110)
CO2 SERPL-SCNC: 26 MMOL/L (ref 23–29)
CREAT SERPL-MCNC: 0.7 MG/DL (ref 0.5–1.4)
EST. GFR  (AFRICAN AMERICAN): >60 ML/MIN/1.73 M^2
EST. GFR  (NON AFRICAN AMERICAN): >60 ML/MIN/1.73 M^2
ESTIMATED AVG GLUCOSE: 140 MG/DL (ref 68–131)
GLUCOSE SERPL-MCNC: 118 MG/DL (ref 70–110)
HBA1C MFR BLD: 6.5 % (ref 4–5.6)
POTASSIUM SERPL-SCNC: 3.7 MMOL/L (ref 3.5–5.1)
PREALB SERPL-MCNC: 19 MG/DL (ref 20–43)
PROT SERPL-MCNC: 8 G/DL (ref 6–8.4)
SODIUM SERPL-SCNC: 138 MMOL/L (ref 136–145)

## 2022-04-29 PROCEDURE — 83880 ASSAY OF NATRIURETIC PEPTIDE: CPT | Performed by: NURSE PRACTITIONER

## 2022-04-29 PROCEDURE — 1126F PR PAIN SEVERITY QUANTIFIED, NO PAIN PRESENT: ICD-10-PCS | Mod: CPTII,S$GLB,, | Performed by: NURSE PRACTITIONER

## 2022-04-29 PROCEDURE — 1159F MED LIST DOCD IN RCRD: CPT | Mod: CPTII,S$GLB,, | Performed by: NURSE PRACTITIONER

## 2022-04-29 PROCEDURE — 3288F PR FALLS RISK ASSESSMENT DOCUMENTED: ICD-10-PCS | Mod: CPTII,S$GLB,, | Performed by: NURSE PRACTITIONER

## 2022-04-29 PROCEDURE — 1101F PT FALLS ASSESS-DOCD LE1/YR: CPT | Mod: CPTII,S$GLB,, | Performed by: NURSE PRACTITIONER

## 2022-04-29 PROCEDURE — 99999 PR PBB SHADOW E&M-EST. PATIENT-LVL IV: CPT | Mod: PBBFAC,,, | Performed by: NURSE PRACTITIONER

## 2022-04-29 PROCEDURE — 1126F AMNT PAIN NOTED NONE PRSNT: CPT | Mod: CPTII,S$GLB,, | Performed by: NURSE PRACTITIONER

## 2022-04-29 PROCEDURE — 99214 OFFICE O/P EST MOD 30 MIN: CPT | Mod: S$GLB,,, | Performed by: NURSE PRACTITIONER

## 2022-04-29 PROCEDURE — 36415 COLL VENOUS BLD VENIPUNCTURE: CPT | Performed by: NURSE PRACTITIONER

## 2022-04-29 PROCEDURE — 3078F DIAST BP <80 MM HG: CPT | Mod: CPTII,S$GLB,, | Performed by: NURSE PRACTITIONER

## 2022-04-29 PROCEDURE — 99999 PR PBB SHADOW E&M-EST. PATIENT-LVL IV: ICD-10-PCS | Mod: PBBFAC,,, | Performed by: NURSE PRACTITIONER

## 2022-04-29 PROCEDURE — 84134 ASSAY OF PREALBUMIN: CPT | Performed by: NURSE PRACTITIONER

## 2022-04-29 PROCEDURE — 99214 PR OFFICE/OUTPT VISIT, EST, LEVL IV, 30-39 MIN: ICD-10-PCS | Mod: S$GLB,,, | Performed by: NURSE PRACTITIONER

## 2022-04-29 PROCEDURE — 3078F PR MOST RECENT DIASTOLIC BLOOD PRESSURE < 80 MM HG: ICD-10-PCS | Mod: CPTII,S$GLB,, | Performed by: NURSE PRACTITIONER

## 2022-04-29 PROCEDURE — 1101F PR PT FALLS ASSESS DOC 0-1 FALLS W/OUT INJ PAST YR: ICD-10-PCS | Mod: CPTII,S$GLB,, | Performed by: NURSE PRACTITIONER

## 2022-04-29 PROCEDURE — 3074F PR MOST RECENT SYSTOLIC BLOOD PRESSURE < 130 MM HG: ICD-10-PCS | Mod: CPTII,S$GLB,, | Performed by: NURSE PRACTITIONER

## 2022-04-29 PROCEDURE — 80053 COMPREHEN METABOLIC PANEL: CPT | Performed by: NURSE PRACTITIONER

## 2022-04-29 PROCEDURE — 1159F PR MEDICATION LIST DOCUMENTED IN MEDICAL RECORD: ICD-10-PCS | Mod: CPTII,S$GLB,, | Performed by: NURSE PRACTITIONER

## 2022-04-29 PROCEDURE — 3074F SYST BP LT 130 MM HG: CPT | Mod: CPTII,S$GLB,, | Performed by: NURSE PRACTITIONER

## 2022-04-29 PROCEDURE — 3288F FALL RISK ASSESSMENT DOCD: CPT | Mod: CPTII,S$GLB,, | Performed by: NURSE PRACTITIONER

## 2022-04-29 PROCEDURE — 83036 HEMOGLOBIN GLYCOSYLATED A1C: CPT | Performed by: NURSE PRACTITIONER

## 2022-05-03 ENCOUNTER — TELEPHONE (OUTPATIENT)
Dept: CARDIOLOGY | Facility: CLINIC | Age: 77
End: 2022-05-03
Payer: MEDICARE

## 2022-05-03 NOTE — TELEPHONE ENCOUNTER
----- Message from JEAN MARIE Powell sent at 5/3/2022 12:54 PM CDT -----  -Please notify patient  -ECHO reviewed without any evidence of abnormalities  -No protein in urine specimen  -A1C is on higher side, would benefit from diabetes appt  -all other labs within normal range  -No overt findings regarding her dizziness  -Keep follow up with Dr Francis as scheduled    Debbie

## 2022-05-06 ENCOUNTER — OFFICE VISIT (OUTPATIENT)
Dept: PULMONOLOGY | Facility: CLINIC | Age: 77
End: 2022-05-06
Payer: MEDICARE

## 2022-05-06 VITALS
BODY MASS INDEX: 27.7 KG/M2 | SYSTOLIC BLOOD PRESSURE: 118 MMHG | RESPIRATION RATE: 16 BRPM | OXYGEN SATURATION: 97 % | DIASTOLIC BLOOD PRESSURE: 76 MMHG | WEIGHT: 182.75 LBS | HEART RATE: 93 BPM | HEIGHT: 68 IN

## 2022-05-06 DIAGNOSIS — E78.49 OTHER HYPERLIPIDEMIA: ICD-10-CM

## 2022-05-06 DIAGNOSIS — Z95.0 CARDIAC PACEMAKER IN SITU: ICD-10-CM

## 2022-05-06 DIAGNOSIS — G47.33 OSA (OBSTRUCTIVE SLEEP APNEA): Primary | ICD-10-CM

## 2022-05-06 DIAGNOSIS — I70.0 ATHEROSCLEROSIS OF AORTA: ICD-10-CM

## 2022-05-06 PROCEDURE — 1160F RVW MEDS BY RX/DR IN RCRD: CPT | Mod: CPTII,S$GLB,, | Performed by: PHYSICIAN ASSISTANT

## 2022-05-06 PROCEDURE — 3078F DIAST BP <80 MM HG: CPT | Mod: CPTII,S$GLB,, | Performed by: PHYSICIAN ASSISTANT

## 2022-05-06 PROCEDURE — 3288F FALL RISK ASSESSMENT DOCD: CPT | Mod: CPTII,S$GLB,, | Performed by: PHYSICIAN ASSISTANT

## 2022-05-06 PROCEDURE — 3288F PR FALLS RISK ASSESSMENT DOCUMENTED: ICD-10-PCS | Mod: CPTII,S$GLB,, | Performed by: PHYSICIAN ASSISTANT

## 2022-05-06 PROCEDURE — 1101F PT FALLS ASSESS-DOCD LE1/YR: CPT | Mod: CPTII,S$GLB,, | Performed by: PHYSICIAN ASSISTANT

## 2022-05-06 PROCEDURE — 99214 OFFICE O/P EST MOD 30 MIN: CPT | Mod: S$GLB,,, | Performed by: PHYSICIAN ASSISTANT

## 2022-05-06 PROCEDURE — 1101F PR PT FALLS ASSESS DOC 0-1 FALLS W/OUT INJ PAST YR: ICD-10-PCS | Mod: CPTII,S$GLB,, | Performed by: PHYSICIAN ASSISTANT

## 2022-05-06 PROCEDURE — 1160F PR REVIEW ALL MEDS BY PRESCRIBER/CLIN PHARMACIST DOCUMENTED: ICD-10-PCS | Mod: CPTII,S$GLB,, | Performed by: PHYSICIAN ASSISTANT

## 2022-05-06 PROCEDURE — 3074F SYST BP LT 130 MM HG: CPT | Mod: CPTII,S$GLB,, | Performed by: PHYSICIAN ASSISTANT

## 2022-05-06 PROCEDURE — 99999 PR PBB SHADOW E&M-EST. PATIENT-LVL IV: ICD-10-PCS | Mod: PBBFAC,,, | Performed by: PHYSICIAN ASSISTANT

## 2022-05-06 PROCEDURE — 1159F MED LIST DOCD IN RCRD: CPT | Mod: CPTII,S$GLB,, | Performed by: PHYSICIAN ASSISTANT

## 2022-05-06 PROCEDURE — 99214 PR OFFICE/OUTPT VISIT, EST, LEVL IV, 30-39 MIN: ICD-10-PCS | Mod: S$GLB,,, | Performed by: PHYSICIAN ASSISTANT

## 2022-05-06 PROCEDURE — 1159F PR MEDICATION LIST DOCUMENTED IN MEDICAL RECORD: ICD-10-PCS | Mod: CPTII,S$GLB,, | Performed by: PHYSICIAN ASSISTANT

## 2022-05-06 PROCEDURE — 99999 PR PBB SHADOW E&M-EST. PATIENT-LVL IV: CPT | Mod: PBBFAC,,, | Performed by: PHYSICIAN ASSISTANT

## 2022-05-06 PROCEDURE — 3078F PR MOST RECENT DIASTOLIC BLOOD PRESSURE < 80 MM HG: ICD-10-PCS | Mod: CPTII,S$GLB,, | Performed by: PHYSICIAN ASSISTANT

## 2022-05-06 PROCEDURE — 3074F PR MOST RECENT SYSTOLIC BLOOD PRESSURE < 130 MM HG: ICD-10-PCS | Mod: CPTII,S$GLB,, | Performed by: PHYSICIAN ASSISTANT

## 2022-05-06 NOTE — PROGRESS NOTES
Subjective:       Patient ID: Anca Mcclellan is a 77 y.o. female.    Chief Complaint: Sleep Apnea      5/6/22  76yo female here for follow up of LUCRETIA  HSAT revealed Mild sleep disordered breathing (AHI=13)  Willing to start CPAP for LUCRETIA to help with daytime fatigue and irregular heart rhythm    4/8/22  75yo female referred by Lloyd Francis MD for LUCRETIA  History of heart block s/p DC PPM 6/2021 carotid artery disease, HLD, DM, polymyositis   In bed about 1:30am (naps in the early evening prior to this), sleeps well throughout the night, she says if she goes to sleep earlier than this she cannot stay asleep  Gets up about 9:30am  No sleep aids, alcohol or caffeine  No restless legs, no sleepwalking, no concerns for narcolepsy  Snores, no waking up gasping  Some daytime fatigue, stays very busy with [employer]/volunteering that she does not notice, but would like to takes naps if she had time  Taking midodrine for dizziness     BP Readings from Last 3 Encounters:   05/06/22 118/76   04/29/22 116/78   04/25/22 120/70     Snoring / Sleep:     [employer] Questionnaire (validated LUCRETIA screening questionnaire)    yes -- Snoring/apnea    yes -- Fatigue    Body mass index is 27.79 kg/m².  (>25 is overweight, >30 is obese)    Blood Pressure = normal blood pressure  (PreHTN 120-139/80-89, Stg1 140-159/90-99, Stg2 >160/>100)  [employer] = 2 of three LUCRETIA categories are positive (high risk is 2-3 positive categories)     [place] Sleepiness Scale TOTAL = 8    (validated sleepiness questionnaire with a higher score indicating greater sleepiness; range 0-24)  [place] SLEEPINESS SCALE 4/8/2022   Sitting and reading 1   Watching TV 1   Sitting, inactive in a public place (e.g. a theatre or a meeting) 0   As a passenger in a car for an hour without a break 1   Lying down to rest in the afternoon when circumstances permit 2   Sitting and talking to someone 0   Sitting quietly after a lunch without alcohol 3   In a car, while stopped for a few  minutes in traffic 0   Total score 8       [employer]ang Questionnaire (validated LUCRETIA screening questionnaire)  Negative unless checked off.  [x] Snoring    [x]  Tired/Fatigued/Sleepy  [x] Obstruction (apneas/choking)  [] Pressure (HTN)  [] BMI >35  [x] Age >50  [x] Neck >40 cm  [] Gender male    STOP-Bang = 5 (low risk 0-2,high risk 3-8)      Immunization History   Administered Date(s) Administered    COVID-19, MRNA, LN-S, PF (Pfizer) (Purple Cap) 01/14/2021, 02/04/2021, 10/07/2021    Influenza - High Dose - PF (65 years and older) 01/16/2019    Influenza - Quadrivalent - High Dose - PF (65 years and older) 09/24/2020    Influenza - Trivalent (ADULT) 10/25/2007    Influenza - Trivalent - PF (ADULT) 09/23/2015, 10/19/2017, 10/01/2019    Pneumococcal Conjugate - 13 Valent 10/21/2014    Pneumococcal Conjugate - 20 Valent 04/27/2022    Tetanus 12/10/2014    Zoster 07/01/2019    Zoster Recombinant 06/10/2019, 06/10/2019, 09/28/2019      Tobacco Use: Low Risk     Smoking Tobacco Use: Never Smoker    Smokeless Tobacco Use: Never Used      Past Medical History:   Diagnosis Date    Bilateral carotid artery stenosis 10/15/2019    Hyperlipidemia     LUCRETIA (obstructive sleep apnea) 5/6/2022      Current Outpatient Medications on File Prior to Visit   Medication Sig Dispense Refill    aspirin (ECOTRIN) 81 MG EC tablet Take 81 mg by mouth once daily.      atorvastatin (LIPITOR) 40 MG tablet Take 40 mg by mouth once daily.      BIOTIN ORAL Take 4,000 mcg by mouth once daily.       cholecalciferol, vitamin D3, (VITAMIN D3) 50 mcg (2,000 unit) Tab 1 capsule      cyanocobalamin (VITAMIN B-12) 100 MCG tablet Take 100 mcg by mouth once daily.      ferrous sulfate (FEOSOL) 325 mg (65 mg iron) Tab tablet 1 tablet      fluocinolone and shower cap 0.01 % Oil       fluticasone propionate (FLONASE) 50 mcg/actuation nasal spray SPRAY 2 SPRAYS INTO EACH NOSTRIL EVERY DAY      ketoconazole (NIZORAL) 2 % shampoo        "metFORMIN (GLUCOPHAGE-XR) 500 MG ER 24hr tablet Take 500 mg by mouth once daily.      midodrine (PROAMATINE) 2.5 MG Tab Take 1 tablet (2.5 mg total) by mouth every 12 (twelve) hours. 60 tablet 11    multivitamin (THERAGRAN) per tablet Take 1 tablet by mouth once daily.      neomycin-polymyxin-hydrocortisone (CORTISPORIN) otic solution 4 drops into affected ear      triamcinolone acetonide 0.1% (KENALOG) 0.1 % ointment APPLY TO RASH TOPICALLY TWICE DAILY  0    vitamin E 100 UNIT capsule Take 100 Units by mouth once daily.      vitamin E 100 UNIT capsule Take 100 Units by mouth once daily.       No current facility-administered medications on file prior to visit.        Review of Systems   Constitutional: Negative for fever, weight loss, appetite change, fatigue and weakness.   HENT: Negative for postnasal drip, rhinorrhea, sinus pressure, trouble swallowing and congestion.    Respiratory: Positive for snoring and somnolence. Negative for cough, sputum production, choking, chest tightness, shortness of breath, wheezing and dyspnea on extertion.    Cardiovascular: Negative for chest pain and leg swelling.   Musculoskeletal: Negative for arthralgias, gait problem and joint swelling.   Gastrointestinal: Negative for nausea, vomiting and abdominal pain.   Neurological: Negative for dizziness, weakness and headaches.   All other systems reviewed and are negative.      Objective:       Vitals:    05/06/22 0954   BP: 118/76   Pulse: 93   Resp: 16   SpO2: 97%   Weight: 82.9 kg (182 lb 12.2 oz)   Height: 5' 8" (1.727 m)       Physical Exam   Constitutional: She is oriented to person, place, and time. She appears well-developed and well-nourished. No distress.   HENT:   Head: Normocephalic.   Nose: Nose normal.   Mouth/Throat: Oropharynx is clear and moist. Mallampati Score: IV.   Cardiovascular: Normal rate and regular rhythm.   Pulmonary/Chest: Effort normal and breath sounds normal. No respiratory distress. She has no " wheezes. She has no rhonchi. She has no rales.   Musculoskeletal:         General: No edema.      Cervical back: Normal range of motion and neck supple.   Lymphadenopathy: No supraclavicular adenopathy is present.     She has no cervical adenopathy.   Neurological: She is alert and oriented to person, place, and time. Gait normal.   Skin: Skin is warm and dry.   Psychiatric: She has a normal mood and affect.   Vitals reviewed.    Personal Diagnostic Review    EXAMINATION:  XR CHEST PA AND LATERAL 6/16/2021     CLINICAL HISTORY:  Presence of cardiac pacemaker     TECHNIQUE:  PA and lateral views of the chest were performed.     COMPARISON:  Chest x-ray 06/03/2021     FINDINGS:  Mild prominence of the pulmonary station, slightly improved from prior.  No focal consolidation.  Mild scarring or atelectasis at the bases.  Tortuosity of the aorta.  Cardiopericardial silhouette remains mildly enlarged.  Degenerative changes of the spine.  Dual lead left-sided pacer device is unchanged     Impression:     As above        Electronically signed by: Arjun Mullins MD  Date:                                            06/16/2021  Time:                                           16:17    Home Sleep Studies     Date/Time: 4/25/2022 8:00 AM  Performed by: Jim Orr MD  Authorized by: Jaymie Kinney PA-C        PHYSICIAN INTERPRETATION AND COMMENTS: Findings are consistent with moderate obstructive sleep apnea (LUCRETIA). CPAP  therapy indicated.  CLINICAL HISTORY: 77 year old female presented with: 13.5 inch neck, BMI of 28.1, an [place] sleepiness score of 8, history  of heart disease, diabetes and symptoms of nocturnal waking up choking. Based on the clinical history, the patient has a  high pre-test probability of having Mild LUCRETIA.  SLEEP STUDY FINDINGS: Patient underwent a 1 night Home Sleep Test and by behavioral criteria, slept for approximately  6.55 hours, with a sleep latency of 3 minutes and a sleep efficiency of  59.7%. Mild sleep disordered breathing (AHI=13) is  noted based on a 4% hypopnea desaturation criteria. When considering more subtle measures of sleep disordered  breathing, the overall respiratory disturbance index is moderate(RDI=22) based on a 1% hypopnea desaturation criteria  with confirmation by surrogate arousal indicators. The apneas/hypopneas are accompanied by minimal oxygen  desaturation (percent time below 90% SpO2: 2.4%, Min SpO2: 81.3%). The average desaturation across all sleep disordered  breathing events is 3.2%. Snoring occurs for 41.2% (30 dB) of the study, 21.1% is very loud. The mean pulse rate is 74.9 BPM,  with infrequent pulse rate variability (27 events with >= 6 BPM increase/decrease per hour).  TREATMENT CONSIDERATIONS: Consider an attended CPAP titration study, given the reported Heart disease. Consider nasal  continuous positive airway pressure based on the RDI severity and co-morbidities. A mandibular advancement splint  (MAS) or referral to an ENT surgeon for modification to the airway should be considered to reduce the potential  contribution of LUCRETIA on existing diseases if the patient prefers an alternative therapy or the CPAP trial is unsuccessful.      Assessment/Plan:       Problem List Items Addressed This Visit        Cardiac/Vascular    Hyperlipidemia     Stable on lipitor           Cardiac pacemaker in situ     F/u regularly with cardiology             Atherosclerosis of aorta     Heart healthy diet and exercise encouraged              Other    LUCRETIA (obstructive sleep apnea) - Primary     HSAT mild LUCRETIA  AutoPAP and nasal pillow ordered  Discussed therapeutic goals for CPAP: Ideal usage 100% of nights for 6-8 hours per night. Minimum usage is 70% of night for at least 4 hours per night.             Relevant Orders    CPAP/BIPAP SUPPLIES    CPAP FOR HOME USE        Follow up after initiation of CPAP and at least 31 days of use.    Discussed diagnosis, its evaluation, treatment and  usual course. All questions answered.    Patient verbalized understanding of plan and left in no acute distress    Thank you for the courtesy of participating in the care of this patient    Jaymie Kinney PA-C

## 2022-05-06 NOTE — ASSESSMENT & PLAN NOTE
HSAT mild LUCRETIA  AutoPAP and nasal pillow ordered  Discussed therapeutic goals for CPAP: Ideal usage 100% of nights for 6-8 hours per night. Minimum usage is 70% of night for at least 4 hours per night.

## 2022-05-17 ENCOUNTER — OFFICE VISIT (OUTPATIENT)
Dept: PULMONOLOGY | Facility: CLINIC | Age: 77
End: 2022-05-17
Payer: MEDICARE

## 2022-05-17 VITALS
BODY MASS INDEX: 28.4 KG/M2 | DIASTOLIC BLOOD PRESSURE: 70 MMHG | OXYGEN SATURATION: 97 % | HEIGHT: 68 IN | HEART RATE: 93 BPM | RESPIRATION RATE: 17 BRPM | SYSTOLIC BLOOD PRESSURE: 112 MMHG | WEIGHT: 187.38 LBS

## 2022-05-17 DIAGNOSIS — G47.8 NON-RESTORATIVE SLEEP: Primary | ICD-10-CM

## 2022-05-17 DIAGNOSIS — R06.83 SNORING: ICD-10-CM

## 2022-05-17 DIAGNOSIS — G47.33 MILD OBSTRUCTIVE SLEEP APNEA: ICD-10-CM

## 2022-05-17 DIAGNOSIS — G47.34 NOCTURNAL HYPOXEMIA: ICD-10-CM

## 2022-05-17 PROCEDURE — 99999 PR PBB SHADOW E&M-EST. PATIENT-LVL V: CPT | Mod: PBBFAC,,, | Performed by: INTERNAL MEDICINE

## 2022-05-17 PROCEDURE — 1101F PR PT FALLS ASSESS DOC 0-1 FALLS W/OUT INJ PAST YR: ICD-10-PCS | Mod: CPTII,S$GLB,, | Performed by: INTERNAL MEDICINE

## 2022-05-17 PROCEDURE — 1159F MED LIST DOCD IN RCRD: CPT | Mod: CPTII,S$GLB,, | Performed by: INTERNAL MEDICINE

## 2022-05-17 PROCEDURE — 3074F SYST BP LT 130 MM HG: CPT | Mod: CPTII,S$GLB,, | Performed by: INTERNAL MEDICINE

## 2022-05-17 PROCEDURE — 3288F PR FALLS RISK ASSESSMENT DOCUMENTED: ICD-10-PCS | Mod: CPTII,S$GLB,, | Performed by: INTERNAL MEDICINE

## 2022-05-17 PROCEDURE — 1101F PT FALLS ASSESS-DOCD LE1/YR: CPT | Mod: CPTII,S$GLB,, | Performed by: INTERNAL MEDICINE

## 2022-05-17 PROCEDURE — 99214 PR OFFICE/OUTPT VISIT, EST, LEVL IV, 30-39 MIN: ICD-10-PCS | Mod: S$GLB,,, | Performed by: INTERNAL MEDICINE

## 2022-05-17 PROCEDURE — 99214 OFFICE O/P EST MOD 30 MIN: CPT | Mod: S$GLB,,, | Performed by: INTERNAL MEDICINE

## 2022-05-17 PROCEDURE — 3078F DIAST BP <80 MM HG: CPT | Mod: CPTII,S$GLB,, | Performed by: INTERNAL MEDICINE

## 2022-05-17 PROCEDURE — 3288F FALL RISK ASSESSMENT DOCD: CPT | Mod: CPTII,S$GLB,, | Performed by: INTERNAL MEDICINE

## 2022-05-17 PROCEDURE — 1159F PR MEDICATION LIST DOCUMENTED IN MEDICAL RECORD: ICD-10-PCS | Mod: CPTII,S$GLB,, | Performed by: INTERNAL MEDICINE

## 2022-05-17 PROCEDURE — 3078F PR MOST RECENT DIASTOLIC BLOOD PRESSURE < 80 MM HG: ICD-10-PCS | Mod: CPTII,S$GLB,, | Performed by: INTERNAL MEDICINE

## 2022-05-17 PROCEDURE — 3074F PR MOST RECENT SYSTOLIC BLOOD PRESSURE < 130 MM HG: ICD-10-PCS | Mod: CPTII,S$GLB,, | Performed by: INTERNAL MEDICINE

## 2022-05-17 PROCEDURE — 99999 PR PBB SHADOW E&M-EST. PATIENT-LVL V: ICD-10-PCS | Mod: PBBFAC,,, | Performed by: INTERNAL MEDICINE

## 2022-05-17 NOTE — PROGRESS NOTES
Pulmonary Outpatient Follow Up Visit     Subjective:    This patient is new to me.  Previous records with colleagues including office visits, testing were personally reviewed.  Outside records under care everywhere if available that includes office visits and testing were reviewed personally as well.     Patient ID: Anca Mcclellan is a 77 y.o. female.    Chief Complaint: Sleep Apnea      HPI    77-year-old female patient presenting for discussion of sleep study results.    Complains of excessive daytime sleepiness [place] Sleepiness Scale score 8, daytime fatigue and nonrestorative sleep.    She is retired .    Does have history of arrhythmia status post pacemaker.        Review of Systems   Constitutional: Positive for fatigue and weakness. Negative for fever and chills.   HENT: Negative for nosebleeds.    Eyes: Negative for redness.   Respiratory: Positive for apnea, snoring and somnolence. Negative for choking.    Cardiovascular: Negative for chest pain.   Genitourinary: Negative for hematuria.   Endocrine: Negative for cold intolerance.    Musculoskeletal: Positive for arthralgias, back pain and gait problem.   Gastrointestinal: Negative for vomiting.   Neurological: Negative for syncope.   Hematological: Negative for adenopathy.   Psychiatric/Behavioral: Positive for sleep disturbance. Negative for confusion.       Outpatient Encounter Medications as of 5/17/2022   Medication Sig Dispense Refill    aspirin (ECOTRIN) 81 MG EC tablet Take 81 mg by mouth once daily.      atorvastatin (LIPITOR) 40 MG tablet Take 40 mg by mouth once daily.      BIOTIN ORAL Take 4,000 mcg by mouth once daily.       cholecalciferol, vitamin D3, (VITAMIN D3) 50 mcg (2,000 unit) Tab 1 capsule      cyanocobalamin (VITAMIN B-12) 100 MCG tablet Take 100 mcg by mouth once daily.      ferrous sulfate (FEOSOL) 325 mg (65 mg iron) Tab tablet 1 tablet      fluocinolone and shower  "cap 0.01 % Oil       fluticasone propionate (FLONASE) 50 mcg/actuation nasal spray SPRAY 2 SPRAYS INTO EACH NOSTRIL EVERY DAY      ketoconazole (NIZORAL) 2 % shampoo       metFORMIN (GLUCOPHAGE-XR) 500 MG ER 24hr tablet Take 500 mg by mouth once daily.      midodrine (PROAMATINE) 2.5 MG Tab Take 1 tablet (2.5 mg total) by mouth every 12 (twelve) hours. 60 tablet 11    multivitamin (THERAGRAN) per tablet Take 1 tablet by mouth once daily.      neomycin-polymyxin-hydrocortisone (CORTISPORIN) otic solution 4 drops into affected ear      triamcinolone acetonide 0.1% (KENALOG) 0.1 % ointment APPLY TO RASH TOPICALLY TWICE DAILY  0    vitamin E 100 UNIT capsule Take 100 Units by mouth once daily.      vitamin E 100 UNIT capsule Take 100 Units by mouth once daily.       No facility-administered encounter medications on file as of 5/17/2022.       Objective:     Vital Signs (Most Recent)  Vital Signs  Pulse: 93  Resp: 17  SpO2: 97 %  BP: 112/70  Height and Weight  Height: 5' 8" (172.7 cm)  Weight: 85 kg (187 lb 6.3 oz)  BSA (Calculated - sq m): 2.02 sq meters  BMI (Calculated): 28.5  Weight in (lb) to have BMI = 25: 164.1]  Wt Readings from Last 2 Encounters:   05/17/22 85 kg (187 lb 6.3 oz)   05/06/22 82.9 kg (182 lb 12.2 oz)       Physical Exam   Constitutional: She is oriented to person, place, and time. She appears well-developed and well-nourished. No distress.   HENT:   Head: Normocephalic.   Cardiovascular: Normal rate and regular rhythm.   Pulmonary/Chest: Normal expansion and effort normal. No stridor. No respiratory distress. She exhibits no tenderness.   Abdominal: She exhibits no distension.   Musculoskeletal:         General: No tenderness.      Cervical back: Neck supple.   Lymphadenopathy:     She has no cervical adenopathy.   Neurological: She is alert and oriented to person, place, and time. Gait normal.   Skin: Skin is warm. No cyanosis. Nails show no clubbing.   Psychiatric: She has a normal mood " and affect. Her behavior is normal. Judgment and thought content normal.   Nursing note and vitals reviewed.      Laboratory  Lab Results   Component Value Date    WBC 3.68 (L) 09/14/2021    RBC 4.75 09/14/2021    HGB 14.2 09/14/2021    HCT 43.3 09/14/2021    MCV 91 09/14/2021    MCH 29.9 09/14/2021    MCHC 32.8 09/14/2021    RDW 14.5 09/14/2021     09/14/2021    MPV 10.1 09/14/2021    GRAN 1.4 (L) 09/14/2021    GRAN 38.8 09/14/2021    LYMPH 1.6 09/14/2021    LYMPH 43.8 09/14/2021    MONO 0.4 09/14/2021    MONO 10.9 09/14/2021    EOS 0.2 09/14/2021    BASO 0.03 09/14/2021    EOSINOPHIL 5.4 09/14/2021    BASOPHIL 0.8 09/14/2021       BMP  Lab Results   Component Value Date     04/29/2022    K 3.7 04/29/2022     04/29/2022    CO2 26 04/29/2022    BUN 10 04/29/2022    CREATININE 0.7 04/29/2022    CALCIUM 9.2 04/29/2022    ANIONGAP 9 04/29/2022    ESTGFRAFRICA >60 04/29/2022    EGFRNONAA >60 04/29/2022    AST 19 04/29/2022    ALT 18 04/29/2022    PROT 8.0 04/29/2022       Lab Results   Component Value Date    BNP 17 04/29/2022    BNP 15 06/16/2021    BNP 26 12/01/2020       Lab Results   Component Value Date    TSH 0.652 12/01/2020       Lab Results   Component Value Date    SEDRATE 27 06/16/2021       Lab Results   Component Value Date    CRP 2.4 06/16/2021     No results found for: IGE     No results found for: ASPERGILLUS  No results found for: AFUMIGATUSCL     No results found for: ACE     Diagnostic Results:  I have personally reviewed today the following studies:      Assessment/Plan:   Non-restorative sleep    Mild obstructive sleep apnea  -     Ambulatory referral/consult to Sleep Disorders    Snoring  -     Ambulatory referral/consult to Sleep Disorders    Nocturnal hypoxemia        Discussed with patient her diagnosis of mild obstructive sleep apnea.    Discussed the nature of the condition and treatment with CPAP as a 1st line option.    Awaiting CPAP ordered to be processed.    I did  discuss her order status with DME personnel.    Of provided with CPAP habituation instructions.    Further recommendation to follow above workup.      Follow up in about 3 months (around 8/29/2022).    This note was prepared using voice recognition system and is likely to have sound alike errors that may have been overlooked even after proof reading.  Please call me with any questions    Discussed diagnosis, its evaluation, treatment and usual course. All questions answered.      Sofía Gamez MD

## 2022-05-17 NOTE — PATIENT INSTRUCTIONS
No eating / drinking for 3 hours before going to bed.  Elevate head of bed 30 - 45 %     CPAP HABITUATION PROCEDURE     Joel Joya, Ph.D., [facility] and Jorge Simmons M.D.  [facility], [facility]     Some people have difficulty adjusting to CPAP/BiPAP/AutoCPAP.  This is not unusual or hard to understand: Breathing with CPAP is different from ordinary breathing, and this difference is aversive to some. The problem can be overcome, however, and the benefits CPAP confers are certainly worth the effort.  Below, you will find a simple and gradual way to get used to CPAP before you try to use it all night, every night.  The essence of this procedure is to relax and let breathing with CPAP become a habit.  It may take about 2 weeks, and involves the following:      CPAP while awake and comfortably seated, during the late evening.     CPAP in bed while attempting sleep at night.     If your discomfort is too great at any time, discontinue and attempt again later the same night, for the same amount of time.   You and your physician may alter the times and pressures if necessary.     If you find that it is very easy to get used to CPAP, you may start using it every night when you are comfortable enough to do so.  IMPORTANT REMINDER: If you have a cold or sinus congestion it is okay to miss a night or two of CPAP. Consider using antihistamines or decongestants to clear up your sinus congestion prior to sleeping.     DAYS  1-3   Start CPAP while awake and comfortably seated during the late evening, after having prepared for bed.  You may do this while watching television, listening to music or reading. Use for 1 hour, then take off CPAP and go directly to bed to sleep     DAYS  4-6     Start CPAP when you go to bed and use for 1 hour, or until you fall asleep.  If your discomfort is too great at any time, discontinue and attempt again later the same night, for the same designated  amount of time (1 hour).      DAYS  7-9     Increase time with CPAP to 2 hours a night.  If your discomfort is too great at any time, discontinue and attempt again later the same night, for the same designated amount of time (2 hours).      DAYS 10-12    Increase time with CPAP to 3 hours a night. If your discomfort is too great at any time, discontinue and attempt again later the same night, for the same designated amount of time (3 hours).      DAYS 13-15     Sleep the entire night with CPAP.      OPTIONAL: You may use Progressive Muscle Relaxation (PMR) to help put you at ease when using CPAP; do PMR twice each day, once in the morning or afternoon, and once in the evening just before using CPAP. You may do PMR prior to any attempt until you are comfortable with CPAP.        Continuous Positive Air Pressure (CPAP)  Continuous positive air pressure (CPAP) uses gentle air pressure to hold the airway open. CPAP is often the most effective treatment for sleep apnea and severe snoring. It works very well for many people. But keep in mind that it can take several adjustments before the setup is right for you.       How CPAP Works  CPAP is a small portable pump beside the bed. The pump sends air through a hose, which is held over your nose and/or mouth by a mask. Mild air pressure is gently pushed through your airway. The air pressure nudges sagging tissues aside. This widens the airway so you can breathe better. CPAP may be combined with other kinds of therapy for sleep apnea.       Types of Air Pressure Treatments  There are different types of CPAP. Your doctor or CPAP technician will help you decide which type is best for you:  Basic CPAP keeps the pressure constant all night long.  A bilevel device (BiPAP) provides more pressure when you breathe in and less when you breathe out. A BiPAP machine also may be set to provide automatic breaths to maintain breathing if you stop breathing while sleeping.  An autoCPAP  device automatically adjusts pressure throughout the night and in response to changes such as body position, sleep stage, and snoring.  © 0091-9425 [employer]. [address] [place] [address]. All rights reserved. This information is not intended as a substitute for professional medical care. Always follow your healthcare professional's instructions.        Snoring and Sleep Apnea: Notes for a Partner  Snoring and sleep apnea affect your life, as well as your partners. You can help in the treatment of the problem. Be supportive. Encourage your partner both to get treatment and to make the adjustments needed to follow through.       Adjusting to Changes  Your partners treatment may involve making changes to certain life habits. You can help your partner make and stick with these changes. For example:  Support and even join your partners exercise program.  Be supportive if your partner gets CPAP (continuous positive airway pressure). He or she may feel self-conscious at first. Remind your partner to expect adjustments to CPAP before it feels just right.  Consider joining a snoring and sleep apnea support group.  Go Along to See the Health Care Provider  You can give the health care provider the best account of your partners nighttime breathing and snoring patterns. Try to go along to health care providers appointments. If you cant go, write notes for your partner to give to the health care provider. Describe your partners snoring and sleep breathing patterns in detail.  Tips for Sleeping with a Snorer  Until treatment takes care of your partners snoring:  Try to go to bed first. It may help if youre already asleep when your partner starts to snore.  Wear earplugs to bed. A fan or other source of background noise may also help drown out snoring.   © 2797-1331 [employer]. [address] [place] [address]. All rights reserved. This information is not intended as a  substitute for professional medical care. Always follow your healthcare professional's instructions.        Continuous Positive Airway Pressure (CPAP)  Your health care provider has prescribed continuous positive airway pressure (CPAP) therapy for you. A CPAP device helps you breathe better at night. The device delivers air through your nose or mouth when you breathe in to keep your air passages open. CPAP is:  Used most often to treat sleep apnea and some other problems (Sleep apnea is a chronic condition with periods of sleep in which you briefly stop breathing.)  Safe and very effective, but it takes time to get used to the mask.   Your health care provider, nurse, or medical supplier will give you tips for wearing and caring for your CPAP device.  General guidelines  It's very important not to give up! It takes time to get used to wearing the mask at night.  Practice using your CPAP device during the day, especially whenever you take a nap.  Remember, there are several different types of masks. If you cant get used to your mask, ask your provider or medical supply company about trying another style.  If you have nasal stuffiness or dryness when using your CPAP device, talk with your provider or medical supply company. There are ways to lessen these problems. For example, your provider may recommend moistening nasal spray or the medical supply company may recommend a device with a humidifier.  The goal is to use your CPAP all night, every night, during all naps, and even when you travel.  Keep your mask clean. Wash it with soap and water. Be sure to rinse the mask and tubing well with water to remove any soap. Let them air-dry thoroughly before using.  Make yourself comfortable when sleeping with CPAP. Try using extra pillows.  Work with your medical supply company so that you know how to correctly use your CPAP. Their representative will be able to help you:  Use the CPAP correctly  Troubleshoot any problems  that come up  Learn to clean and maintain the device  Adjust to regular use of the CPAP  © 3193-8681 [employer]. [address] [place] [address], [place] [address]. All rights reserved. This information is not intended as a substitute for professional medical care. Always follow your healthcare professional's instructions.

## 2022-06-03 ENCOUNTER — CLINICAL SUPPORT (OUTPATIENT)
Dept: CARDIOLOGY | Facility: HOSPITAL | Age: 77
End: 2022-06-03
Payer: MEDICARE

## 2022-06-03 DIAGNOSIS — Z95.0 PRESENCE OF CARDIAC PACEMAKER: ICD-10-CM

## 2022-06-03 PROCEDURE — 93294 REM INTERROG EVL PM/LDLS PM: CPT | Mod: S$GLB,,, | Performed by: INTERNAL MEDICINE

## 2022-06-03 PROCEDURE — 93294 CARDIAC DEVICE CHECK - REMOTE: ICD-10-PCS | Mod: S$GLB,,, | Performed by: INTERNAL MEDICINE

## 2022-06-03 PROCEDURE — 93296 REM INTERROG EVL PM/IDS: CPT | Performed by: INTERNAL MEDICINE

## 2022-07-01 ENCOUNTER — TELEPHONE (OUTPATIENT)
Dept: PULMONOLOGY | Facility: CLINIC | Age: 77
End: 2022-07-01
Payer: MEDICARE

## 2022-07-01 ENCOUNTER — IMMUNIZATION (OUTPATIENT)
Dept: PRIMARY CARE CLINIC | Facility: CLINIC | Age: 77
End: 2022-07-01
Payer: MEDICARE

## 2022-07-01 DIAGNOSIS — Z23 NEED FOR VACCINATION: Primary | ICD-10-CM

## 2022-07-01 PROCEDURE — 91305 COVID-19, MRNA, LNP-S, PF, 30 MCG/0.3 ML DOSE VACCINE (PFIZER): CPT | Mod: PBBFAC | Performed by: FAMILY MEDICINE

## 2022-07-01 NOTE — TELEPHONE ENCOUNTER
----- Message from Marina Taylor sent at 6/30/2022  9:34 AM CDT -----  Contact: Self [phone]  Would like to receive medical advice.    Would they like a call back or a response via [facility]:  call back    Additional information: Calling to speak with the nurse regarding pt part has came off sleep machine. Pt is would like to bring machine in today of possible.

## 2022-07-11 DIAGNOSIS — I49.9 IRREGULAR HEART BEAT: Primary | ICD-10-CM

## 2022-07-11 DIAGNOSIS — I44.4 LAFB (LEFT ANTERIOR FASCICULAR BLOCK): ICD-10-CM

## 2022-07-11 DIAGNOSIS — R94.31 ABNORMAL ECG: ICD-10-CM

## 2022-07-12 ENCOUNTER — HOSPITAL ENCOUNTER (OUTPATIENT)
Dept: CARDIOLOGY | Facility: HOSPITAL | Age: 77
Discharge: HOME | End: 2022-07-12
Attending: INTERNAL MEDICINE
Payer: MEDICARE

## 2022-07-12 ENCOUNTER — OFFICE VISIT (OUTPATIENT)
Dept: CARDIOLOGY | Facility: CLINIC | Age: 77
End: 2022-07-12
Payer: MEDICARE

## 2022-07-12 VITALS
SYSTOLIC BLOOD PRESSURE: 122 MMHG | WEIGHT: 185.19 LBS | BODY MASS INDEX: 28.07 KG/M2 | HEIGHT: 68 IN | RESPIRATION RATE: 18 BRPM | DIASTOLIC BLOOD PRESSURE: 75 MMHG | HEART RATE: 91 BPM

## 2022-07-12 DIAGNOSIS — Z95.0 PRESENCE OF CARDIAC PACEMAKER: ICD-10-CM

## 2022-07-12 DIAGNOSIS — R55 POSTURAL DIZZINESS WITH PRESYNCOPE: ICD-10-CM

## 2022-07-12 DIAGNOSIS — Z95.0 CARDIAC PACEMAKER IN SITU: ICD-10-CM

## 2022-07-12 DIAGNOSIS — R94.31 ABNORMAL ECG: ICD-10-CM

## 2022-07-12 DIAGNOSIS — I44.4 LAFB (LEFT ANTERIOR FASCICULAR BLOCK): ICD-10-CM

## 2022-07-12 DIAGNOSIS — R55 SYNCOPE AND COLLAPSE: ICD-10-CM

## 2022-07-12 DIAGNOSIS — I49.9 IRREGULAR HEART BEAT: ICD-10-CM

## 2022-07-12 DIAGNOSIS — E78.49 OTHER HYPERLIPIDEMIA: ICD-10-CM

## 2022-07-12 DIAGNOSIS — R42 POSTURAL DIZZINESS WITH PRESYNCOPE: ICD-10-CM

## 2022-07-12 DIAGNOSIS — I70.0 ATHEROSCLEROSIS OF AORTA: ICD-10-CM

## 2022-07-12 DIAGNOSIS — G47.33 OSA (OBSTRUCTIVE SLEEP APNEA): ICD-10-CM

## 2022-07-12 DIAGNOSIS — E11.69 TYPE 2 DIABETES MELLITUS WITH OTHER SPECIFIED COMPLICATION, UNSPECIFIED WHETHER LONG TERM INSULIN USE: ICD-10-CM

## 2022-07-12 DIAGNOSIS — I49.9 IRREGULAR HEART BEAT: Primary | ICD-10-CM

## 2022-07-12 PROCEDURE — 3288F PR FALLS RISK ASSESSMENT DOCUMENTED: ICD-10-PCS | Mod: CPTII,S$GLB,, | Performed by: INTERNAL MEDICINE

## 2022-07-12 PROCEDURE — 99214 PR OFFICE/OUTPT VISIT, EST, LEVL IV, 30-39 MIN: ICD-10-PCS | Mod: S$GLB,,, | Performed by: INTERNAL MEDICINE

## 2022-07-12 PROCEDURE — 3078F PR MOST RECENT DIASTOLIC BLOOD PRESSURE < 80 MM HG: ICD-10-PCS | Mod: CPTII,S$GLB,, | Performed by: INTERNAL MEDICINE

## 2022-07-12 PROCEDURE — 99214 OFFICE O/P EST MOD 30 MIN: CPT | Mod: S$GLB,,, | Performed by: INTERNAL MEDICINE

## 2022-07-12 PROCEDURE — 99999 PR PBB SHADOW E&M-EST. PATIENT-LVL IV: CPT | Mod: PBBFAC,,, | Performed by: INTERNAL MEDICINE

## 2022-07-12 PROCEDURE — 1101F PR PT FALLS ASSESS DOC 0-1 FALLS W/OUT INJ PAST YR: ICD-10-PCS | Mod: CPTII,S$GLB,, | Performed by: INTERNAL MEDICINE

## 2022-07-12 PROCEDURE — 1160F PR REVIEW ALL MEDS BY PRESCRIBER/CLIN PHARMACIST DOCUMENTED: ICD-10-PCS | Mod: CPTII,S$GLB,, | Performed by: INTERNAL MEDICINE

## 2022-07-12 PROCEDURE — 93010 ELECTROCARDIOGRAM REPORT: CPT | Mod: ,,, | Performed by: INTERNAL MEDICINE

## 2022-07-12 PROCEDURE — 3074F SYST BP LT 130 MM HG: CPT | Mod: CPTII,S$GLB,, | Performed by: INTERNAL MEDICINE

## 2022-07-12 PROCEDURE — 99999 PR PBB SHADOW E&M-EST. PATIENT-LVL IV: ICD-10-PCS | Mod: PBBFAC,,, | Performed by: INTERNAL MEDICINE

## 2022-07-12 PROCEDURE — 1159F MED LIST DOCD IN RCRD: CPT | Mod: CPTII,S$GLB,, | Performed by: INTERNAL MEDICINE

## 2022-07-12 PROCEDURE — 93005 ELECTROCARDIOGRAM TRACING: CPT

## 2022-07-12 PROCEDURE — 3078F DIAST BP <80 MM HG: CPT | Mod: CPTII,S$GLB,, | Performed by: INTERNAL MEDICINE

## 2022-07-12 PROCEDURE — 93010 EKG 12-LEAD: ICD-10-PCS | Mod: ,,, | Performed by: INTERNAL MEDICINE

## 2022-07-12 PROCEDURE — 1159F PR MEDICATION LIST DOCUMENTED IN MEDICAL RECORD: ICD-10-PCS | Mod: CPTII,S$GLB,, | Performed by: INTERNAL MEDICINE

## 2022-07-12 PROCEDURE — 1160F RVW MEDS BY RX/DR IN RCRD: CPT | Mod: CPTII,S$GLB,, | Performed by: INTERNAL MEDICINE

## 2022-07-12 PROCEDURE — 3288F FALL RISK ASSESSMENT DOCD: CPT | Mod: CPTII,S$GLB,, | Performed by: INTERNAL MEDICINE

## 2022-07-12 PROCEDURE — 1101F PT FALLS ASSESS-DOCD LE1/YR: CPT | Mod: CPTII,S$GLB,, | Performed by: INTERNAL MEDICINE

## 2022-07-12 PROCEDURE — 3074F PR MOST RECENT SYSTOLIC BLOOD PRESSURE < 130 MM HG: ICD-10-PCS | Mod: CPTII,S$GLB,, | Performed by: INTERNAL MEDICINE
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
## 2022-07-28 ENCOUNTER — PATIENT MESSAGE (OUTPATIENT)
Dept: PULMONOLOGY | Facility: CLINIC | Age: 77
End: 2022-07-28
Payer: MEDICARE

## 2022-08-05 ENCOUNTER — OFFICE VISIT (OUTPATIENT)
Dept: PULMONOLOGY | Facility: CLINIC | Age: 77
End: 2022-08-05
Payer: MEDICARE

## 2022-08-05 VITALS
WEIGHT: 178.81 LBS | HEART RATE: 83 BPM | SYSTOLIC BLOOD PRESSURE: 124 MMHG | BODY MASS INDEX: 27.1 KG/M2 | RESPIRATION RATE: 18 BRPM | HEIGHT: 68 IN | OXYGEN SATURATION: 94 % | DIASTOLIC BLOOD PRESSURE: 72 MMHG

## 2022-08-05 DIAGNOSIS — G47.33 OSA ON CPAP: ICD-10-CM

## 2022-08-05 DIAGNOSIS — R06.09 DOE (DYSPNEA ON EXERTION): Primary | ICD-10-CM

## 2022-08-05 DIAGNOSIS — Z71.89 CPAP USE COUNSELING: ICD-10-CM

## 2022-08-05 DIAGNOSIS — R13.10 DYSPHAGIA, UNSPECIFIED TYPE: ICD-10-CM

## 2022-08-05 PROCEDURE — 99999 PR PBB SHADOW E&M-EST. PATIENT-LVL V: ICD-10-PCS | Mod: PBBFAC,,, | Performed by: INTERNAL MEDICINE

## 2022-08-05 PROCEDURE — 3078F DIAST BP <80 MM HG: CPT | Mod: CPTII,S$GLB,, | Performed by: INTERNAL MEDICINE

## 2022-08-05 PROCEDURE — 1159F PR MEDICATION LIST DOCUMENTED IN MEDICAL RECORD: ICD-10-PCS | Mod: CPTII,S$GLB,, | Performed by: INTERNAL MEDICINE

## 2022-08-05 PROCEDURE — 3074F SYST BP LT 130 MM HG: CPT | Mod: CPTII,S$GLB,, | Performed by: INTERNAL MEDICINE

## 2022-08-05 PROCEDURE — 3074F PR MOST RECENT SYSTOLIC BLOOD PRESSURE < 130 MM HG: ICD-10-PCS | Mod: CPTII,S$GLB,, | Performed by: INTERNAL MEDICINE

## 2022-08-05 PROCEDURE — 3288F FALL RISK ASSESSMENT DOCD: CPT | Mod: CPTII,S$GLB,, | Performed by: INTERNAL MEDICINE

## 2022-08-05 PROCEDURE — 1101F PT FALLS ASSESS-DOCD LE1/YR: CPT | Mod: CPTII,S$GLB,, | Performed by: INTERNAL MEDICINE

## 2022-08-05 PROCEDURE — 3078F PR MOST RECENT DIASTOLIC BLOOD PRESSURE < 80 MM HG: ICD-10-PCS | Mod: CPTII,S$GLB,, | Performed by: INTERNAL MEDICINE

## 2022-08-05 PROCEDURE — 99214 PR OFFICE/OUTPT VISIT, EST, LEVL IV, 30-39 MIN: ICD-10-PCS | Mod: S$GLB,,, | Performed by: INTERNAL MEDICINE

## 2022-08-05 PROCEDURE — 1101F PR PT FALLS ASSESS DOC 0-1 FALLS W/OUT INJ PAST YR: ICD-10-PCS | Mod: CPTII,S$GLB,, | Performed by: INTERNAL MEDICINE

## 2022-08-05 PROCEDURE — 3288F PR FALLS RISK ASSESSMENT DOCUMENTED: ICD-10-PCS | Mod: CPTII,S$GLB,, | Performed by: INTERNAL MEDICINE

## 2022-08-05 PROCEDURE — 1159F MED LIST DOCD IN RCRD: CPT | Mod: CPTII,S$GLB,, | Performed by: INTERNAL MEDICINE

## 2022-08-05 PROCEDURE — 99214 OFFICE O/P EST MOD 30 MIN: CPT | Mod: S$GLB,,, | Performed by: INTERNAL MEDICINE

## 2022-08-05 PROCEDURE — 99999 PR PBB SHADOW E&M-EST. PATIENT-LVL V: CPT | Mod: PBBFAC,,, | Performed by: INTERNAL MEDICINE

## 2022-08-05 RX ORDER — HYDROCODONE BITARTRATE AND ACETAMINOPHEN 5; 325 MG/1; MG/1
1 TABLET ORAL EVERY 6 HOURS PRN
COMMUNITY
Start: 2022-07-12 | End: 2022-11-09

## 2022-08-05 RX ORDER — PRAVASTATIN SODIUM 40 MG/1
40 TABLET ORAL
COMMUNITY
End: 2022-11-09

## 2022-08-05 RX ORDER — MELOXICAM 15 MG/1
15 TABLET ORAL
COMMUNITY
End: 2022-11-09

## 2022-08-05 NOTE — PATIENT INSTRUCTIONS
No eating / drinking for 3 hours before going to bed.  Elevate head of bed 30 - 45 %     CPAP HABITUATION PROCEDURE     Joel Joya, Ph.D., [facility] and Jorge Simmons M.D.  [facility], [facility] [facility]     Some people have difficulty adjusting to CPAP/BiPAP/AutoCPAP.  This is not unusual or hard to understand: Breathing with CPAP is different from ordinary breathing, and this difference is aversive to some. The problem can be overcome, however, and the benefits CPAP confers are certainly worth the effort.  Below, you will find a simple and gradual way to get used to CPAP before you try to use it all night, every night.  The essence of this procedure is to relax and let breathing with CPAP become a habit.  It may take about 2 weeks, and involves the following:      CPAP while awake and comfortably seated, during the late evening.     CPAP in bed while attempting sleep at night.     If your discomfort is too great at any time, discontinue and attempt again later the same night, for the same amount of time.   You and your physician may alter the times and pressures if necessary.     If you find that it is very easy to get used to CPAP, you may start using it every night when you are comfortable enough to do so.  IMPORTANT REMINDER: If you have a cold or sinus congestion it is okay to miss a night or two of CPAP. Consider using antihistamines or decongestants to clear up your sinus congestion prior to sleeping.     DAYS  1-3   Start CPAP while awake and comfortably seated during the late evening, after having prepared for bed.  You may do this while watching television, listening to music or reading. Use for 1 hour, then take off CPAP and go directly to bed to sleep     DAYS  4-6     Start CPAP when you go to bed and use for 1 hour, or until you fall asleep.  If your discomfort is too great at any time, discontinue and attempt again later the same night, for the same designated  amount of time (1 hour).      DAYS  7-9     Increase time with CPAP to 2 hours a night.  If your discomfort is too great at any time, discontinue and attempt again later the same night, for the same designated amount of time (2 hours).      DAYS 10-12    Increase time with CPAP to 3 hours a night. If your discomfort is too great at any time, discontinue and attempt again later the same night, for the same designated amount of time (3 hours).      DAYS 13-15     Sleep the entire night with CPAP.      OPTIONAL: You may use Progressive Muscle Relaxation (PMR) to help put you at ease when using CPAP; do PMR twice each day, once in the morning or afternoon, and once in the evening just before using CPAP. You may do PMR prior to any attempt until you are comfortable with CPAP.        Continuous Positive Air Pressure (CPAP)  Continuous positive air pressure (CPAP) uses gentle air pressure to hold the airway open. CPAP is often the most effective treatment for sleep apnea and severe snoring. It works very well for many people. But keep in mind that it can take several adjustments before the setup is right for you.       How CPAP Works  CPAP is a small portable pump beside the bed. The pump sends air through a hose, which is held over your nose and/or mouth by a mask. Mild air pressure is gently pushed through your airway. The air pressure nudges sagging tissues aside. This widens the airway so you can breathe better. CPAP may be combined with other kinds of therapy for sleep apnea.       Types of Air Pressure Treatments  There are different types of CPAP. Your doctor or CPAP technician will help you decide which type is best for you:  Basic CPAP keeps the pressure constant all night long.  A bilevel device (BiPAP) provides more pressure when you breathe in and less when you breathe out. A BiPAP machine also may be set to provide automatic breaths to maintain breathing if you stop breathing while sleeping.  An autoCPAP  device automatically adjusts pressure throughout the night and in response to changes such as body position, sleep stage, and snoring.  © 9156-7126 [employer]. [address] [place] [address]. All rights reserved. This information is not intended as a substitute for professional medical care. Always follow your healthcare professional's instructions.        Snoring and Sleep Apnea: Notes for a Partner  Snoring and sleep apnea affect your life, as well as your partners. You can help in the treatment of the problem. Be supportive. Encourage your partner both to get treatment and to make the adjustments needed to follow through.       Adjusting to Changes  Your partners treatment may involve making changes to certain life habits. You can help your partner make and stick with these changes. For example:  Support and even join your partners exercise program.  Be supportive if your partner gets CPAP (continuous positive airway pressure). He or she may feel self-conscious at first. Remind your partner to expect adjustments to CPAP before it feels just right.  Consider joining a snoring and sleep apnea support group.  Go Along to See the Health Care Provider  You can give the health care provider the best account of your partners nighttime breathing and snoring patterns. Try to go along to health care providers appointments. If you cant go, write notes for your partner to give to the health care provider. Describe your partners snoring and sleep breathing patterns in detail.  Tips for Sleeping with a Snorer  Until treatment takes care of your partners snoring:  Try to go to bed first. It may help if youre already asleep when your partner starts to snore.  Wear earplugs to bed. A fan or other source of background noise may also help drown out snoring.   © 4359-5868 [employer]. [address] [place] [address]. All rights reserved. This information is not intended as a  substitute for professional medical care. Always follow your healthcare professional's instructions.        Continuous Positive Airway Pressure (CPAP)  Your health care provider has prescribed continuous positive airway pressure (CPAP) therapy for you. A CPAP device helps you breathe better at night. The device delivers air through your nose or mouth when you breathe in to keep your air passages open. CPAP is:  Used most often to treat sleep apnea and some other problems (Sleep apnea is a chronic condition with periods of sleep in which you briefly stop breathing.)  Safe and very effective, but it takes time to get used to the mask.   Your health care provider, nurse, or medical supplier will give you tips for wearing and caring for your CPAP device.  General guidelines  It's very important not to give up! It takes time to get used to wearing the mask at night.  Practice using your CPAP device during the day, especially whenever you take a nap.  Remember, there are several different types of masks. If you cant get used to your mask, ask your provider or medical supply company about trying another style.  If you have nasal stuffiness or dryness when using your CPAP device, talk with your provider or medical supply company. There are ways to lessen these problems. For example, your provider may recommend moistening nasal spray or the medical supply company may recommend a device with a humidifier.  The goal is to use your CPAP all night, every night, during all naps, and even when you travel.  Keep your mask clean. Wash it with soap and water. Be sure to rinse the mask and tubing well with water to remove any soap. Let them air-dry thoroughly before using.  Make yourself comfortable when sleeping with CPAP. Try using extra pillows.  Work with your medical supply company so that you know how to correctly use your CPAP. Their representative will be able to help you:  Use the CPAP correctly  Troubleshoot any problems  that come up  Learn to clean and maintain the device  Adjust to regular use of the CPAP  © 3782-6160 [employer]. [address] [place] [address], [place] [address]. All rights reserved. This information is not intended as a substitute for professional medical care. Always follow your healthcare professional's instructions.

## 2022-08-05 NOTE — PROGRESS NOTES
Pulmonary Outpatient Follow Up Visit     Subjective:        Patient ID: Anca Mcclellan is a 77 y.o. female.    Chief Complaint: Sleep Apnea and Shortness of Breath      HPI      77-year-old female patient presenting for 3 months follow up .       Mild obstructive sleep apnea AHI 13 events per hour.  Suboptimal compliance with CPAP a days of usage last month that than 4 hours per day.        Continues to try to adapt to the machine.        She is retired .    Does have history of arrhythmia status post pacemaker.      Does complain of dizziness on changing position from bending to upright, on midodrine.      Does complain of few months history of dysphagia with Solid food.        Review of Systems   Constitutional: Positive for fatigue and weakness. Negative for fever and chills.   HENT: Negative for nosebleeds.    Eyes: Negative for redness.   Respiratory: Positive for apnea, snoring, dyspnea on extertion and somnolence. Negative for choking.    Cardiovascular: Negative for chest pain.   Genitourinary: Negative for hematuria.   Endocrine: Negative for cold intolerance.    Musculoskeletal: Positive for arthralgias, back pain and gait problem.   Gastrointestinal: Negative for vomiting.        Dysphagia   Neurological: Positive for light-headedness. Negative for syncope.   Hematological: Negative for adenopathy.   Psychiatric/Behavioral: Positive for sleep disturbance. Negative for confusion.       Outpatient Encounter Medications as of 8/5/2022   Medication Sig Dispense Refill    aspirin (ECOTRIN) 81 MG EC tablet Take 81 mg by mouth once daily.      atorvastatin (LIPITOR) 40 MG tablet Take 40 mg by mouth once daily.      BIOTIN ORAL Take 4,000 mcg by mouth once daily.       cholecalciferol, vitamin D3, (VITAMIN D3) 50 mcg (2,000 unit) Tab 1 capsule      cyanocobalamin (VITAMIN B-12) 100 MCG tablet Take 100 mcg by mouth once daily.      ferrous sulfate  "(FEOSOL) 325 mg (65 mg iron) Tab tablet 1 tablet      HYDROcodone-acetaminophen (NORCO) 5-325 mg per tablet Take 1 tablet by mouth every 6 (six) hours as needed.      meloxicam (MOBIC) 15 MG tablet Take 15 mg by mouth.      metFORMIN (GLUCOPHAGE-XR) 500 MG ER 24hr tablet Take 500 mg by mouth once daily.      midodrine (PROAMATINE) 2.5 MG Tab Take 1 tablet (2.5 mg total) by mouth every 12 (twelve) hours. 60 tablet 11    multivitamin (THERAGRAN) per tablet Take 1 tablet by mouth once daily.      neomycin-polymyxin-hydrocortisone (CORTISPORIN) otic solution 4 drops into affected ear      pravastatin (PRAVACHOL) 40 MG tablet Take 40 mg by mouth.      triamcinolone acetonide 0.1% (KENALOG) 0.1 % ointment APPLY TO RASH TOPICALLY TWICE DAILY  0    vitamin E 100 UNIT capsule Take 100 Units by mouth once daily.      [DISCONTINUED] fluocinolone and shower cap 0.01 % Oil       [DISCONTINUED] fluticasone propionate (FLONASE) 50 mcg/actuation nasal spray SPRAY 2 SPRAYS INTO EACH NOSTRIL EVERY DAY      [DISCONTINUED] ketoconazole (NIZORAL) 2 % shampoo       [DISCONTINUED] vitamin E 100 UNIT capsule Take 100 Units by mouth once daily.       No facility-administered encounter medications on file as of 8/5/2022.       Objective:     Vital Signs (Most Recent)  Vital Signs  Pulse: 83  Resp: 18  SpO2: (!) 94 %  BP: 124/72  Height and Weight  Height: 5' 8" (172.7 cm)  Weight: 81.1 kg (178 lb 12.7 oz)  BSA (Calculated - sq m): 1.97 sq meters  BMI (Calculated): 27.2  Weight in (lb) to have BMI = 25: 164.1]  Wt Readings from Last 2 Encounters:   08/05/22 81.1 kg (178 lb 12.7 oz)   07/12/22 84 kg (185 lb 3 oz)       Physical Exam   Constitutional: She is oriented to person, place, and time. She appears well-developed and well-nourished. No distress.   HENT:   Head: Normocephalic.   Cardiovascular: Normal rate and regular rhythm.   Pulmonary/Chest: Normal expansion and effort normal. No stridor. No respiratory distress. She " exhibits no tenderness.   Abdominal: She exhibits no distension.   Musculoskeletal:         General: No tenderness.      Cervical back: Neck supple.   Lymphadenopathy:     She has no cervical adenopathy.   Neurological: She is alert and oriented to person, place, and time. Gait normal.   Skin: Skin is warm. No cyanosis. Nails show no clubbing.   Psychiatric: She has a normal mood and affect. Her behavior is normal. Judgment and thought content normal.   Nursing note and vitals reviewed.      Laboratory  Lab Results   Component Value Date    WBC 3.68 (L) 09/14/2021    RBC 4.75 09/14/2021    HGB 14.2 09/14/2021    HCT 43.3 09/14/2021    MCV 91 09/14/2021    MCH 29.9 09/14/2021    MCHC 32.8 09/14/2021    RDW 14.5 09/14/2021     09/14/2021    MPV 10.1 09/14/2021    GRAN 1.4 (L) 09/14/2021    GRAN 38.8 09/14/2021    LYMPH 1.6 09/14/2021    LYMPH 43.8 09/14/2021    MONO 0.4 09/14/2021    MONO 10.9 09/14/2021    EOS 0.2 09/14/2021    BASO 0.03 09/14/2021    EOSINOPHIL 5.4 09/14/2021    BASOPHIL 0.8 09/14/2021       BMP  Lab Results   Component Value Date     04/29/2022    K 3.7 04/29/2022     04/29/2022    CO2 26 04/29/2022    BUN 10 04/29/2022    CREATININE 0.7 04/29/2022    CALCIUM 9.2 04/29/2022    ANIONGAP 9 04/29/2022    ESTGFRAFRICA >60 04/29/2022    EGFRNONAA >60 04/29/2022    AST 19 04/29/2022    ALT 18 04/29/2022    PROT 8.0 04/29/2022       Lab Results   Component Value Date    BNP 17 04/29/2022    BNP 15 06/16/2021    BNP 26 12/01/2020       Lab Results   Component Value Date    TSH 0.652 12/01/2020       Lab Results   Component Value Date    SEDRATE 27 06/16/2021       Lab Results   Component Value Date    CRP 2.4 06/16/2021     No results found for: IGE     No results found for: ASPERGILLUS  No results found for: AFUMIGATUSCL     No results found for: ACE     Diagnostic Results:  I have personally reviewed today the following studies:      Assessment/Plan:   DE LA FUENTE (dyspnea on exertion)  -      Stress test, pulmonary; Future    Dysphagia, unspecified type  -     Ambulatory referral/consult to Gastroenterology; Future; Expected date: 08/12/2022    LUCRETIA on CPAP    CPAP use counseling       discussed with patient CPAP habituation instructions, alternative option for treatment of mild obstructive sleep apnea including surgical approaches and inspire device is.      Opted to continue effort to adapt to CPAP machine will follow up in about 3 months.      Never smoker no history of asthma.      We will a check 6 minute walking test complains of dizziness on changing position.      On midodrine.  Follow-up with cardiology.      Will refer to gastroenterology for evaluation of dysphagia to food for few months.       full face mask  Follow up in about 3 months (around 11/5/2022).    This note was prepared using voice recognition system and is likely to have sound alike errors that may have been overlooked even after proof reading.  Please call me with any questions    Discussed diagnosis, its evaluation, treatment and usual course. All questions answered.      Sofía Gamez MD

## 2022-08-09 ENCOUNTER — CLINICAL SUPPORT (OUTPATIENT)
Dept: PULMONOLOGY | Facility: CLINIC | Age: 77
End: 2022-08-09
Payer: MEDICARE

## 2022-08-09 VITALS — WEIGHT: 167.13 LBS | HEIGHT: 67 IN | BODY MASS INDEX: 26.23 KG/M2

## 2022-08-09 DIAGNOSIS — R06.09 DOE (DYSPNEA ON EXERTION): ICD-10-CM

## 2022-08-09 PROCEDURE — 94618 PULMONARY STRESS TESTING: CPT | Mod: S$GLB,,, | Performed by: INTERNAL MEDICINE

## 2022-08-09 PROCEDURE — 94618 PULMONARY STRESS TESTING: ICD-10-PCS | Mod: S$GLB,,, | Performed by: INTERNAL MEDICINE

## 2022-08-09 NOTE — PROCEDURES
"[facility]  Six Minute Walk   SUMMARY   Ordering Provider: Sofía Gamez MD   Interpreting Provider: Sofía Gamez MD  Performing nurse/tech/RT: RT SANDI  Diagnosis:  (DE LA FUENTE)  Height: 5' 7" (170.2 cm)  Weight: 75.8 kg (167 lb 1.7 oz)  BMI (Calculated): 26.2   Patient Race:     Phase Oxygen Assessment Supplemental O2 Heart   Rate Blood Pressure Cyrus Dyspnea Scale Rating   Resting 99 % Room Air 82 bpm 127/78 1   Exercise        Minute        1 99 % Room Air 95 bpm     2 98 % Room Air 101 bpm     3 99 % Room Air 104 bpm     4 99 % Room Air 106 bpm     5 99 % Room Air 114 bpm     6  99 % Room Air 116 bpm 135/67 2   Recovery        Minute        1 99 % Room Air 102 bpm     2 99 % Room Air 86 bpm     3 99 % Room Air 84 bpm     4 99 % Room Air 83 bpm 112/59 2     Six Minute Walk Summary  6MWT Status: completed without stopping  Patient Reported: Dyspnea, Lightheadedness     Interpretation:  Did the patient stop or pause?: No               Total Time Walked (Calculated): 360 seconds  Final Partial Lap Distance (feet): 0 feet  Total Distance Meters (Calculated): 426.72 meters  Predicted Distance Meters (Calculated): 401.7 meters  Percentage of Predicted (Calculated): 106.23  Peak VO2 (Calculated): 16.78  Mets: 4.79  Has The Patient Had a Previous Six Minute Walk Test?: No       Previous 6MWT Results  Has The Patient Had a Previous Six Minute Walk Test?: No    "

## 2022-09-01 ENCOUNTER — CLINICAL SUPPORT (OUTPATIENT)
Dept: CARDIOLOGY | Facility: HOSPITAL | Age: 77
End: 2022-09-01
Payer: MEDICARE

## 2022-09-01 DIAGNOSIS — Z95.0 PRESENCE OF CARDIAC PACEMAKER: ICD-10-CM

## 2022-09-01 PROCEDURE — 93296 REM INTERROG EVL PM/IDS: CPT | Performed by: INTERNAL MEDICINE

## 2022-10-04 ENCOUNTER — OFFICE VISIT (OUTPATIENT)
Dept: OTOLARYNGOLOGY | Facility: CLINIC | Age: 77
End: 2022-10-04
Payer: MEDICARE

## 2022-10-04 VITALS — BODY MASS INDEX: 28.52 KG/M2 | TEMPERATURE: 98 F | WEIGHT: 182.13 LBS

## 2022-10-04 DIAGNOSIS — H60.63 CHRONIC OTITIS EXTERNA OF BOTH EARS, UNSPECIFIED TYPE: ICD-10-CM

## 2022-10-04 DIAGNOSIS — H91.93 BILATERAL HEARING LOSS, UNSPECIFIED HEARING LOSS TYPE: ICD-10-CM

## 2022-10-04 DIAGNOSIS — J30.9 ALLERGIC RHINITIS, UNSPECIFIED SEASONALITY, UNSPECIFIED TRIGGER: Primary | ICD-10-CM

## 2022-10-04 PROCEDURE — 99999 PR PBB SHADOW E&M-EST. PATIENT-LVL III: ICD-10-PCS | Mod: PBBFAC,,, | Performed by: STUDENT IN AN ORGANIZED HEALTH CARE EDUCATION/TRAINING PROGRAM

## 2022-10-04 PROCEDURE — 1101F PR PT FALLS ASSESS DOC 0-1 FALLS W/OUT INJ PAST YR: ICD-10-PCS | Mod: CPTII,S$GLB,, | Performed by: STUDENT IN AN ORGANIZED HEALTH CARE EDUCATION/TRAINING PROGRAM

## 2022-10-04 PROCEDURE — 1126F PR PAIN SEVERITY QUANTIFIED, NO PAIN PRESENT: ICD-10-PCS | Mod: CPTII,S$GLB,, | Performed by: STUDENT IN AN ORGANIZED HEALTH CARE EDUCATION/TRAINING PROGRAM

## 2022-10-04 PROCEDURE — 69210 PR REMOVAL IMPACTED CERUMEN REQUIRING INSTRUMENTATION, UNILATERAL: ICD-10-PCS | Mod: S$GLB,,, | Performed by: STUDENT IN AN ORGANIZED HEALTH CARE EDUCATION/TRAINING PROGRAM

## 2022-10-04 PROCEDURE — 1101F PT FALLS ASSESS-DOCD LE1/YR: CPT | Mod: CPTII,S$GLB,, | Performed by: STUDENT IN AN ORGANIZED HEALTH CARE EDUCATION/TRAINING PROGRAM

## 2022-10-04 PROCEDURE — 69210 REMOVE IMPACTED EAR WAX UNI: CPT | Mod: S$GLB,,, | Performed by: STUDENT IN AN ORGANIZED HEALTH CARE EDUCATION/TRAINING PROGRAM

## 2022-10-04 PROCEDURE — 99999 PR PBB SHADOW E&M-EST. PATIENT-LVL III: CPT | Mod: PBBFAC,,, | Performed by: STUDENT IN AN ORGANIZED HEALTH CARE EDUCATION/TRAINING PROGRAM

## 2022-10-04 PROCEDURE — 3288F FALL RISK ASSESSMENT DOCD: CPT | Mod: CPTII,S$GLB,, | Performed by: STUDENT IN AN ORGANIZED HEALTH CARE EDUCATION/TRAINING PROGRAM

## 2022-10-04 PROCEDURE — 99214 OFFICE O/P EST MOD 30 MIN: CPT | Mod: 25,S$GLB,, | Performed by: STUDENT IN AN ORGANIZED HEALTH CARE EDUCATION/TRAINING PROGRAM

## 2022-10-04 PROCEDURE — 99214 PR OFFICE/OUTPT VISIT, EST, LEVL IV, 30-39 MIN: ICD-10-PCS | Mod: 25,S$GLB,, | Performed by: STUDENT IN AN ORGANIZED HEALTH CARE EDUCATION/TRAINING PROGRAM

## 2022-10-04 PROCEDURE — 1126F AMNT PAIN NOTED NONE PRSNT: CPT | Mod: CPTII,S$GLB,, | Performed by: STUDENT IN AN ORGANIZED HEALTH CARE EDUCATION/TRAINING PROGRAM

## 2022-10-04 PROCEDURE — 1159F MED LIST DOCD IN RCRD: CPT | Mod: CPTII,S$GLB,, | Performed by: STUDENT IN AN ORGANIZED HEALTH CARE EDUCATION/TRAINING PROGRAM

## 2022-10-04 PROCEDURE — 3288F PR FALLS RISK ASSESSMENT DOCUMENTED: ICD-10-PCS | Mod: CPTII,S$GLB,, | Performed by: STUDENT IN AN ORGANIZED HEALTH CARE EDUCATION/TRAINING PROGRAM

## 2022-10-04 PROCEDURE — 1159F PR MEDICATION LIST DOCUMENTED IN MEDICAL RECORD: ICD-10-PCS | Mod: CPTII,S$GLB,, | Performed by: STUDENT IN AN ORGANIZED HEALTH CARE EDUCATION/TRAINING PROGRAM

## 2022-10-04 RX ORDER — FLUTICASONE PROPIONATE 50 MCG
1 SPRAY, SUSPENSION (ML) NASAL DAILY
Qty: 16 G | Refills: 0 | Status: SHIPPED | OUTPATIENT
Start: 2022-10-04 | End: 2022-10-27

## 2022-10-04 RX ORDER — CIPROFLOXACIN HYDROCHLORIDE 3 MG/ML
4 SOLUTION/ DROPS OPHTHALMIC 2 TIMES DAILY
Qty: 10 ML | Refills: 0 | Status: SHIPPED | OUTPATIENT
Start: 2022-10-04 | End: 2022-10-11

## 2022-10-04 RX ORDER — CIPROFLOXACIN AND DEXAMETHASONE 3; 1 MG/ML; MG/ML
4 SUSPENSION/ DROPS AURICULAR (OTIC) 2 TIMES DAILY
Qty: 7.5 ML | Refills: 0 | Status: SHIPPED | OUTPATIENT
Start: 2022-10-04 | End: 2022-10-04

## 2022-10-04 NOTE — PROGRESS NOTES
Chief complaint:   Chief Complaint   Patient presents with    Cerumen Impaction    Sinus Problem     Runny nose          Referring Provider:  Aaareferral Self  No address on file    History of Present Illness:     Ms. Davidson is a 77 y.o. female presenting for evaluation of bilateral erar itching and otalgia (mild and comes and goes). Onset of this chief complaint was about a few weeks ago.  Additional symptoms that also have been associated are clear rhinorrhea (random, some sneezing). The patient has tried cough drops without relief.  Also noticed hearing loss, bilateral. Progressive. Turning TV louder.     The patient denies drainage, tinnitus.      Review of Systems     A complete 10 point ROS was completed and are positive as per above HPI.    Otherwise negative for fever, diplopia, chest pain, shortness of breath, vomiting, blood in urine, joint pain, skin rash, seizures and unusual bleeding.       History        Past Medical History:   Past Medical History:   Diagnosis Date    Bilateral carotid artery stenosis 10/15/2019    Hyperlipidemia     LUCRETIA (obstructive sleep apnea) 5/6/2022    .          Past Surgical History:  Past Surgical History:   Procedure Laterality Date    A-V CARDIAC PACEMAKER INSERTION Left 6/3/2021    Procedure: INSERTION, CARDIAC PACEMAKER, DUAL CHAMBER;  Surgeon: Arnold Martinez MD;  Location: [facility] CATH LAB;  Service: Cardiology;  Laterality: Left;  COVID-19, MRNA, LN-S, PF ([employer]) 2/4/2021, 1/14/2021//Gil ching notified    ESOPHAGOGASTRODUODENOSCOPY N/A 9/17/2021    Procedure: EGD (ESOPHAGOGASTRODUODENOSCOPY);  Surgeon: Rimma Mccracken MD;  Location: [facility] ENDO;  Service: Endoscopy;  Laterality: N/A;    HYSTERECTOMY  1988    INSERTION OF PACEMAKER  06/03/2021   .         Medications: Medication list was reviewed. She  has a current medication list which includes the following prescription(s): aspirin, atorvastatin, ferrous sulfate, metformin, midodrine, multivitamin, biotin,  cholecalciferol (vitamin d3), cyanocobalamin, hydrocodone-acetaminophen, meloxicam, neomycin-polymyxin-hydrocortisone, pravastatin, triamcinolone acetonide 0.1%, and vitamin e.         Allergies: Review of patient's allergies indicates:  No Known Allergies         Family history: family history includes Blindness in her father; Cataracts in her mother; Dementia in her father; Diabetes type II in her mother; Heart disease in her brother and mother.         Social History          Alcohol use:  reports that she does not currently use alcohol.            Tobacco:  reports that she has never smoked. She has never used smokeless tobacco.         Please see the patient's intake form for full details of past medical history, past surgical history, family history, social history and review of systems. ?This information was reviewed by me and noted.      Physical Examination     General: Well developed, well nourished, well hydrated. Verbal with a strong voice and not dysphonic.     Head/Face: Normocephalic, atraumatic. No scars or lesions. Facial musculature equal.     Eyes: No scleral icterus or conjunctival hemorrhage. EOMI. PERRLA.     Ears:     Right ear: No gross deformity. EAC is clear of debris and erythema. The TM is intact with a pneumatized middle ear. No signs of retraction, fluid or infection.      Left ear: No gross deformity. EAC is clear of debris and erythema. The TM is intact with a pneumatized middle ear. No signs of retraction, fluid or infection.     Hearing: grossly intact    Nose: No gross deformity or lesions. No purulent discharge. No significant NSD.      Mouth/Oropharynx: Lips without any lesions. No mucosal lesions within the oropharynx. No tonsillar exudate or lesions. Pharyngeal walls symmetrical. Uvula midline. Tongue midline without lesions.     Neck: Trachea midline. No masses. No thyromegaly or nodules palpated.     Lymphatic: No lymphadenopathy in the neck.     Extremities: No cyanosis.  Warm and well-perfused.     Skin: No scars or lesions on face or neck.      Neurologic: Moving all extremities without gross abnormality.CN II-XII grossly intact. House-Brackmann 1/6. No signs of nystagmus.      Psych: Alert and oriented to person, place, and time with an appropriate mood and affect.       Procedure: ear microscopy with removal of cerumen    Description: The patient was in agreement with the examination and debridement of the ears. Removal of the cerumen required use of an operating microscope and multiple micro-instruments.     With the patient in the supine position, we used the operating microscope to examine both ears with the appropriate sized ear speculum.  A variety of sterile, micro-instruments were utilized to remove the cerumen atraumatically.  I performed the procedure which required a significant amount of time and effort. The tympanic membrane was then well visualized.  The patient tolerated the procedure well and there were no complications.    Findings:   Right ear had moderate wax, once removed revealed that the EAC was mildly erythematous and tender, and the tympanic membrane was intact with no evidence of middle ear fluid.    Left ear had little wax, the EAC was mildly erythematous and tender, and the tympanic membrane was intact with no evidence of middle ear fluid.    Hearing did not improve after procedure     Assessment/Plan:      1. Allergic rhinitis, unspecified seasonality, unspecified trigger    2. Chronic otitis externa of both ears, unspecified type    3. Bilateral hearing loss, unspecified hearing loss type         Ciprodex  x 7 days  Flonase daily  Return to clinic in 3 weeks with audio    Mukesh Kim MD  [facility]   [facility] - [address] [place] [address]  P: [phone]  F: [phone]

## 2022-10-05 ENCOUNTER — TELEPHONE (OUTPATIENT)
Dept: OTOLARYNGOLOGY | Facility: CLINIC | Age: 77
End: 2022-10-05
Payer: MEDICARE

## 2022-10-05 NOTE — TELEPHONE ENCOUNTER
----- Message from Sathya Abraham sent at 10/5/2022  2:53 PM CDT -----  Contact: patient  Anca Manjarrez Ministerio Davidson would like  a call back at [phone], in regards to discussing medication she was prescribed on yesterday.

## 2022-10-05 NOTE — TELEPHONE ENCOUNTER
Pt states medication was picked up yesterday but it says for the eye only.  Advised her that she does have the correct medication and it is an eye drop that can also be used in the ear.

## 2022-10-18 ENCOUNTER — PATIENT MESSAGE (OUTPATIENT)
Dept: OTOLARYNGOLOGY | Facility: CLINIC | Age: 77
End: 2022-10-18
Payer: MEDICARE

## 2022-10-20 ENCOUNTER — TELEPHONE (OUTPATIENT)
Dept: CARDIOLOGY | Facility: HOSPITAL | Age: 77
End: 2022-10-20
Payer: MEDICARE

## 2022-10-31 ENCOUNTER — CLINICAL SUPPORT (OUTPATIENT)
Dept: AUDIOLOGY | Facility: CLINIC | Age: 77
End: 2022-10-31
Payer: MEDICARE

## 2022-10-31 ENCOUNTER — OFFICE VISIT (OUTPATIENT)
Dept: OTOLARYNGOLOGY | Facility: CLINIC | Age: 77
End: 2022-10-31
Payer: MEDICARE

## 2022-10-31 VITALS — WEIGHT: 180.13 LBS | BODY MASS INDEX: 28.21 KG/M2 | TEMPERATURE: 98 F

## 2022-10-31 DIAGNOSIS — H60.63 CHRONIC OTITIS EXTERNA OF BOTH EARS, UNSPECIFIED TYPE: ICD-10-CM

## 2022-10-31 DIAGNOSIS — H90.3 ASYMMETRICAL SENSORINEURAL HEARING LOSS: ICD-10-CM

## 2022-10-31 DIAGNOSIS — H90.3 SENSORY HEARING LOSS, BILATERAL: Primary | ICD-10-CM

## 2022-10-31 DIAGNOSIS — H90.3 ASYMMETRICAL SENSORINEURAL HEARING LOSS: Primary | ICD-10-CM

## 2022-10-31 PROCEDURE — 99213 PR OFFICE/OUTPT VISIT, EST, LEVL III, 20-29 MIN: ICD-10-PCS | Mod: S$GLB,,, | Performed by: PHYSICIAN ASSISTANT

## 2022-10-31 PROCEDURE — 92567 TYMPANOMETRY: CPT | Mod: S$GLB,,, | Performed by: AUDIOLOGIST-HEARING AID FITTER

## 2022-10-31 PROCEDURE — 92557 PR COMPREHENSIVE HEARING TEST: ICD-10-PCS | Mod: S$GLB,,, | Performed by: AUDIOLOGIST-HEARING AID FITTER

## 2022-10-31 PROCEDURE — 3288F FALL RISK ASSESSMENT DOCD: CPT | Mod: CPTII,S$GLB,, | Performed by: PHYSICIAN ASSISTANT

## 2022-10-31 PROCEDURE — 99999 PR PBB SHADOW E&M-EST. PATIENT-LVL I: CPT | Mod: PBBFAC,,, | Performed by: AUDIOLOGIST-HEARING AID FITTER

## 2022-10-31 PROCEDURE — 1159F PR MEDICATION LIST DOCUMENTED IN MEDICAL RECORD: ICD-10-PCS | Mod: CPTII,S$GLB,, | Performed by: PHYSICIAN ASSISTANT

## 2022-10-31 PROCEDURE — 1101F PR PT FALLS ASSESS DOC 0-1 FALLS W/OUT INJ PAST YR: ICD-10-PCS | Mod: CPTII,S$GLB,, | Performed by: PHYSICIAN ASSISTANT

## 2022-10-31 PROCEDURE — 92557 COMPREHENSIVE HEARING TEST: CPT | Mod: S$GLB,,, | Performed by: AUDIOLOGIST-HEARING AID FITTER

## 2022-10-31 PROCEDURE — 99999 PR PBB SHADOW E&M-EST. PATIENT-LVL III: ICD-10-PCS | Mod: PBBFAC,,, | Performed by: PHYSICIAN ASSISTANT

## 2022-10-31 PROCEDURE — 3288F PR FALLS RISK ASSESSMENT DOCUMENTED: ICD-10-PCS | Mod: CPTII,S$GLB,, | Performed by: PHYSICIAN ASSISTANT

## 2022-10-31 PROCEDURE — 1101F PT FALLS ASSESS-DOCD LE1/YR: CPT | Mod: CPTII,S$GLB,, | Performed by: PHYSICIAN ASSISTANT

## 2022-10-31 PROCEDURE — 99213 OFFICE O/P EST LOW 20 MIN: CPT | Mod: S$GLB,,, | Performed by: PHYSICIAN ASSISTANT

## 2022-10-31 PROCEDURE — 99999 PR PBB SHADOW E&M-EST. PATIENT-LVL I: ICD-10-PCS | Mod: PBBFAC,,, | Performed by: AUDIOLOGIST-HEARING AID FITTER

## 2022-10-31 PROCEDURE — 1159F MED LIST DOCD IN RCRD: CPT | Mod: CPTII,S$GLB,, | Performed by: PHYSICIAN ASSISTANT

## 2022-10-31 PROCEDURE — 92567 PR TYMPA2METRY: ICD-10-PCS | Mod: S$GLB,,, | Performed by: AUDIOLOGIST-HEARING AID FITTER

## 2022-10-31 PROCEDURE — 99999 PR PBB SHADOW E&M-EST. PATIENT-LVL III: CPT | Mod: PBBFAC,,, | Performed by: PHYSICIAN ASSISTANT

## 2022-10-31 NOTE — PROGRESS NOTES
Subjective:   Patient ID: Anca Mcclellan is a 77 y.o. female.    Chief Complaint: f/u and audio (Pt states she gets dizzy when bends down and comes back up.States ears feel tickly and a little sore)    Ms. Davidson is a very pleasant 78 yo female here to see me today with decreased hearing. I last saw her 3 years ago and she was found to have an asymmetry. Her MRI was normal at that time. She does not notice one ear worse than the other and her hearing loss does not bother her. She has occasionally become dizzy but attributes it to her pace maker. She saw Dr. Kim 2 weeks ago and was treated for OE- She did not start medication.     Review of patient's allergies indicates:  No Known Allergies        Review of Systems   Constitutional: Negative.    HENT:  Positive for ear discharge and hearing loss.    Eyes: Negative.    Respiratory: Negative.     Gastrointestinal: Negative.    Endocrine: Negative.    Genitourinary: Negative.    Musculoskeletal: Negative.    Skin: Negative.    Allergic/Immunologic: Negative.    Neurological:  Positive for light-headedness.   Hematological: Negative.    Psychiatric/Behavioral: Negative.         Objective:   Temp 98.1 °F (36.7 °C) (Temporal)   Wt 81.7 kg (180 lb 1.9 oz)   BMI 28.21 kg/m²     Physical Exam  Constitutional:       General: She is not in acute distress.     Appearance: She is well-developed.   HENT:      Head: Normocephalic and atraumatic.      Right Ear: Tympanic membrane, ear canal and external ear normal.      Left Ear: Tympanic membrane, ear canal and external ear normal.      Nose: Nose normal. No nasal deformity, septal deviation, mucosal edema or rhinorrhea.      Right Sinus: No maxillary sinus tenderness or frontal sinus tenderness.      Left Sinus: No maxillary sinus tenderness or frontal sinus tenderness.      Mouth/Throat:      Mouth: Mucous membranes are not pale and not dry.      Dentition: No dental caries.      Pharynx: Uvula midline. No oropharyngeal  exudate or posterior oropharyngeal erythema.   Eyes:      General: Lids are normal. No scleral icterus.     Extraocular Movements:      Right eye: Normal extraocular motion and no nystagmus.      Left eye: Normal extraocular motion and no nystagmus.      Conjunctiva/sclera: Conjunctivae normal.      Right eye: Right conjunctiva is not injected. No chemosis.     Left eye: Left conjunctiva is not injected. No chemosis.     Pupils: Pupils are equal, round, and reactive to light.   Neck:      Thyroid: No thyroid mass or thyromegaly.      Trachea: Trachea and phonation normal. No tracheal tenderness or tracheal deviation.   Pulmonary:      Effort: Pulmonary effort is normal. No respiratory distress.      Breath sounds: No stridor.   Abdominal:      General: There is no distension.   Lymphadenopathy:      Head:      Right side of head: No submental, submandibular, preauricular, posterior auricular or occipital adenopathy.      Left side of head: No submental, submandibular, preauricular, posterior auricular or occipital adenopathy.      Cervical: No cervical adenopathy.   Skin:     General: Skin is warm and dry.      Findings: No erythema or rash.   Neurological:      Mental Status: She is alert and oriented to person, place, and time.      Cranial Nerves: No cranial nerve deficit.   Psychiatric:         Behavior: Behavior normal.        Imaging :               Assessment:     1. Asymmetrical sensorineural hearing loss    2. Chronic otitis externa of both ears, unspecified type        Plan:     Asymmetrical sensorineural hearing loss    Chronic otitis externa of both ears, unspecified type    Will continue to monitor with annual audiograms. SH eis not interested in hearing aids at this time.    OE: looks good today but told her to use the drops at home should they start bothering her again. OK to use eye drops in ear. RTC as needed.

## 2022-10-31 NOTE — PROGRESS NOTES
Anca Mcclellan was seen 10/31/2022 for an audiological evaluation and ENT appt.    Results reveal a normal-to-moderate sensorineural hearing loss 250-8000 Hz for the right ear, and normal-to-severe sensorineural hearing loss 250-8000 Hz for the left ear.   Speech Reception Thresholds were  10 dBHL for the right ear and 25 dBHL for the left ear.   Word recognition scores were excellent for the right ear and excellent for the left ear.   Tympanograms were Type A for the right ear and Type A for the left ear.    Asymmetry noted. Decrease noted from 7/2/2020 (L>R).    Patient was counseled on the above findings.     Recommendations:  ENT review. Asymmetry.  Annual audiograms

## 2022-11-02 ENCOUNTER — OFFICE VISIT (OUTPATIENT)
Dept: PULMONOLOGY | Facility: CLINIC | Age: 77
End: 2022-11-02
Payer: MEDICARE

## 2022-11-02 VITALS
BODY MASS INDEX: 28.37 KG/M2 | HEART RATE: 85 BPM | RESPIRATION RATE: 16 BRPM | HEIGHT: 67 IN | DIASTOLIC BLOOD PRESSURE: 74 MMHG | WEIGHT: 180.75 LBS | OXYGEN SATURATION: 98 % | SYSTOLIC BLOOD PRESSURE: 126 MMHG

## 2022-11-02 DIAGNOSIS — Z95.0 CARDIAC PACEMAKER IN SITU: ICD-10-CM

## 2022-11-02 DIAGNOSIS — Z71.89 CPAP USE COUNSELING: ICD-10-CM

## 2022-11-02 DIAGNOSIS — G47.33 MILD OBSTRUCTIVE SLEEP APNEA: ICD-10-CM

## 2022-11-02 DIAGNOSIS — R13.10 DYSPHAGIA, UNSPECIFIED TYPE: Primary | ICD-10-CM

## 2022-11-02 DIAGNOSIS — Z78.9 INTOLERANCE OF CONTINUOUS POSITIVE AIRWAY PRESSURE (CPAP) VENTILATION: ICD-10-CM

## 2022-11-02 PROCEDURE — 3288F PR FALLS RISK ASSESSMENT DOCUMENTED: ICD-10-PCS | Mod: CPTII,S$GLB,, | Performed by: INTERNAL MEDICINE

## 2022-11-02 PROCEDURE — 3288F FALL RISK ASSESSMENT DOCD: CPT | Mod: CPTII,S$GLB,, | Performed by: INTERNAL MEDICINE

## 2022-11-02 PROCEDURE — 99214 PR OFFICE/OUTPT VISIT, EST, LEVL IV, 30-39 MIN: ICD-10-PCS | Mod: S$GLB,,, | Performed by: INTERNAL MEDICINE

## 2022-11-02 PROCEDURE — 3074F PR MOST RECENT SYSTOLIC BLOOD PRESSURE < 130 MM HG: ICD-10-PCS | Mod: CPTII,S$GLB,, | Performed by: INTERNAL MEDICINE

## 2022-11-02 PROCEDURE — 1160F RVW MEDS BY RX/DR IN RCRD: CPT | Mod: CPTII,S$GLB,, | Performed by: INTERNAL MEDICINE

## 2022-11-02 PROCEDURE — 1101F PT FALLS ASSESS-DOCD LE1/YR: CPT | Mod: CPTII,S$GLB,, | Performed by: INTERNAL MEDICINE

## 2022-11-02 PROCEDURE — 1159F PR MEDICATION LIST DOCUMENTED IN MEDICAL RECORD: ICD-10-PCS | Mod: CPTII,S$GLB,, | Performed by: INTERNAL MEDICINE

## 2022-11-02 PROCEDURE — 1159F MED LIST DOCD IN RCRD: CPT | Mod: CPTII,S$GLB,, | Performed by: INTERNAL MEDICINE

## 2022-11-02 PROCEDURE — 3074F SYST BP LT 130 MM HG: CPT | Mod: CPTII,S$GLB,, | Performed by: INTERNAL MEDICINE

## 2022-11-02 PROCEDURE — 3078F DIAST BP <80 MM HG: CPT | Mod: CPTII,S$GLB,, | Performed by: INTERNAL MEDICINE

## 2022-11-02 PROCEDURE — 99214 OFFICE O/P EST MOD 30 MIN: CPT | Mod: S$GLB,,, | Performed by: INTERNAL MEDICINE

## 2022-11-02 PROCEDURE — 99999 PR PBB SHADOW E&M-EST. PATIENT-LVL IV: CPT | Mod: PBBFAC,,, | Performed by: INTERNAL MEDICINE

## 2022-11-02 PROCEDURE — 3078F PR MOST RECENT DIASTOLIC BLOOD PRESSURE < 80 MM HG: ICD-10-PCS | Mod: CPTII,S$GLB,, | Performed by: INTERNAL MEDICINE

## 2022-11-02 PROCEDURE — 99999 PR PBB SHADOW E&M-EST. PATIENT-LVL IV: ICD-10-PCS | Mod: PBBFAC,,, | Performed by: INTERNAL MEDICINE

## 2022-11-02 PROCEDURE — 1160F PR REVIEW ALL MEDS BY PRESCRIBER/CLIN PHARMACIST DOCUMENTED: ICD-10-PCS | Mod: CPTII,S$GLB,, | Performed by: INTERNAL MEDICINE

## 2022-11-02 PROCEDURE — 1101F PR PT FALLS ASSESS DOC 0-1 FALLS W/OUT INJ PAST YR: ICD-10-PCS | Mod: CPTII,S$GLB,, | Performed by: INTERNAL MEDICINE

## 2022-11-02 NOTE — PROGRESS NOTES
Pulmonary Outpatient Follow Up Visit     Subjective:        Patient ID: Anca Mcclellan is a 77 y.o. female.    Chief Complaint: Sleep Apnea      HPI    77-year-old female patient presenting for 3 months follow up .       Mild obstructive sleep apnea AHI 13 events per hour..  Failed CPAP therapy and willing to return her machine.    Continues to complain of dysphagia with solids and pills  for few months.      I referred her to Gastroenterology on previous visit.  She does not have an appointment.    She is retired .    Does have history of arrhythmia status post pacemaker.      Does complain of dizziness on changing position from bending to upright, on midodrine.     Review of Systems   Constitutional:  Positive for fatigue and weakness. Negative for fever and chills.   HENT:  Negative for nosebleeds.    Eyes:  Negative for redness.   Respiratory:  Positive for apnea, snoring, dyspnea on extertion and somnolence. Negative for choking.    Cardiovascular:  Negative for chest pain.   Genitourinary:  Negative for hematuria.   Endocrine:  Negative for cold intolerance.    Musculoskeletal:  Positive for arthralgias, back pain and gait problem.   Gastrointestinal:  Negative for vomiting.        Dysphagia   Neurological:  Positive for light-headedness. Negative for syncope.   Hematological:  Negative for adenopathy.   Psychiatric/Behavioral:  Positive for sleep disturbance. Negative for confusion.      Outpatient Encounter Medications as of 11/2/2022   Medication Sig Dispense Refill    aspirin (ECOTRIN) 81 MG EC tablet Take 81 mg by mouth once daily.      atorvastatin (LIPITOR) 40 MG tablet Take 40 mg by mouth once daily.      BIOTIN ORAL Take 4,000 mcg by mouth once daily.       cholecalciferol, vitamin D3, (VITAMIN D3) 50 mcg (2,000 unit) Tab 1 capsule      cyanocobalamin (VITAMIN B-12) 100 MCG tablet Take 100 mcg by mouth once daily.      ferrous sulfate (FEOSOL)  "325 mg (65 mg iron) Tab tablet 1 tablet      fluticasone propionate (FLONASE) 50 mcg/actuation nasal spray SPRAY 1 SPRAY INTO EACH NOSTRIL EVERY DAY 16 mL 12    HYDROcodone-acetaminophen (NORCO) 5-325 mg per tablet Take 1 tablet by mouth every 6 (six) hours as needed.      meloxicam (MOBIC) 15 MG tablet Take 15 mg by mouth.      metFORMIN (GLUCOPHAGE-XR) 500 MG ER 24hr tablet Take 500 mg by mouth once daily.      midodrine (PROAMATINE) 2.5 MG Tab Take 1 tablet (2.5 mg total) by mouth every 12 (twelve) hours. 60 tablet 11    multivitamin (THERAGRAN) per tablet Take 1 tablet by mouth once daily.      pravastatin (PRAVACHOL) 40 MG tablet Take 40 mg by mouth.      triamcinolone acetonide 0.1% (KENALOG) 0.1 % ointment APPLY TO RASH TOPICALLY TWICE DAILY  0    vitamin E 100 UNIT capsule Take 100 Units by mouth once daily.       No facility-administered encounter medications on file as of 11/2/2022.       Objective:     Vital Signs (Most Recent)  Vital Signs  Pulse: 85  Resp: 16  SpO2: 98 %  BP: 126/74  Height and Weight  Height: 5' 7" (170.2 cm)  Weight: 82 kg (180 lb 12.4 oz)  BSA (Calculated - sq m): 1.97 sq meters  BMI (Calculated): 28.3  Weight in (lb) to have BMI = 25: 159.3]  Wt Readings from Last 2 Encounters:   11/02/22 82 kg (180 lb 12.4 oz)   10/31/22 81.7 kg (180 lb 1.9 oz)       Physical Exam   Constitutional: She is oriented to person, place, and time. She appears well-developed and well-nourished. No distress.   HENT:   Head: Normocephalic.   Cardiovascular: Normal rate and regular rhythm.   Pulmonary/Chest: Normal expansion and effort normal. No stridor. No respiratory distress. She exhibits no tenderness.   Abdominal: She exhibits no distension.   Musculoskeletal:         General: No tenderness.      Cervical back: Neck supple.   Lymphadenopathy:     She has no cervical adenopathy.   Neurological: She is alert and oriented to person, place, and time. Gait normal.   Skin: Skin is warm. No cyanosis. Nails " show no clubbing.   Psychiatric: She has a normal mood and affect. Her behavior is normal. Judgment and thought content normal.   Nursing note and vitals reviewed.    Laboratory  Lab Results   Component Value Date    WBC 3.68 (L) 09/14/2021    RBC 4.75 09/14/2021    HGB 14.2 09/14/2021    HCT 43.3 09/14/2021    MCV 91 09/14/2021    MCH 29.9 09/14/2021    MCHC 32.8 09/14/2021    RDW 14.5 09/14/2021     09/14/2021    MPV 10.1 09/14/2021    GRAN 1.4 (L) 09/14/2021    GRAN 38.8 09/14/2021    LYMPH 1.6 09/14/2021    LYMPH 43.8 09/14/2021    MONO 0.4 09/14/2021    MONO 10.9 09/14/2021    EOS 0.2 09/14/2021    BASO 0.03 09/14/2021    EOSINOPHIL 5.4 09/14/2021    BASOPHIL 0.8 09/14/2021       BMP  Lab Results   Component Value Date     04/29/2022    K 3.7 04/29/2022     04/29/2022    CO2 26 04/29/2022    BUN 10 04/29/2022    CREATININE 0.7 04/29/2022    CALCIUM 9.2 04/29/2022    ANIONGAP 9 04/29/2022    ESTGFRAFRICA >60 04/29/2022    EGFRNONAA >60 04/29/2022    AST 19 04/29/2022    ALT 18 04/29/2022    PROT 8.0 04/29/2022       Lab Results   Component Value Date    BNP 17 04/29/2022    BNP 15 06/16/2021    BNP 26 12/01/2020       Lab Results   Component Value Date    TSH 0.652 12/01/2020       Lab Results   Component Value Date    SEDRATE 27 06/16/2021       Lab Results   Component Value Date    CRP 2.4 06/16/2021     No results found for: IGE     No results found for: ASPERGILLUS  No results found for: AFUMIGATUSCL     No results found for: ACE     Diagnostic Results:  I have personally reviewed today the following studies:    Chest x-ray 2021     FINDINGS:  Mild prominence of the pulmonary station, slightly improved from prior.  No focal consolidation.  Mild scarring or atelectasis at the bases.  Tortuosity of the aorta.  Cardiopericardial silhouette remains mildly enlarged.  Degenerative changes of the spine.  Dual lead left-sided pacer device is unchanged           Assessment/Plan:   Dysphagia,  unspecified type  -     Ambulatory referral/consult to Gastroenterology; Future; Expected date: 11/09/2022    Intolerance of continuous positive airway pressure (CPAP) ventilation    Mild obstructive sleep apnea    CPAP use counseling    Cardiac pacemaker in situ      Referred to Gastroenterology.      Patient will return her CPAP machine.    Continue midodrine.  Follow-up with EPS/cardiology.        Follow up if symptoms worsen or fail to improve.    This note was prepared using voice recognition system and is likely to have sound alike errors that may have been overlooked even after proof reading.  Please call me with any questions    Discussed diagnosis, its evaluation, treatment and usual course. All questions answered.      Sofía Gamez MD

## 2022-11-07 ENCOUNTER — OFFICE VISIT (OUTPATIENT)
Dept: OPHTHALMOLOGY | Facility: CLINIC | Age: 77
End: 2022-11-07
Payer: MEDICARE

## 2022-11-07 DIAGNOSIS — H52.4 PRESBYOPIA OF BOTH EYES: ICD-10-CM

## 2022-11-07 DIAGNOSIS — Z96.1 PSEUDOPHAKIA OF BOTH EYES: ICD-10-CM

## 2022-11-07 DIAGNOSIS — H40.013 OPEN ANGLE WITH BORDERLINE FINDINGS OF BOTH EYES: Primary | ICD-10-CM

## 2022-11-07 DIAGNOSIS — H26.491 RIGHT POSTERIOR CAPSULAR OPACIFICATION: ICD-10-CM

## 2022-11-07 DIAGNOSIS — E11.9 DIABETES MELLITUS WITHOUT COMPLICATION: ICD-10-CM

## 2022-11-07 PROCEDURE — 92014 PR EYE EXAM, EST PATIENT,COMPREHESV: ICD-10-PCS | Mod: S$GLB,,, | Performed by: OPTOMETRIST

## 2022-11-07 PROCEDURE — 1159F PR MEDICATION LIST DOCUMENTED IN MEDICAL RECORD: ICD-10-PCS | Mod: CPTII,S$GLB,, | Performed by: OPTOMETRIST

## 2022-11-07 PROCEDURE — 1126F AMNT PAIN NOTED NONE PRSNT: CPT | Mod: CPTII,S$GLB,, | Performed by: OPTOMETRIST

## 2022-11-07 PROCEDURE — 1160F PR REVIEW ALL MEDS BY PRESCRIBER/CLIN PHARMACIST DOCUMENTED: ICD-10-PCS | Mod: CPTII,S$GLB,, | Performed by: OPTOMETRIST

## 2022-11-07 PROCEDURE — 92015 DETERMINE REFRACTIVE STATE: CPT | Mod: S$GLB,,, | Performed by: OPTOMETRIST

## 2022-11-07 PROCEDURE — 92014 COMPRE OPH EXAM EST PT 1/>: CPT | Mod: S$GLB,,, | Performed by: OPTOMETRIST

## 2022-11-07 PROCEDURE — 99999 PR PBB SHADOW E&M-EST. PATIENT-LVL III: ICD-10-PCS | Mod: PBBFAC,,, | Performed by: OPTOMETRIST

## 2022-11-07 PROCEDURE — 92133 OCT, OPTIC NERVE - OU - BOTH EYES: ICD-10-PCS | Mod: S$GLB,,, | Performed by: OPTOMETRIST

## 2022-11-07 PROCEDURE — 92133 CPTRZD OPH DX IMG PST SGM ON: CPT | Mod: S$GLB,,, | Performed by: OPTOMETRIST

## 2022-11-07 PROCEDURE — 92015 PR REFRACTION: ICD-10-PCS | Mod: S$GLB,,, | Performed by: OPTOMETRIST

## 2022-11-07 PROCEDURE — 99999 PR PBB SHADOW E&M-EST. PATIENT-LVL III: CPT | Mod: PBBFAC,,, | Performed by: OPTOMETRIST

## 2022-11-07 PROCEDURE — 2023F PR DILATED RETINAL EXAM W/O EVID OF RETINOPATHY: ICD-10-PCS | Mod: CPTII,S$GLB,, | Performed by: OPTOMETRIST

## 2022-11-07 PROCEDURE — 2023F DILAT RTA XM W/O RTNOPTHY: CPT | Mod: CPTII,S$GLB,, | Performed by: OPTOMETRIST

## 2022-11-07 PROCEDURE — 1160F RVW MEDS BY RX/DR IN RCRD: CPT | Mod: CPTII,S$GLB,, | Performed by: OPTOMETRIST

## 2022-11-07 PROCEDURE — 1126F PR PAIN SEVERITY QUANTIFIED, NO PAIN PRESENT: ICD-10-PCS | Mod: CPTII,S$GLB,, | Performed by: OPTOMETRIST

## 2022-11-07 PROCEDURE — 1159F MED LIST DOCD IN RCRD: CPT | Mod: CPTII,S$GLB,, | Performed by: OPTOMETRIST

## 2022-11-07 NOTE — PROGRESS NOTES
HPI     Annual Exam            Comments: Patient here for routine eye exam           Diabetic Eye Exam            Comments: Patient here for diabetic eye exam           Comments    Diagnosed with diabetes in 2012  Lab Results       Component                Value               Date                       HGBA1C                   6.5 (H)             04/29/2022            Vision changes since last eye exam?: Blurred vision at night  No correction     Any eye pain today: No    Other ocular symptoms: Tearing eyes    Interested in contact lens fitting today? No    1. PVD OS  2. Vitreous Degeneration OU  3. Glaucoma Suspect OU  4. Pseudophakia OU             Last edited by Cecy Kothari, PCT on 11/7/2022 10:39 AM.            Assessment /Plan     For exam results, see Encounter Report.    Open angle with borderline findings of both eyes  -     OCT, Optic Nerve - OU - Both Eyes  OCT performed today, appears stable. RTC next available for HVF 24-2 and IOP check. Continue observation off drops.     Diabetes mellitus without complication  10 years, last A1c 6.5 Stressed importance of DM control to preserve vision. No diabetic retinopathy was seen in either eye today. Continue strict blood glucose control.  Reviewed importance of yearly dilated eye exams. Continue close care with PCP regarding diabetes.     Right posterior capsular opacification  Not visually significant. Observe.     Pseudophakia of both eyes  S/p CE/IOL doing well. Observe at this time.     Presbyopia of both eyes  Eyeglass Final Rx       Eyeglass Final Rx         Sphere Cylinder Axis Add    Right -0.75 +0.25 013 +2.50    Left -0.25 +0.25 025 +2.50      Type: PAL    Expiration Date: 11/7/2023                   RTC in 1 month for HVF 24-2 with IOP check, sooner if changes to vision or worsening symptoms

## 2022-11-09 ENCOUNTER — OFFICE VISIT (OUTPATIENT)
Dept: CARDIOLOGY | Facility: CLINIC | Age: 77
End: 2022-11-09
Payer: MEDICARE

## 2022-11-09 ENCOUNTER — HOSPITAL ENCOUNTER (OUTPATIENT)
Dept: CARDIOLOGY | Facility: HOSPITAL | Age: 77
Discharge: HOME | End: 2022-11-09
Attending: INTERNAL MEDICINE
Payer: MEDICARE

## 2022-11-09 VITALS
HEIGHT: 67 IN | BODY MASS INDEX: 28.3 KG/M2 | DIASTOLIC BLOOD PRESSURE: 80 MMHG | OXYGEN SATURATION: 94 % | HEART RATE: 78 BPM | WEIGHT: 180.31 LBS | SYSTOLIC BLOOD PRESSURE: 120 MMHG

## 2022-11-09 DIAGNOSIS — Z95.0 CARDIAC PACEMAKER IN SITU: ICD-10-CM

## 2022-11-09 DIAGNOSIS — E11.69 TYPE 2 DIABETES MELLITUS WITH OTHER SPECIFIED COMPLICATION, UNSPECIFIED WHETHER LONG TERM INSULIN USE: ICD-10-CM

## 2022-11-09 DIAGNOSIS — I65.23 BILATERAL CAROTID ARTERY STENOSIS: ICD-10-CM

## 2022-11-09 DIAGNOSIS — I49.5 SINUS NODE DYSFUNCTION: ICD-10-CM

## 2022-11-09 DIAGNOSIS — I45.3 TRIFASCICULAR BLOCK: ICD-10-CM

## 2022-11-09 DIAGNOSIS — R06.09 DYSPNEA ON EXERTION: ICD-10-CM

## 2022-11-09 DIAGNOSIS — R55 POSTURAL DIZZINESS WITH PRESYNCOPE: Primary | ICD-10-CM

## 2022-11-09 DIAGNOSIS — I44.4 LAFB (LEFT ANTERIOR FASCICULAR BLOCK): ICD-10-CM

## 2022-11-09 DIAGNOSIS — R42 POSTURAL DIZZINESS WITH PRESYNCOPE: Primary | ICD-10-CM

## 2022-11-09 DIAGNOSIS — I45.10 RBBB: ICD-10-CM

## 2022-11-09 DIAGNOSIS — I49.9 IRREGULAR HEART BEAT: ICD-10-CM

## 2022-11-09 DIAGNOSIS — I44.0 FIRST DEGREE AV BLOCK: ICD-10-CM

## 2022-11-09 DIAGNOSIS — R55 SYNCOPE AND COLLAPSE: ICD-10-CM

## 2022-11-09 PROCEDURE — 3079F PR MOST RECENT DIASTOLIC BLOOD PRESSURE 80-89 MM HG: ICD-10-PCS | Mod: CPTII,S$GLB,, | Performed by: NURSE PRACTITIONER

## 2022-11-09 PROCEDURE — 1159F MED LIST DOCD IN RCRD: CPT | Mod: CPTII,S$GLB,, | Performed by: NURSE PRACTITIONER

## 2022-11-09 PROCEDURE — 99999 PR PBB SHADOW E&M-EST. PATIENT-LVL IV: CPT | Mod: PBBFAC,,, | Performed by: NURSE PRACTITIONER

## 2022-11-09 PROCEDURE — 3288F PR FALLS RISK ASSESSMENT DOCUMENTED: ICD-10-PCS | Mod: CPTII,S$GLB,, | Performed by: NURSE PRACTITIONER

## 2022-11-09 PROCEDURE — 3074F PR MOST RECENT SYSTOLIC BLOOD PRESSURE < 130 MM HG: ICD-10-PCS | Mod: CPTII,S$GLB,, | Performed by: NURSE PRACTITIONER

## 2022-11-09 PROCEDURE — 1126F AMNT PAIN NOTED NONE PRSNT: CPT | Mod: CPTII,S$GLB,, | Performed by: NURSE PRACTITIONER

## 2022-11-09 PROCEDURE — 1101F PR PT FALLS ASSESS DOC 0-1 FALLS W/OUT INJ PAST YR: ICD-10-PCS | Mod: CPTII,S$GLB,, | Performed by: NURSE PRACTITIONER

## 2022-11-09 PROCEDURE — 93280 PM DEVICE PROGR EVAL DUAL: CPT

## 2022-11-09 PROCEDURE — 3288F FALL RISK ASSESSMENT DOCD: CPT | Mod: CPTII,S$GLB,, | Performed by: NURSE PRACTITIONER

## 2022-11-09 PROCEDURE — 3079F DIAST BP 80-89 MM HG: CPT | Mod: CPTII,S$GLB,, | Performed by: NURSE PRACTITIONER

## 2022-11-09 PROCEDURE — 1101F PT FALLS ASSESS-DOCD LE1/YR: CPT | Mod: CPTII,S$GLB,, | Performed by: NURSE PRACTITIONER

## 2022-11-09 PROCEDURE — 1126F PR PAIN SEVERITY QUANTIFIED, NO PAIN PRESENT: ICD-10-PCS | Mod: CPTII,S$GLB,, | Performed by: NURSE PRACTITIONER

## 2022-11-09 PROCEDURE — 93280 PM DEVICE PROGR EVAL DUAL: CPT | Mod: 26,,, | Performed by: INTERNAL MEDICINE

## 2022-11-09 PROCEDURE — 1159F PR MEDICATION LIST DOCUMENTED IN MEDICAL RECORD: ICD-10-PCS | Mod: CPTII,S$GLB,, | Performed by: NURSE PRACTITIONER

## 2022-11-09 PROCEDURE — 99999 PR PBB SHADOW E&M-EST. PATIENT-LVL IV: ICD-10-PCS | Mod: PBBFAC,,, | Performed by: NURSE PRACTITIONER

## 2022-11-09 PROCEDURE — 3074F SYST BP LT 130 MM HG: CPT | Mod: CPTII,S$GLB,, | Performed by: NURSE PRACTITIONER

## 2022-11-09 PROCEDURE — 99214 PR OFFICE/OUTPT VISIT, EST, LEVL IV, 30-39 MIN: ICD-10-PCS | Mod: S$GLB,,, | Performed by: NURSE PRACTITIONER

## 2022-11-09 PROCEDURE — 93280 CARDIAC DEVICE CHECK - IN CLINIC & HOSPITAL: ICD-10-PCS | Mod: 26,,, | Performed by: INTERNAL MEDICINE

## 2022-11-09 PROCEDURE — 99214 OFFICE O/P EST MOD 30 MIN: CPT | Mod: S$GLB,,, | Performed by: NURSE PRACTITIONER

## 2022-11-09 NOTE — PROGRESS NOTES
Subjective:   Patient ID:  Anca Mcclellan is a 77 y.o. female who presents for evaluation of Loss of Consciousness            Anca Mcclellan is a 76 year old female who presents to cardiology clinic for post device 1 week wound check s/p PPM implant. Her current medical conditions include HTN, near syncope, s/p PPM implant (Biotronik), LAFB, HLP. She returns today and states she is doing well.     Denies chest pain or anginal equivalents. No shortness of breath, [place] or palpitations. Denies orthopnea, PND or abdominal bloating. Reports regular walking without any issues lately. NO leg swelling or claudications. No recent falls, syncope or near syncopal events. Reports compliance with medications and dietary restrictions. NO CNS complaints to suggest TIA or CVA today. No signs of abnormal bleeding on ASA.     Device check completed in office today. Wound reviewed with Dr Martinez, no need for additional dressing today.     Reviewed post device instructions in detail with patient, all questions answered in detail.       3/25/2022 update    She returns today and states she is doing ok.   On Mardi Gras, she was at a parade and had a syncopal event and was brought to ED for IV fluids. Since that time she has felt weak and dizzy. On that AM, she had not eaten breakfast that AM.     BP at home 115-120/70s    Tries to stay hydrated throughout the day. Has issues with dizziness upon standing.     She has never started wearing compression socks as were previously recommended in October 2021.     She does not feel any better since changing her device settings to DDDR in October as well. Continues to have issues with daily fatigue and felt better when she was on her previous settings. Will change her back to DDD CLS 70 today and assess response at next visit    4/29/2022 update    Anca Mcclellan returns today for 4 week follow up and is doing ok.   Most of the time, if she has to make any sudden movement then she is  very dizzy.   As the day progresses, she reports worsening dizziness episodes. Is staying hydrated and drinking >2 L of fluids daily.     Still has issues with dizziness. Discussed with Dr Martinez today.   Device spot check completed, 0% RV pacing. No arrhythmias. Well functioning device today.    Will plan for further evaluation with labs and urine to rule out amyloidosis.     11/9/2022 update    Anca Mcclellan returns for follow up with device check.     She continues to have some episodes of dizziness with position changes.     Recommendation to add compression stockings.     Had a prolonged episode of chest dullness last week which suddenly went away.     NO chest pain today in office. BP stable today in office.     No leg swelling on exam today. Has been doing well since last visit. Had only been taking midodrine daily but will start taking at least twice a day.     Device interrogation completed today in office  Well functioning device, no arrhythmias noted.     Past Medical History:   Diagnosis Date    Bilateral carotid artery stenosis 10/15/2019    Hyperlipidemia     LUCRETIA (obstructive sleep apnea) 5/6/2022       Past Surgical History:   Procedure Laterality Date    A-V CARDIAC PACEMAKER INSERTION Left 6/3/2021    Procedure: INSERTION, CARDIAC PACEMAKER, DUAL CHAMBER;  Surgeon: Arnold Martinez MD;  Location: [facility] CATH LAB;  Service: Cardiology;  Laterality: Left;  COVID-19, MRNA, LN-S, PF (Pfizer) 2/4/2021, 1/14/2021//Gil ching notified    ESOPHAGOGASTRODUODENOSCOPY N/A 9/17/2021    Procedure: EGD (ESOPHAGOGASTRODUODENOSCOPY);  Surgeon: Rimma Mccracken MD;  Location: [facility] ENDO;  Service: Endoscopy;  Laterality: N/A;    HYSTERECTOMY  1988    INSERTION OF PACEMAKER  06/03/2021       Social History     Tobacco Use    Smoking status: Never    Smokeless tobacco: Never   Substance Use Topics    Alcohol use: Not Currently     Comment: seldom    Drug use: Never       Family History   Problem  Relation Age of Onset    Cataracts Mother     Heart disease Mother     Diabetes type II Mother     Dementia Father     Blindness Father     Heart disease Brother     Migraines Neg Hx     Melanoma Neg Hx     Lupus Neg Hx     Psoriasis Neg Hx      Wt Readings from Last 3 Encounters:   11/09/22 81.8 kg (180 lb 5.4 oz)   11/02/22 82 kg (180 lb 12.4 oz)   10/31/22 81.7 kg (180 lb 1.9 oz)     Temp Readings from Last 3 Encounters:   10/31/22 98.1 °F (36.7 °C) (Temporal)   10/04/22 98 °F (36.7 °C) (Temporal)   09/17/21 98.2 °F (36.8 °C)     BP Readings from Last 3 Encounters:   11/09/22 120/80   11/02/22 126/74   08/05/22 124/72     Pulse Readings from Last 3 Encounters:   11/09/22 78   11/02/22 85   08/05/22 83     Current Outpatient Medications on File Prior to Visit   Medication Sig Dispense Refill    aspirin (ECOTRIN) 81 MG EC tablet Take 81 mg by mouth once daily.      atorvastatin (LIPITOR) 40 MG tablet Take 40 mg by mouth once daily.      cholecalciferol, vitamin D3, (VITAMIN D3) 50 mcg (2,000 unit) Tab 1 capsule      cyanocobalamin (VITAMIN B-12) 100 MCG tablet Take 100 mcg by mouth once daily.      ferrous sulfate (FEOSOL) 325 mg (65 mg iron) Tab tablet 1 tablet      fluticasone propionate (FLONASE) 50 mcg/actuation nasal spray SPRAY 1 SPRAY INTO EACH NOSTRIL EVERY DAY 16 mL 12    metFORMIN (GLUCOPHAGE-XR) 500 MG ER 24hr tablet Take 500 mg by mouth once daily.      midodrine (PROAMATINE) 2.5 MG Tab Take 1 tablet (2.5 mg total) by mouth every 12 (twelve) hours. 60 tablet 11    multivitamin (THERAGRAN) per tablet Take 1 tablet by mouth once daily.      vitamin E 100 UNIT capsule Take 100 Units by mouth once daily.      [DISCONTINUED] BIOTIN ORAL Take 4,000 mcg by mouth once daily.       [DISCONTINUED] meloxicam (MOBIC) 15 MG tablet Take 15 mg by mouth.      triamcinolone acetonide 0.1% (KENALOG) 0.1 % ointment APPLY TO RASH TOPICALLY TWICE DAILY  0    [DISCONTINUED] HYDROcodone-acetaminophen (NORCO) 5-325 mg per  "tablet Take 1 tablet by mouth every 6 (six) hours as needed.      [DISCONTINUED] pravastatin (PRAVACHOL) 40 MG tablet Take 40 mg by mouth.       No current facility-administered medications on file prior to visit.         Review of Systems   Constitutional: Positive for fatigue. Negative for malaise/fatigue.   HENT: Negative.  Negative for hearing loss and hoarse voice.    Eyes:  Negative for blurred vision and visual disturbance.   Cardiovascular:  Positive for palpitations (rare) and syncope. Negative for chest pain, claudication, dyspnea on exertion, irregular heartbeat, leg swelling, near-syncope, orthopnea and paroxysmal nocturnal dyspnea.   Respiratory:  Positive for shortness of breath. Negative for cough, hemoptysis, sleep disturbances due to breathing, snoring and wheezing.    Endocrine: Negative for cold intolerance and heat intolerance.   Hematologic/Lymphatic: Does not bruise/bleed easily.   Skin: Negative.  Negative for color change, dry skin and nail changes.   Musculoskeletal: Negative.  Negative for back pain, joint pain and myalgias.   Gastrointestinal: Negative.  Negative for bloating, abdominal pain, constipation, nausea and vomiting.   Genitourinary: Negative.  Negative for dysuria, flank pain, hematuria and hesitancy.   Neurological:  Negative for dizziness, headaches, light-headedness, loss of balance, numbness, paresthesias and weakness.   Psychiatric/Behavioral: Negative.  Negative for altered mental status.    Allergic/Immunologic: Negative for environmental allergies.   All other systems reviewed and are negative.  /80 (BP Location: Right arm, Patient Position: Sitting)   Pulse 78   Ht 5' 7" (1.702 m)   Wt 81.8 kg (180 lb 5.4 oz)   SpO2 (!) 94%   BMI 28.24 kg/m²     Objective:   Physical Exam  Vitals and nursing note reviewed.   Constitutional:       General: She is not in acute distress.     Appearance: Normal appearance. She is well-developed. She is not ill-appearing or " diaphoretic.   HENT:      Head: Normocephalic and atraumatic.      Nose: Nose normal.      Mouth/Throat:      Mouth: Mucous membranes are moist.   Eyes:      General: No scleral icterus.     Conjunctiva/sclera: Conjunctivae normal.      Pupils: Pupils are equal, round, and reactive to light.   Neck:      Thyroid: No thyromegaly.      Vascular: No JVD.      Trachea: No tracheal deviation.   Cardiovascular:      Rate and Rhythm: Normal rate and regular rhythm.      Chest Wall: PMI is not displaced.      Pulses: Intact distal pulses.           Radial pulses are 2+ on the right side and 2+ on the left side.        Dorsalis pedis pulses are 2+ on the right side and 2+ on the left side.      Heart sounds: S1 normal and S2 normal. Heart sounds not distant. No murmur heard.    No friction rub. No gallop. No S3 or S4 sounds.      Comments: Left chest wall PPM site well healed, no signs of infection noted.   Pulmonary:      Effort: Pulmonary effort is normal. No respiratory distress.      Breath sounds: Normal breath sounds. No stridor. No decreased breath sounds, wheezing or rales.   Chest:      Chest wall: No tenderness.   Abdominal:      General: Bowel sounds are normal. There is no distension.      Palpations: Abdomen is soft. There is no mass.      Tenderness: There is no abdominal tenderness. There is no rebound.   Genitourinary:     Comments: Deferred  Musculoskeletal:         General: No tenderness or deformity. Normal range of motion.      Cervical back: Full passive range of motion without pain, normal range of motion and neck supple.      Right ankle: No swelling.      Left ankle: No swelling.   Lymphadenopathy:      Cervical: No cervical adenopathy.   Skin:     General: Skin is warm and dry.      Capillary Refill: Capillary refill takes less than 2 seconds.      Coloration: Skin is not pale.      Findings: No erythema or rash.      Nails: There is no clubbing.   Neurological:      General: No focal deficit  present.      Mental Status: She is alert and oriented to person, place, and time.      Motor: No abnormal muscle tone.      Coordination: Coordination normal.   Psychiatric:         Mood and Affect: Mood normal.         Speech: Speech normal.         Behavior: Behavior normal.         Thought Content: Thought content normal.         Judgment: Judgment normal.       Lab Results   Component Value Date    CHOL 148 09/29/2021    CHOL 140 02/28/2019     Lab Results   Component Value Date    HDL 49 09/29/2021    HDL 49 02/28/2019     Lab Results   Component Value Date    LDLCALC 82.2 09/29/2021    LDLCALC 79.6 02/28/2019     Lab Results   Component Value Date    TRIG 84 09/29/2021    TRIG 57 02/28/2019     Lab Results   Component Value Date    CHOLHDL 33.1 09/29/2021    CHOLHDL 35.0 02/28/2019       Chemistry        Component Value Date/Time     04/29/2022 1028    K 3.7 04/29/2022 1028     04/29/2022 1028    CO2 26 04/29/2022 1028    BUN 10 04/29/2022 1028    CREATININE 0.7 04/29/2022 1028     (H) 04/29/2022 1028        Component Value Date/Time    CALCIUM 9.2 04/29/2022 1028    ALKPHOS 71 04/29/2022 1028    AST 19 04/29/2022 1028    ALT 18 04/29/2022 1028    BILITOT 0.6 04/29/2022 1028    ESTGFRAFRICA >60 04/29/2022 1028    EGFRNONAA >60 04/29/2022 1028          Lab Results   Component Value Date    TSH 0.652 12/01/2020     Lab Results   Component Value Date    INR 1.0 05/18/2021     Lab Results   Component Value Date    WBC 3.68 (L) 09/14/2021    HGB 14.2 09/14/2021    HCT 43.3 09/14/2021    MCV 91 09/14/2021     09/14/2021   1.TTE  Results for orders placed during the hospital encounter of 04/25/22    Echo    Interpretation Summary  · Mild tricuspid regurgitation.  · Normal systolic function.  · The estimated ejection fraction is 55%.  · Normal left ventricular diastolic function.  · Normal right ventricular size.  · Normal central venous pressure (3 mmHg).  · The estimated PA systolic  pressure is 32 mmHg.       Assessment:      1. Postural dizziness with presyncope    2. Bilateral carotid artery stenosis    3. Irregular heart beat    4. LAFB (left anterior fascicular block)    5. Dyspnea on exertion    6. RBBB    7. Cardiac pacemaker in situ    8. Trifascicular block    9. Sinus node dysfunction    10. Type 2 diabetes mellitus with other specified complication, unspecified whether long term insulin use        Plan:     Increase midodrine to 2.5 mg 2 doses per day  Continue ASA Statin  She knows to call if any recurrent chest pain episodes  Profile BP at home  Encourage daily walking  RTC in 1 year with Device check.     Debbie Louis, JACINDAP-C  [facility]

## 2022-11-23 DIAGNOSIS — I45.3 TRIFASCICULAR BLOCK: ICD-10-CM

## 2022-11-23 DIAGNOSIS — I49.5 SINUS NODE DYSFUNCTION: ICD-10-CM

## 2022-11-23 DIAGNOSIS — Z95.0 CARDIAC PACEMAKER IN SITU: Primary | ICD-10-CM

## 2022-11-30 ENCOUNTER — CLINICAL SUPPORT (OUTPATIENT)
Dept: CARDIOLOGY | Facility: HOSPITAL | Age: 77
End: 2022-11-30
Payer: MEDICARE

## 2022-11-30 DIAGNOSIS — Z95.0 PRESENCE OF CARDIAC PACEMAKER: ICD-10-CM

## 2022-11-30 PROCEDURE — 93294 CARDIAC DEVICE CHECK - REMOTE: ICD-10-PCS | Mod: S$GLB,,, | Performed by: INTERNAL MEDICINE

## 2022-11-30 PROCEDURE — 93294 REM INTERROG EVL PM/LDLS PM: CPT | Mod: S$GLB,,, | Performed by: INTERNAL MEDICINE

## 2022-11-30 PROCEDURE — 93296 REM INTERROG EVL PM/IDS: CPT | Performed by: INTERNAL MEDICINE

## 2022-12-07 ENCOUNTER — OFFICE VISIT (OUTPATIENT)
Dept: OPHTHALMOLOGY | Facility: CLINIC | Age: 77
End: 2022-12-07
Payer: MEDICARE

## 2022-12-07 DIAGNOSIS — E11.9 DIABETES MELLITUS WITHOUT COMPLICATION: ICD-10-CM

## 2022-12-07 DIAGNOSIS — Z83.511 FAMILY HISTORY OF GLAUCOMA IN MOTHER: ICD-10-CM

## 2022-12-07 DIAGNOSIS — H40.013 OPEN ANGLE WITH BORDERLINE FINDINGS OF BOTH EYES: Primary | ICD-10-CM

## 2022-12-07 DIAGNOSIS — H26.491 RIGHT POSTERIOR CAPSULAR OPACIFICATION: ICD-10-CM

## 2022-12-07 PROCEDURE — 1159F PR MEDICATION LIST DOCUMENTED IN MEDICAL RECORD: ICD-10-PCS | Mod: CPTII,S$GLB,, | Performed by: OPTOMETRIST

## 2022-12-07 PROCEDURE — 92083 EXTENDED VISUAL FIELD XM: CPT | Mod: S$GLB,,, | Performed by: OPTOMETRIST

## 2022-12-07 PROCEDURE — 99213 OFFICE O/P EST LOW 20 MIN: CPT | Mod: S$GLB,,, | Performed by: OPTOMETRIST

## 2022-12-07 PROCEDURE — 99999 PR PBB SHADOW E&M-EST. PATIENT-LVL III: ICD-10-PCS | Mod: PBBFAC,,, | Performed by: OPTOMETRIST

## 2022-12-07 PROCEDURE — 2023F DILAT RTA XM W/O RTNOPTHY: CPT | Mod: CPTII,S$GLB,, | Performed by: OPTOMETRIST

## 2022-12-07 PROCEDURE — 99213 PR OFFICE/OUTPT VISIT, EST, LEVL III, 20-29 MIN: ICD-10-PCS | Mod: S$GLB,,, | Performed by: OPTOMETRIST

## 2022-12-07 PROCEDURE — 99999 PR PBB SHADOW E&M-EST. PATIENT-LVL III: CPT | Mod: PBBFAC,,, | Performed by: OPTOMETRIST

## 2022-12-07 PROCEDURE — 92083 HUMPHREY VISUAL FIELD - OU - BOTH EYES: ICD-10-PCS | Mod: S$GLB,,, | Performed by: OPTOMETRIST

## 2022-12-07 PROCEDURE — 1159F MED LIST DOCD IN RCRD: CPT | Mod: CPTII,S$GLB,, | Performed by: OPTOMETRIST

## 2022-12-07 PROCEDURE — 2023F PR DILATED RETINAL EXAM W/O EVID OF RETINOPATHY: ICD-10-PCS | Mod: CPTII,S$GLB,, | Performed by: OPTOMETRIST

## 2022-12-08 NOTE — PROGRESS NOTES
HPI    Last seen by DKT  Patient here today for IOP check with HVF  HVF: 12/7/22  gOCT:11/7/22    1. PVD OS  2. Vitreous Degeneration OU  3. Glaucoma Suspect OU  -Asymmetry Analysis 30/29  4. Pseudophakia OU  5. DM 2012          Last edited by Cecy Kothari, PCT on 12/7/2022  1:50 PM.            Assessment /Plan     For exam results, see Encounter Report.    Open angle with borderline findings of both eyes  -     Guzman Visual Field - OU - Extended - Both Eyes  gOCT stable, unreliable HVF. Continue close observation off drops at this time with annual gOCT and HVF.    Diabetes mellitus without complication  No retinopathy, next DFE 11/2023    Right posterior capsular opacification  Worsening, will observe at this time discussed with patient if having difficulty with Mrx will consider Yag eval.      Family history of glaucoma in mother  See #1.     RTC 1 yr for dilated eye exam with gOCT or sooner if any changes to vision.   Discussed above and answered questions.

## 2023-01-13 ENCOUNTER — OFFICE VISIT (OUTPATIENT)
Dept: CARDIOLOGY | Facility: CLINIC | Age: 78
End: 2023-01-13
Payer: MEDICARE

## 2023-01-13 VITALS
DIASTOLIC BLOOD PRESSURE: 80 MMHG | BODY MASS INDEX: 28.27 KG/M2 | HEIGHT: 67 IN | WEIGHT: 180.13 LBS | HEART RATE: 88 BPM | SYSTOLIC BLOOD PRESSURE: 120 MMHG | OXYGEN SATURATION: 99 %

## 2023-01-13 DIAGNOSIS — Z95.0 CARDIAC PACEMAKER IN SITU: ICD-10-CM

## 2023-01-13 DIAGNOSIS — I49.5 SINUS NODE DYSFUNCTION: ICD-10-CM

## 2023-01-13 DIAGNOSIS — R55 POSTURAL DIZZINESS WITH PRESYNCOPE: ICD-10-CM

## 2023-01-13 DIAGNOSIS — R06.09 DYSPNEA ON EXERTION: ICD-10-CM

## 2023-01-13 DIAGNOSIS — R20.9 BILATERAL COLD FEET: ICD-10-CM

## 2023-01-13 DIAGNOSIS — I44.4 LAFB (LEFT ANTERIOR FASCICULAR BLOCK): ICD-10-CM

## 2023-01-13 DIAGNOSIS — E11.69 TYPE 2 DIABETES MELLITUS WITH OTHER SPECIFIED COMPLICATION, UNSPECIFIED WHETHER LONG TERM INSULIN USE: ICD-10-CM

## 2023-01-13 DIAGNOSIS — I45.3 TRIFASCICULAR BLOCK: ICD-10-CM

## 2023-01-13 DIAGNOSIS — I65.23 BILATERAL CAROTID ARTERY STENOSIS: ICD-10-CM

## 2023-01-13 DIAGNOSIS — R55 SYNCOPE AND COLLAPSE: ICD-10-CM

## 2023-01-13 DIAGNOSIS — Z95.0 PRESENCE OF CARDIAC PACEMAKER: Primary | ICD-10-CM

## 2023-01-13 DIAGNOSIS — R42 POSTURAL DIZZINESS WITH PRESYNCOPE: ICD-10-CM

## 2023-01-13 DIAGNOSIS — I49.9 IRREGULAR HEART BEAT: ICD-10-CM

## 2023-01-13 DIAGNOSIS — I45.10 RBBB: ICD-10-CM

## 2023-01-13 PROCEDURE — 99999 PR PBB SHADOW E&M-EST. PATIENT-LVL III: CPT | Mod: PBBFAC,,, | Performed by: INTERNAL MEDICINE

## 2023-01-13 PROCEDURE — 99214 OFFICE O/P EST MOD 30 MIN: CPT | Mod: S$GLB,,, | Performed by: INTERNAL MEDICINE

## 2023-01-13 PROCEDURE — 3288F FALL RISK ASSESSMENT DOCD: CPT | Mod: CPTII,S$GLB,, | Performed by: INTERNAL MEDICINE

## 2023-01-13 PROCEDURE — 3074F SYST BP LT 130 MM HG: CPT | Mod: CPTII,S$GLB,, | Performed by: INTERNAL MEDICINE

## 2023-01-13 PROCEDURE — 1101F PT FALLS ASSESS-DOCD LE1/YR: CPT | Mod: CPTII,S$GLB,, | Performed by: INTERNAL MEDICINE

## 2023-01-13 PROCEDURE — 3074F PR MOST RECENT SYSTOLIC BLOOD PRESSURE < 130 MM HG: ICD-10-PCS | Mod: CPTII,S$GLB,, | Performed by: INTERNAL MEDICINE

## 2023-01-13 PROCEDURE — 3288F PR FALLS RISK ASSESSMENT DOCUMENTED: ICD-10-PCS | Mod: CPTII,S$GLB,, | Performed by: INTERNAL MEDICINE

## 2023-01-13 PROCEDURE — 1126F PR PAIN SEVERITY QUANTIFIED, NO PAIN PRESENT: ICD-10-PCS | Mod: CPTII,S$GLB,, | Performed by: INTERNAL MEDICINE

## 2023-01-13 PROCEDURE — 99999 PR PBB SHADOW E&M-EST. PATIENT-LVL III: ICD-10-PCS | Mod: PBBFAC,,, | Performed by: INTERNAL MEDICINE

## 2023-01-13 PROCEDURE — 1159F PR MEDICATION LIST DOCUMENTED IN MEDICAL RECORD: ICD-10-PCS | Mod: CPTII,S$GLB,, | Performed by: INTERNAL MEDICINE

## 2023-01-13 PROCEDURE — 3079F PR MOST RECENT DIASTOLIC BLOOD PRESSURE 80-89 MM HG: ICD-10-PCS | Mod: CPTII,S$GLB,, | Performed by: INTERNAL MEDICINE

## 2023-01-13 PROCEDURE — 3079F DIAST BP 80-89 MM HG: CPT | Mod: CPTII,S$GLB,, | Performed by: INTERNAL MEDICINE

## 2023-01-13 PROCEDURE — 1101F PR PT FALLS ASSESS DOC 0-1 FALLS W/OUT INJ PAST YR: ICD-10-PCS | Mod: CPTII,S$GLB,, | Performed by: INTERNAL MEDICINE

## 2023-01-13 PROCEDURE — 99214 PR OFFICE/OUTPT VISIT, EST, LEVL IV, 30-39 MIN: ICD-10-PCS | Mod: S$GLB,,, | Performed by: INTERNAL MEDICINE

## 2023-01-13 PROCEDURE — 1159F MED LIST DOCD IN RCRD: CPT | Mod: CPTII,S$GLB,, | Performed by: INTERNAL MEDICINE

## 2023-01-13 PROCEDURE — 1126F AMNT PAIN NOTED NONE PRSNT: CPT | Mod: CPTII,S$GLB,, | Performed by: INTERNAL MEDICINE

## 2023-01-13 PROCEDURE — 3072F LOW RISK FOR RETINOPATHY: CPT | Mod: CPTII,S$GLB,, | Performed by: INTERNAL MEDICINE

## 2023-01-13 PROCEDURE — 1160F RVW MEDS BY RX/DR IN RCRD: CPT | Mod: CPTII,S$GLB,, | Performed by: INTERNAL MEDICINE

## 2023-01-13 PROCEDURE — 1160F PR REVIEW ALL MEDS BY PRESCRIBER/CLIN PHARMACIST DOCUMENTED: ICD-10-PCS | Mod: CPTII,S$GLB,, | Performed by: INTERNAL MEDICINE

## 2023-01-13 PROCEDURE — 3072F PR LOW RISK FOR RETINOPATHY: ICD-10-PCS | Mod: CPTII,S$GLB,, | Performed by: INTERNAL MEDICINE

## 2023-01-13 RX ORDER — MIDODRINE HYDROCHLORIDE 5 MG/1
5 TABLET ORAL EVERY 12 HOURS
Qty: 180 TABLET | Refills: 1 | Status: SHIPPED | OUTPATIENT
Start: 2023-01-13 | End: 2023-04-27 | Stop reason: SDUPTHER
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
## 2023-01-18 ENCOUNTER — OFFICE VISIT (OUTPATIENT)
Dept: RHEUMATOLOGY | Facility: CLINIC | Age: 78
End: 2023-01-18
Payer: MEDICARE

## 2023-01-18 ENCOUNTER — LAB VISIT (OUTPATIENT)
Dept: LAB | Facility: HOSPITAL | Age: 78
End: 2023-01-18
Attending: INTERNAL MEDICINE
Payer: MEDICARE

## 2023-01-18 VITALS
DIASTOLIC BLOOD PRESSURE: 83 MMHG | HEIGHT: 67 IN | HEART RATE: 87 BPM | WEIGHT: 176.81 LBS | SYSTOLIC BLOOD PRESSURE: 123 MMHG | BODY MASS INDEX: 27.75 KG/M2

## 2023-01-18 DIAGNOSIS — M35.9 POLYMYOSITIS ASSOCIATED WITH AUTOIMMUNE DISEASE: ICD-10-CM

## 2023-01-18 DIAGNOSIS — M11.20 CHONDROCALCINOSIS: ICD-10-CM

## 2023-01-18 DIAGNOSIS — M17.12 PRIMARY OSTEOARTHRITIS OF LEFT KNEE: ICD-10-CM

## 2023-01-18 DIAGNOSIS — R76.8 POSITIVE ANA (ANTINUCLEAR ANTIBODY): ICD-10-CM

## 2023-01-18 DIAGNOSIS — M15.9 PRIMARY OSTEOARTHRITIS INVOLVING MULTIPLE JOINTS: ICD-10-CM

## 2023-01-18 DIAGNOSIS — M35.9 POLYMYOSITIS ASSOCIATED WITH AUTOIMMUNE DISEASE: Primary | ICD-10-CM

## 2023-01-18 DIAGNOSIS — M33.20 POLYMYOSITIS ASSOCIATED WITH AUTOIMMUNE DISEASE: Primary | ICD-10-CM

## 2023-01-18 DIAGNOSIS — M33.20 POLYMYOSITIS ASSOCIATED WITH AUTOIMMUNE DISEASE: ICD-10-CM

## 2023-01-18 PROBLEM — M15.0 PRIMARY OSTEOARTHRITIS INVOLVING MULTIPLE JOINTS: Status: ACTIVE | Noted: 2023-01-18

## 2023-01-18 LAB
ALBUMIN SERPL BCP-MCNC: 3.8 G/DL (ref 3.5–5.2)
ALP SERPL-CCNC: 85 U/L (ref 55–135)
ALT SERPL W/O P-5'-P-CCNC: 35 U/L (ref 10–44)
ANION GAP SERPL CALC-SCNC: 7 MMOL/L (ref 8–16)
AST SERPL-CCNC: 39 U/L (ref 10–40)
BASOPHILS # BLD AUTO: 0.03 K/UL (ref 0–0.2)
BASOPHILS NFR BLD: 1.1 % (ref 0–1.9)
BILIRUB SERPL-MCNC: 0.8 MG/DL (ref 0.1–1)
BUN SERPL-MCNC: 10 MG/DL (ref 8–23)
CALCIUM SERPL-MCNC: 9.8 MG/DL (ref 8.7–10.5)
CHLORIDE SERPL-SCNC: 105 MMOL/L (ref 95–110)
CO2 SERPL-SCNC: 26 MMOL/L (ref 23–29)
CREAT SERPL-MCNC: 0.7 MG/DL (ref 0.5–1.4)
CRP SERPL-MCNC: 1.9 MG/L (ref 0–8.2)
DIFFERENTIAL METHOD: ABNORMAL
EOSINOPHIL # BLD AUTO: 0.1 K/UL (ref 0–0.5)
EOSINOPHIL NFR BLD: 4.8 % (ref 0–8)
ERYTHROCYTE [DISTWIDTH] IN BLOOD BY AUTOMATED COUNT: 13.3 % (ref 11.5–14.5)
ERYTHROCYTE [SEDIMENTATION RATE] IN BLOOD BY PHOTOMETRIC METHOD: 25 MM/HR (ref 0–36)
EST. GFR  (NO RACE VARIABLE): >60 ML/MIN/1.73 M^2
GLUCOSE SERPL-MCNC: 132 MG/DL (ref 70–110)
HCT VFR BLD AUTO: 41.3 % (ref 37–48.5)
HGB BLD-MCNC: 13.6 G/DL (ref 12–16)
IMM GRANULOCYTES # BLD AUTO: 0 K/UL (ref 0–0.04)
IMM GRANULOCYTES NFR BLD AUTO: 0 % (ref 0–0.5)
LYMPHOCYTES # BLD AUTO: 1.1 K/UL (ref 1–4.8)
LYMPHOCYTES NFR BLD: 40.9 % (ref 18–48)
MCH RBC QN AUTO: 30.4 PG (ref 27–31)
MCHC RBC AUTO-ENTMCNC: 32.9 G/DL (ref 32–36)
MCV RBC AUTO: 92 FL (ref 82–98)
MONOCYTES # BLD AUTO: 0.2 K/UL (ref 0.3–1)
MONOCYTES NFR BLD: 8.9 % (ref 4–15)
NEUTROPHILS # BLD AUTO: 1.2 K/UL (ref 1.8–7.7)
NEUTROPHILS NFR BLD: 44.3 % (ref 38–73)
NRBC BLD-RTO: 0 /100 WBC
PLATELET # BLD AUTO: 145 K/UL (ref 150–450)
PMV BLD AUTO: 9.7 FL (ref 9.2–12.9)
POTASSIUM SERPL-SCNC: 4 MMOL/L (ref 3.5–5.1)
PROT SERPL-MCNC: 8.9 G/DL (ref 6–8.4)
RBC # BLD AUTO: 4.47 M/UL (ref 4–5.4)
SODIUM SERPL-SCNC: 138 MMOL/L (ref 136–145)
WBC # BLD AUTO: 2.69 K/UL (ref 3.9–12.7)

## 2023-01-18 PROCEDURE — 3072F PR LOW RISK FOR RETINOPATHY: ICD-10-PCS | Mod: CPTII,S$GLB,, | Performed by: INTERNAL MEDICINE

## 2023-01-18 PROCEDURE — 86225 DNA ANTIBODY NATIVE: CPT | Mod: 59 | Performed by: INTERNAL MEDICINE

## 2023-01-18 PROCEDURE — 1101F PT FALLS ASSESS-DOCD LE1/YR: CPT | Mod: CPTII,S$GLB,, | Performed by: INTERNAL MEDICINE

## 2023-01-18 PROCEDURE — 85025 COMPLETE CBC W/AUTO DIFF WBC: CPT | Performed by: INTERNAL MEDICINE

## 2023-01-18 PROCEDURE — 3288F PR FALLS RISK ASSESSMENT DOCUMENTED: ICD-10-PCS | Mod: CPTII,S$GLB,, | Performed by: INTERNAL MEDICINE

## 2023-01-18 PROCEDURE — 86140 C-REACTIVE PROTEIN: CPT | Performed by: INTERNAL MEDICINE

## 2023-01-18 PROCEDURE — 86038 ANTINUCLEAR ANTIBODIES: CPT | Performed by: INTERNAL MEDICINE

## 2023-01-18 PROCEDURE — 1160F RVW MEDS BY RX/DR IN RCRD: CPT | Mod: CPTII,S$GLB,, | Performed by: INTERNAL MEDICINE

## 2023-01-18 PROCEDURE — 1160F PR REVIEW ALL MEDS BY PRESCRIBER/CLIN PHARMACIST DOCUMENTED: ICD-10-PCS | Mod: CPTII,S$GLB,, | Performed by: INTERNAL MEDICINE

## 2023-01-18 PROCEDURE — 36415 COLL VENOUS BLD VENIPUNCTURE: CPT | Performed by: INTERNAL MEDICINE

## 2023-01-18 PROCEDURE — 3288F FALL RISK ASSESSMENT DOCD: CPT | Mod: CPTII,S$GLB,, | Performed by: INTERNAL MEDICINE

## 2023-01-18 PROCEDURE — 80053 COMPREHEN METABOLIC PANEL: CPT | Performed by: INTERNAL MEDICINE

## 2023-01-18 PROCEDURE — 85652 RBC SED RATE AUTOMATED: CPT | Performed by: INTERNAL MEDICINE

## 2023-01-18 PROCEDURE — 99999 PR PBB SHADOW E&M-EST. PATIENT-LVL III: CPT | Mod: PBBFAC,,, | Performed by: INTERNAL MEDICINE

## 2023-01-18 PROCEDURE — 1159F PR MEDICATION LIST DOCUMENTED IN MEDICAL RECORD: ICD-10-PCS | Mod: CPTII,S$GLB,, | Performed by: INTERNAL MEDICINE

## 2023-01-18 PROCEDURE — 3079F PR MOST RECENT DIASTOLIC BLOOD PRESSURE 80-89 MM HG: ICD-10-PCS | Mod: CPTII,S$GLB,, | Performed by: INTERNAL MEDICINE

## 2023-01-18 PROCEDURE — 86039 ANTINUCLEAR ANTIBODIES (ANA): CPT | Performed by: INTERNAL MEDICINE

## 2023-01-18 PROCEDURE — 3074F SYST BP LT 130 MM HG: CPT | Mod: CPTII,S$GLB,, | Performed by: INTERNAL MEDICINE

## 2023-01-18 PROCEDURE — 99999 PR PBB SHADOW E&M-EST. PATIENT-LVL III: ICD-10-PCS | Mod: PBBFAC,,, | Performed by: INTERNAL MEDICINE

## 2023-01-18 PROCEDURE — 3079F DIAST BP 80-89 MM HG: CPT | Mod: CPTII,S$GLB,, | Performed by: INTERNAL MEDICINE

## 2023-01-18 PROCEDURE — 86235 NUCLEAR ANTIGEN ANTIBODY: CPT | Mod: 59 | Performed by: INTERNAL MEDICINE

## 2023-01-18 PROCEDURE — 99214 PR OFFICE/OUTPT VISIT, EST, LEVL IV, 30-39 MIN: ICD-10-PCS | Mod: S$GLB,,, | Performed by: INTERNAL MEDICINE

## 2023-01-18 PROCEDURE — 1159F MED LIST DOCD IN RCRD: CPT | Mod: CPTII,S$GLB,, | Performed by: INTERNAL MEDICINE

## 2023-01-18 PROCEDURE — 99214 OFFICE O/P EST MOD 30 MIN: CPT | Mod: S$GLB,,, | Performed by: INTERNAL MEDICINE

## 2023-01-18 PROCEDURE — 3074F PR MOST RECENT SYSTOLIC BLOOD PRESSURE < 130 MM HG: ICD-10-PCS | Mod: CPTII,S$GLB,, | Performed by: INTERNAL MEDICINE

## 2023-01-18 PROCEDURE — 1101F PR PT FALLS ASSESS DOC 0-1 FALLS W/OUT INJ PAST YR: ICD-10-PCS | Mod: CPTII,S$GLB,, | Performed by: INTERNAL MEDICINE

## 2023-01-18 PROCEDURE — 3072F LOW RISK FOR RETINOPATHY: CPT | Mod: CPTII,S$GLB,, | Performed by: INTERNAL MEDICINE

## 2023-01-18 NOTE — PROGRESS NOTES
RHEUMATOLOGY CLINIC FOLLOW UP VISIT  Chief complaints, HPI, ROS, EXAM, Assessment & Plans:-  Anca Kirkland a 77 y.o. pleasant female comes in for follow-up visit.  She follows up in the Rheumatology Clinic for polymyositis which has been in remission.  Last visit she was seen with worsening left knee joint pain associated with stiffness and swelling and synovitis which was injected with Kenalog.  She reports doing well except mild activity-related lower back pain and intermittent knee pain especially since they elevated her house and she had to go up around 13 stairs.  Rheumatological review of system negative otherwise.  Exam shows chronic deformities but no active synovitis of bilateral knees.  No photosensitive rash.    1. Polymyositis associated with autoimmune disease    2. Primary osteoarthritis of left knee    3. Chondrocalcinosis    4. Primary osteoarthritis involving multiple joints    5. Positive VALDEZ (antinuclear antibody)      Problem List Items Addressed This Visit       Polymyositis associated with autoimmune disease - Primary    Relevant Orders    CBC Auto Differential    Comprehensive Metabolic Panel    C-Reactive Protein    Sedimentation rate    VALDEZ Screen w/Reflex    Primary osteoarthritis of left knee    Chondrocalcinosis    Relevant Orders    CBC Auto Differential    Comprehensive Metabolic Panel    Primary osteoarthritis involving multiple joints     Other Visit Diagnoses       Positive VALDEZ (antinuclear antibody)        Relevant Orders    CBC Auto Differential    Comprehensive Metabolic Panel    C-Reactive Protein    Sedimentation rate    VALDEZ Screen w/Reflex           Latest Reference Range & Units Most Recent   VALDEZ Screen Negative <1:160  Positive !  10/31/19 13:48   VALDEZ HEP-2 Titer  Positive >=1:2560 Homogeneous  10/31/19 13:48   ds DNA Ab Negative 1:10  Positive >=1:5120 !  10/31/19 13:48   Anti-SSA Antibody 0.00 - 19.99 EU  3.19  10/31/19 13:48   Anti-SSA Interpretation Negative  Negative  10/31/19 13:48   Anti-SSB Antibody 0.00 - 19.99 EU 3.36  10/31/19 13:48   Anti-SSB Interpretation Negative  Negative  10/31/19 13:48   Anti Sm Antibody 0.00 - 19.99 EU 2.16  10/31/19 13:48   Anti-Sm Interpretation Negative  Negative  10/31/19 13:48   Anti Sm/RNP Antibody 0.00 - 19.99 EU 2.09  10/31/19 13:48   Anti-Sm/RNP Interpretation Negative  Negative  10/31/19 13:48   Complement (C-3) 50 - 180 mg/dL 139  10/31/19 13:28   Complement (C-4) 11 - 44 mg/dL 35  10/31/19 13:28   Pathologist Interpretation UPE  REVIEWED  5/2/22 10:19   Protein Electrophoresis, Ur  SEE COMMENT  5/2/22 10:19   Anti-Emilia-1 Antibody <20 Units <20  4/21/16 15:43   Anti-PM/Scl Ab <20 Units <20  4/21/16 15:43   Anti-SS-A 52 kD Ab, IgG <20 Units <20  4/21/16 15:43   Anti-U1-RNP  Ab <20 Units <20  4/21/16 15:43   EJ Negative  Negative  4/21/16 15:43   Fibrillarin (U3 RNP) Negative  Negative  4/21/16 15:43   HMGCR Antibody 0 - 19 Units <3  4/21/16 15:43   Ku Negative  Negative  4/21/16 15:43   MDA-5 (P140) (CADM-140) <20 Units <20  4/21/16 15:43   MI-2 Negative  Weak Positive !  4/21/16 15:43   NXP-2 (P140) <20 Units <20  4/21/16 15:43   OJ Negative  Negative  4/21/16 15:43   PL-12 Negative  Negative  4/21/16 15:43   PL-7 Negative  Negative  4/21/16 15:43   SRP Negative  Negative  4/21/16 15:43   TIF1 GAMMA (P155/140) <20 Units <20  4/21/16 15:43   U2 snRNP Negative  Negative  4/21/16 15:43   !: Data is abnormal   Latest Reference Range & Units 01/18/23 12:34   WBC 3.90 - 12.70 K/uL 2.69 (L)   RBC 4.00 - 5.40 M/uL 4.47   Hemoglobin 12.0 - 16.0 g/dL 13.6   Hematocrit 37.0 - 48.5 % 41.3   MCV 82 - 98 fL 92   MCH 27.0 - 31.0 pg 30.4   MCHC 32.0 - 36.0 g/dL 32.9   RDW 11.5 - 14.5 % 13.3   Platelets 150 - 450 K/uL 145 (L)   MPV 9.2 - 12.9 fL 9.7   Gran % 38.0 - 73.0 % 44.3   Lymph % 18.0 - 48.0 % 40.9   Mono % 4.0 - 15.0 % 8.9   Eosinophil % 0.0 - 8.0 % 4.8   Basophil % 0.0 - 1.9 % 1.1    Immature Granulocytes 0.0 - 0.5 % 0.0   Gran # (ANC) 1.8 - 7.7 K/uL 1.2 (L)   Lymph # 1.0 - 4.8 K/uL 1.1   Mono # 0.3 - 1.0 K/uL 0.2 (L)   Eos # 0.0 - 0.5 K/uL 0.1   Baso # 0.00 - 0.20 K/uL 0.03   Immature Grans (Abs) 0.00 - 0.04 K/uL 0.00   nRBC 0 /100 WBC 0   Differential Method  Automated   Sodium 136 - 145 mmol/L 138   Potassium 3.5 - 5.1 mmol/L 4.0   Chloride 95 - 110 mmol/L 105   CO2 23 - 29 mmol/L 26   Anion Gap 8 - 16 mmol/L 7 (L)   BUN 8 - 23 mg/dL 10   Creatinine 0.5 - 1.4 mg/dL 0.7   eGFR >60 mL/min/1.73 m^2 >60   Glucose 70 - 110 mg/dL 132 (H)   Calcium 8.7 - 10.5 mg/dL 9.8   Alkaline Phosphatase 55 - 135 U/L 85   PROTEIN TOTAL 6.0 - 8.4 g/dL 8.9 (H)   Albumin 3.5 - 5.2 g/dL 3.8   BILIRUBIN TOTAL 0.1 - 1.0 mg/dL 0.8   AST 10 - 40 U/L 39   ALT 10 - 44 U/L 35   CRP 0.0 - 8.2 mg/L 1.9   (L): Data is abnormally low  (H): Data is abnormally high      Polymyositis in remission.  Monitor clinically.    High titer ds DNA in the past without any evidence of lupus arthritis or cutaneous lupus or any other lupus manifestations.  Monitor clinically.  Check activity markers today.  Severe chondrocalcinosis of bilateral knee associated with intermittent rare episodes of pseudogout.  Medrol Dosepak p.r.n..  Inject left knee with corticosteroid if needed in future.  Tylenol p.r.n. for chronic lower back pain.  Referral to back and spine clinic in future if any worsening symptoms.  # Follow up in about 1 year (around 1/18/2024).      Disclaimer: This note was prepared using voice recognition system and is likely to have sound alike errors and is not proof read.  Please call me with any questions.

## 2023-01-19 LAB
ANA PATTERN 1: NORMAL
ANA SER QL IF: POSITIVE
ANA TITR SER IF: >2560 {TITER}

## 2023-01-23 LAB
ANTI SM ANTIBODY: 0.13 RATIO (ref 0–0.99)
ANTI SM/RNP ANTIBODY: 0.35 RATIO (ref 0–0.99)
ANTI-SM INTERPRETATION: NEGATIVE
ANTI-SM/RNP INTERPRETATION: NEGATIVE
ANTI-SSA ANTIBODY: 0.83 RATIO (ref 0–0.99)
ANTI-SSA INTERPRETATION: NEGATIVE
ANTI-SSB ANTIBODY: 0.16 RATIO (ref 0–0.99)
ANTI-SSB INTERPRETATION: NEGATIVE
DNA TITER: >5120
DSDNA AB SER-ACNC: POSITIVE [IU]/ML

## 2023-01-26 ENCOUNTER — HOSPITAL ENCOUNTER (OUTPATIENT)
Dept: CARDIOLOGY | Facility: HOSPITAL | Age: 78
Discharge: HOME | End: 2023-01-26
Attending: INTERNAL MEDICINE
Payer: MEDICARE

## 2023-01-26 DIAGNOSIS — R20.9 BILATERAL COLD FEET: ICD-10-CM

## 2023-01-26 LAB
LEFT ABI: 1.21
LEFT ANT TIBIAL SYS PSV: 58 CM/S
LEFT ARM BP: 106 MMHG
LEFT CFA PSV: 109 CM/S
LEFT DORSALIS PEDIS: 126 MMHG
LEFT PERONEAL SYS PSV: 53 CM/S
LEFT POPLITEAL PSV: 63 CM/S
LEFT POST TIBIAL SYS PSV: 56 CM/S
LEFT POSTERIOR TIBIAL: 133 MMHG
LEFT PROFUNDA SYS PSV: 49 CM/S
LEFT SUPER FEMORAL DIST SYS PSV: 101 CM/S
LEFT SUPER FEMORAL MID SYS PSV: 108 CM/S
LEFT SUPER FEMORAL OSTIAL SYS PSV: 76 CM/S
LEFT SUPER FEMORAL PROX SYS PSV: 111 CM/S
LEFT TBI: 0.57
LEFT TIB/PER TRUNK SYS PSV: 65 CM/S
LEFT TOE PRESSURE: 63 MMHG
OHS CV LEFT LOWER EXTREMITY ABI (NO CALC): 1.21
OHS CV RIGHT ABI LOWER EXTREMITY (NO CALC): 1.18
RIGHT ABI: 1.18
RIGHT ANT TIBIAL SYS PSV: 71 CM/S
RIGHT ARM BP: 110 MMHG
RIGHT CFA PSV: 106 CM/S
RIGHT DORSALIS PEDIS: 129 MMHG
RIGHT PERONEAL SYS PSV: 53 CM/S
RIGHT POPLITEAL PSV: 68 CM/S
RIGHT POST TIBIAL SYS PSV: 70 CM/S
RIGHT POSTERIOR TIBIAL: 130 MMHG
RIGHT PROFUNDA SYS PSV: 51 CM/S
RIGHT SUPER FEMORAL DIST SYS PSV: 104 CM/S
RIGHT SUPER FEMORAL MID SYS PSV: 87 CM/S
RIGHT SUPER FEMORAL OSTIAL SYS PSV: 93 CM/S
RIGHT SUPER FEMORAL PROX SYS PSV: 92 CM/S
RIGHT TBI: 0.74
RIGHT TIB/PER TRUNK SYS PSV: 72 CM/S
RIGHT TOE PRESSURE: 81 MMHG

## 2023-01-26 PROCEDURE — 93922 UPR/L XTREMITY ART 2 LEVELS: CPT | Mod: 26,,, | Performed by: INTERNAL MEDICINE

## 2023-01-26 PROCEDURE — 93925 CV US DOPPLER ARTERIAL LEGS BILATERAL (CUPID ONLY): ICD-10-PCS | Mod: 26,,, | Performed by: INTERNAL MEDICINE

## 2023-01-26 PROCEDURE — 93925 LOWER EXTREMITY STUDY: CPT | Mod: 26,,, | Performed by: INTERNAL MEDICINE

## 2023-01-26 PROCEDURE — 93922 UPR/L XTREMITY ART 2 LEVELS: CPT

## 2023-01-26 PROCEDURE — 93925 LOWER EXTREMITY STUDY: CPT

## 2023-01-26 PROCEDURE — 93922 ANKLE BRACHIAL INDICES (ABI): ICD-10-PCS | Mod: 26,,, | Performed by: INTERNAL MEDICINE

## 2023-02-15 ENCOUNTER — OFFICE VISIT (OUTPATIENT)
Dept: PODIATRY | Facility: CLINIC | Age: 78
End: 2023-02-15
Payer: MEDICARE

## 2023-02-15 VITALS
BODY MASS INDEX: 27.62 KG/M2 | HEIGHT: 67 IN | DIASTOLIC BLOOD PRESSURE: 70 MMHG | WEIGHT: 176 LBS | SYSTOLIC BLOOD PRESSURE: 105 MMHG | HEART RATE: 90 BPM

## 2023-02-15 DIAGNOSIS — G57.93 NEUROPATHY OF BOTH FEET: ICD-10-CM

## 2023-02-15 DIAGNOSIS — E11.9 ENCOUNTER FOR COMPREHENSIVE DIABETIC FOOT EXAMINATION, TYPE 2 DIABETES MELLITUS: Primary | ICD-10-CM

## 2023-02-15 PROCEDURE — 3078F PR MOST RECENT DIASTOLIC BLOOD PRESSURE < 80 MM HG: ICD-10-PCS | Mod: CPTII,S$GLB,, | Performed by: PODIATRIST

## 2023-02-15 PROCEDURE — 99999 PR PBB SHADOW E&M-EST. PATIENT-LVL III: ICD-10-PCS | Mod: PBBFAC,,, | Performed by: PODIATRIST

## 2023-02-15 PROCEDURE — 1101F PR PT FALLS ASSESS DOC 0-1 FALLS W/OUT INJ PAST YR: ICD-10-PCS | Mod: CPTII,S$GLB,, | Performed by: PODIATRIST

## 2023-02-15 PROCEDURE — 3074F SYST BP LT 130 MM HG: CPT | Mod: CPTII,S$GLB,, | Performed by: PODIATRIST

## 2023-02-15 PROCEDURE — 3288F PR FALLS RISK ASSESSMENT DOCUMENTED: ICD-10-PCS | Mod: CPTII,S$GLB,, | Performed by: PODIATRIST

## 2023-02-15 PROCEDURE — 99203 PR OFFICE/OUTPT VISIT, NEW, LEVL III, 30-44 MIN: ICD-10-PCS | Mod: 25,S$GLB,, | Performed by: PODIATRIST

## 2023-02-15 PROCEDURE — 99203 OFFICE O/P NEW LOW 30 MIN: CPT | Mod: 25,S$GLB,, | Performed by: PODIATRIST

## 2023-02-15 PROCEDURE — 1159F MED LIST DOCD IN RCRD: CPT | Mod: CPTII,S$GLB,, | Performed by: PODIATRIST

## 2023-02-15 PROCEDURE — 3074F PR MOST RECENT SYSTOLIC BLOOD PRESSURE < 130 MM HG: ICD-10-PCS | Mod: CPTII,S$GLB,, | Performed by: PODIATRIST

## 2023-02-15 PROCEDURE — 1160F RVW MEDS BY RX/DR IN RCRD: CPT | Mod: CPTII,S$GLB,, | Performed by: PODIATRIST

## 2023-02-15 PROCEDURE — 3072F PR LOW RISK FOR RETINOPATHY: ICD-10-PCS | Mod: CPTII,S$GLB,, | Performed by: PODIATRIST

## 2023-02-15 PROCEDURE — 3078F DIAST BP <80 MM HG: CPT | Mod: CPTII,S$GLB,, | Performed by: PODIATRIST

## 2023-02-15 PROCEDURE — 3072F LOW RISK FOR RETINOPATHY: CPT | Mod: CPTII,S$GLB,, | Performed by: PODIATRIST

## 2023-02-15 PROCEDURE — 1159F PR MEDICATION LIST DOCUMENTED IN MEDICAL RECORD: ICD-10-PCS | Mod: CPTII,S$GLB,, | Performed by: PODIATRIST

## 2023-02-15 PROCEDURE — 1101F PT FALLS ASSESS-DOCD LE1/YR: CPT | Mod: CPTII,S$GLB,, | Performed by: PODIATRIST

## 2023-02-15 PROCEDURE — 3288F FALL RISK ASSESSMENT DOCD: CPT | Mod: CPTII,S$GLB,, | Performed by: PODIATRIST

## 2023-02-15 PROCEDURE — 1160F PR REVIEW ALL MEDS BY PRESCRIBER/CLIN PHARMACIST DOCUMENTED: ICD-10-PCS | Mod: CPTII,S$GLB,, | Performed by: PODIATRIST

## 2023-02-15 PROCEDURE — 99999 PR PBB SHADOW E&M-EST. PATIENT-LVL III: CPT | Mod: PBBFAC,,, | Performed by: PODIATRIST

## 2023-02-16 NOTE — PROGRESS NOTES
Subjective:     Patient ID: Anca Mcclellan is a 77 y.o. female.    Chief Complaint: Foot Pain (C/o tingling and cold feet, diabetic Pt, Wears casual shoes, PCP  )    Anca is a 77 y.o. female who presents to the clinic upon referral from Dr. Yesica ventura. provider found  for evaluation and treatment of diabetic feet. Anca has a past medical history of Bilateral carotid artery stenosis (10/15/2019), Hyperlipidemia, and LUCRETIA (obstructive sleep apnea) (5/6/2022). Patient relates no major problem with feet. Only complaints today consist of tingling and cold feet. Patient states her last HgA1c was eleveted to 6.3.    PCP: Tony Natarajan, DO    Date Last Seen by PCP: 02/10/2023    Current shoe gear: Casual shoes    Hemoglobin A1C   Date Value Ref Range Status   04/29/2022 6.5 (H) 4.0 - 5.6 % Final     Comment:     ADA Screening Guidelines:  5.7-6.4%  Consistent with prediabetes  >or=6.5%  Consistent with diabetes    High levels of fetal hemoglobin interfere with the HbA1C  assay. Heterozygous hemoglobin variants (HbS, HgC, etc)do  not significantly interfere with this assay.   However, presence of multiple variants may affect accuracy.           Patient Active Problem List   Diagnosis    Polymyositis associated with autoimmune disease    Fatigue    Irregular heart beat    Hyperlipidemia    Syncope and collapse    Primary osteoarthritis of left knee    Chronic right shoulder pain    Cervical radiculopathy due to degenerative joint disease of spine    Bilateral carotid artery stenosis    Postural dizziness with presyncope    Abnormal ECG    LAFB (left anterior fascicular block)    RBBB    Dyspnea on exertion    First degree AV block    Near syncope    Palpitations    Disorder of connective tissue    Trifascicular block    Cardiac pacemaker in situ    Iron deficiency anemia due to chronic blood loss    Anemia    Angina pectoris    Atherosclerosis of aorta    Gastroesophageal reflux disease    Hearing loss    Long term  (current) use of aspirin    Seasonal allergies    Sinus node dysfunction    Type 2 diabetes mellitus with other specified complication    Lumbar spondylosis    Sleep-disordered breathing    LUCRETIA (obstructive sleep apnea)    Chondrocalcinosis    Primary osteoarthritis involving multiple joints       Medication List with Changes/Refills   Current Medications    ASPIRIN (ECOTRIN) 81 MG EC TABLET    Take 81 mg by mouth once daily.    ATORVASTATIN (LIPITOR) 40 MG TABLET    Take 40 mg by mouth once daily.    CHOLECALCIFEROL, VITAMIN D3, (VITAMIN D3) 50 MCG (2,000 UNIT) TAB    1 capsule    CYANOCOBALAMIN (VITAMIN B-12) 100 MCG TABLET    Take 100 mcg by mouth once daily.    FERROUS SULFATE (FEOSOL) 325 MG (65 MG IRON) TAB TABLET    1 tablet    FLUTICASONE PROPIONATE (FLONASE) 50 MCG/ACTUATION NASAL SPRAY    SPRAY 1 SPRAY INTO EACH NOSTRIL EVERY DAY    METFORMIN (GLUCOPHAGE-XR) 500 MG ER 24HR TABLET    Take 500 mg by mouth once daily.    MIDODRINE (PROAMATINE) 5 MG TAB    Take 1 tablet (5 mg total) by mouth every 12 (twelve) hours.    MULTIVITAMIN (THERAGRAN) PER TABLET    Take 1 tablet by mouth once daily.    TRIAMCINOLONE ACETONIDE 0.1% (KENALOG) 0.1 % OINTMENT    APPLY TO RASH TOPICALLY TWICE DAILY    VITAMIN E 100 UNIT CAPSULE    Take 100 Units by mouth once daily.       Review of patient's allergies indicates:  No Known Allergies    Past Surgical History:   Procedure Laterality Date    A-V CARDIAC PACEMAKER INSERTION Left 6/3/2021    Procedure: INSERTION, CARDIAC PACEMAKER, DUAL CHAMBER;  Surgeon: Arnold Martinez MD;  Location: [facility] CATH LAB;  Service: Cardiology;  Laterality: Left;  COVID-19, MRNA, LN-S, PF ([employer]) 2/4/2021, 1/14/2021//VISENZEsantiago ching notified    ESOPHAGOGASTRODUODENOSCOPY N/A 9/17/2021    Procedure: EGD (ESOPHAGOGASTRODUODENOSCOPY);  Surgeon: Rimma Mccracken MD;  Location: [facility] ENDO;  Service: Endoscopy;  Laterality: N/A;    HYSTERECTOMY  1988    INSERTION OF PACEMAKER  06/03/2021  "      Family History   Problem Relation Age of Onset    Cataracts Mother     Heart disease Mother     Diabetes type II Mother     Dementia Father     Blindness Father     Heart disease Brother     Migraines Neg Hx     Melanoma Neg Hx     Lupus Neg Hx     Psoriasis Neg Hx        Social History     Socioeconomic History    Marital status:    Tobacco Use    Smoking status: Never    Smokeless tobacco: Never   Substance and Sexual Activity    Alcohol use: Not Currently     Comment: seldom    Drug use: Never    Sexual activity: Not Currently   Other Topics Concern    Are you pregnant or think you may be? No    Breast-feeding No       Vitals:    02/15/23 1432   BP: 105/70   Pulse: 90   Weight: 79.8 kg (176 lb)   Height: 5' 7" (1.702 m)       Hemoglobin A1C   Date Value Ref Range Status   04/29/2022 6.5 (H) 4.0 - 5.6 % Final     Comment:     ADA Screening Guidelines:  5.7-6.4%  Consistent with prediabetes  >or=6.5%  Consistent with diabetes    High levels of fetal hemoglobin interfere with the HbA1C  assay. Heterozygous hemoglobin variants (HbS, HgC, etc)do  not significantly interfere with this assay.   However, presence of multiple variants may affect accuracy.         Review of Systems   Constitutional:  Negative for chills and fever.   Respiratory:  Negative for shortness of breath.    Cardiovascular:  Negative for chest pain, palpitations, orthopnea, claudication and leg swelling.   Gastrointestinal:  Negative for diarrhea, nausea and vomiting.   Musculoskeletal:  Negative for joint pain.   Skin:  Negative for rash.   Neurological:  Positive for tingling.   Psychiatric/Behavioral: Negative.             Objective:      PHYSICAL EXAM: Apperance: Alert and orient in no distress,well developed, and with good attention to grooming and body habits  Patient presents ambulating in casual shoes.   LOWER EXTREMITY EXAM:  VASCULAR: Dorsalis pedis pulses 2/4 bilateral and Posterior Tibial pulses 2/4 bilateral. Capillary " fill time <4 seconds bilateral. No edema observed bilateral. Varicosities absent bilateral. Skin temperature of the lower extremities is warm to warm, proximal to distal. Hair growth dim bilateral.  DERMATOLOGICAL: No skin rashes, subcutaneous nodules, lesions, or ulcers observed bilateral. Nails 1,2,3,4,5 bilateral normal length and thickness. Webspaces 1,2,3,4 bilateral clean, dry and without evidence of break in skin integrity.   NEUROLOGICAL: Light touch, sharp-dull, proprioception all present and equal bilaterally.  Vibratory sensation diminished at bilateral hallux and navicular. Protective sensation intact at all 10 sites as tested with a [place] 5.07 monofilament.   MUSCULOSKELETAL: Muscle strength is 5/5 for foot inverters, everters, plantarflexors, and dorsiflexors. Muscle tone is normal.         Assessment:       ICD-10-CM ICD-9-CM   1. Encounter for comprehensive diabetic foot examination, type 2 diabetes mellitus  E11.9 250.00   2. Neuropathy of both feet  G57.93 356.9       Plan:   Encounter for comprehensive diabetic foot examination, type 2 diabetes mellitus    Neuropathy of both feet      I counseled the patient on her conditions, regarding findings of my examination, my impressions, and usual treatment plan.   This visit spent on counseling and coordination of care.  Appointment spent on education about the diabetic foot, neuropathy, and prevention of limb loss.  Shoe inspection. Diabetic Foot Education. Patient reminded of the importance of good nutrition and blood sugar control to help prevent podiatric complications of diabetes. Patient instructed on proper foot hygeine. We discussed wearing proper shoe gear, daily foot inspections, never walking without protective shoe gear, never putting sharp instruments to feet.    Discussed with patient treatments for neuropathy consisting of topical or oral medication.  Recommendations given for over-the-counter medicine such as [place]  and/or Capsaicin.  Patient  will continue to monitor the areas daily, inspect feet, wear protective shoe gear when ambulatory, moisturizer to maintain skin integrity. Patient reminded of the importance of good nutrition and blood sugar control to help prevent podiatric complications of diabetes.  Patient to return 6 months or sooner if needed.          Grady Almanzar DPM  [facility]

## 2023-02-28 ENCOUNTER — CLINICAL SUPPORT (OUTPATIENT)
Dept: CARDIOLOGY | Facility: HOSPITAL | Age: 78
End: 2023-02-28
Payer: MEDICARE

## 2023-02-28 DIAGNOSIS — Z95.0 PRESENCE OF CARDIAC PACEMAKER: ICD-10-CM

## 2023-02-28 PROCEDURE — 93296 REM INTERROG EVL PM/IDS: CPT | Performed by: INTERNAL MEDICINE

## 2023-04-25 DIAGNOSIS — Z76.89 ENCOUNTER TO ESTABLISH CARE: Primary | ICD-10-CM

## 2023-04-27 ENCOUNTER — OFFICE VISIT (OUTPATIENT)
Dept: CARDIOLOGY | Facility: CLINIC | Age: 78
End: 2023-04-27
Payer: MEDICARE

## 2023-04-27 ENCOUNTER — HOSPITAL ENCOUNTER (OUTPATIENT)
Dept: CARDIOLOGY | Facility: HOSPITAL | Age: 78
Discharge: HOME | End: 2023-04-27
Attending: INTERNAL MEDICINE
Payer: MEDICARE

## 2023-04-27 VITALS
WEIGHT: 175.06 LBS | SYSTOLIC BLOOD PRESSURE: 106 MMHG | DIASTOLIC BLOOD PRESSURE: 70 MMHG | OXYGEN SATURATION: 98 % | HEART RATE: 87 BPM | BODY MASS INDEX: 27.42 KG/M2

## 2023-04-27 DIAGNOSIS — I45.10 RBBB: ICD-10-CM

## 2023-04-27 DIAGNOSIS — Z95.0 PRESENCE OF CARDIAC PACEMAKER: Primary | ICD-10-CM

## 2023-04-27 DIAGNOSIS — R20.9 BILATERAL COLD FEET: ICD-10-CM

## 2023-04-27 DIAGNOSIS — R06.09 DYSPNEA ON EXERTION: ICD-10-CM

## 2023-04-27 DIAGNOSIS — I44.4 LAFB (LEFT ANTERIOR FASCICULAR BLOCK): ICD-10-CM

## 2023-04-27 DIAGNOSIS — I45.3 TRIFASCICULAR BLOCK: ICD-10-CM

## 2023-04-27 DIAGNOSIS — R55 NEAR SYNCOPE: ICD-10-CM

## 2023-04-27 DIAGNOSIS — I49.5 SINUS NODE DYSFUNCTION: ICD-10-CM

## 2023-04-27 DIAGNOSIS — I44.0 FIRST DEGREE AV BLOCK: ICD-10-CM

## 2023-04-27 DIAGNOSIS — E11.69 TYPE 2 DIABETES MELLITUS WITH OTHER SPECIFIED COMPLICATION, UNSPECIFIED WHETHER LONG TERM INSULIN USE: ICD-10-CM

## 2023-04-27 DIAGNOSIS — Z76.89 ENCOUNTER TO ESTABLISH CARE: ICD-10-CM

## 2023-04-27 DIAGNOSIS — R55 SYNCOPE AND COLLAPSE: ICD-10-CM

## 2023-04-27 DIAGNOSIS — I65.23 BILATERAL CAROTID ARTERY STENOSIS: ICD-10-CM

## 2023-04-27 DIAGNOSIS — R55 POSTURAL DIZZINESS WITH PRESYNCOPE: ICD-10-CM

## 2023-04-27 DIAGNOSIS — R42 POSTURAL DIZZINESS WITH PRESYNCOPE: ICD-10-CM

## 2023-04-27 DIAGNOSIS — Z95.0 CARDIAC PACEMAKER IN SITU: ICD-10-CM

## 2023-04-27 DIAGNOSIS — I49.9 IRREGULAR HEART BEAT: ICD-10-CM

## 2023-04-27 PROCEDURE — 3078F PR MOST RECENT DIASTOLIC BLOOD PRESSURE < 80 MM HG: ICD-10-PCS | Mod: CPTII,S$GLB,, | Performed by: INTERNAL MEDICINE

## 2023-04-27 PROCEDURE — 3072F PR LOW RISK FOR RETINOPATHY: ICD-10-PCS | Mod: CPTII,S$GLB,, | Performed by: INTERNAL MEDICINE

## 2023-04-27 PROCEDURE — 1101F PR PT FALLS ASSESS DOC 0-1 FALLS W/OUT INJ PAST YR: ICD-10-PCS | Mod: CPTII,S$GLB,, | Performed by: INTERNAL MEDICINE

## 2023-04-27 PROCEDURE — 1101F PT FALLS ASSESS-DOCD LE1/YR: CPT | Mod: CPTII,S$GLB,, | Performed by: INTERNAL MEDICINE

## 2023-04-27 PROCEDURE — 99214 PR OFFICE/OUTPT VISIT, EST, LEVL IV, 30-39 MIN: ICD-10-PCS | Mod: S$GLB,,, | Performed by: INTERNAL MEDICINE

## 2023-04-27 PROCEDURE — 3074F SYST BP LT 130 MM HG: CPT | Mod: CPTII,S$GLB,, | Performed by: INTERNAL MEDICINE

## 2023-04-27 PROCEDURE — 3288F FALL RISK ASSESSMENT DOCD: CPT | Mod: CPTII,S$GLB,, | Performed by: INTERNAL MEDICINE

## 2023-04-27 PROCEDURE — 3072F LOW RISK FOR RETINOPATHY: CPT | Mod: CPTII,S$GLB,, | Performed by: INTERNAL MEDICINE

## 2023-04-27 PROCEDURE — 93010 ELECTROCARDIOGRAM REPORT: CPT | Mod: ,,, | Performed by: STUDENT IN AN ORGANIZED HEALTH CARE EDUCATION/TRAINING PROGRAM

## 2023-04-27 PROCEDURE — 99999 PR PBB SHADOW E&M-EST. PATIENT-LVL III: CPT | Mod: PBBFAC,,, | Performed by: INTERNAL MEDICINE

## 2023-04-27 PROCEDURE — 3288F PR FALLS RISK ASSESSMENT DOCUMENTED: ICD-10-PCS | Mod: CPTII,S$GLB,, | Performed by: INTERNAL MEDICINE

## 2023-04-27 PROCEDURE — 3074F PR MOST RECENT SYSTOLIC BLOOD PRESSURE < 130 MM HG: ICD-10-PCS | Mod: CPTII,S$GLB,, | Performed by: INTERNAL MEDICINE

## 2023-04-27 PROCEDURE — 93005 ELECTROCARDIOGRAM TRACING: CPT

## 2023-04-27 PROCEDURE — 93010 EKG 12-LEAD: ICD-10-PCS | Mod: ,,, | Performed by: STUDENT IN AN ORGANIZED HEALTH CARE EDUCATION/TRAINING PROGRAM

## 2023-04-27 PROCEDURE — 1126F AMNT PAIN NOTED NONE PRSNT: CPT | Mod: CPTII,S$GLB,, | Performed by: INTERNAL MEDICINE

## 2023-04-27 PROCEDURE — 1160F PR REVIEW ALL MEDS BY PRESCRIBER/CLIN PHARMACIST DOCUMENTED: ICD-10-PCS | Mod: CPTII,S$GLB,, | Performed by: INTERNAL MEDICINE

## 2023-04-27 PROCEDURE — 99214 OFFICE O/P EST MOD 30 MIN: CPT | Mod: S$GLB,,, | Performed by: INTERNAL MEDICINE

## 2023-04-27 PROCEDURE — 1159F PR MEDICATION LIST DOCUMENTED IN MEDICAL RECORD: ICD-10-PCS | Mod: CPTII,S$GLB,, | Performed by: INTERNAL MEDICINE

## 2023-04-27 PROCEDURE — 3078F DIAST BP <80 MM HG: CPT | Mod: CPTII,S$GLB,, | Performed by: INTERNAL MEDICINE

## 2023-04-27 PROCEDURE — 1126F PR PAIN SEVERITY QUANTIFIED, NO PAIN PRESENT: ICD-10-PCS | Mod: CPTII,S$GLB,, | Performed by: INTERNAL MEDICINE

## 2023-04-27 PROCEDURE — 99999 PR PBB SHADOW E&M-EST. PATIENT-LVL III: ICD-10-PCS | Mod: PBBFAC,,, | Performed by: INTERNAL MEDICINE

## 2023-04-27 PROCEDURE — 1160F RVW MEDS BY RX/DR IN RCRD: CPT | Mod: CPTII,S$GLB,, | Performed by: INTERNAL MEDICINE

## 2023-04-27 PROCEDURE — 1159F MED LIST DOCD IN RCRD: CPT | Mod: CPTII,S$GLB,, | Performed by: INTERNAL MEDICINE

## 2023-04-27 RX ORDER — MIDODRINE HYDROCHLORIDE 10 MG/1
10 TABLET ORAL
Qty: 360 TABLET | Refills: 1 | Status: SHIPPED | OUTPATIENT
Start: 2023-04-27 | End: 2023-11-03 | Stop reason: SDUPTHER
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
## 2023-05-29 ENCOUNTER — CLINICAL SUPPORT (OUTPATIENT)
Dept: CARDIOLOGY | Facility: HOSPITAL | Age: 78
End: 2023-05-29
Payer: MEDICARE

## 2023-05-29 DIAGNOSIS — Z95.0 PRESENCE OF CARDIAC PACEMAKER: ICD-10-CM

## 2023-05-29 PROCEDURE — 93294 REM INTERROG EVL PM/LDLS PM: CPT | Mod: S$GLB,,, | Performed by: INTERNAL MEDICINE

## 2023-05-29 PROCEDURE — 93296 REM INTERROG EVL PM/IDS: CPT | Performed by: INTERNAL MEDICINE

## 2023-05-29 PROCEDURE — 93294 CARDIAC DEVICE CHECK - REMOTE: ICD-10-PCS | Mod: S$GLB,,, | Performed by: INTERNAL MEDICINE

## 2023-07-07 ENCOUNTER — PATIENT MESSAGE (OUTPATIENT)
Dept: INFECTIOUS DISEASES | Facility: CLINIC | Age: 78
End: 2023-07-07
Payer: MEDICARE

## 2023-08-08 ENCOUNTER — OFFICE VISIT (OUTPATIENT)
Dept: INTERNAL MEDICINE | Facility: CLINIC | Age: 78
End: 2023-08-08
Payer: MEDICARE

## 2023-08-08 VITALS
BODY MASS INDEX: 27.06 KG/M2 | WEIGHT: 172.38 LBS | HEIGHT: 67 IN | DIASTOLIC BLOOD PRESSURE: 74 MMHG | HEART RATE: 70 BPM | TEMPERATURE: 99 F | OXYGEN SATURATION: 94 % | SYSTOLIC BLOOD PRESSURE: 112 MMHG

## 2023-08-08 DIAGNOSIS — H91.93 BILATERAL HEARING LOSS, UNSPECIFIED HEARING LOSS TYPE: Primary | ICD-10-CM

## 2023-08-08 PROCEDURE — 99999 PR PBB SHADOW E&M-EST. PATIENT-LVL V: CPT | Mod: PBBFAC,,, | Performed by: PHYSICIAN ASSISTANT

## 2023-08-08 PROCEDURE — 1159F MED LIST DOCD IN RCRD: CPT | Mod: CPTII,S$GLB,, | Performed by: PHYSICIAN ASSISTANT

## 2023-08-08 PROCEDURE — 1126F PR PAIN SEVERITY QUANTIFIED, NO PAIN PRESENT: ICD-10-PCS | Mod: CPTII,S$GLB,, | Performed by: PHYSICIAN ASSISTANT

## 2023-08-08 PROCEDURE — 1101F PT FALLS ASSESS-DOCD LE1/YR: CPT | Mod: CPTII,S$GLB,, | Performed by: PHYSICIAN ASSISTANT

## 2023-08-08 PROCEDURE — 3074F PR MOST RECENT SYSTOLIC BLOOD PRESSURE < 130 MM HG: ICD-10-PCS | Mod: CPTII,S$GLB,, | Performed by: PHYSICIAN ASSISTANT

## 2023-08-08 PROCEDURE — 3288F FALL RISK ASSESSMENT DOCD: CPT | Mod: CPTII,S$GLB,, | Performed by: PHYSICIAN ASSISTANT

## 2023-08-08 PROCEDURE — 1160F PR REVIEW ALL MEDS BY PRESCRIBER/CLIN PHARMACIST DOCUMENTED: ICD-10-PCS | Mod: CPTII,S$GLB,, | Performed by: PHYSICIAN ASSISTANT

## 2023-08-08 PROCEDURE — 99999 PR PBB SHADOW E&M-EST. PATIENT-LVL V: ICD-10-PCS | Mod: PBBFAC,,, | Performed by: PHYSICIAN ASSISTANT

## 2023-08-08 PROCEDURE — 3078F DIAST BP <80 MM HG: CPT | Mod: CPTII,S$GLB,, | Performed by: PHYSICIAN ASSISTANT

## 2023-08-08 PROCEDURE — 1160F RVW MEDS BY RX/DR IN RCRD: CPT | Mod: CPTII,S$GLB,, | Performed by: PHYSICIAN ASSISTANT

## 2023-08-08 PROCEDURE — 99213 PR OFFICE/OUTPT VISIT, EST, LEVL III, 20-29 MIN: ICD-10-PCS | Mod: S$GLB,,, | Performed by: PHYSICIAN ASSISTANT

## 2023-08-08 PROCEDURE — 3072F LOW RISK FOR RETINOPATHY: CPT | Mod: CPTII,S$GLB,, | Performed by: PHYSICIAN ASSISTANT

## 2023-08-08 PROCEDURE — 3074F SYST BP LT 130 MM HG: CPT | Mod: CPTII,S$GLB,, | Performed by: PHYSICIAN ASSISTANT

## 2023-08-08 PROCEDURE — 99213 OFFICE O/P EST LOW 20 MIN: CPT | Mod: S$GLB,,, | Performed by: PHYSICIAN ASSISTANT

## 2023-08-08 PROCEDURE — 1159F PR MEDICATION LIST DOCUMENTED IN MEDICAL RECORD: ICD-10-PCS | Mod: CPTII,S$GLB,, | Performed by: PHYSICIAN ASSISTANT

## 2023-08-08 PROCEDURE — 3078F PR MOST RECENT DIASTOLIC BLOOD PRESSURE < 80 MM HG: ICD-10-PCS | Mod: CPTII,S$GLB,, | Performed by: PHYSICIAN ASSISTANT

## 2023-08-08 PROCEDURE — 3288F PR FALLS RISK ASSESSMENT DOCUMENTED: ICD-10-PCS | Mod: CPTII,S$GLB,, | Performed by: PHYSICIAN ASSISTANT

## 2023-08-08 PROCEDURE — 1126F AMNT PAIN NOTED NONE PRSNT: CPT | Mod: CPTII,S$GLB,, | Performed by: PHYSICIAN ASSISTANT

## 2023-08-08 PROCEDURE — 3072F PR LOW RISK FOR RETINOPATHY: ICD-10-PCS | Mod: CPTII,S$GLB,, | Performed by: PHYSICIAN ASSISTANT

## 2023-08-08 PROCEDURE — 1101F PR PT FALLS ASSESS DOC 0-1 FALLS W/OUT INJ PAST YR: ICD-10-PCS | Mod: CPTII,S$GLB,, | Performed by: PHYSICIAN ASSISTANT

## 2023-08-08 RX ORDER — MELOXICAM 15 MG/1
15 TABLET ORAL
COMMUNITY

## 2023-08-08 NOTE — PROGRESS NOTES
Subjective:      Patient ID: Anca Mcclellan is a 78 y.o. female.    Chief Complaint: Referral    Ear Fullness   There is pain in both ears. This is a recurrent problem. There has been no fever. Associated symptoms include ear discharge and hearing loss. Pertinent negatives include no abdominal pain, coughing, diarrhea, headaches, neck pain, rash, rhinorrhea, sore throat or vomiting. Treatments tried: otc wax removals. The treatment provided no relief.       Patient Active Problem List   Diagnosis    Polymyositis associated with autoimmune disease    Fatigue    Irregular heart beat    Hyperlipidemia    Syncope and collapse    Primary osteoarthritis of left knee    Chronic right shoulder pain    Cervical radiculopathy due to degenerative joint disease of spine    Bilateral carotid artery stenosis    Postural dizziness with presyncope    Abnormal ECG    LAFB (left anterior fascicular block)    RBBB    Dyspnea on exertion    First degree AV block    Near syncope    Palpitations    Disorder of connective tissue    Trifascicular block    Cardiac pacemaker in situ    Iron deficiency anemia due to chronic blood loss    Anemia    Angina pectoris    Atherosclerosis of aorta    Gastroesophageal reflux disease    Hearing loss    Long term (current) use of aspirin    Seasonal allergies    Sinus node dysfunction    Type 2 diabetes mellitus with other specified complication    Lumbar spondylosis    Sleep-disordered breathing    LUCRETIA (obstructive sleep apnea)    Chondrocalcinosis    Primary osteoarthritis involving multiple joints         Current Outpatient Medications:     aspirin (ECOTRIN) 81 MG EC tablet, Take 81 mg by mouth once daily., Disp: , Rfl:     atorvastatin (LIPITOR) 40 MG tablet, Take 40 mg by mouth once daily., Disp: , Rfl:     cholecalciferol, vitamin D3, (VITAMIN D3) 50 mcg (2,000 unit) Tab, 1 capsule, Disp: , Rfl:     cyanocobalamin (VITAMIN B-12) 100 MCG tablet, Take 100 mcg by mouth once daily., Disp: ,  Rfl:     ferrous sulfate (FEOSOL) 325 mg (65 mg iron) Tab tablet, 1 tablet, Disp: , Rfl:     fluticasone propionate (FLONASE) 50 mcg/actuation nasal spray, SPRAY 1 SPRAY INTO EACH NOSTRIL EVERY DAY, Disp: 16 mL, Rfl: 12    meloxicam (MOBIC) 15 MG tablet, Take 15 mg by mouth., Disp: , Rfl:     metFORMIN (GLUCOPHAGE-XR) 500 MG ER 24hr tablet, Take 500 mg by mouth once daily., Disp: , Rfl:     midodrine (PROAMATINE) 10 MG tablet, Take 1 tablet (10 mg total) by mouth 3 (three) times daily with meals., Disp: 360 tablet, Rfl: 1    multivitamin (THERAGRAN) per tablet, Take 1 tablet by mouth once daily., Disp: , Rfl:     triamcinolone acetonide 0.1% (KENALOG) 0.1 % ointment, APPLY TO RASH TOPICALLY TWICE DAILY, Disp: , Rfl: 0    vitamin E 100 UNIT capsule, Take 100 Units by mouth once daily., Disp: , Rfl:     Review of Systems   Constitutional:  Negative for activity change, appetite change, chills, diaphoresis, fatigue, fever and unexpected weight change.   HENT:  Positive for ear discharge and hearing loss. Negative for congestion, dental problem, drooling, ear pain, facial swelling, mouth sores, nosebleeds, postnasal drip, rhinorrhea, sinus pressure, sinus pain, sneezing, sore throat, tinnitus, trouble swallowing and voice change.    Eyes: Negative.  Negative for visual disturbance.   Respiratory: Negative.  Negative for cough, choking, chest tightness and shortness of breath.    Cardiovascular:  Negative for chest pain, palpitations and leg swelling.   Gastrointestinal:  Negative for abdominal distention, abdominal pain, blood in stool, constipation, diarrhea, nausea and vomiting.   Endocrine: Negative for cold intolerance, heat intolerance, polydipsia and polyuria.   Genitourinary: Negative.  Negative for difficulty urinating and frequency.   Musculoskeletal:  Negative for arthralgias, back pain, gait problem, joint swelling, myalgias and neck pain.   Skin:  Negative for color change, pallor, rash and wound.  "  Neurological:  Negative for dizziness, tremors, weakness, light-headedness, numbness and headaches.   Hematological:  Negative for adenopathy.   Psychiatric/Behavioral:  Negative for behavioral problems, confusion, self-injury, sleep disturbance and suicidal ideas. The patient is not nervous/anxious.      Objective:   /74 (BP Location: Left arm, Patient Position: Sitting, BP Method: Medium (Manual))   Pulse 70   Temp 98.6 °F (37 °C) (Tympanic)   Ht 5' 7" (1.702 m)   Wt 78.2 kg (172 lb 6.4 oz)   SpO2 (!) 94%   BMI 27.00 kg/m²     Physical Exam  Vitals and nursing note reviewed.   Constitutional:       General: She is not in acute distress.     Appearance: Normal appearance. She is well-developed. She is not ill-appearing, toxic-appearing or diaphoretic.   HENT:      Head: Normocephalic and atraumatic.      Right Ear: Tympanic membrane, ear canal and external ear normal.      Left Ear: Tympanic membrane, ear canal and external ear normal.      Nose: Nose normal. No congestion or rhinorrhea.      Mouth/Throat:      Mouth: Mucous membranes are moist.      Pharynx: No oropharyngeal exudate or posterior oropharyngeal erythema.   Cardiovascular:      Rate and Rhythm: Normal rate and regular rhythm.      Heart sounds: Normal heart sounds. No murmur heard.     No friction rub. No gallop.   Pulmonary:      Effort: Pulmonary effort is normal. No respiratory distress.      Breath sounds: Normal breath sounds. No wheezing or rales.   Musculoskeletal:         General: Normal range of motion.   Skin:     General: Skin is warm.      Capillary Refill: Capillary refill takes less than 2 seconds.      Findings: No rash.   Neurological:      Mental Status: She is alert and oriented to person, place, and time.      Motor: No weakness.      Gait: Gait normal.   Psychiatric:         Mood and Affect: Mood normal.         Behavior: Behavior normal.         Thought Content: Thought content normal.         Judgment: Judgment " normal.         Assessment:     1. Bilateral hearing loss, unspecified hearing loss type      Plan:   Bilateral hearing loss, unspecified hearing loss type  -     Ambulatory referral/consult to ENT  -     Ambulatory referral/consult to Audiology; Future; Expected date: 08/15/2023    -advise to use flonase daily along with antihistamien.     Follow up if symptoms worsen or fail to improve.

## 2023-08-16 ENCOUNTER — OFFICE VISIT (OUTPATIENT)
Dept: OTOLARYNGOLOGY | Facility: CLINIC | Age: 78
End: 2023-08-16
Payer: MEDICARE

## 2023-08-16 ENCOUNTER — CLINICAL SUPPORT (OUTPATIENT)
Dept: AUDIOLOGY | Facility: CLINIC | Age: 78
End: 2023-08-16
Payer: MEDICARE

## 2023-08-16 VITALS — TEMPERATURE: 97 F | BODY MASS INDEX: 27 KG/M2 | HEIGHT: 67 IN | WEIGHT: 172 LBS

## 2023-08-16 DIAGNOSIS — H90.3 ASYMMETRICAL SENSORINEURAL HEARING LOSS: Primary | ICD-10-CM

## 2023-08-16 DIAGNOSIS — H90.3 SENSORINEURAL HEARING LOSS (SNHL) OF BOTH EARS: ICD-10-CM

## 2023-08-16 PROCEDURE — 1159F MED LIST DOCD IN RCRD: CPT | Mod: CPTII,S$GLB,, | Performed by: PHYSICIAN ASSISTANT

## 2023-08-16 PROCEDURE — 3072F LOW RISK FOR RETINOPATHY: CPT | Mod: CPTII,S$GLB,, | Performed by: PHYSICIAN ASSISTANT

## 2023-08-16 PROCEDURE — 3072F PR LOW RISK FOR RETINOPATHY: ICD-10-PCS | Mod: CPTII,S$GLB,, | Performed by: PHYSICIAN ASSISTANT

## 2023-08-16 PROCEDURE — 99213 PR OFFICE/OUTPT VISIT, EST, LEVL III, 20-29 MIN: ICD-10-PCS | Mod: S$GLB,,, | Performed by: PHYSICIAN ASSISTANT

## 2023-08-16 PROCEDURE — 1126F AMNT PAIN NOTED NONE PRSNT: CPT | Mod: CPTII,S$GLB,, | Performed by: PHYSICIAN ASSISTANT

## 2023-08-16 PROCEDURE — 99999 PR PBB SHADOW E&M-EST. PATIENT-LVL I: CPT | Mod: PBBFAC,,, | Performed by: AUDIOLOGIST

## 2023-08-16 PROCEDURE — 1101F PR PT FALLS ASSESS DOC 0-1 FALLS W/OUT INJ PAST YR: ICD-10-PCS | Mod: CPTII,S$GLB,, | Performed by: PHYSICIAN ASSISTANT

## 2023-08-16 PROCEDURE — 1101F PT FALLS ASSESS-DOCD LE1/YR: CPT | Mod: CPTII,S$GLB,, | Performed by: PHYSICIAN ASSISTANT

## 2023-08-16 PROCEDURE — 1126F PR PAIN SEVERITY QUANTIFIED, NO PAIN PRESENT: ICD-10-PCS | Mod: CPTII,S$GLB,, | Performed by: PHYSICIAN ASSISTANT

## 2023-08-16 PROCEDURE — 92557 COMPREHENSIVE HEARING TEST: CPT | Mod: S$GLB,,, | Performed by: AUDIOLOGIST

## 2023-08-16 PROCEDURE — 1159F PR MEDICATION LIST DOCUMENTED IN MEDICAL RECORD: ICD-10-PCS | Mod: CPTII,S$GLB,, | Performed by: PHYSICIAN ASSISTANT

## 2023-08-16 PROCEDURE — 92567 PR TYMPA2METRY: ICD-10-PCS | Mod: S$GLB,,, | Performed by: AUDIOLOGIST

## 2023-08-16 PROCEDURE — 99999 PR PBB SHADOW E&M-EST. PATIENT-LVL I: ICD-10-PCS | Mod: PBBFAC,,, | Performed by: AUDIOLOGIST

## 2023-08-16 PROCEDURE — 99999 PR PBB SHADOW E&M-EST. PATIENT-LVL III: ICD-10-PCS | Mod: PBBFAC,,, | Performed by: PHYSICIAN ASSISTANT

## 2023-08-16 PROCEDURE — 92567 TYMPANOMETRY: CPT | Mod: S$GLB,,, | Performed by: AUDIOLOGIST

## 2023-08-16 PROCEDURE — 3288F PR FALLS RISK ASSESSMENT DOCUMENTED: ICD-10-PCS | Mod: CPTII,S$GLB,, | Performed by: PHYSICIAN ASSISTANT

## 2023-08-16 PROCEDURE — 3288F FALL RISK ASSESSMENT DOCD: CPT | Mod: CPTII,S$GLB,, | Performed by: PHYSICIAN ASSISTANT

## 2023-08-16 PROCEDURE — 92557 PR COMPREHENSIVE HEARING TEST: ICD-10-PCS | Mod: S$GLB,,, | Performed by: AUDIOLOGIST

## 2023-08-16 PROCEDURE — 99999 PR PBB SHADOW E&M-EST. PATIENT-LVL III: CPT | Mod: PBBFAC,,, | Performed by: PHYSICIAN ASSISTANT

## 2023-08-16 PROCEDURE — 99213 OFFICE O/P EST LOW 20 MIN: CPT | Mod: S$GLB,,, | Performed by: PHYSICIAN ASSISTANT

## 2023-08-16 NOTE — PROGRESS NOTES
Subjective     Patient ID: Anca Mcclellan is a 78 y.o. female.    Chief Complaint: Hearing Loss (Bilateral hearing loss. Audio results.)    Ms. Davidson is a very pleasant 79 yo female here to see me today for evaluation of hearing loss.  She has noticed that she's turning up volume on television.  Denies having a better hearing ear.  She has hx of asymmetrical sensorineural hearing loss and had negative MRI Brain in 2019.  No recent ear drainage; has occasional soreness in her ears AD>AS (denies any triggers).  No recent dizziness.  No previous otologic surgery or hx of loud noise exposure.  She denies family hx of hearing loss.  No tinnitus recently (had it many years ago).      Review of Systems   Constitutional:  Negative for fever.   HENT:  Positive for hearing loss. Negative for ear discharge, ear pain and tinnitus.    Neurological:  Negative for dizziness.          Objective     Physical Exam  Constitutional:       General: She is not in acute distress.     Appearance: She is well-developed.   HENT:      Head: Normocephalic and atraumatic.      Right Ear: Tympanic membrane, ear canal and external ear normal. No drainage, swelling or tenderness. No middle ear effusion. Tympanic membrane is not injected or erythematous.      Left Ear: Tympanic membrane, ear canal and external ear normal. No drainage, swelling or tenderness.  No middle ear effusion. Tympanic membrane is not injected or erythematous.      Nose: Nose normal. No mucosal edema or rhinorrhea.      Right Sinus: No maxillary sinus tenderness or frontal sinus tenderness.      Left Sinus: No maxillary sinus tenderness or frontal sinus tenderness.      Mouth/Throat:      Mouth: Mucous membranes are moist. Mucous membranes are not pale and not dry.      Pharynx: Uvula midline. No oropharyngeal exudate or posterior oropharyngeal erythema.   Eyes:      General: Lids are normal. No scleral icterus.     Extraocular Movements:      Right eye: Normal  extraocular motion and no nystagmus.      Left eye: Normal extraocular motion and no nystagmus.      Conjunctiva/sclera: Conjunctivae normal.      Right eye: Right conjunctiva is not injected. No chemosis.     Left eye: Left conjunctiva is not injected. No chemosis.     Pupils: Pupils are equal, round, and reactive to light.   Neck:      Trachea: Trachea and phonation normal. No tracheal tenderness or tracheal deviation.   Pulmonary:      Effort: Pulmonary effort is normal. No respiratory distress.   Lymphadenopathy:      Head:      Right side of head: No submental, submandibular, preauricular, posterior auricular or occipital adenopathy.      Left side of head: No submental, submandibular, preauricular, posterior auricular or occipital adenopathy.      Cervical: No cervical adenopathy.   Skin:     General: Skin is warm and dry.      Findings: No erythema or rash.   Neurological:      Mental Status: She is alert and oriented to person, place, and time.      Cranial Nerves: No cranial nerve deficit.   Psychiatric:         Behavior: Behavior normal.       AUDIOGRAM:    9/2019:  Reading Date Result Priority    Cleve Johnson Jr., MD  [phone] 9/21/2019 Routine     Narrative & Impression  EXAMINATION:  MRI BRAIN W WO CONTRAST     CLINICAL HISTORY:  Other specified hearing loss, left earasymmetrical hearing loss;     TECHNIQUE:  Multisequential multiplanar MR imaging was obtained through the internal auditory canals and brainstem after administration of IV gadolinium contrast.     COMPARISON:  None     FINDINGS:  The course of cranial nerves VII and VIII is normal bilaterally with no abnormal enhancement. The cochleae and semicircular canals are normal in appearance bilaterally. The porus acusticus is normal bilaterally. No evidence of cerebellopontine angle mass.     Scattered tiny foci of T2 FLAIR hyperintensity within the periventricular white matter, nonspecific.  No abnormal enhancement. No mass effect or  midline shift. No extra-axial fluid collections. Ventricles and sulci are appropriate in size and configuration for age.     Paranasal sinuses and mastoid air cells are normal. Orbits are normal. Skull base and calvarium demonstrate normal signal.     Impression:     1. No significant MRI abnormality of the temporal bones or cerebellopontine angles.  2. White matter findings consistent with minimal chronic microvascular ischemia.               Assessment and Plan     1. Asymmetrical sensorineural hearing loss        We reviewed the recent audiogram in detail; has known left asymmetry.  Stable compared to 2022.   We discussed that as humans we are not entirely symmetric, and that many people have one ear that hears better than the other.  She had negative MRI Brain in 2019.  Recommend she consider hearing aid for the left ear when motivated and she should continue to follow with annual audiograms.             No follow-ups on file.

## 2023-08-16 NOTE — PROGRESS NOTES
Anca Mcclellan was seen 08/16/2023 for an audiological evaluation. Patient complains of bilateral soreness in the ears and decreased hearing since her last evaluation in October. She denies any tinnitus or dizziness.    Results reveal a mild sensorineural hearing loss 8000 Hz for the right ear, and  mild-to-moderate sensorineural hearing loss 4776-1473 Hz for the left ear.   Speech Reception Thresholds were  15 dBHL for the right ear and 25 dBHL for the left ear.   Word recognition scores were excellent for the right ear and excellent for the left ear.  Tympanograms were Type Ad, hypermobile for the right ear and Type Ad, hypermobile for the left ear.    Patient was counseled on the above findings. No change in hearing since last evaluation.    Recommendations:  Follow-up with ENT, as scheduled.   Repeat audiological evaluation in one to two years to monitor hearing, or sooner if needed.  Consider a hearing aid consultation through [facility]. Patient provided with a copy of audiogram.   Wear hearing protection devices when around loud noise.

## 2023-08-23 ENCOUNTER — OFFICE VISIT (OUTPATIENT)
Dept: OPHTHALMOLOGY | Facility: CLINIC | Age: 78
End: 2023-08-23
Payer: MEDICARE

## 2023-08-23 DIAGNOSIS — H43.812 POSTERIOR VITREOUS DETACHMENT OF LEFT EYE: ICD-10-CM

## 2023-08-23 DIAGNOSIS — Z96.1 PSEUDOPHAKIA OF BOTH EYES: ICD-10-CM

## 2023-08-23 DIAGNOSIS — E11.9 DIABETES MELLITUS WITHOUT COMPLICATION: ICD-10-CM

## 2023-08-23 DIAGNOSIS — H40.013 OPEN ANGLE WITH BORDERLINE FINDINGS OF BOTH EYES: Primary | ICD-10-CM

## 2023-08-23 DIAGNOSIS — H26.491 RIGHT POSTERIOR CAPSULAR OPACIFICATION: ICD-10-CM

## 2023-08-23 PROCEDURE — 99999 PR PBB SHADOW E&M-EST. PATIENT-LVL III: CPT | Mod: PBBFAC,,, | Performed by: OPTOMETRIST

## 2023-08-23 PROCEDURE — 1159F MED LIST DOCD IN RCRD: CPT | Mod: CPTII,S$GLB,, | Performed by: OPTOMETRIST

## 2023-08-23 PROCEDURE — 99999 PR PBB SHADOW E&M-EST. PATIENT-LVL III: ICD-10-PCS | Mod: PBBFAC,,, | Performed by: OPTOMETRIST

## 2023-08-23 PROCEDURE — 92133 CPTRZD OPH DX IMG PST SGM ON: CPT | Mod: S$GLB,,, | Performed by: OPTOMETRIST

## 2023-08-23 PROCEDURE — 92133 OCT, OPTIC NERVE - OU - BOTH EYES: ICD-10-PCS | Mod: S$GLB,,, | Performed by: OPTOMETRIST

## 2023-08-23 PROCEDURE — 99213 OFFICE O/P EST LOW 20 MIN: CPT | Mod: S$GLB,,, | Performed by: OPTOMETRIST

## 2023-08-23 PROCEDURE — 1159F PR MEDICATION LIST DOCUMENTED IN MEDICAL RECORD: ICD-10-PCS | Mod: CPTII,S$GLB,, | Performed by: OPTOMETRIST

## 2023-08-23 PROCEDURE — 2023F DILAT RTA XM W/O RTNOPTHY: CPT | Mod: CPTII,S$GLB,, | Performed by: OPTOMETRIST

## 2023-08-23 PROCEDURE — 99213 PR OFFICE/OUTPT VISIT, EST, LEVL III, 20-29 MIN: ICD-10-PCS | Mod: S$GLB,,, | Performed by: OPTOMETRIST

## 2023-08-23 PROCEDURE — 2023F PR DILATED RETINAL EXAM W/O EVID OF RETINOPATHY: ICD-10-PCS | Mod: CPTII,S$GLB,, | Performed by: OPTOMETRIST

## 2023-08-23 NOTE — PROGRESS NOTES
HPI     Blurred Vision            Comments: Patient here today blurred vision   No correction           Comments    1. PVD OS  2. Vitreous Degeneration OU  3. Glaucoma Suspect OU  -Asymmetry Analysis 30/29  4. Pseudophakia OU  5. DM 2012          Last edited by Cecy Kothari, PCT on 8/23/2023  2:39 PM.            Assessment /Plan     For exam results, see Encounter Report.    Open angle with borderline findings of both eyes  -     OCT, Optic Nerve - OU - Both Eyes  RNFL stable. Slight progression on ganglion cell layer, Continue observation off drops at this time.     Right posterior capsular opacification  -     Ambulatory referral/consult to Ophthalmology; Future; Expected date: 08/30/2023  Visually significant, referral sent to [facility] for Yag eval.      Posterior vitreous detachment of left eye  The nature of posterior vitreous detachment was discussed with the patient.  Signs and symptoms of retinal detachment were discussed with patient.   No holes or tears were noted upon careful retinal examination after dilation today.   Return to clinic as soon as possible (same day) if you notice any new floaters, flashes of light, curtain/veil over your vision from any direction, or any change in vision.    Diabetes mellitus without complication  11 years, last A1c 6.5 There was no diabetic retinopathy present on either eye on examination today. Recommend good blood pressure control, strict blood glucose control, and good cholesterol control.  Continue close care with Dr. Natarajan regarding diabetes.     Pseudophakia of both eyes  Doing well OU, observe.     RTC with Dr Bland for Yag consult OD.

## 2023-08-25 ENCOUNTER — OFFICE VISIT (OUTPATIENT)
Dept: OPHTHALMOLOGY | Facility: CLINIC | Age: 78
End: 2023-08-25
Payer: MEDICARE

## 2023-08-25 DIAGNOSIS — H26.491 RIGHT POSTERIOR CAPSULAR OPACIFICATION: Primary | ICD-10-CM

## 2023-08-25 PROCEDURE — 2023F DILAT RTA XM W/O RTNOPTHY: CPT | Mod: CPTII,S$GLB,, | Performed by: STUDENT IN AN ORGANIZED HEALTH CARE EDUCATION/TRAINING PROGRAM

## 2023-08-25 PROCEDURE — 99204 PR OFFICE/OUTPT VISIT, NEW, LEVL IV, 45-59 MIN: ICD-10-PCS | Mod: 57,S$GLB,, | Performed by: STUDENT IN AN ORGANIZED HEALTH CARE EDUCATION/TRAINING PROGRAM

## 2023-08-25 PROCEDURE — 2023F PR DILATED RETINAL EXAM W/O EVID OF RETINOPATHY: ICD-10-PCS | Mod: CPTII,S$GLB,, | Performed by: STUDENT IN AN ORGANIZED HEALTH CARE EDUCATION/TRAINING PROGRAM

## 2023-08-25 PROCEDURE — 1160F PR REVIEW ALL MEDS BY PRESCRIBER/CLIN PHARMACIST DOCUMENTED: ICD-10-PCS | Mod: CPTII,S$GLB,, | Performed by: STUDENT IN AN ORGANIZED HEALTH CARE EDUCATION/TRAINING PROGRAM

## 2023-08-25 PROCEDURE — 1160F RVW MEDS BY RX/DR IN RCRD: CPT | Mod: CPTII,S$GLB,, | Performed by: STUDENT IN AN ORGANIZED HEALTH CARE EDUCATION/TRAINING PROGRAM

## 2023-08-25 PROCEDURE — 66821 AFTER CATARACT LASER SURGERY: CPT | Mod: RT,S$GLB,, | Performed by: STUDENT IN AN ORGANIZED HEALTH CARE EDUCATION/TRAINING PROGRAM

## 2023-08-25 PROCEDURE — 1159F PR MEDICATION LIST DOCUMENTED IN MEDICAL RECORD: ICD-10-PCS | Mod: CPTII,S$GLB,, | Performed by: STUDENT IN AN ORGANIZED HEALTH CARE EDUCATION/TRAINING PROGRAM

## 2023-08-25 PROCEDURE — 99204 OFFICE O/P NEW MOD 45 MIN: CPT | Mod: 57,S$GLB,, | Performed by: STUDENT IN AN ORGANIZED HEALTH CARE EDUCATION/TRAINING PROGRAM

## 2023-08-25 PROCEDURE — 66821 PR DISCISSION,2ND CATARACT,LASER: ICD-10-PCS | Mod: RT,S$GLB,, | Performed by: STUDENT IN AN ORGANIZED HEALTH CARE EDUCATION/TRAINING PROGRAM

## 2023-08-25 PROCEDURE — 1159F MED LIST DOCD IN RCRD: CPT | Mod: CPTII,S$GLB,, | Performed by: STUDENT IN AN ORGANIZED HEALTH CARE EDUCATION/TRAINING PROGRAM

## 2023-08-25 PROCEDURE — 99999 PR PBB SHADOW E&M-EST. PATIENT-LVL III: ICD-10-PCS | Mod: PBBFAC,,, | Performed by: STUDENT IN AN ORGANIZED HEALTH CARE EDUCATION/TRAINING PROGRAM

## 2023-08-25 PROCEDURE — 99999 PR PBB SHADOW E&M-EST. PATIENT-LVL III: CPT | Mod: PBBFAC,,, | Performed by: STUDENT IN AN ORGANIZED HEALTH CARE EDUCATION/TRAINING PROGRAM

## 2023-08-25 RX ORDER — PREDNISOLONE ACETATE 10 MG/ML
1 SUSPENSION/ DROPS OPHTHALMIC EVERY 4 HOURS
Qty: 5 ML | Refills: 0 | Status: SHIPPED | OUTPATIENT
Start: 2023-08-25 | End: 2023-09-01

## 2023-08-25 NOTE — PROGRESS NOTES
HPI     Blurred Vision     Additional comments: POC eval  OD    Patient states VA has decreased in OD gradually over the last several   months, images appear dull and cloudy along with glare sensitivity.           Comments    1. PVD OS  2. Vitreous Degeneration OU  3. Glaucoma Suspect OU  -Asymmetry Analysis 30/29  4. Pseudophakia OU  5. DM 2012          Last edited by Sussy Ramirez, Patient Care Assistant on 8/25/2023  2:11   PM.            Assessment /Plan     For exam results, see Encounter Report.    Right posterior capsular opacification  -    Yag Operative Procedure Note    78 y.o. year old patient experiencing a symptomatic decrease in vision OD with inability to perform activities of daily living including reading.      SLE: Posterior intraocular lens implant with capsular fibrosis     Risks, benefits and alternatives of Yag Laser Capsulotomy discussed. Including risks of retinal detachment (1-3%), macular edema, dislocated implant, pain, inflammation elevated intraocular pressure and vision loss. Consent signed.  Time out procedure form completed prior to laser.    Medications given:  Tetracaine    Laser energy settings:    2 energy per shot  12 bursts  1 to 1 ratio per shot     Central capsular opening created without difficulty.    Start PF QID to operated eye for 7 days then stop    RTC 4 weeks for DFE and Mrx

## 2023-08-27 ENCOUNTER — CLINICAL SUPPORT (OUTPATIENT)
Dept: CARDIOLOGY | Facility: HOSPITAL | Age: 78
End: 2023-08-27
Payer: MEDICARE

## 2023-08-27 DIAGNOSIS — Z95.0 PRESENCE OF CARDIAC PACEMAKER: ICD-10-CM

## 2023-08-27 PROCEDURE — 93296 REM INTERROG EVL PM/IDS: CPT | Performed by: INTERNAL MEDICINE

## 2023-09-27 ENCOUNTER — OFFICE VISIT (OUTPATIENT)
Dept: OPHTHALMOLOGY | Facility: CLINIC | Age: 78
End: 2023-09-27
Payer: MEDICARE

## 2023-09-27 DIAGNOSIS — Z96.1 PSEUDOPHAKIA OF BOTH EYES: Primary | ICD-10-CM

## 2023-09-27 PROCEDURE — 1160F RVW MEDS BY RX/DR IN RCRD: CPT | Mod: CPTII,S$GLB,, | Performed by: STUDENT IN AN ORGANIZED HEALTH CARE EDUCATION/TRAINING PROGRAM

## 2023-09-27 PROCEDURE — 1159F PR MEDICATION LIST DOCUMENTED IN MEDICAL RECORD: ICD-10-PCS | Mod: CPTII,S$GLB,, | Performed by: STUDENT IN AN ORGANIZED HEALTH CARE EDUCATION/TRAINING PROGRAM

## 2023-09-27 PROCEDURE — 99999 PR PBB SHADOW E&M-EST. PATIENT-LVL III: ICD-10-PCS | Mod: PBBFAC,,, | Performed by: STUDENT IN AN ORGANIZED HEALTH CARE EDUCATION/TRAINING PROGRAM

## 2023-09-27 PROCEDURE — 99024 POSTOP FOLLOW-UP VISIT: CPT | Mod: S$GLB,,, | Performed by: STUDENT IN AN ORGANIZED HEALTH CARE EDUCATION/TRAINING PROGRAM

## 2023-09-27 PROCEDURE — 2023F DILAT RTA XM W/O RTNOPTHY: CPT | Mod: CPTII,S$GLB,, | Performed by: STUDENT IN AN ORGANIZED HEALTH CARE EDUCATION/TRAINING PROGRAM

## 2023-09-27 PROCEDURE — 99999 PR PBB SHADOW E&M-EST. PATIENT-LVL III: CPT | Mod: PBBFAC,,, | Performed by: STUDENT IN AN ORGANIZED HEALTH CARE EDUCATION/TRAINING PROGRAM

## 2023-09-27 PROCEDURE — 99024 PR POST-OP FOLLOW-UP VISIT: ICD-10-PCS | Mod: S$GLB,,, | Performed by: STUDENT IN AN ORGANIZED HEALTH CARE EDUCATION/TRAINING PROGRAM

## 2023-09-27 PROCEDURE — 1159F MED LIST DOCD IN RCRD: CPT | Mod: CPTII,S$GLB,, | Performed by: STUDENT IN AN ORGANIZED HEALTH CARE EDUCATION/TRAINING PROGRAM

## 2023-09-27 PROCEDURE — 2023F PR DILATED RETINAL EXAM W/O EVID OF RETINOPATHY: ICD-10-PCS | Mod: CPTII,S$GLB,, | Performed by: STUDENT IN AN ORGANIZED HEALTH CARE EDUCATION/TRAINING PROGRAM

## 2023-09-27 PROCEDURE — 1160F PR REVIEW ALL MEDS BY PRESCRIBER/CLIN PHARMACIST DOCUMENTED: ICD-10-PCS | Mod: CPTII,S$GLB,, | Performed by: STUDENT IN AN ORGANIZED HEALTH CARE EDUCATION/TRAINING PROGRAM

## 2023-09-27 NOTE — PROGRESS NOTES
HPI    Pt in today for a 4 week follow-up with a DFE and MRX. Pt states her   vision has gotten better since her last visit. No pain or discomfort.     1. PVD OS  2. Vitreous Degeneration OU  3. Glaucoma Suspect OU  -Asymmetry Analysis 30/29  4. Pseudophakia OU  5. DM 2012    Last edited by Patricia Bone on 9/27/2023  9:57 AM.            Assessment /Plan     For exam results, see Encounter Report.    Pseudophakia of both eyes- Doing well,   follow      Return to clinic in 1 year DFE or PRN

## 2023-10-26 DIAGNOSIS — I44.4 LAFB (LEFT ANTERIOR FASCICULAR BLOCK): Primary | ICD-10-CM

## 2023-10-26 DIAGNOSIS — I65.23 BILATERAL CAROTID ARTERY STENOSIS: ICD-10-CM

## 2023-11-02 ENCOUNTER — PATIENT MESSAGE (OUTPATIENT)
Dept: CARDIOLOGY | Facility: CLINIC | Age: 78
End: 2023-11-02
Payer: MEDICARE

## 2023-11-03 ENCOUNTER — OFFICE VISIT (OUTPATIENT)
Dept: CARDIOLOGY | Facility: CLINIC | Age: 78
End: 2023-11-03
Payer: MEDICARE

## 2023-11-03 ENCOUNTER — HOSPITAL ENCOUNTER (OUTPATIENT)
Dept: CARDIOLOGY | Facility: HOSPITAL | Age: 78
Discharge: HOME | End: 2023-11-03
Attending: INTERNAL MEDICINE
Payer: MEDICARE

## 2023-11-03 VITALS
DIASTOLIC BLOOD PRESSURE: 74 MMHG | WEIGHT: 169.56 LBS | BODY MASS INDEX: 26.61 KG/M2 | HEIGHT: 67 IN | SYSTOLIC BLOOD PRESSURE: 110 MMHG | OXYGEN SATURATION: 97 % | HEART RATE: 82 BPM

## 2023-11-03 DIAGNOSIS — I70.0 ATHEROSCLEROSIS OF AORTA: ICD-10-CM

## 2023-11-03 DIAGNOSIS — I45.3 TRIFASCICULAR BLOCK: ICD-10-CM

## 2023-11-03 DIAGNOSIS — I44.0 FIRST DEGREE AV BLOCK: ICD-10-CM

## 2023-11-03 DIAGNOSIS — R06.09 DYSPNEA ON EXERTION: ICD-10-CM

## 2023-11-03 DIAGNOSIS — Z95.0 CARDIAC PACEMAKER IN SITU: ICD-10-CM

## 2023-11-03 DIAGNOSIS — R42 POSTURAL DIZZINESS WITH PRESYNCOPE: ICD-10-CM

## 2023-11-03 DIAGNOSIS — I65.23 BILATERAL CAROTID ARTERY STENOSIS: ICD-10-CM

## 2023-11-03 DIAGNOSIS — I45.10 RBBB: ICD-10-CM

## 2023-11-03 DIAGNOSIS — R55 POSTURAL DIZZINESS WITH PRESYNCOPE: ICD-10-CM

## 2023-11-03 DIAGNOSIS — Z95.0 PRESENCE OF CARDIAC PACEMAKER: ICD-10-CM

## 2023-11-03 DIAGNOSIS — G47.33 OSA (OBSTRUCTIVE SLEEP APNEA): ICD-10-CM

## 2023-11-03 DIAGNOSIS — I44.4 LAFB (LEFT ANTERIOR FASCICULAR BLOCK): ICD-10-CM

## 2023-11-03 DIAGNOSIS — I49.9 IRREGULAR HEART BEAT: ICD-10-CM

## 2023-11-03 DIAGNOSIS — E11.69 TYPE 2 DIABETES MELLITUS WITH OTHER SPECIFIED COMPLICATION, UNSPECIFIED WHETHER LONG TERM INSULIN USE: ICD-10-CM

## 2023-11-03 DIAGNOSIS — E78.49 OTHER HYPERLIPIDEMIA: ICD-10-CM

## 2023-11-03 DIAGNOSIS — I20.9 ANGINA PECTORIS: ICD-10-CM

## 2023-11-03 DIAGNOSIS — R55 SYNCOPE AND COLLAPSE: ICD-10-CM

## 2023-11-03 DIAGNOSIS — I49.5 SINUS NODE DYSFUNCTION: ICD-10-CM

## 2023-11-03 DIAGNOSIS — I44.4 LAFB (LEFT ANTERIOR FASCICULAR BLOCK): Primary | ICD-10-CM

## 2023-11-03 PROCEDURE — 93005 ELECTROCARDIOGRAM TRACING: CPT

## 2023-11-03 PROCEDURE — 1126F PR PAIN SEVERITY QUANTIFIED, NO PAIN PRESENT: ICD-10-PCS | Mod: CPTII,S$GLB,, | Performed by: INTERNAL MEDICINE

## 2023-11-03 PROCEDURE — 93010 ELECTROCARDIOGRAM REPORT: CPT | Mod: ,,, | Performed by: INTERNAL MEDICINE

## 2023-11-03 PROCEDURE — 1101F PT FALLS ASSESS-DOCD LE1/YR: CPT | Mod: CPTII,S$GLB,, | Performed by: INTERNAL MEDICINE

## 2023-11-03 PROCEDURE — 99214 PR OFFICE/OUTPT VISIT, EST, LEVL IV, 30-39 MIN: ICD-10-PCS | Mod: 25,S$GLB,, | Performed by: INTERNAL MEDICINE

## 2023-11-03 PROCEDURE — 3078F DIAST BP <80 MM HG: CPT | Mod: CPTII,S$GLB,, | Performed by: INTERNAL MEDICINE

## 2023-11-03 PROCEDURE — 1101F PR PT FALLS ASSESS DOC 0-1 FALLS W/OUT INJ PAST YR: ICD-10-PCS | Mod: CPTII,S$GLB,, | Performed by: INTERNAL MEDICINE

## 2023-11-03 PROCEDURE — 3074F SYST BP LT 130 MM HG: CPT | Mod: CPTII,S$GLB,, | Performed by: INTERNAL MEDICINE

## 2023-11-03 PROCEDURE — 1160F PR REVIEW ALL MEDS BY PRESCRIBER/CLIN PHARMACIST DOCUMENTED: ICD-10-PCS | Mod: CPTII,S$GLB,, | Performed by: INTERNAL MEDICINE

## 2023-11-03 PROCEDURE — 3288F FALL RISK ASSESSMENT DOCD: CPT | Mod: CPTII,S$GLB,, | Performed by: INTERNAL MEDICINE

## 2023-11-03 PROCEDURE — 3074F PR MOST RECENT SYSTOLIC BLOOD PRESSURE < 130 MM HG: ICD-10-PCS | Mod: CPTII,S$GLB,, | Performed by: INTERNAL MEDICINE

## 2023-11-03 PROCEDURE — 99999 PR PBB SHADOW E&M-EST. PATIENT-LVL III: CPT | Mod: PBBFAC,,, | Performed by: INTERNAL MEDICINE

## 2023-11-03 PROCEDURE — 93010 EKG 12-LEAD: ICD-10-PCS | Mod: ,,, | Performed by: INTERNAL MEDICINE

## 2023-11-03 PROCEDURE — 1160F RVW MEDS BY RX/DR IN RCRD: CPT | Mod: CPTII,S$GLB,, | Performed by: INTERNAL MEDICINE

## 2023-11-03 PROCEDURE — 99999 PR PBB SHADOW E&M-EST. PATIENT-LVL III: ICD-10-PCS | Mod: PBBFAC,,, | Performed by: INTERNAL MEDICINE

## 2023-11-03 PROCEDURE — 1159F PR MEDICATION LIST DOCUMENTED IN MEDICAL RECORD: ICD-10-PCS | Mod: CPTII,S$GLB,, | Performed by: INTERNAL MEDICINE

## 2023-11-03 PROCEDURE — 3288F PR FALLS RISK ASSESSMENT DOCUMENTED: ICD-10-PCS | Mod: CPTII,S$GLB,, | Performed by: INTERNAL MEDICINE

## 2023-11-03 PROCEDURE — 1159F MED LIST DOCD IN RCRD: CPT | Mod: CPTII,S$GLB,, | Performed by: INTERNAL MEDICINE

## 2023-11-03 PROCEDURE — 99214 OFFICE O/P EST MOD 30 MIN: CPT | Mod: 25,S$GLB,, | Performed by: INTERNAL MEDICINE

## 2023-11-03 PROCEDURE — 3078F PR MOST RECENT DIASTOLIC BLOOD PRESSURE < 80 MM HG: ICD-10-PCS | Mod: CPTII,S$GLB,, | Performed by: INTERNAL MEDICINE

## 2023-11-03 PROCEDURE — 1126F AMNT PAIN NOTED NONE PRSNT: CPT | Mod: CPTII,S$GLB,, | Performed by: INTERNAL MEDICINE

## 2023-11-03 RX ORDER — MIDODRINE HYDROCHLORIDE 10 MG/1
10 TABLET ORAL
Qty: 360 TABLET | Refills: 1 | Status: SHIPPED | OUTPATIENT
Start: 2023-11-03 | End: 2024-01-31 | Stop reason: SDUPTHER
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
## 2023-11-25 ENCOUNTER — CLINICAL SUPPORT (OUTPATIENT)
Dept: CARDIOLOGY | Facility: HOSPITAL | Age: 78
End: 2023-11-25
Payer: MEDICARE

## 2023-11-25 DIAGNOSIS — Z95.0 PRESENCE OF CARDIAC PACEMAKER: ICD-10-CM

## 2023-11-25 PROCEDURE — 93296 REM INTERROG EVL PM/IDS: CPT | Performed by: INTERNAL MEDICINE

## 2023-11-25 PROCEDURE — 93294 CARDIAC DEVICE CHECK - REMOTE: ICD-10-PCS | Mod: S$GLB,,, | Performed by: INTERNAL MEDICINE

## 2023-11-25 PROCEDURE — 93294 REM INTERROG EVL PM/LDLS PM: CPT | Mod: S$GLB,,, | Performed by: INTERNAL MEDICINE

## 2023-11-26 ENCOUNTER — CLINICAL SUPPORT (OUTPATIENT)
Dept: CARDIOLOGY | Facility: HOSPITAL | Age: 78
End: 2023-11-26
Attending: INTERNAL MEDICINE
Payer: MEDICARE

## 2023-11-26 DIAGNOSIS — Z95.0 PRESENCE OF CARDIAC PACEMAKER: ICD-10-CM

## 2023-11-26 PROCEDURE — 93296 REM INTERROG EVL PM/IDS: CPT | Performed by: INTERNAL MEDICINE

## 2023-11-28 ENCOUNTER — OFFICE VISIT (OUTPATIENT)
Dept: CARDIOLOGY | Facility: CLINIC | Age: 78
End: 2023-11-28
Payer: MEDICARE

## 2023-11-28 ENCOUNTER — HOSPITAL ENCOUNTER (OUTPATIENT)
Dept: CARDIOLOGY | Facility: HOSPITAL | Age: 78
Discharge: HOME | End: 2023-11-28
Attending: INTERNAL MEDICINE
Payer: MEDICARE

## 2023-11-28 VITALS
OXYGEN SATURATION: 100 % | DIASTOLIC BLOOD PRESSURE: 80 MMHG | BODY MASS INDEX: 27.23 KG/M2 | HEART RATE: 85 BPM | SYSTOLIC BLOOD PRESSURE: 122 MMHG | WEIGHT: 173.5 LBS | HEIGHT: 67 IN

## 2023-11-28 DIAGNOSIS — D50.0 IRON DEFICIENCY ANEMIA DUE TO CHRONIC BLOOD LOSS: ICD-10-CM

## 2023-11-28 DIAGNOSIS — Z95.0 CARDIAC PACEMAKER IN SITU: Primary | ICD-10-CM

## 2023-11-28 DIAGNOSIS — I44.4 LAFB (LEFT ANTERIOR FASCICULAR BLOCK): ICD-10-CM

## 2023-11-28 DIAGNOSIS — I45.3 TRIFASCICULAR BLOCK: ICD-10-CM

## 2023-11-28 DIAGNOSIS — Z95.0 CARDIAC PACEMAKER IN SITU: ICD-10-CM

## 2023-11-28 DIAGNOSIS — E78.49 OTHER HYPERLIPIDEMIA: ICD-10-CM

## 2023-11-28 DIAGNOSIS — E11.69 TYPE 2 DIABETES MELLITUS WITH OTHER SPECIFIED COMPLICATION, UNSPECIFIED WHETHER LONG TERM INSULIN USE: ICD-10-CM

## 2023-11-28 DIAGNOSIS — I49.5 SINUS NODE DYSFUNCTION: ICD-10-CM

## 2023-11-28 PROCEDURE — 3288F FALL RISK ASSESSMENT DOCD: CPT | Mod: CPTII,S$GLB,, | Performed by: NURSE PRACTITIONER

## 2023-11-28 PROCEDURE — 99999 PR PBB SHADOW E&M-EST. PATIENT-LVL III: ICD-10-PCS | Mod: PBBFAC,,, | Performed by: NURSE PRACTITIONER

## 2023-11-28 PROCEDURE — 99214 OFFICE O/P EST MOD 30 MIN: CPT | Mod: S$GLB,,, | Performed by: NURSE PRACTITIONER

## 2023-11-28 PROCEDURE — 93280 PM DEVICE PROGR EVAL DUAL: CPT

## 2023-11-28 PROCEDURE — 3074F PR MOST RECENT SYSTOLIC BLOOD PRESSURE < 130 MM HG: ICD-10-PCS | Mod: CPTII,S$GLB,, | Performed by: NURSE PRACTITIONER

## 2023-11-28 PROCEDURE — 99214 PR OFFICE/OUTPT VISIT, EST, LEVL IV, 30-39 MIN: ICD-10-PCS | Mod: S$GLB,,, | Performed by: NURSE PRACTITIONER

## 2023-11-28 PROCEDURE — 93280 CARDIAC DEVICE CHECK - IN CLINIC & HOSPITAL: ICD-10-PCS | Mod: 26,,, | Performed by: INTERNAL MEDICINE

## 2023-11-28 PROCEDURE — 3079F DIAST BP 80-89 MM HG: CPT | Mod: CPTII,S$GLB,, | Performed by: NURSE PRACTITIONER

## 2023-11-28 PROCEDURE — 1101F PT FALLS ASSESS-DOCD LE1/YR: CPT | Mod: CPTII,S$GLB,, | Performed by: NURSE PRACTITIONER

## 2023-11-28 PROCEDURE — 1101F PR PT FALLS ASSESS DOC 0-1 FALLS W/OUT INJ PAST YR: ICD-10-PCS | Mod: CPTII,S$GLB,, | Performed by: NURSE PRACTITIONER

## 2023-11-28 PROCEDURE — 93280 PM DEVICE PROGR EVAL DUAL: CPT | Mod: 26,,, | Performed by: INTERNAL MEDICINE

## 2023-11-28 PROCEDURE — 1159F MED LIST DOCD IN RCRD: CPT | Mod: CPTII,S$GLB,, | Performed by: NURSE PRACTITIONER

## 2023-11-28 PROCEDURE — 1126F AMNT PAIN NOTED NONE PRSNT: CPT | Mod: CPTII,S$GLB,, | Performed by: NURSE PRACTITIONER

## 2023-11-28 PROCEDURE — 99999 PR PBB SHADOW E&M-EST. PATIENT-LVL III: CPT | Mod: PBBFAC,,, | Performed by: NURSE PRACTITIONER

## 2023-11-28 PROCEDURE — 3288F PR FALLS RISK ASSESSMENT DOCUMENTED: ICD-10-PCS | Mod: CPTII,S$GLB,, | Performed by: NURSE PRACTITIONER

## 2023-11-28 PROCEDURE — 3079F PR MOST RECENT DIASTOLIC BLOOD PRESSURE 80-89 MM HG: ICD-10-PCS | Mod: CPTII,S$GLB,, | Performed by: NURSE PRACTITIONER

## 2023-11-28 PROCEDURE — 3074F SYST BP LT 130 MM HG: CPT | Mod: CPTII,S$GLB,, | Performed by: NURSE PRACTITIONER

## 2023-11-28 PROCEDURE — 1126F PR PAIN SEVERITY QUANTIFIED, NO PAIN PRESENT: ICD-10-PCS | Mod: CPTII,S$GLB,, | Performed by: NURSE PRACTITIONER

## 2023-11-28 PROCEDURE — 1159F PR MEDICATION LIST DOCUMENTED IN MEDICAL RECORD: ICD-10-PCS | Mod: CPTII,S$GLB,, | Performed by: NURSE PRACTITIONER

## 2023-11-28 NOTE — PROGRESS NOTES
Subjective:   Patient ID:  Anca Mcclellan is a 78 y.o. female who presents for evaluation of No chief complaint on file.              Anca Mcclellan is a 76 year old female who presents to cardiology clinic for post device 1 week wound check s/p PPM implant. Her current medical conditions include HTN, near syncope, s/p PPM implant (Biotronik), LAFB, HLP. She returns today and states she is doing well.     Denies chest pain or anginal equivalents. No shortness of breath, DE LA FUENTE or palpitations. Denies orthopnea, PND or abdominal bloating. Reports regular walking without any issues lately. NO leg swelling or claudications. No recent falls, syncope or near syncopal events. Reports compliance with medications and dietary restrictions. NO CNS complaints to suggest TIA or CVA today. No signs of abnormal bleeding on ASA.     Device check completed in office today. Wound reviewed with Dr Martinez, no need for additional dressing today.     Reviewed post device instructions in detail with patient, all questions answered in detail.       3/25/2022 update    She returns today and states she is doing ok.   On Mardi [place]as, she was at a parade and had a syncopal event and was brought to ED for IV fluids. Since that time she has felt weak and dizzy. On that AM, she had not eaten breakfast that AM.     BP at home 115-120/70s    Tries to stay hydrated throughout the day. Has issues with dizziness upon standing.     She has never started wearing compression socks as were previously recommended in October 2021.     She does not feel any better since changing her device settings to DDDR in October as well. Continues to have issues with daily fatigue and felt better when she was on her previous settings. Will change her back to DDD CLS 70 today and assess response at next visit    4/29/2022 update    Anca Mcclellan returns today for 4 week follow up and is doing ok.   Most of the time, if she has to make any sudden movement  then she is very dizzy.   As the day progresses, she reports worsening dizziness episodes. Is staying hydrated and drinking >2 L of fluids daily.     Still has issues with dizziness. Discussed with Dr Martinez today.   Device spot check completed, 0% RV pacing. No arrhythmias. Well functioning device today.    Will plan for further evaluation with labs and urine to rule out amyloidosis.     11/9/2022 update    Anca Mcclellan returns for follow up with device check.     She continues to have some episodes of dizziness with position changes.     Recommendation to add compression stockings.     Had a prolonged episode of chest dullness last week which suddenly went away.     NO chest pain today in office. BP stable today in office.     No leg swelling on exam today. Has been doing well since last visit. Had only been taking midodrine daily but will start taking at least twice a day.     Device interrogation completed today in office  Well functioning device, no arrhythmias noted.     11/28/2023 update    Anca Mcclellan returns for follow up with device check.   Device interrogation- well functioning device, 46% A pacing, 0% RV pacing, Underlying SR with PACS, no arrhythmias since Feb 2023.     Denies chest pain or anginal equivalents. No shortness of breath, DE LA FUENTE or palpitations. Denies orthopnea, PND or abdominal bloating. Reports regular walking without any issues lately. NO leg swelling or claudications. No recent falls, syncope or near syncopal events. Reports compliance with medications and dietary restrictions. NO CNS complaints to suggest TIA or CVA today. No signs of abnormal bleeding on ASA daily.       BP well controlled today.     Reports improvement in dizziness since midodrine. She stays active and walks regularly during the day.     Denies any recurrent syncopal events.       Past Medical History:   Diagnosis Date    Bilateral carotid artery stenosis 10/15/2019    Hyperlipidemia     LUCRETIA  (obstructive sleep apnea) 5/6/2022       Past Surgical History:   Procedure Laterality Date    A-V CARDIAC PACEMAKER INSERTION Left 06/03/2021    Procedure: INSERTION, CARDIAC PACEMAKER, DUAL CHAMBER;  Surgeon: Arnold Martinez MD;  Location: [facility] CATH LAB;  Service: Cardiology;  Laterality: Left;  COVID-19, MRNA, LN-S, PF (Pfizer) 2/4/2021, 1/14/2021//Gil ching notified    CATARACT EXTRACTION      ESOPHAGOGASTRODUODENOSCOPY N/A 09/17/2021    Procedure: EGD (ESOPHAGOGASTRODUODENOSCOPY);  Surgeon: Rimma Mccracken MD;  Location: [facility] ENDO;  Service: Endoscopy;  Laterality: N/A;    HYSTERECTOMY  1988    INSERTION OF PACEMAKER  06/03/2021       Social History     Tobacco Use    Smoking status: Never    Smokeless tobacco: Never   Substance Use Topics    Alcohol use: Not Currently     Comment: seldom    Drug use: Never       Family History   Problem Relation Age of Onset    Cataracts Mother     Heart disease Mother     Diabetes type II Mother     Dementia Father     Blindness Father     Heart disease Brother     Migraines Neg Hx     Melanoma Neg Hx     Lupus Neg Hx     Psoriasis Neg Hx      Wt Readings from Last 3 Encounters:   11/28/23 78.7 kg (173 lb 8 oz)   11/03/23 76.9 kg (169 lb 8.5 oz)   08/16/23 78 kg (172 lb)     Temp Readings from Last 3 Encounters:   08/16/23 97.1 °F (36.2 °C) (Tympanic)   08/08/23 98.6 °F (37 °C) (Tympanic)   10/31/22 98.1 °F (36.7 °C) (Temporal)     BP Readings from Last 3 Encounters:   11/28/23 122/80   11/03/23 110/74   08/08/23 112/74     Pulse Readings from Last 3 Encounters:   11/28/23 85   11/03/23 82   08/08/23 70     Current Outpatient Medications on File Prior to Visit   Medication Sig Dispense Refill    aspirin (ECOTRIN) 81 MG EC tablet Take 81 mg by mouth once daily.      atorvastatin (LIPITOR) 40 MG tablet Take 40 mg by mouth once daily.      cholecalciferol, vitamin D3, (VITAMIN D3) 50 mcg (2,000 unit) Tab 1 capsule      cyanocobalamin (VITAMIN B-12) 100 MCG tablet  Take 100 mcg by mouth once daily.      ferrous sulfate (FEOSOL) 325 mg (65 mg iron) Tab tablet 1 tablet      fluticasone propionate (FLONASE) 50 mcg/actuation nasal spray SPRAY 1 SPRAY INTO EACH NOSTRIL EVERY DAY 16 mL 12    meloxicam (MOBIC) 15 MG tablet Take 15 mg by mouth.      metFORMIN (GLUCOPHAGE-XR) 500 MG ER 24hr tablet Take 500 mg by mouth once daily.      midodrine (PROAMATINE) 10 MG tablet Take 1 tablet (10 mg total) by mouth 3 (three) times daily with meals. 360 tablet 1    multivitamin (THERAGRAN) per tablet Take 1 tablet by mouth once daily.      triamcinolone acetonide 0.1% (KENALOG) 0.1 % ointment APPLY TO RASH TOPICALLY TWICE DAILY  0    vitamin E 100 UNIT capsule Take 100 Units by mouth once daily.       No current facility-administered medications on file prior to visit.         Review of Systems   Constitutional: Negative for malaise/fatigue.   HENT: Negative.  Negative for hearing loss and hoarse voice.    Eyes:  Negative for blurred vision and visual disturbance.   Cardiovascular:  Positive for palpitations (rare) and syncope. Negative for chest pain, claudication, dyspnea on exertion, irregular heartbeat, leg swelling, near-syncope, orthopnea and paroxysmal nocturnal dyspnea.   Respiratory:  Positive for shortness of breath. Negative for cough, hemoptysis, sleep disturbances due to breathing, snoring and wheezing.    Endocrine: Negative for cold intolerance and heat intolerance.   Hematologic/Lymphatic: Does not bruise/bleed easily.   Skin: Negative.  Negative for color change, dry skin and nail changes.   Musculoskeletal: Negative.  Negative for back pain, joint pain and myalgias.   Gastrointestinal: Negative.  Negative for bloating, abdominal pain, constipation, nausea and vomiting.   Genitourinary: Negative.  Negative for dysuria, flank pain, hematuria and hesitancy.   Neurological:  Negative for dizziness, headaches, light-headedness, loss of balance, numbness, paresthesias and weakness.  "  Psychiatric/Behavioral: Negative.  Negative for altered mental status.    Allergic/Immunologic: Negative for environmental allergies.   All other systems reviewed and are negative.    /80 (BP Location: Left arm, Patient Position: Sitting, BP Method: Medium (Manual))   Pulse 85   Ht 5' 7" (1.702 m)   Wt 78.7 kg (173 lb 8 oz)   SpO2 100%   BMI 27.17 kg/m²     Objective:   Physical Exam  Vitals and nursing note reviewed.   Constitutional:       General: She is not in acute distress.     Appearance: Normal appearance. She is well-developed. She is not ill-appearing or diaphoretic.   HENT:      Head: Normocephalic and atraumatic.      Nose: Nose normal.      Mouth/Throat:      Mouth: Mucous membranes are moist.   Eyes:      General: No scleral icterus.     Conjunctiva/sclera: Conjunctivae normal.      Pupils: Pupils are equal, round, and reactive to light.   Neck:      Thyroid: No thyromegaly.      Vascular: No JVD.      Trachea: No tracheal deviation.   Cardiovascular:      Rate and Rhythm: Normal rate and regular rhythm.      Chest Wall: PMI is not displaced.      Pulses: Intact distal pulses.           Radial pulses are 2+ on the right side and 2+ on the left side.        Dorsalis pedis pulses are 2+ on the right side and 2+ on the left side.      Heart sounds: S1 normal and S2 normal. Heart sounds not distant. No murmur heard.     No friction rub. No gallop. No S3 or S4 sounds.      Comments: Left chest wall PPM site well healed, no signs of infection noted.   Pulmonary:      Effort: Pulmonary effort is normal. No respiratory distress.      Breath sounds: Normal breath sounds. No stridor. No decreased breath sounds, wheezing or rales.   Chest:      Chest wall: No tenderness.   Abdominal:      General: Bowel sounds are normal. There is no distension.      Palpations: Abdomen is soft. There is no mass.      Tenderness: There is no abdominal tenderness. There is no rebound.   Genitourinary:     Comments: " Deferred  Musculoskeletal:         General: No tenderness or deformity. Normal range of motion.      Cervical back: Full passive range of motion without pain, normal range of motion and neck supple.      Right ankle: No swelling.      Left ankle: No swelling.   Lymphadenopathy:      Cervical: No cervical adenopathy.   Skin:     General: Skin is warm and dry.      Capillary Refill: Capillary refill takes less than 2 seconds.      Coloration: Skin is not pale.      Findings: No erythema or rash.      Nails: There is no clubbing.   Neurological:      General: No focal deficit present.      Mental Status: She is alert and oriented to person, place, and time.      Motor: No abnormal muscle tone.      Coordination: Coordination normal.   Psychiatric:         Mood and Affect: Mood normal.         Speech: Speech normal.         Behavior: Behavior normal.         Thought Content: Thought content normal.         Judgment: Judgment normal.         Lab Results   Component Value Date    CHOL 148 09/29/2021    CHOL 140 02/28/2019     Lab Results   Component Value Date    HDL 49 09/29/2021    HDL 49 02/28/2019     Lab Results   Component Value Date    LDLCALC 82.2 09/29/2021    LDLCALC 79.6 02/28/2019     Lab Results   Component Value Date    TRIG 84 09/29/2021    TRIG 57 02/28/2019     Lab Results   Component Value Date    CHOLHDL 33.1 09/29/2021    CHOLHDL 35.0 02/28/2019       Chemistry        Component Value Date/Time     01/18/2023 1234    K 4.0 01/18/2023 1234     01/18/2023 1234    CO2 26 01/18/2023 1234    BUN 10 01/18/2023 1234    CREATININE 0.7 01/18/2023 1234     (H) 01/18/2023 1234        Component Value Date/Time    CALCIUM 9.8 01/18/2023 1234    ALKPHOS 85 01/18/2023 1234    AST 39 01/18/2023 1234    ALT 35 01/18/2023 1234    BILITOT 0.8 01/18/2023 1234    ESTGFRAFRICA >60 04/29/2022 1028    EGFRNONAA >60 04/29/2022 1028          Lab Results   Component Value Date    TSH 0.652 12/01/2020     Lab  Results   Component Value Date    INR 1.0 05/18/2021     Lab Results   Component Value Date    WBC 2.69 (L) 01/18/2023    HGB 13.6 01/18/2023    HCT 41.3 01/18/2023    MCV 92 01/18/2023     (L) 01/18/2023   1.TTE  Results for orders placed during the hospital encounter of 04/25/22    Echo    Interpretation Summary  · Mild tricuspid regurgitation.  · Normal systolic function.  · The estimated ejection fraction is 55%.  · Normal left ventricular diastolic function.  · Normal right ventricular size.  · Normal central venous pressure (3 mmHg).  · The estimated PA systolic pressure is 32 mmHg.       Assessment:      1. Cardiac pacemaker in situ    2. Trifascicular block    3. LAFB (left anterior fascicular block)    4. Other hyperlipidemia    5. Sinus node dysfunction    6. Iron deficiency anemia due to chronic blood loss    7. Type 2 diabetes mellitus with other specified complication, unspecified whether long term insulin use          Plan:     Continue ASA Statin Midodrine  Profile BP at home  Encourage daily walking  RTC in 1 year with Device check.Continue home monitoring as scheduled.     JEAN MARIE Obregon  [facility]

## 2023-12-19 ENCOUNTER — OFFICE VISIT (OUTPATIENT)
Dept: DERMATOLOGY | Facility: CLINIC | Age: 78
End: 2023-12-19
Payer: MEDICARE

## 2023-12-19 DIAGNOSIS — L65.9 ALOPECIA: ICD-10-CM

## 2023-12-19 DIAGNOSIS — L21.9 SEBORRHEIC DERMATITIS: Primary | ICD-10-CM

## 2023-12-19 PROCEDURE — 99999 PR PBB SHADOW E&M-EST. PATIENT-LVL III: CPT | Mod: PBBFAC,,, | Performed by: STUDENT IN AN ORGANIZED HEALTH CARE EDUCATION/TRAINING PROGRAM

## 2023-12-19 PROCEDURE — 99999 PR PBB SHADOW E&M-EST. PATIENT-LVL III: ICD-10-PCS | Mod: PBBFAC,,, | Performed by: STUDENT IN AN ORGANIZED HEALTH CARE EDUCATION/TRAINING PROGRAM

## 2023-12-19 PROCEDURE — 1126F PR PAIN SEVERITY QUANTIFIED, NO PAIN PRESENT: ICD-10-PCS | Mod: CPTII,S$GLB,, | Performed by: STUDENT IN AN ORGANIZED HEALTH CARE EDUCATION/TRAINING PROGRAM

## 2023-12-19 PROCEDURE — 3288F FALL RISK ASSESSMENT DOCD: CPT | Mod: CPTII,S$GLB,, | Performed by: STUDENT IN AN ORGANIZED HEALTH CARE EDUCATION/TRAINING PROGRAM

## 2023-12-19 PROCEDURE — 1159F MED LIST DOCD IN RCRD: CPT | Mod: CPTII,S$GLB,, | Performed by: STUDENT IN AN ORGANIZED HEALTH CARE EDUCATION/TRAINING PROGRAM

## 2023-12-19 PROCEDURE — 1160F PR REVIEW ALL MEDS BY PRESCRIBER/CLIN PHARMACIST DOCUMENTED: ICD-10-PCS | Mod: CPTII,S$GLB,, | Performed by: STUDENT IN AN ORGANIZED HEALTH CARE EDUCATION/TRAINING PROGRAM

## 2023-12-19 PROCEDURE — 1159F PR MEDICATION LIST DOCUMENTED IN MEDICAL RECORD: ICD-10-PCS | Mod: CPTII,S$GLB,, | Performed by: STUDENT IN AN ORGANIZED HEALTH CARE EDUCATION/TRAINING PROGRAM

## 2023-12-19 PROCEDURE — 1160F RVW MEDS BY RX/DR IN RCRD: CPT | Mod: CPTII,S$GLB,, | Performed by: STUDENT IN AN ORGANIZED HEALTH CARE EDUCATION/TRAINING PROGRAM

## 2023-12-19 PROCEDURE — 1126F AMNT PAIN NOTED NONE PRSNT: CPT | Mod: CPTII,S$GLB,, | Performed by: STUDENT IN AN ORGANIZED HEALTH CARE EDUCATION/TRAINING PROGRAM

## 2023-12-19 PROCEDURE — 3288F PR FALLS RISK ASSESSMENT DOCUMENTED: ICD-10-PCS | Mod: CPTII,S$GLB,, | Performed by: STUDENT IN AN ORGANIZED HEALTH CARE EDUCATION/TRAINING PROGRAM

## 2023-12-19 PROCEDURE — 99204 OFFICE O/P NEW MOD 45 MIN: CPT | Mod: S$GLB,,, | Performed by: STUDENT IN AN ORGANIZED HEALTH CARE EDUCATION/TRAINING PROGRAM

## 2023-12-19 PROCEDURE — 99204 PR OFFICE/OUTPT VISIT, NEW, LEVL IV, 45-59 MIN: ICD-10-PCS | Mod: S$GLB,,, | Performed by: STUDENT IN AN ORGANIZED HEALTH CARE EDUCATION/TRAINING PROGRAM

## 2023-12-19 PROCEDURE — 1101F PR PT FALLS ASSESS DOC 0-1 FALLS W/OUT INJ PAST YR: ICD-10-PCS | Mod: CPTII,S$GLB,, | Performed by: STUDENT IN AN ORGANIZED HEALTH CARE EDUCATION/TRAINING PROGRAM

## 2023-12-19 PROCEDURE — 1101F PT FALLS ASSESS-DOCD LE1/YR: CPT | Mod: CPTII,S$GLB,, | Performed by: STUDENT IN AN ORGANIZED HEALTH CARE EDUCATION/TRAINING PROGRAM

## 2023-12-19 RX ORDER — KETOCONAZOLE 20 MG/ML
SHAMPOO, SUSPENSION TOPICAL WEEKLY
Qty: 120 ML | Refills: 5 | Status: SHIPPED | OUTPATIENT
Start: 2023-12-19

## 2023-12-19 RX ORDER — FLUOCINONIDE TOPICAL SOLUTION USP, 0.05% 0.5 MG/ML
SOLUTION TOPICAL
Qty: 60 ML | Refills: 3 | Status: SHIPPED | OUTPATIENT
Start: 2023-12-19 | End: 2024-03-06 | Stop reason: SDUPTHER

## 2023-12-19 RX ORDER — FLUCONAZOLE 200 MG/1
TABLET ORAL
Qty: 4 TABLET | Refills: 0 | Status: SHIPPED | OUTPATIENT
Start: 2023-12-19

## 2023-12-19 NOTE — PROGRESS NOTES
Patient Information  Name: Anca Mcclellan  : 1945  MRN: [id number]     Referring Physician:  Dr. Hernandez   Primary Care Physician:  Tony Ewing,    Date of Visit: 2023      Subjective:       Anca Mcclellan is a 78 y.o. female who presents for   Chief Complaint   Patient presents with    Hair Loss     C/o hair loss      HPI  Hx of alopecia and seb derm, here for follow up. Last seen Dr. Mcclure in 2017.  At the last visit, she was given topical ketoconazole and topical steroids to use.  She said it helped however it did not completely clear.  Her scalp continues to be flaky and itchy.  Patient was last seen:Visit date not found     Prior notes by myself reviewed.   Clinical documentation obtained by nursing staff reviewed.    Review of Systems   Skin:  Positive for itching and rash.        Objective:    Physical Exam   Constitutional: She appears well-developed and well-nourished. No distress.   Neurological: She is alert and oriented to person, place, and time. She is not disoriented.   Psychiatric: She has a normal mood and affect.   Skin:   Areas Examined (abnormalities noted in diagram):   Scalp / Hair Palpated and Inspected              Diagram Legend     Erythematous scaling macule/papule c/w actinic keratosis       Vascular papule c/w angioma      Pigmented verrucoid papule/plaque c/w seborrheic keratosis      Yellow umbilicated papule c/w sebaceous hyperplasia      Irregularly shaped tan macule c/w lentigo     1-2 mm smooth white papules consistent with Milia      Movable subcutaneous cyst with punctum c/w epidermal inclusion cyst      Subcutaneous movable cyst c/w pilar cyst      Firm pink to brown papule c/w dermatofibroma      Pedunculated fleshy papule(s) c/w skin tag(s)      Evenly pigmented macule c/w junctional nevus     Mildly variegated pigmented, slightly irregular-bordered macule c/w mildly atypical nevus      Flesh colored to evenly pigmented papule c/w intradermal nevus        Pink pearly papule/plaque c/w basal cell carcinoma      Erythematous hyperkeratotic cursted plaque c/w SCC      Surgical scar with no sign of skin cancer recurrence      Open and closed comedones      Inflammatory papules and pustules      Verrucoid papule consistent consistent with wart     Erythematous eczematous patches and plaques     Dystrophic onycholytic nail with subungual debris c/w onychomycosis     Umbilicated papule    Erythematous-base heme-crusted tan verrucoid plaque consistent with inflamed seborrheic keratosis     Erythematous Silvery Scaling Plaque c/w Psoriasis     See annotation      No images are attached to the encounter or orders placed in the encounter.    [] Data reviewed  [] Independent review of test  [] Management discussed with another provider    Assessment / Plan:        Seborrheic dermatitis leading to alopecia  -     ketoconazole (NIZORAL) 2 % shampoo; Apply topically once a week. Lather in for 5-10 min before rinsing  Dispense: 120 mL; Refill: 5  -     fluocinonide (LIDEX) 0.05 % external solution; AAA scalp qday - bid prn pruritus  Dispense: 60 mL; Refill: 3  -     fluconazole (DIFLUCAN) 200 MG Tab; 1 po biw x 2 weeks  Dispense: 4 tablet; Refill: 0  Counseled Diflucan can have side affects such as change in taste, diarrhea, headache, nausea, these are not urgent, please report if bothersome. If a rash or feeling faint or having palpations please seek urgent care.              LOS NUMBER AND COMPLEXITY OF PROBLEMS    COMPLEXITY OF DATA RISK TOTAL TIME (m)   26268  55329 [] 1 self-limited or minor problem [x] Minimal to none [] No treatment recommended or patient to monitor 15-29  10-19   56044  35766 Low  [] 2 or > self limited or minor problems  [] 1 stable chronic illness  [] 1 acute, uncomplicated illness or injury Limited (2)  [] Prior external notes from each unique source  [] Review result of each unique test  [] Order each unique test []  Low  OTC medications, minor skin  biopsy 30-44  20-29   66331  09399 Moderate  [x]  1 or > chronic illness with progression, exacerbation or SE of treatment  []  2 or more stable chronic illnesses  []  1 acute illness with systemic symptoms  []  1 acute complicated injury  []  1 undiagnosed new problem with uncertain prognosis Moderate (1/3 below)  []  3 or more data items        *Now includes assessment requiring independent historian  []  Independent interpretation of a test  []  Discuss management/test with another provider Moderate  [x]  Prescription drug mgmt  []  Minor surgery with risk discussed  []  Mgmt limited by social determinates 45-59  30-39   98113  38510 High  []  1 or more chronic illness with severe exacerbation, progression or SE of treatment  []  1 acute or chronic illness/injury that poses a threat to life or bodily function Extensive (2/3 below)  []  3 or more data items        *Now includes assessment requiring independent historian.  []  Independent interpretation of a test  []  Discuss management/test with another provider High  []  Major surgery with risk discussed  []  Drug therapy requiring intensive monitoring for toxicity  []  Hospitalization  []  Decision for DNR 60-74  40-54      No follow-ups on file.    Haritha Estrada MD, FAAD  [facility]

## 2023-12-27 LAB
OHS CV AF BURDEN PERCENT: < 1
OHS CV DC REMOTE DEVICE TYPE: NORMAL
OHS CV DC REMOTE DEVICE TYPE: NORMAL
OHS CV RV PACING PERCENT: 0 %
OHS CV RV PACING PERCENT: 0 %

## 2024-01-17 ENCOUNTER — LAB VISIT (OUTPATIENT)
Dept: LAB | Facility: HOSPITAL | Age: 79
End: 2024-01-17
Attending: INTERNAL MEDICINE
Payer: MEDICARE

## 2024-01-17 ENCOUNTER — OFFICE VISIT (OUTPATIENT)
Dept: RHEUMATOLOGY | Facility: CLINIC | Age: 79
End: 2024-01-17
Payer: MEDICARE

## 2024-01-17 ENCOUNTER — TELEPHONE (OUTPATIENT)
Dept: RHEUMATOLOGY | Facility: CLINIC | Age: 79
End: 2024-01-17
Payer: MEDICARE

## 2024-01-17 VITALS
HEART RATE: 87 BPM | BODY MASS INDEX: 26.68 KG/M2 | SYSTOLIC BLOOD PRESSURE: 144 MMHG | DIASTOLIC BLOOD PRESSURE: 90 MMHG | WEIGHT: 170 LBS | HEIGHT: 67 IN

## 2024-01-17 DIAGNOSIS — M33.20 POLYMYOSITIS ASSOCIATED WITH AUTOIMMUNE DISEASE: ICD-10-CM

## 2024-01-17 DIAGNOSIS — R76.8 POSITIVE ANA (ANTINUCLEAR ANTIBODY): ICD-10-CM

## 2024-01-17 DIAGNOSIS — M11.20 CHONDROCALCINOSIS: ICD-10-CM

## 2024-01-17 DIAGNOSIS — M35.9 POLYMYOSITIS ASSOCIATED WITH AUTOIMMUNE DISEASE: ICD-10-CM

## 2024-01-17 DIAGNOSIS — M33.20 POLYMYOSITIS ASSOCIATED WITH AUTOIMMUNE DISEASE: Primary | ICD-10-CM

## 2024-01-17 DIAGNOSIS — M15.9 PRIMARY OSTEOARTHRITIS INVOLVING MULTIPLE JOINTS: ICD-10-CM

## 2024-01-17 DIAGNOSIS — R20.9 BILATERAL COLD FEET: ICD-10-CM

## 2024-01-17 DIAGNOSIS — M35.9 POLYMYOSITIS ASSOCIATED WITH AUTOIMMUNE DISEASE: Primary | ICD-10-CM

## 2024-01-17 DIAGNOSIS — M17.12 PRIMARY OSTEOARTHRITIS OF LEFT KNEE: ICD-10-CM

## 2024-01-17 LAB
ALBUMIN SERPL BCP-MCNC: 4 G/DL (ref 3.5–5.2)
ALP SERPL-CCNC: 97 U/L (ref 55–135)
ALT SERPL W/O P-5'-P-CCNC: 39 U/L (ref 10–44)
ANION GAP SERPL CALC-SCNC: 8 MMOL/L (ref 8–16)
AST SERPL-CCNC: 53 U/L (ref 10–40)
BASOPHILS # BLD AUTO: 0.03 K/UL (ref 0–0.2)
BASOPHILS NFR BLD: 0.8 % (ref 0–1.9)
BILIRUB SERPL-MCNC: 0.7 MG/DL (ref 0.1–1)
BUN SERPL-MCNC: 10 MG/DL (ref 8–23)
CALCIUM SERPL-MCNC: 9.7 MG/DL (ref 8.7–10.5)
CHLORIDE SERPL-SCNC: 105 MMOL/L (ref 95–110)
CO2 SERPL-SCNC: 24 MMOL/L (ref 23–29)
CREAT SERPL-MCNC: 0.7 MG/DL (ref 0.5–1.4)
CRP SERPL-MCNC: 3.9 MG/L (ref 0–8.2)
DIFFERENTIAL METHOD BLD: ABNORMAL
EOSINOPHIL # BLD AUTO: 0.1 K/UL (ref 0–0.5)
EOSINOPHIL NFR BLD: 4 % (ref 0–8)
ERYTHROCYTE [DISTWIDTH] IN BLOOD BY AUTOMATED COUNT: 13.8 % (ref 11.5–14.5)
ERYTHROCYTE [SEDIMENTATION RATE] IN BLOOD BY PHOTOMETRIC METHOD: 82 MM/HR (ref 0–36)
EST. GFR  (NO RACE VARIABLE): >60 ML/MIN/1.73 M^2
GLUCOSE SERPL-MCNC: 120 MG/DL (ref 70–110)
HCT VFR BLD AUTO: 39.9 % (ref 37–48.5)
HGB BLD-MCNC: 13.2 G/DL (ref 12–16)
IMM GRANULOCYTES # BLD AUTO: 0 K/UL (ref 0–0.04)
IMM GRANULOCYTES NFR BLD AUTO: 0 % (ref 0–0.5)
LYMPHOCYTES # BLD AUTO: 1.4 K/UL (ref 1–4.8)
LYMPHOCYTES NFR BLD: 39.3 % (ref 18–48)
MCH RBC QN AUTO: 30.1 PG (ref 27–31)
MCHC RBC AUTO-ENTMCNC: 33.1 G/DL (ref 32–36)
MCV RBC AUTO: 91 FL (ref 82–98)
MONOCYTES # BLD AUTO: 0.3 K/UL (ref 0.3–1)
MONOCYTES NFR BLD: 8.8 % (ref 4–15)
NEUTROPHILS # BLD AUTO: 1.7 K/UL (ref 1.8–7.7)
NEUTROPHILS NFR BLD: 47.1 % (ref 38–73)
NRBC BLD-RTO: 0 /100 WBC
PLATELET # BLD AUTO: 160 K/UL (ref 150–450)
PMV BLD AUTO: 9.8 FL (ref 9.2–12.9)
POTASSIUM SERPL-SCNC: 4 MMOL/L (ref 3.5–5.1)
PROT SERPL-MCNC: 9.7 G/DL (ref 6–8.4)
RBC # BLD AUTO: 4.39 M/UL (ref 4–5.4)
SODIUM SERPL-SCNC: 137 MMOL/L (ref 136–145)
WBC # BLD AUTO: 3.54 K/UL (ref 3.9–12.7)

## 2024-01-17 PROCEDURE — 85652 RBC SED RATE AUTOMATED: CPT | Performed by: INTERNAL MEDICINE

## 2024-01-17 PROCEDURE — 99215 OFFICE O/P EST HI 40 MIN: CPT | Mod: S$GLB,,, | Performed by: INTERNAL MEDICINE

## 2024-01-17 PROCEDURE — 86038 ANTINUCLEAR ANTIBODIES: CPT | Performed by: INTERNAL MEDICINE

## 2024-01-17 PROCEDURE — 86140 C-REACTIVE PROTEIN: CPT | Performed by: INTERNAL MEDICINE

## 2024-01-17 PROCEDURE — 80053 COMPREHEN METABOLIC PANEL: CPT | Performed by: INTERNAL MEDICINE

## 2024-01-17 PROCEDURE — 86225 DNA ANTIBODY NATIVE: CPT | Mod: 59 | Performed by: INTERNAL MEDICINE

## 2024-01-17 PROCEDURE — 86039 ANTINUCLEAR ANTIBODIES (ANA): CPT | Performed by: INTERNAL MEDICINE

## 2024-01-17 PROCEDURE — 99999 PR PBB SHADOW E&M-EST. PATIENT-LVL IV: CPT | Mod: PBBFAC,,, | Performed by: INTERNAL MEDICINE

## 2024-01-17 PROCEDURE — 36415 COLL VENOUS BLD VENIPUNCTURE: CPT | Performed by: INTERNAL MEDICINE

## 2024-01-17 PROCEDURE — 86235 NUCLEAR ANTIGEN ANTIBODY: CPT | Mod: 59 | Performed by: INTERNAL MEDICINE

## 2024-01-17 PROCEDURE — 85025 COMPLETE CBC W/AUTO DIFF WBC: CPT | Performed by: INTERNAL MEDICINE

## 2024-01-17 NOTE — PROGRESS NOTES
RHEUMATOLOGY CLINIC FOLLOW UP VISIT  Chief complaints, HPI, ROS, EXAM, Assessment & Plans:-  Anca Kirkland a 78 y.o. pleasant female comes in for follow-up visit.  She follows up in the Rheumatology Clinic for polymyositis which has been in remission.  Back in 2022 she was seen with worsening left knee joint pain associated with stiffness and swelling and synovitis which was injected with Kenalog.  She reports doing well except mild activity-related lower back pain and intermittent knee pain.  Have been noticing worsening cold sensation of both foot.  No cyanosis or Raynaud's.  Rheumatological review of system negative otherwise.  Exam shows chronic deformities but no active synovitis of bilateral knees.  No photosensitive rash.  Normal dorsalis pedis pulse bilateral.    1. Polymyositis associated with autoimmune disease    2. Primary osteoarthritis of left knee    3. Primary osteoarthritis involving multiple joints    4. Chondrocalcinosis    5. Positive VALDEZ (antinuclear antibody)    6. Bilateral cold feet      Problem List Items Addressed This Visit       Polymyositis associated with autoimmune disease - Primary    Relevant Orders    VALDEZ Screen w/Reflex    C-Reactive Protein    Sedimentation rate    CBC Auto Differential    Comprehensive Metabolic Panel    Primary osteoarthritis of left knee    Relevant Orders    VALDEZ Screen w/Reflex    C-Reactive Protein    Sedimentation rate    CBC Auto Differential    Comprehensive Metabolic Panel    Chondrocalcinosis    Primary osteoarthritis involving multiple joints    Relevant Orders    VALDEZ Screen w/Reflex    C-Reactive Protein    Sedimentation rate    CBC Auto Differential    Comprehensive Metabolic Panel    Positive VALDEZ (antinuclear antibody)    Relevant Orders    VALDEZ Screen w/Reflex    C-Reactive Protein    Sedimentation rate    CBC Auto Differential    Comprehensive Metabolic Panel    Bilateral cold feet     Relevant Orders    Segmental Pressure Lower Extremity    Ankle Brachial Indices (COURTNEY)      Latest Reference Range & Units Most Recent   VALDEZ Screen Negative <1:160  Positive !  10/31/19 13:48   VALDEZ HEP-2 Titer  Positive >=1:2560 Homogeneous  10/31/19 13:48   ds DNA Ab Negative 1:10  Positive >=1:5120 !  10/31/19 13:48   Anti-SSA Antibody 0.00 - 19.99 EU 3.19  10/31/19 13:48   Anti-SSA Interpretation Negative  Negative  10/31/19 13:48   Anti-SSB Antibody 0.00 - 19.99 EU 3.36  10/31/19 13:48   Anti-SSB Interpretation Negative  Negative  10/31/19 13:48   Anti Sm Antibody 0.00 - 19.99 EU 2.16  10/31/19 13:48   Anti-Sm Interpretation Negative  Negative  10/31/19 13:48   Anti Sm/RNP Antibody 0.00 - 19.99 EU 2.09  10/31/19 13:48   Anti-Sm/RNP Interpretation Negative  Negative  10/31/19 13:48   Complement (C-3) 50 - 180 mg/dL 139  10/31/19 13:28   Complement (C-4) 11 - 44 mg/dL 35  10/31/19 13:28   Pathologist Interpretation UPE  REVIEWED  5/2/22 10:19   Protein Electrophoresis, Ur  SEE COMMENT  5/2/22 10:19   Anti-Emilia-1 Antibody <20 Units <20  4/21/16 15:43   Anti-PM/Scl Ab <20 Units <20  4/21/16 15:43   Anti-SS-A 52 kD Ab, IgG <20 Units <20  4/21/16 15:43   Anti-U1-RNP  Ab <20 Units <20  4/21/16 15:43   EJ Negative  Negative  4/21/16 15:43   Fibrillarin (U3 RNP) Negative  Negative  4/21/16 15:43   HMGCR Antibody 0 - 19 Units <3  4/21/16 15:43   Ku Negative  Negative  4/21/16 15:43   MDA-5 (P140) (CADM-140) <20 Units <20  4/21/16 15:43   MI-2 Negative  Weak Positive !  4/21/16 15:43   NXP-2 (P140) <20 Units <20  4/21/16 15:43   OJ Negative  Negative  4/21/16 15:43   PL-12 Negative  Negative  4/21/16 15:43   PL-7 Negative  Negative  4/21/16 15:43   SRP Negative  Negative  4/21/16 15:43   TIF1 GAMMA (P155/140) <20 Units <20  4/21/16 15:43   U2 snRNP Negative  Negative  4/21/16 15:43   !: Data is abnormal   Latest Reference Range & Units 01/18/23 12:34   WBC 3.90 - 12.70 K/uL 2.69 (L)   RBC 4.00 - 5.40 M/uL 4.47   Hemoglobin  12.0 - 16.0 g/dL 13.6   Hematocrit 37.0 - 48.5 % 41.3   MCV 82 - 98 fL 92   MCH 27.0 - 31.0 pg 30.4   MCHC 32.0 - 36.0 g/dL 32.9   RDW 11.5 - 14.5 % 13.3   Platelets 150 - 450 K/uL 145 (L)   MPV 9.2 - 12.9 fL 9.7   Gran % 38.0 - 73.0 % 44.3   Lymph % 18.0 - 48.0 % 40.9   Mono % 4.0 - 15.0 % 8.9   Eosinophil % 0.0 - 8.0 % 4.8   Basophil % 0.0 - 1.9 % 1.1   Immature Granulocytes 0.0 - 0.5 % 0.0   Gran # (ANC) 1.8 - 7.7 K/uL 1.2 (L)   Lymph # 1.0 - 4.8 K/uL 1.1   Mono # 0.3 - 1.0 K/uL 0.2 (L)   Eos # 0.0 - 0.5 K/uL 0.1   Baso # 0.00 - 0.20 K/uL 0.03   Immature Grans (Abs) 0.00 - 0.04 K/uL 0.00   nRBC 0 /100 WBC 0   Differential Method  Automated   Sodium 136 - 145 mmol/L 138   Potassium 3.5 - 5.1 mmol/L 4.0   Chloride 95 - 110 mmol/L 105   CO2 23 - 29 mmol/L 26   Anion Gap 8 - 16 mmol/L 7 (L)   BUN 8 - 23 mg/dL 10   Creatinine 0.5 - 1.4 mg/dL 0.7   eGFR >60 mL/min/1.73 m^2 >60   Glucose 70 - 110 mg/dL 132 (H)   Calcium 8.7 - 10.5 mg/dL 9.8   Alkaline Phosphatase 55 - 135 U/L 85   PROTEIN TOTAL 6.0 - 8.4 g/dL 8.9 (H)   Albumin 3.5 - 5.2 g/dL 3.8   BILIRUBIN TOTAL 0.1 - 1.0 mg/dL 0.8   AST 10 - 40 U/L 39   ALT 10 - 44 U/L 35   CRP 0.0 - 8.2 mg/L 1.9   (L): Data is abnormally low  (H): Data is abnormally high      Polymyositis in remission.  Monitor clinically.    High titer ds DNA in the past without any evidence of lupus arthritis or cutaneous lupus or any other lupus manifestations.  Monitor clinically.  Check activity markers today.  Severe chondrocalcinosis of bilateral knee associated with intermittent rare episodes of pseudogout.  Medrol Dosepak p.r.n..  Inject left knee with corticosteroid if needed in future.  Tylenol p.r.n. for chronic lower back pain.  Referral to back and spine clinic in future if any worsening symptoms.  Ankle brachial pressure index bilateral for cold feeling of bilateral extremities.  # Follow up in about 1 year (around 1/17/2025).      Disclaimer: This note was prepared using voice  recognition system and is likely to have sound alike errors and is not proof read.  Please call me with any questions.

## 2024-01-17 NOTE — TELEPHONE ENCOUNTER
Dr ROCHA ordered COURTNEY lower extremity test for blood flow. I cannot schedule from order.  Can you assist me?

## 2024-01-18 LAB
ANA PATTERN 1: NORMAL
ANA SER QL IF: POSITIVE
ANA TITR SER IF: >2560 {TITER}

## 2024-01-19 ENCOUNTER — TELEPHONE (OUTPATIENT)
Dept: CARDIOLOGY | Facility: HOSPITAL | Age: 79
End: 2024-01-19
Payer: MEDICARE

## 2024-01-22 ENCOUNTER — TELEPHONE (OUTPATIENT)
Dept: CARDIOLOGY | Facility: CLINIC | Age: 79
End: 2024-01-22
Payer: MEDICARE

## 2024-01-22 LAB
ANTI SM ANTIBODY: 0.14 RATIO (ref 0–0.99)
ANTI SM/RNP ANTIBODY: 0.34 RATIO (ref 0–0.99)
ANTI-SM INTERPRETATION: NEGATIVE
ANTI-SM/RNP INTERPRETATION: NEGATIVE
ANTI-SSA ANTIBODY: 3.23 RATIO (ref 0–0.99)
ANTI-SSA INTERPRETATION: POSITIVE
ANTI-SSB ANTIBODY: 0.28 RATIO (ref 0–0.99)
ANTI-SSB INTERPRETATION: NEGATIVE
DNA TITER: NORMAL
DSDNA AB SER-ACNC: POSITIVE [IU]/ML

## 2024-01-22 NOTE — PROGRESS NOTES
No other significant abnormality noted other than elevated sedimentation rate.  May be falsely elevated.  Repeat ESR in 3 weeks.  Thanks.

## 2024-01-22 NOTE — TELEPHONE ENCOUNTER
Appt made    ----- Message from Leatha Dumont sent at 1/22/2024  9:51 AM CST -----  Contact: Anca  .Type:  Patient Returning Call    Who Called:Anca  Who Left Message for Patient:Nurse  Does the patient know what this is regarding?:Yes  Would the patient rather a call back or a response via [facility]? Call back   Best Call Back Number: [phone]  Additional Information: NA                            Thanks  KT

## 2024-01-23 ENCOUNTER — TELEPHONE (OUTPATIENT)
Dept: RHEUMATOLOGY | Facility: CLINIC | Age: 79
End: 2024-01-23
Payer: MEDICARE

## 2024-01-23 NOTE — TELEPHONE ENCOUNTER
----- Message from Chaz Lock MD sent at 1/22/2024  5:57 PM CST -----  No other significant abnormality noted other than elevated sedimentation rate.  May be falsely elevated.  Repeat ESR in 3 weeks.  Thanks.

## 2024-01-24 ENCOUNTER — HOSPITAL ENCOUNTER (OUTPATIENT)
Dept: CARDIOLOGY | Facility: HOSPITAL | Age: 79
Discharge: HOME | End: 2024-01-24
Attending: INTERNAL MEDICINE
Payer: MEDICARE

## 2024-01-24 VITALS
DIASTOLIC BLOOD PRESSURE: 74 MMHG | BODY MASS INDEX: 26.53 KG/M2 | WEIGHT: 169 LBS | HEIGHT: 67 IN | SYSTOLIC BLOOD PRESSURE: 126 MMHG

## 2024-01-24 DIAGNOSIS — R20.9 BILATERAL COLD FEET: ICD-10-CM

## 2024-01-24 LAB
LEFT ABI: 1.1
LEFT ARM BP: 108 MMHG
LEFT DORSALIS PEDIS: 139 MMHG
LEFT POSTERIOR TIBIAL: 126 MMHG
LEFT TBI: 0.48
LEFT TOE PRESSURE: 60 MMHG
RIGHT ABI: 1.1
RIGHT ARM BP: 126 MMHG
RIGHT DORSALIS PEDIS: 137 MMHG
RIGHT POSTERIOR TIBIAL: 139 MMHG
RIGHT TBI: 0.7
RIGHT TOE PRESSURE: 88 MMHG

## 2024-01-24 PROCEDURE — 93922 UPR/L XTREMITY ART 2 LEVELS: CPT

## 2024-01-24 PROCEDURE — 93922 UPR/L XTREMITY ART 2 LEVELS: CPT | Mod: 26,,, | Performed by: INTERNAL MEDICINE

## 2024-01-30 ENCOUNTER — TELEPHONE (OUTPATIENT)
Dept: RHEUMATOLOGY | Facility: CLINIC | Age: 79
End: 2024-01-30
Payer: MEDICARE

## 2024-01-30 DIAGNOSIS — M33.20 POLYMYOSITIS ASSOCIATED WITH AUTOIMMUNE DISEASE: Primary | ICD-10-CM

## 2024-01-30 DIAGNOSIS — M35.9 POLYMYOSITIS ASSOCIATED WITH AUTOIMMUNE DISEASE: Primary | ICD-10-CM

## 2024-01-30 NOTE — TELEPHONE ENCOUNTER
HI if my staff can set up an appt with me asap will review further and may need to repeat LE arterial u/s and discuss other causes. Can double book anytime she can come in. Thanks!    - PM   Coming in 1/31 pt confirmed

## 2024-01-30 NOTE — TELEPHONE ENCOUNTER
Please let patient know that Dr. Francis's team will be contacting her to schedule appointment to see him as soon as possible.  Also schedule labs ordered in this encounter at the earliest.  Thanks.

## 2024-01-30 NOTE — TELEPHONE ENCOUNTER
Dear ,   Ms. March is complaining of worsening cold feet. Even though she has lupus, she did not have any evidence of Raynaud's on exam. I did an COURTNEY which was normal. Do you have any suggestions ?   Thanks for your help.

## 2024-01-30 NOTE — TELEPHONE ENCOUNTER
Patient states that she is still having problems with her feet being real cold.  Tried all kinds of socks/compression stockings and they are not helping.      Any suggestions since COURTNEY was normal

## 2024-01-31 ENCOUNTER — OFFICE VISIT (OUTPATIENT)
Dept: CARDIOLOGY | Facility: CLINIC | Age: 79
End: 2024-01-31
Payer: MEDICARE

## 2024-01-31 ENCOUNTER — PATIENT MESSAGE (OUTPATIENT)
Dept: RHEUMATOLOGY | Facility: CLINIC | Age: 79
End: 2024-01-31
Payer: MEDICARE

## 2024-01-31 ENCOUNTER — LAB VISIT (OUTPATIENT)
Dept: LAB | Facility: HOSPITAL | Age: 79
End: 2024-01-31
Attending: INTERNAL MEDICINE
Payer: MEDICARE

## 2024-01-31 VITALS
OXYGEN SATURATION: 94 % | BODY MASS INDEX: 26.85 KG/M2 | HEART RATE: 81 BPM | SYSTOLIC BLOOD PRESSURE: 122 MMHG | HEIGHT: 67 IN | WEIGHT: 171.06 LBS | DIASTOLIC BLOOD PRESSURE: 81 MMHG

## 2024-01-31 DIAGNOSIS — I49.5 SINUS NODE DYSFUNCTION: ICD-10-CM

## 2024-01-31 DIAGNOSIS — G62.9 NEUROPATHY: ICD-10-CM

## 2024-01-31 DIAGNOSIS — M35.9 POLYMYOSITIS ASSOCIATED WITH AUTOIMMUNE DISEASE: ICD-10-CM

## 2024-01-31 DIAGNOSIS — M79.671 PAIN IN BOTH FEET: ICD-10-CM

## 2024-01-31 DIAGNOSIS — M79.605 PAIN IN BOTH LOWER EXTREMITIES: Primary | ICD-10-CM

## 2024-01-31 DIAGNOSIS — M79.604 PAIN IN BOTH LOWER EXTREMITIES: Primary | ICD-10-CM

## 2024-01-31 DIAGNOSIS — Z95.0 PRESENCE OF CARDIAC PACEMAKER: ICD-10-CM

## 2024-01-31 DIAGNOSIS — R20.9 BILATERAL COLD FEET: ICD-10-CM

## 2024-01-31 DIAGNOSIS — D50.0 IRON DEFICIENCY ANEMIA DUE TO CHRONIC BLOOD LOSS: ICD-10-CM

## 2024-01-31 DIAGNOSIS — E11.69 TYPE 2 DIABETES MELLITUS WITH OTHER SPECIFIED COMPLICATION, UNSPECIFIED WHETHER LONG TERM INSULIN USE: ICD-10-CM

## 2024-01-31 DIAGNOSIS — I44.4 LAFB (LEFT ANTERIOR FASCICULAR BLOCK): ICD-10-CM

## 2024-01-31 DIAGNOSIS — R55 POSTURAL DIZZINESS WITH PRESYNCOPE: ICD-10-CM

## 2024-01-31 DIAGNOSIS — E78.49 OTHER HYPERLIPIDEMIA: ICD-10-CM

## 2024-01-31 DIAGNOSIS — R42 POSTURAL DIZZINESS WITH PRESYNCOPE: ICD-10-CM

## 2024-01-31 DIAGNOSIS — Z95.0 CARDIAC PACEMAKER IN SITU: ICD-10-CM

## 2024-01-31 DIAGNOSIS — I49.9 IRREGULAR HEART BEAT: ICD-10-CM

## 2024-01-31 DIAGNOSIS — M79.672 PAIN IN BOTH FEET: ICD-10-CM

## 2024-01-31 DIAGNOSIS — I65.23 BILATERAL CAROTID ARTERY STENOSIS: ICD-10-CM

## 2024-01-31 DIAGNOSIS — R55 SYNCOPE AND COLLAPSE: ICD-10-CM

## 2024-01-31 DIAGNOSIS — M33.20 POLYMYOSITIS ASSOCIATED WITH AUTOIMMUNE DISEASE: ICD-10-CM

## 2024-01-31 LAB — FERRITIN SERPL-MCNC: 41 NG/ML (ref 20–300)

## 2024-01-31 PROCEDURE — 99214 OFFICE O/P EST MOD 30 MIN: CPT | Mod: S$GLB,,, | Performed by: INTERNAL MEDICINE

## 2024-01-31 PROCEDURE — 84165 PROTEIN E-PHORESIS SERUM: CPT | Performed by: INTERNAL MEDICINE

## 2024-01-31 PROCEDURE — 82784 ASSAY IGA/IGD/IGG/IGM EACH: CPT | Mod: 59 | Performed by: INTERNAL MEDICINE

## 2024-01-31 PROCEDURE — 82728 ASSAY OF FERRITIN: CPT | Performed by: INTERNAL MEDICINE

## 2024-01-31 PROCEDURE — 84165 PROTEIN E-PHORESIS SERUM: CPT | Mod: 26,,, | Performed by: PATHOLOGY

## 2024-01-31 PROCEDURE — 86334 IMMUNOFIX E-PHORESIS SERUM: CPT | Performed by: INTERNAL MEDICINE

## 2024-01-31 PROCEDURE — 99999 PR PBB SHADOW E&M-EST. PATIENT-LVL V: CPT | Mod: PBBFAC,,, | Performed by: INTERNAL MEDICINE

## 2024-01-31 PROCEDURE — 36415 COLL VENOUS BLD VENIPUNCTURE: CPT | Mod: XB | Performed by: INTERNAL MEDICINE

## 2024-01-31 PROCEDURE — 83540 ASSAY OF IRON: CPT | Performed by: INTERNAL MEDICINE

## 2024-01-31 PROCEDURE — 86334 IMMUNOFIX E-PHORESIS SERUM: CPT | Mod: 26,,, | Performed by: PATHOLOGY

## 2024-01-31 PROCEDURE — 82595 ASSAY OF CRYOGLOBULIN: CPT | Performed by: INTERNAL MEDICINE

## 2024-01-31 RX ORDER — MIDODRINE HYDROCHLORIDE 10 MG/1
10 TABLET ORAL
Qty: 360 TABLET | Refills: 2 | Status: SHIPPED | OUTPATIENT
Start: 2024-01-31 | End: 2025-01-30
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
## 2024-02-01 LAB
ALBUMIN SERPL ELPH-MCNC: 4.09 G/DL (ref 3.35–5.55)
ALPHA1 GLOB SERPL ELPH-MCNC: 0.27 G/DL (ref 0.17–0.41)
ALPHA2 GLOB SERPL ELPH-MCNC: 0.82 G/DL (ref 0.43–0.99)
B-GLOBULIN SERPL ELPH-MCNC: 0.81 G/DL (ref 0.5–1.1)
GAMMA GLOB SERPL ELPH-MCNC: 3.11 G/DL (ref 0.67–1.58)
IGA SERPL-MCNC: 273 MG/DL (ref 40–350)
IGG SERPL-MCNC: 3473 MG/DL (ref 650–1600)
IGM SERPL-MCNC: 69 MG/DL (ref 50–300)
INTERPRETATION SERPL IFE-IMP: NORMAL
IRON SERPL-MCNC: 107 UG/DL (ref 30–160)
PROT SERPL-MCNC: 9.1 G/DL (ref 6–8.4)
SATURATED IRON: 29 % (ref 20–50)
TOTAL IRON BINDING CAPACITY: 373 UG/DL (ref 250–450)
TRANSFERRIN SERPL-MCNC: 252 MG/DL (ref 200–375)

## 2024-02-02 LAB
PATHOLOGIST INTERPRETATION IFE: NORMAL
PATHOLOGIST INTERPRETATION SPE: NORMAL

## 2024-02-06 ENCOUNTER — OFFICE VISIT (OUTPATIENT)
Dept: PODIATRY | Facility: CLINIC | Age: 79
End: 2024-02-06
Payer: MEDICARE

## 2024-02-06 DIAGNOSIS — M79.671 PAIN IN BOTH FEET: ICD-10-CM

## 2024-02-06 DIAGNOSIS — M79.604 PAIN IN BOTH LOWER EXTREMITIES: ICD-10-CM

## 2024-02-06 DIAGNOSIS — I87.8 VENOUS STASIS OF BOTH LOWER EXTREMITIES: Primary | ICD-10-CM

## 2024-02-06 DIAGNOSIS — R20.9 BILATERAL COLD FEET: ICD-10-CM

## 2024-02-06 DIAGNOSIS — R60.0 BILATERAL LOWER EXTREMITY EDEMA: ICD-10-CM

## 2024-02-06 DIAGNOSIS — M79.672 PAIN IN BOTH FEET: ICD-10-CM

## 2024-02-06 DIAGNOSIS — M79.605 PAIN IN BOTH LOWER EXTREMITIES: ICD-10-CM

## 2024-02-06 PROCEDURE — 99999 PR PBB SHADOW E&M-EST. PATIENT-LVL III: CPT | Mod: PBBFAC,,, | Performed by: PODIATRIST

## 2024-02-06 PROCEDURE — 99214 OFFICE O/P EST MOD 30 MIN: CPT | Mod: S$GLB,,, | Performed by: PODIATRIST

## 2024-02-06 NOTE — PROGRESS NOTES
Subjective:       Patient ID: Anca Mcclellan is a 78 y.o. female.    Chief Complaint: Diabetic Foot Exam (Patient complains of numbnes, tingling and cold bilateral feet. She complains of 5/10 pain at present to bilateral feet.10.26.23 Last seen by Tico Nevarez. )    HPI: Anca Mcclellan presents to the office today with cold and tingling sensation to the bilateral lower extremities.  She states 5/10 pain.  Reports she has had compression over the last 2 years intermittently.  She denies any recent complications.  Does follow with Dr. Francis with cardiology.  .  States swelling has been ongoing.  Denies any recent trauma or injury.     Review of patient's allergies indicates:  No Known Allergies    Past Medical History:   Diagnosis Date    Bilateral carotid artery stenosis 10/15/2019    Hyperlipidemia     LUCRETIA (obstructive sleep apnea) 5/6/2022       Family History   Problem Relation Age of Onset    Cataracts Mother     Heart disease Mother     Diabetes type II Mother     Dementia Father     Blindness Father     Heart disease Brother     Migraines Neg Hx     Melanoma Neg Hx     Lupus Neg Hx     Psoriasis Neg Hx        Social History     Socioeconomic History    Marital status:    Tobacco Use    Smoking status: Never    Smokeless tobacco: Never   Substance and Sexual Activity    Alcohol use: Not Currently     Comment: seldom    Drug use: Never    Sexual activity: Not Currently   Other Topics Concern    Are you pregnant or think you may be? No    Breast-feeding No       Past Surgical History:   Procedure Laterality Date    A-V CARDIAC PACEMAKER INSERTION Left 06/03/2021    Procedure: INSERTION, CARDIAC PACEMAKER, DUAL CHAMBER;  Surgeon: Arnold Martinez MD;  Location: [facility] CATH LAB;  Service: Cardiology;  Laterality: Left;  COVID-19, MRNA, LN-S, PF ([employer]) 2/4/2021, 1/14/2021//Gil ching notified    CATARACT EXTRACTION      ESOPHAGOGASTRODUODENOSCOPY N/A 09/17/2021    Procedure: EGD  (ESOPHAGOGASTRODUODENOSCOPY);  Surgeon: Rimma Mccracken MD;  Location: [facility];  Service: Endoscopy;  Laterality: N/A;    HYSTERECTOMY  1988    INSERTION OF PACEMAKER  06/03/2021       Review of Systems       Objective:   There were no vitals taken for this visit.    Ankle Brachial Indices (COURTNEY)    Bilateral normal courtney and waveforms       Physical Exam   LOWER EXTREMITY PHYSICAL EXAMINATION    Vascular: Capillary refill time is intact.  Moderate to severe edema present to the bilateral lower extremity. +2/3 in nature. Edema is pitting in nature.   The right dorsalis pedis pulse 2/4 and the right posterior tibial pulse 2/4.  The left dorsalis pedis pulse 2/4 and posterior tibial pulse on the left is 2/4.  Capillary refill is intact.  Digits cool to touch      Neurological:  Protective sensation is intact with Dora Brooklyn monofilament. Proprioception is intact. Intact sensation to light touch.  Vibratory sensation is diminished to the 1st metatarsal phalangeal joint.     Musculoskeletal: Manual Muscle Testing is 5/5 with dorsiflexion, plantar flexion, abduction, and adduction.   There is normal range of motion in the forefoot, hindfoot, and Ankle joint.      Dermatological:  Skin is thin, shiny, and atrophic.  Significant edema present to the bilateral lower extremity.  Pretibial edema is noted.  There is no evidence of open wounds were venous insufficiency ulcerations.  Skin is thin in nature.  Foot and ankle also have significant edema.  No signs of open ulcerations or wound present.    Assessment:     1. Venous stasis of both lower extremities    2. Bilateral cold feet    3. Pain in both lower extremities    4. Pain in both feet    5. Bilateral lower extremity edema        Plan:     Venous stasis of both lower extremities  -     COMPRESSION STOCKINGS    Bilateral cold feet  -     Ambulatory referral/consult to Podiatry    Pain in both lower extremities  -     Ambulatory referral/consult to  Podiatry    Pain in both feet  -     Ambulatory referral/consult to Podiatry    Bilateral lower extremity edema  -     COMPRESSION STOCKINGS      Thorough discussion is had with the patient today, concerning the diagnosis, its etiology, and the treatment algorithm at present.    Pedal pulses appear to be adequate both in the dependent and an elevated position.    ABIs, appear to be normal with normal triphasic flow on the right and left lower extremities albeit patient's toes are quite cool to touch.  Suspect that this is associated with venous pathology.  Patient would likely benefit from a venous ultrasound with reflux.  Venous ultrasound scheduled per Cardiology on 02/08/2024.  If pathology is not noted, recommend referral to vascular surgery to perform reflux exam    Did discuss significant swelling in pooling of the lower extremities.  I do recommend patient utilize elevation techniques to elevate the ankle above the level the hips when lying or sitting down.  We discussed that with elevation this could help decrease swelling which is occurring to the lower extremities.  Would also recommend patient to consider/use bilateral lower leg compression which is gradient in nature.  Would recommend initiating with 20-30 mmHg of graduated compression and advancing thereafter.  Would recommend compliance as this can lead to significant benefits in regards of swelling and fluid accumulation which is occurring in the lower extremities.      Future Appointments   Date Time Provider Department Center   2/6/2024  4:00 PM Kristal Soto DPM [facility] POD [facility]   2/8/2024  1:30 PM CARDIOVASCULAR, [facility] SPE[facility]   2/8/2024  2:15 PM CARDIOVASCULAR 2, [facility]   2/12/2024 12:00 PM LABORATORY, [facility] LAB [facility]   3/19/2024 10:00 AM Haritha Estrada MD [facility] DERM [facility]   7/10/2024  1:20 PM Lloyd Francsi MD [facility] CARDIO [facility]   10/9/2024  9:20 AM Devyn Lora MD [facility]  NEURO [facility]   11/18/2024 10:30 AM Debbie Louis FNP-ELISA [facility]   1/14/2025 10:00 AM LABORATORY, [facility] HG LAB [facility]   1/21/2025 12:45 PM [facility]  [place]

## 2024-02-08 ENCOUNTER — HOSPITAL ENCOUNTER (OUTPATIENT)
Dept: CARDIOLOGY | Facility: HOSPITAL | Age: 79
Discharge: HOME | End: 2024-02-08
Attending: INTERNAL MEDICINE
Payer: MEDICARE

## 2024-02-08 VITALS
DIASTOLIC BLOOD PRESSURE: 81 MMHG | WEIGHT: 171 LBS | SYSTOLIC BLOOD PRESSURE: 122 MMHG | BODY MASS INDEX: 26.84 KG/M2 | HEIGHT: 67 IN

## 2024-02-08 VITALS
BODY MASS INDEX: 26.84 KG/M2 | DIASTOLIC BLOOD PRESSURE: 80 MMHG | SYSTOLIC BLOOD PRESSURE: 157 MMHG | WEIGHT: 171 LBS | HEIGHT: 67 IN

## 2024-02-08 DIAGNOSIS — R20.9 BILATERAL COLD FEET: ICD-10-CM

## 2024-02-08 DIAGNOSIS — M79.604 PAIN IN BOTH LOWER EXTREMITIES: ICD-10-CM

## 2024-02-08 DIAGNOSIS — M79.605 PAIN IN BOTH LOWER EXTREMITIES: ICD-10-CM

## 2024-02-08 PROCEDURE — 93925 LOWER EXTREMITY STUDY: CPT

## 2024-02-08 PROCEDURE — 93970 EXTREMITY STUDY: CPT | Mod: 26,,, | Performed by: INTERNAL MEDICINE

## 2024-02-08 PROCEDURE — 93925 LOWER EXTREMITY STUDY: CPT | Mod: 26,,, | Performed by: INTERNAL MEDICINE

## 2024-02-08 PROCEDURE — 93970 EXTREMITY STUDY: CPT

## 2024-02-09 LAB
LEFT ANT TIBIAL SYS PSV: 93 CM/S
LEFT CFA PSV: 94 CM/S
LEFT PERONEAL SYS PSV: 69 CM/S
LEFT POPLITEAL PSV: 61 CM/S
LEFT POST TIBIAL SYS PSV: 98 CM/S
LEFT PROFUNDA SYS PSV: 56 CM/S
LEFT SUPER FEMORAL DIST SYS PSV: 84 CM/S
LEFT SUPER FEMORAL MID SYS PSV: 91 CM/S
LEFT SUPER FEMORAL OSTIAL SYS PSV: 68 CM/S
LEFT SUPER FEMORAL PROX SYS PSV: 111 CM/S
LEFT TIB/PER TRUNK SYS PSV: 82 CM/S
OHS CV LEFT LOWER EXTREMITY ABI (NO CALC): 1.2
OHS CV RIGHT ABI LOWER EXTREMITY (NO CALC): 1.2
RIGHT ANT TIBIAL SYS PSV: 112 CM/S
RIGHT CFA PSV: 79 CM/S
RIGHT PERONEAL SYS PSV: 79 CM/S
RIGHT POPLITEAL PSV: 54 CM/S
RIGHT POST TIBIAL SYS PSV: 110 CM/S
RIGHT PROFUNDA SYS PSV: 47 CM/S
RIGHT SUPER FEMORAL DIST SYS PSV: 86 CM/S
RIGHT SUPER FEMORAL MID SYS PSV: 91 CM/S
RIGHT SUPER FEMORAL OSTIAL SYS PSV: 98 CM/S
RIGHT SUPER FEMORAL PROX SYS PSV: 108 CM/S
RIGHT TIB/PER TRUNK SYS PSV: 61 CM/S

## 2024-02-12 ENCOUNTER — LAB VISIT (OUTPATIENT)
Dept: LAB | Facility: HOSPITAL | Age: 79
End: 2024-02-12
Attending: INTERNAL MEDICINE
Payer: MEDICARE

## 2024-02-12 DIAGNOSIS — R76.8 POSITIVE ANA (ANTINUCLEAR ANTIBODY): ICD-10-CM

## 2024-02-12 DIAGNOSIS — M33.20 POLYMYOSITIS ASSOCIATED WITH AUTOIMMUNE DISEASE: ICD-10-CM

## 2024-02-12 DIAGNOSIS — M15.9 PRIMARY OSTEOARTHRITIS INVOLVING MULTIPLE JOINTS: ICD-10-CM

## 2024-02-12 DIAGNOSIS — M35.9 POLYMYOSITIS ASSOCIATED WITH AUTOIMMUNE DISEASE: ICD-10-CM

## 2024-02-12 DIAGNOSIS — M17.12 PRIMARY OSTEOARTHRITIS OF LEFT KNEE: ICD-10-CM

## 2024-02-12 LAB
CRYOGLOB SER QL: NORMAL
ERYTHROCYTE [SEDIMENTATION RATE] IN BLOOD BY PHOTOMETRIC METHOD: 66 MM/HR (ref 0–36)

## 2024-02-12 PROCEDURE — 36415 COLL VENOUS BLD VENIPUNCTURE: CPT | Performed by: INTERNAL MEDICINE

## 2024-02-12 PROCEDURE — 85652 RBC SED RATE AUTOMATED: CPT | Performed by: INTERNAL MEDICINE

## 2024-02-25 ENCOUNTER — CLINICAL SUPPORT (OUTPATIENT)
Dept: CARDIOLOGY | Facility: HOSPITAL | Age: 79
End: 2024-02-25

## 2024-02-25 ENCOUNTER — CLINICAL SUPPORT (OUTPATIENT)
Dept: CARDIOLOGY | Facility: HOSPITAL | Age: 79
End: 2024-02-25
Attending: INTERNAL MEDICINE
Payer: MEDICARE

## 2024-02-25 DIAGNOSIS — Z95.0 PRESENCE OF CARDIAC PACEMAKER: ICD-10-CM

## 2024-02-25 DIAGNOSIS — I49.5 SICK SINUS SYNDROME: ICD-10-CM

## 2024-02-25 PROCEDURE — 93294 REM INTERROG EVL PM/LDLS PM: CPT | Mod: S$GLB,,, | Performed by: INTERNAL MEDICINE

## 2024-02-25 PROCEDURE — 93296 REM INTERROG EVL PM/IDS: CPT | Performed by: INTERNAL MEDICINE

## 2024-03-06 ENCOUNTER — OFFICE VISIT (OUTPATIENT)
Dept: DERMATOLOGY | Facility: CLINIC | Age: 79
End: 2024-03-06
Payer: MEDICARE

## 2024-03-06 DIAGNOSIS — L21.9 SEBORRHEIC DERMATITIS: ICD-10-CM

## 2024-03-06 PROCEDURE — 99999 PR PBB SHADOW E&M-EST. PATIENT-LVL II: CPT | Mod: PBBFAC,,, | Performed by: STUDENT IN AN ORGANIZED HEALTH CARE EDUCATION/TRAINING PROGRAM

## 2024-03-06 PROCEDURE — 99213 OFFICE O/P EST LOW 20 MIN: CPT | Mod: S$GLB,,, | Performed by: STUDENT IN AN ORGANIZED HEALTH CARE EDUCATION/TRAINING PROGRAM

## 2024-03-06 RX ORDER — FLUOCINONIDE TOPICAL SOLUTION USP, 0.05% 0.5 MG/ML
SOLUTION TOPICAL
Qty: 60 ML | Refills: 3 | Status: SHIPPED | OUTPATIENT
Start: 2024-03-06 | End: 2024-06-06 | Stop reason: SDUPTHER

## 2024-03-06 NOTE — PROGRESS NOTES
Patient Information  Name: Anca Mcclellan  : 1945  MRN: [id number]     Referring Physician:  Dr. Robert ref. provider found   Primary Care Physician:  Tony Ewing,    Date of Visit: 2024      Subjective:       Anca Mcclellan is a 78 y.o. female who presents for   Chief Complaint   Patient presents with    Follow-up    Rash       Hx of seb derm, here for follow up. She has completed diflucan therapy and currently still using ketoconazole shampoo with improvements.   Patient was last seen:2023     Prior notes by myself reviewed.   Clinical documentation obtained by nursing staff reviewed.    Review of Systems   Skin:  Negative for itching and rash.        Objective:    Physical Exam   Constitutional: She appears well-developed and well-nourished. No distress.   Neurological: She is alert and oriented to person, place, and time. She is not disoriented.   Psychiatric: She has a normal mood and affect.   Skin:   Areas Examined (abnormalities noted in diagram):   Scalp / Hair Palpated and Inspected              Diagram Legend     Erythematous scaling macule/papule c/w actinic keratosis       Vascular papule c/w angioma      Pigmented verrucoid papule/plaque c/w seborrheic keratosis      Yellow umbilicated papule c/w sebaceous hyperplasia      Irregularly shaped tan macule c/w lentigo     1-2 mm smooth white papules consistent with Milia      Movable subcutaneous cyst with punctum c/w epidermal inclusion cyst      Subcutaneous movable cyst c/w pilar cyst      Firm pink to brown papule c/w dermatofibroma      Pedunculated fleshy papule(s) c/w skin tag(s)      Evenly pigmented macule c/w junctional nevus     Mildly variegated pigmented, slightly irregular-bordered macule c/w mildly atypical nevus      Flesh colored to evenly pigmented papule c/w intradermal nevus       Pink pearly papule/plaque c/w basal cell carcinoma      Erythematous hyperkeratotic cursted plaque c/w SCC      Surgical scar  with no sign of skin cancer recurrence      Open and closed comedones      Inflammatory papules and pustules      Verrucoid papule consistent consistent with wart     Erythematous eczematous patches and plaques     Dystrophic onycholytic nail with subungual debris c/w onychomycosis     Umbilicated papule    Erythematous-base heme-crusted tan verrucoid plaque consistent with inflamed seborrheic keratosis     Erythematous Silvery Scaling Plaque c/w Psoriasis     See annotation      No images are attached to the encounter or orders placed in the encounter.    [] Data reviewed  [] Independent review of test  [] Management discussed with another provider    Assessment / Plan:        Seborrheic dermatitis leading to alopecia  -     fluocinonide (LIDEX) 0.05 % external solution; AAA scalp qday - bid prn pruritus  Dispense: 60 mL; Refill: 3  - Continue ketoconazole shampoo           LOS NUMBER AND COMPLEXITY OF PROBLEMS    COMPLEXITY OF DATA RISK TOTAL TIME (m)   21103  77543 [] 1 self-limited or minor problem [x] Minimal to none [] No treatment recommended or patient to monitor 15-29  10-19   87439  76140 Low  [] 2 or > self limited or minor problems  [x] 1 stable chronic illness  [] 1 acute, uncomplicated illness or injury Limited (2)  [] Prior external notes from each unique source  [] Review result of each unique test  [] Order each unique test []  Low  OTC medications, minor skin biopsy 30-44 20-29   15192  96103 Moderate  []  1 or > chronic illness with progression, exacerbation or SE of treatment  []  2 or more stable chronic illnesses  []  1 acute illness with systemic symptoms  []  1 acute complicated injury  []  1 undiagnosed new problem with uncertain prognosis Moderate (1/3 below)  []  3 or more data items        *Now includes assessment requiring independent historian  []  Independent interpretation of a test  []  Discuss management/test with another provider Moderate  [x]  Prescription drug mgmt  []  Minor  surgery with risk discussed  []  Mgmt limited by social determinates 45-59  30-39   21424  75659 High  []  1 or more chronic illness with severe exacerbation, progression or SE of treatment  []  1 acute or chronic illness/injury that poses a threat to life or bodily function Extensive (2/3 below)  []  3 or more data items        *Now includes assessment requiring independent historian.  []  Independent interpretation of a test  []  Discuss management/test with another provider High  []  Major surgery with risk discussed  []  Drug therapy requiring intensive monitoring for toxicity  []  Hospitalization  []  Decision for DNR 60-74  40-54      No follow-ups on file.    Haritha Estrada MD, FAAD  [facility]

## 2024-04-21 LAB
OHS CV AF BURDEN PERCENT: < 1
OHS CV DC REMOTE DEVICE TYPE: NORMAL
OHS CV RV PACING PERCENT: 0 %

## 2024-05-26 ENCOUNTER — CLINICAL SUPPORT (OUTPATIENT)
Dept: CARDIOLOGY | Facility: HOSPITAL | Age: 79
End: 2024-05-26
Payer: MEDICARE

## 2024-05-26 ENCOUNTER — CLINICAL SUPPORT (OUTPATIENT)
Dept: CARDIOLOGY | Facility: HOSPITAL | Age: 79
End: 2024-05-26
Attending: INTERNAL MEDICINE
Payer: MEDICARE

## 2024-05-26 DIAGNOSIS — I49.5 SICK SINUS SYNDROME: ICD-10-CM

## 2024-05-26 DIAGNOSIS — Z95.0 PRESENCE OF CARDIAC PACEMAKER: ICD-10-CM

## 2024-05-26 PROCEDURE — 93296 REM INTERROG EVL PM/IDS: CPT | Performed by: INTERNAL MEDICINE

## 2024-05-26 PROCEDURE — 93294 REM INTERROG EVL PM/LDLS PM: CPT | Mod: S$GLB,,, | Performed by: INTERNAL MEDICINE

## 2024-05-28 LAB
OHS CV AF BURDEN PERCENT: < 1
OHS CV DC REMOTE DEVICE TYPE: NORMAL
OHS CV RV PACING PERCENT: 1 %

## 2024-06-06 ENCOUNTER — OFFICE VISIT (OUTPATIENT)
Dept: DERMATOLOGY | Facility: CLINIC | Age: 79
End: 2024-06-06
Payer: MEDICARE

## 2024-06-06 DIAGNOSIS — L21.9 SEBORRHEIC DERMATITIS: ICD-10-CM

## 2024-06-06 PROCEDURE — 1126F AMNT PAIN NOTED NONE PRSNT: CPT | Mod: CPTII,S$GLB,, | Performed by: STUDENT IN AN ORGANIZED HEALTH CARE EDUCATION/TRAINING PROGRAM

## 2024-06-06 PROCEDURE — 1160F RVW MEDS BY RX/DR IN RCRD: CPT | Mod: CPTII,S$GLB,, | Performed by: STUDENT IN AN ORGANIZED HEALTH CARE EDUCATION/TRAINING PROGRAM

## 2024-06-06 PROCEDURE — 99213 OFFICE O/P EST LOW 20 MIN: CPT | Mod: S$GLB,,, | Performed by: STUDENT IN AN ORGANIZED HEALTH CARE EDUCATION/TRAINING PROGRAM

## 2024-06-06 PROCEDURE — 3288F FALL RISK ASSESSMENT DOCD: CPT | Mod: CPTII,S$GLB,, | Performed by: STUDENT IN AN ORGANIZED HEALTH CARE EDUCATION/TRAINING PROGRAM

## 2024-06-06 PROCEDURE — 1101F PT FALLS ASSESS-DOCD LE1/YR: CPT | Mod: CPTII,S$GLB,, | Performed by: STUDENT IN AN ORGANIZED HEALTH CARE EDUCATION/TRAINING PROGRAM

## 2024-06-06 PROCEDURE — G2211 COMPLEX E/M VISIT ADD ON: HCPCS | Mod: S$GLB,,, | Performed by: STUDENT IN AN ORGANIZED HEALTH CARE EDUCATION/TRAINING PROGRAM

## 2024-06-06 PROCEDURE — 3072F LOW RISK FOR RETINOPATHY: CPT | Mod: CPTII,S$GLB,, | Performed by: STUDENT IN AN ORGANIZED HEALTH CARE EDUCATION/TRAINING PROGRAM

## 2024-06-06 PROCEDURE — 1159F MED LIST DOCD IN RCRD: CPT | Mod: CPTII,S$GLB,, | Performed by: STUDENT IN AN ORGANIZED HEALTH CARE EDUCATION/TRAINING PROGRAM

## 2024-06-06 PROCEDURE — 99999 PR PBB SHADOW E&M-EST. PATIENT-LVL III: CPT | Mod: PBBFAC,,, | Performed by: STUDENT IN AN ORGANIZED HEALTH CARE EDUCATION/TRAINING PROGRAM

## 2024-06-06 RX ORDER — KETOCONAZOLE 20 MG/ML
SHAMPOO, SUSPENSION TOPICAL WEEKLY
Qty: 120 ML | Refills: 5 | Status: SHIPPED | OUTPATIENT
Start: 2024-06-06

## 2024-06-06 RX ORDER — FLUOCINONIDE TOPICAL SOLUTION USP, 0.05% 0.5 MG/ML
SOLUTION TOPICAL
Qty: 60 ML | Refills: 11 | Status: SHIPPED | OUTPATIENT
Start: 2024-06-06

## 2024-06-06 NOTE — PROGRESS NOTES
Patient Information  Name: Anca Mcclellan  : 1945  MRN: [id number]     Referring Physician:  Dr. Robert ref. provider found   Primary Care Physician:  Tony Ewing,    Date of Visit: 2024      Subjective:       Anca Mcclellan is a 79 y.o. female who presents for   Chief Complaint   Patient presents with    Follow-up     Seborrheic dermatitis follow up, improving        Hx of seb derm, here for follow up. She is currently using topical lidex and ketoconazole shampoo.  Patient was last seen:2023     Prior notes by myself reviewed.   Clinical documentation obtained by nursing staff reviewed.    Review of Systems   Skin:  Negative for itching and rash.        Objective:    Physical Exam   Constitutional: She appears well-developed and well-nourished. No distress.   Neurological: She is alert and oriented to person, place, and time. She is not disoriented.   Psychiatric: She has a normal mood and affect.   Skin:   Areas Examined (abnormalities noted in diagram):   Scalp / Hair Palpated and Inspected              Diagram Legend     Erythematous scaling macule/papule c/w actinic keratosis       Vascular papule c/w angioma      Pigmented verrucoid papule/plaque c/w seborrheic keratosis      Yellow umbilicated papule c/w sebaceous hyperplasia      Irregularly shaped tan macule c/w lentigo     1-2 mm smooth white papules consistent with Milia      Movable subcutaneous cyst with punctum c/w epidermal inclusion cyst      Subcutaneous movable cyst c/w pilar cyst      Firm pink to brown papule c/w dermatofibroma      Pedunculated fleshy papule(s) c/w skin tag(s)      Evenly pigmented macule c/w junctional nevus     Mildly variegated pigmented, slightly irregular-bordered macule c/w mildly atypical nevus      Flesh colored to evenly pigmented papule c/w intradermal nevus       Pink pearly papule/plaque c/w basal cell carcinoma      Erythematous hyperkeratotic cursted plaque c/w SCC      Surgical scar  with no sign of skin cancer recurrence      Open and closed comedones      Inflammatory papules and pustules      Verrucoid papule consistent consistent with wart     Erythematous eczematous patches and plaques     Dystrophic onycholytic nail with subungual debris c/w onychomycosis     Umbilicated papule    Erythematous-base heme-crusted tan verrucoid plaque consistent with inflamed seborrheic keratosis     Erythematous Silvery Scaling Plaque c/w Psoriasis     See annotation      No images are attached to the encounter or orders placed in the encounter.    [] Data reviewed  [] Independent review of test  [] Management discussed with another provider    Assessment / Plan:        Seborrheic dermatitis leading to alopecia, improving  -    Refill fluocinonide (LIDEX) 0.05 % external solution; AAA scalp qday - bid prn pruritus  Dispense: 60 mL; Refill: 11  - Continue ketoconazole shampoo           LOS NUMBER AND COMPLEXITY OF PROBLEMS    COMPLEXITY OF DATA RISK TOTAL TIME (m)   47501  13048 [] 1 self-limited or minor problem [x] Minimal to none [] No treatment recommended or patient to monitor 15-29  10-19   52011  31525 Low  [] 2 or > self limited or minor problems  [x] 1 stable chronic illness  [] 1 acute, uncomplicated illness or injury Limited (2)  [] Prior external notes from each unique source  [] Review result of each unique test  [] Order each unique test []  Low  OTC medications, minor skin biopsy 30-44 20-29   19014  60539 Moderate  []  1 or > chronic illness with progression, exacerbation or SE of treatment  []  2 or more stable chronic illnesses  []  1 acute illness with systemic symptoms  []  1 acute complicated injury  []  1 undiagnosed new problem with uncertain prognosis Moderate (1/3 below)  []  3 or more data items        *Now includes assessment requiring independent historian  []  Independent interpretation of a test  []  Discuss management/test with another provider Moderate  [x]  Prescription drug  mgmt  []  Minor surgery with risk discussed  []  Mgmt limited by social determinates 45-59  30-39   76075  87790 High  []  1 or more chronic illness with severe exacerbation, progression or SE of treatment  []  1 acute or chronic illness/injury that poses a threat to life or bodily function Extensive (2/3 below)  []  3 or more data items        *Now includes assessment requiring independent historian.  []  Independent interpretation of a test  []  Discuss management/test with another provider High  []  Major surgery with risk discussed  []  Drug therapy requiring intensive monitoring for toxicity  []  Hospitalization  []  Decision for DNR 60-74  40-54      No follow-ups on file.    Haritha Estrada MD, FAAD  [facility]

## 2024-07-03 DIAGNOSIS — I44.4 LAFB (LEFT ANTERIOR FASCICULAR BLOCK): ICD-10-CM

## 2024-07-03 DIAGNOSIS — M79.605 PAIN IN BOTH LOWER EXTREMITIES: Primary | ICD-10-CM

## 2024-07-03 DIAGNOSIS — M79.604 PAIN IN BOTH LOWER EXTREMITIES: Primary | ICD-10-CM

## 2024-07-10 ENCOUNTER — OFFICE VISIT (OUTPATIENT)
Dept: CARDIOLOGY | Facility: CLINIC | Age: 79
End: 2024-07-10
Payer: MEDICARE

## 2024-07-10 ENCOUNTER — HOSPITAL ENCOUNTER (OUTPATIENT)
Dept: CARDIOLOGY | Facility: HOSPITAL | Age: 79
Discharge: HOME | End: 2024-07-10
Attending: INTERNAL MEDICINE
Payer: MEDICARE

## 2024-07-10 VITALS
WEIGHT: 171.94 LBS | HEIGHT: 67 IN | OXYGEN SATURATION: 98 % | BODY MASS INDEX: 26.93 KG/M2 | WEIGHT: 171.94 LBS | DIASTOLIC BLOOD PRESSURE: 77 MMHG | BODY MASS INDEX: 26.99 KG/M2 | SYSTOLIC BLOOD PRESSURE: 116 MMHG | HEART RATE: 89 BPM

## 2024-07-10 DIAGNOSIS — D50.0 IRON DEFICIENCY ANEMIA DUE TO CHRONIC BLOOD LOSS: ICD-10-CM

## 2024-07-10 DIAGNOSIS — E78.49 OTHER HYPERLIPIDEMIA: ICD-10-CM

## 2024-07-10 DIAGNOSIS — M79.672 PAIN IN BOTH FEET: ICD-10-CM

## 2024-07-10 DIAGNOSIS — I49.9 IRREGULAR HEART BEAT: ICD-10-CM

## 2024-07-10 DIAGNOSIS — R55 SYNCOPE AND COLLAPSE: ICD-10-CM

## 2024-07-10 DIAGNOSIS — G62.9 NEUROPATHY: ICD-10-CM

## 2024-07-10 DIAGNOSIS — M79.671 PAIN IN BOTH FEET: ICD-10-CM

## 2024-07-10 DIAGNOSIS — R06.09 DYSPNEA ON EXERTION: ICD-10-CM

## 2024-07-10 DIAGNOSIS — M79.605 PAIN IN BOTH LOWER EXTREMITIES: ICD-10-CM

## 2024-07-10 DIAGNOSIS — M79.604 PAIN IN BOTH LOWER EXTREMITIES: ICD-10-CM

## 2024-07-10 DIAGNOSIS — I44.4 LAFB (LEFT ANTERIOR FASCICULAR BLOCK): ICD-10-CM

## 2024-07-10 DIAGNOSIS — I49.5 SICK SINUS SYNDROME: ICD-10-CM

## 2024-07-10 DIAGNOSIS — E11.69 TYPE 2 DIABETES MELLITUS WITH OTHER SPECIFIED COMPLICATION, UNSPECIFIED WHETHER LONG TERM INSULIN USE: ICD-10-CM

## 2024-07-10 DIAGNOSIS — Z95.0 CARDIAC PACEMAKER IN SITU: ICD-10-CM

## 2024-07-10 DIAGNOSIS — R55 POSTURAL DIZZINESS WITH PRESYNCOPE: ICD-10-CM

## 2024-07-10 DIAGNOSIS — I49.5 SINUS NODE DYSFUNCTION: Primary | ICD-10-CM

## 2024-07-10 DIAGNOSIS — R42 POSTURAL DIZZINESS WITH PRESYNCOPE: ICD-10-CM

## 2024-07-10 DIAGNOSIS — I70.0 ATHEROSCLEROSIS OF AORTA: ICD-10-CM

## 2024-07-10 DIAGNOSIS — R20.9 BILATERAL COLD FEET: ICD-10-CM

## 2024-07-10 DIAGNOSIS — I20.9 ANGINA PECTORIS: ICD-10-CM

## 2024-07-10 DIAGNOSIS — I65.23 BILATERAL CAROTID ARTERY STENOSIS: ICD-10-CM

## 2024-07-10 DIAGNOSIS — Z95.0 PRESENCE OF CARDIAC PACEMAKER: ICD-10-CM

## 2024-07-10 DIAGNOSIS — G47.33 OSA (OBSTRUCTIVE SLEEP APNEA): ICD-10-CM

## 2024-07-10 LAB
OHS QRS DURATION: 142 MS
OHS QTC CALCULATION: 474 MS

## 2024-07-10 PROCEDURE — 1101F PT FALLS ASSESS-DOCD LE1/YR: CPT | Mod: CPTII,S$GLB,, | Performed by: INTERNAL MEDICINE

## 2024-07-10 PROCEDURE — 93005 ELECTROCARDIOGRAM TRACING: CPT

## 2024-07-10 PROCEDURE — 3078F DIAST BP <80 MM HG: CPT | Mod: CPTII,S$GLB,, | Performed by: INTERNAL MEDICINE

## 2024-07-10 PROCEDURE — 1159F MED LIST DOCD IN RCRD: CPT | Mod: CPTII,S$GLB,, | Performed by: INTERNAL MEDICINE

## 2024-07-10 PROCEDURE — 3074F SYST BP LT 130 MM HG: CPT | Mod: CPTII,S$GLB,, | Performed by: INTERNAL MEDICINE

## 2024-07-10 PROCEDURE — 3288F FALL RISK ASSESSMENT DOCD: CPT | Mod: CPTII,S$GLB,, | Performed by: INTERNAL MEDICINE

## 2024-07-10 PROCEDURE — 3072F LOW RISK FOR RETINOPATHY: CPT | Mod: CPTII,S$GLB,, | Performed by: INTERNAL MEDICINE

## 2024-07-10 PROCEDURE — 99214 OFFICE O/P EST MOD 30 MIN: CPT | Mod: S$GLB,,, | Performed by: INTERNAL MEDICINE

## 2024-07-10 PROCEDURE — 1126F AMNT PAIN NOTED NONE PRSNT: CPT | Mod: CPTII,S$GLB,, | Performed by: INTERNAL MEDICINE

## 2024-07-10 PROCEDURE — 1160F RVW MEDS BY RX/DR IN RCRD: CPT | Mod: CPTII,S$GLB,, | Performed by: INTERNAL MEDICINE

## 2024-07-10 PROCEDURE — 93010 ELECTROCARDIOGRAM REPORT: CPT | Mod: ,,, | Performed by: INTERNAL MEDICINE

## 2024-07-10 PROCEDURE — 99999 PR PBB SHADOW E&M-EST. PATIENT-LVL IV: CPT | Mod: PBBFAC,,, | Performed by: INTERNAL MEDICINE

## 2024-07-10 PROCEDURE — G2211 COMPLEX E/M VISIT ADD ON: HCPCS | Mod: S$GLB,,, | Performed by: INTERNAL MEDICINE

## 2024-07-10 RX ORDER — DONEPEZIL HYDROCHLORIDE 5 MG/1
5 TABLET, FILM COATED ORAL NIGHTLY
COMMUNITY
Start: 2024-05-13

## 2024-07-10 RX ORDER — MIDODRINE HYDROCHLORIDE 10 MG/1
10 TABLET ORAL
Qty: 360 TABLET | Refills: 2 | Status: SHIPPED | OUTPATIENT
Start: 2024-07-10 | End: 2025-07-10

## 2024-07-10 NOTE — PROGRESS NOTES
Subjective:   Patient ID:  Anca Mcclellan is a 79 y.o. female who presents for cardiac consult of No chief complaint on file.      The patient came in today for cardiac consult of No chief complaint on file.    Anca Mcclellan is a 79 y.o. female pt with current medical conditions CHB s/p DC PPM 6/2021, carotid artery disease, CPAP,  HLD, DM, polymyositis, MGUS presents for follow up CV evaluation.     11/3/23  BP and HR stable.   ECG - A paced AV prolonged, RBBB, LAFB.  She had bone marrow biopsy - has MGUS, not multiple myeloma.     1/31/24  From Dr. ROCHA - worsening cold feet. Even though she has lupus, she did not have any evidence of Raynaud's on exam.     7/10/24  LE arterial and venous u/s neg 2/2024 with normal ANYA.   BP and HR stable. BMI 26  Overall is active. No CP/SOB. Legs are stable.   Is not able to tolerate CPAP.   Overall stable with device interrogation in May.     ECG - A paced , prolonged AV conduction    Patient is compliant with medications.    Conclusion 1/2023    Bilateral normal anya and waveforms bilaterally.essentially within normal study w/o any significant stenosis    Home Sleep Studies     Date/Time: 4/25/2022 8:00 AM  Performed by: Jim Orr MD  Authorized by: Jaymie Kinney PA-C        PHYSICIAN INTERPRETATION AND COMMENTS: Findings are consistent with moderate obstructive sleep apnea (LUCRETIA). CPAP  therapy indicated.  CLINICAL HISTORY: 77 year old female presented with: 13.5 inch neck, BMI of 28.1, an [place] sleepiness score of 8, history  of heart disease, diabetes and symptoms of nocturnal waking up choking. Based on the clinical history, the patient has a  high pre-test probability of having Mild LUCRETIA.    Results for orders placed during the hospital encounter of 04/25/22    Echo    Interpretation Summary  · Mild tricuspid regurgitation.  · Normal systolic function.  · The estimated ejection fraction is 55%.  · Normal left ventricular diastolic function.  · Normal  right ventricular size.  · Normal central venous pressure (3 mmHg).  · The estimated PA systolic pressure is 32 mmHg.          Nuclear Quantitative Functional Analysis:   LVEF: 63 %  LVED Volume: 85 ml  LVES Volume: 31 ml    Impression: NORMAL MYOCARDIAL PERFUSION  1. The perfusion scan is free of evidence for myocardial ischemia or injury.   2. Resting wall motion is physiologic.   3. Resting LV function is normal.   4. The ventricular volumes are normal at rest and stress.   5. The extracardiac distribution of radioactivity is normal.     This document has been electronically    SIGNED BY: Lloyd Francis MD On: 11/12/2019 16:58      CONCLUSIONS   There is 40 - 49% right Internal Carotid stenosis.  There is 40 - 49% left Internal Carotid stenosis.    This document has been electronically    SIGNED BY: Ham Taylor MD On: 11/06/2019 18:40        Past Medical History:   Diagnosis Date    Bilateral carotid artery stenosis 10/15/2019    Hyperlipidemia     LUCRETIA (obstructive sleep apnea) 5/6/2022       Past Surgical History:   Procedure Laterality Date    A-V CARDIAC PACEMAKER INSERTION Left 06/03/2021    Procedure: INSERTION, CARDIAC PACEMAKER, DUAL CHAMBER;  Surgeon: Arnold Martinez MD;  Location: [facility] CATH LAB;  Service: Cardiology;  Laterality: Left;  COVID-19, MRNA, LN-S, PF (Pf[employer]) 2/4/2021, 1/14/2021//Gil ching notified    CATARACT EXTRACTION      ESOPHAGOGASTRODUODENOSCOPY N/A 09/17/2021    Procedure: EGD (ESOPHAGOGASTRODUODENOSCOPY);  Surgeon: Rimma Mccracken MD;  Location: [facility] ENDO;  Service: Endoscopy;  Laterality: N/A;    HYSTERECTOMY  1988    INSERTION OF PACEMAKER  06/03/2021       Social History     Tobacco Use    Smoking status: Never    Smokeless tobacco: Never   Substance Use Topics    Alcohol use: Not Currently     Comment: seldom    Drug use: Never       Family History   Problem Relation Name Age of Onset    Cataracts Mother      Heart disease Mother      Diabetes type II Mother       Dementia Father      Blindness Father      Heart disease Brother      Migraines Neg Hx      Melanoma Neg Hx      Lupus Neg Hx      Psoriasis Neg Hx         Patient's Medications   New Prescriptions    No medications on file   Previous Medications    ASPIRIN (ECOTRIN) 81 MG EC TABLET    Take 81 mg by mouth once daily.    ATORVASTATIN (LIPITOR) 40 MG TABLET    Take 40 mg by mouth once daily.    CHOLECALCIFEROL, VITAMIN D3, (VITAMIN D3) 50 MCG (2,000 UNIT) TAB    1 capsule    CYANOCOBALAMIN (VITAMIN B-12) 100 MCG TABLET    Take 100 mcg by mouth once daily.    DONEPEZIL (ARICEPT) 5 MG TABLET    Take 5 mg by mouth every evening.    FERROUS SULFATE (FEOSOL) 325 MG (65 MG IRON) TAB TABLET    1 tablet    FLUCONAZOLE (DIFLUCAN) 200 MG TAB    1 po biw x 2 weeks    FLUOCINONIDE (LIDEX) 0.05 % EXTERNAL SOLUTION    AAA scalp qday - bid prn pruritus    FLUTICASONE PROPIONATE (FLONASE) 50 MCG/ACTUATION NASAL SPRAY    SPRAY 1 SPRAY INTO EACH NOSTRIL EVERY DAY    KETOCONAZOLE (NIZORAL) 2 % SHAMPOO    Apply topically once a week. Lather in for 5-10 min before rinsing    MELOXICAM (MOBIC) 15 MG TABLET    Take 15 mg by mouth.    METFORMIN (GLUCOPHAGE-XR) 500 MG ER 24HR TABLET    Take 500 mg by mouth once daily.    MIDODRINE (PROAMATINE) 10 MG TABLET    Take 1 tablet (10 mg total) by mouth 3 (three) times daily with meals.    MULTIVITAMIN (THERAGRAN) PER TABLET    Take 1 tablet by mouth once daily.    TRIAMCINOLONE ACETONIDE 0.1% (KENALOG) 0.1 % OINTMENT    APPLY TO RASH TOPICALLY TWICE DAILY    VITAMIN E 100 UNIT CAPSULE    Take 100 Units by mouth once daily.   Modified Medications    No medications on file   Discontinued Medications    No medications on file       Review of Systems   HENT: Negative.     Eyes:  Negative for blurred vision.   Respiratory:  Positive for shortness of breath.    Cardiovascular:  Positive for palpitations (rare). Negative for chest pain and leg swelling.   Gastrointestinal: Negative.    Genitourinary:  "Negative.    Musculoskeletal: Negative.    Skin: Negative.    Neurological:  Negative for dizziness.   Endo/Heme/Allergies: Negative.    Psychiatric/Behavioral: Negative.     All 12 systems otherwise negative.      Wt Readings from Last 3 Encounters:   07/10/24 78 kg (171 lb 15.3 oz)   07/10/24 78 kg (171 lb 15.3 oz)   02/08/24 77.6 kg (171 lb)     Temp Readings from Last 3 Encounters:   08/16/23 97.1 °F (36.2 °C) (Tympanic)   08/08/23 98.6 °F (37 °C) (Tympanic)   10/31/22 98.1 °F (36.7 °C) (Temporal)     BP Readings from Last 3 Encounters:   07/10/24 116/77   02/08/24 (!) 157/80   02/08/24 122/81     Pulse Readings from Last 3 Encounters:   07/10/24 89   01/31/24 81   01/17/24 87       /77 (BP Location: Right arm, Patient Position: Sitting, BP Method: Medium (Automatic))   Pulse 89   Ht 5' 7" (1.702 m)   Wt 78 kg (171 lb 15.3 oz)   SpO2 98%   BMI 26.93 kg/m²     Objective:   Physical Exam  Vitals and nursing note reviewed.   Constitutional:       General: She is not in acute distress.     Appearance: She is well-developed. She is not diaphoretic.   HENT:      Head: Normocephalic and atraumatic.      Nose: Nose normal.   Eyes:      General: No scleral icterus.     Conjunctiva/sclera: Conjunctivae normal.   Neck:      Thyroid: No thyromegaly.      Vascular: No JVD.   Cardiovascular:      Rate and Rhythm: Normal rate and regular rhythm.      Heart sounds: S1 normal and S2 normal. No murmur heard.     No friction rub. No gallop. No S3 or S4 sounds.   Pulmonary:      Effort: Pulmonary effort is normal. No respiratory distress.      Breath sounds: Normal breath sounds. No stridor. No wheezing or rales.   Chest:      Chest wall: No tenderness.   Abdominal:      General: Bowel sounds are normal. There is no distension.      Palpations: Abdomen is soft. There is no mass.      Tenderness: There is no abdominal tenderness. There is no rebound.   Genitourinary:     Comments: Deferred  Musculoskeletal:         " General: No tenderness or deformity. Normal range of motion.      Cervical back: Normal range of motion and neck supple.   Lymphadenopathy:      Cervical: No cervical adenopathy.   Skin:     General: Skin is warm and dry.      Coloration: Skin is not pale.      Findings: No erythema or rash.   Neurological:      Mental Status: She is alert and oriented to person, place, and time.      Motor: No abnormal muscle tone.      Coordination: Coordination normal.   Psychiatric:         Behavior: Behavior normal.         Thought Content: Thought content normal.         Judgment: Judgment normal.         Lab Results   Component Value Date     01/17/2024    K 4.0 01/17/2024     01/17/2024    CO2 24 01/17/2024    BUN 10 01/17/2024    CREATININE 0.7 01/17/2024     (H) 01/17/2024    HGBA1C 6.5 (H) 04/29/2022    MG 2.1 06/16/2021    AST 53 (H) 01/17/2024    ALT 39 01/17/2024    ALBUMIN 4.0 01/17/2024    PROT 9.7 (H) 01/17/2024    BILITOT 0.7 01/17/2024    WBC 3.54 (L) 01/17/2024    HGB 13.2 01/17/2024    HCT 39.9 01/17/2024    MCV 91 01/17/2024     01/17/2024    INR 1.0 05/18/2021    TSH 0.652 12/01/2020    CHOL 148 09/29/2021    HDL 49 09/29/2021    LDLCALC 82.2 09/29/2021    TRIG 84 09/29/2021    BNP 17 04/29/2022     Assessment:      1. Sinus node dysfunction    2. Pain in both lower extremities    3. Sick sinus syndrome    4. LAFB (left anterior fascicular block)    5. Presence of cardiac pacemaker    6. Bilateral cold feet    7. Cardiac pacemaker in situ    8. Type 2 diabetes mellitus with other specified complication, unspecified whether long term insulin use    9. Bilateral carotid artery stenosis    10. Iron deficiency anemia due to chronic blood loss    11. Other hyperlipidemia    12. Postural dizziness with presyncope    13. Irregular heart beat    14. Pain in both feet    15. Neuropathy    16. Dyspnea on exertion    17. LUCRETIA (obstructive sleep apnea)    18. Syncope and collapse    19. Angina  pectoris    20. Atherosclerosis of aorta            Plan:   1. High degree AVB s/p PPM 6/2021  - cont f/u at PPM clinic and EP as needed    2. H/o Syncope -presyncope - syncope during MG 2022  - ECHO in 4/2022 with normal bi V function, PASP 32 mmHg.   - Holter - negative  - s/p ENT - no further eval  - increase fluid intake  - cont Midodrine 2.5 mg BID - increase to 5mg BID --increase 10mg TID  - rec salt intake if BP is lower    3. HLD   - cont Lipitor    4. Polymyositis  - cont meds per PCP/Rheum    5. Carotid artery disease  - cont asa, statin  - carotid u/s stable 1/2022    6. Fatigue with anemia, EGD with ulcers/bleeding,. MGUS  - f/u hemeonc and GI   - cont iron tabs  - repeat iron levels   - ECHO in 4/2022 with normal bi V function, PASP 32 mmHg.   - had bone marrow biopsy  - MGUS ?     7. Chest pain - resolved   - pharm nuclear stress test - prior neg     8. DM A1c 6.5 --> 6.0  - cont tx    9. Moderate LUCRETIA  - needs CPAP machine/supplies - has not gotten     10. LE feet cold  - LE arterial u/s and COURTNEY  - neg 1/2023  - LE arterial and venous u/s neg 2/2024 with normal COURTNEY.   - refer to podiatry and neuro - may need EMG     Thank you for allowing me to participate in this patient's care. Please do not hesitate to contact me with any questions or concerns. Consult note has been forwarded to the referral physician.     Lloyd Francis MD, [facility]  Cardiovascular Disease  [facility], [place]  [phone] (P)

## 2024-07-30 ENCOUNTER — TELEPHONE (OUTPATIENT)
Dept: OBSTETRICS AND GYNECOLOGY | Facility: CLINIC | Age: 79
End: 2024-07-30
Payer: MEDICARE

## 2024-07-30 NOTE — TELEPHONE ENCOUNTER
Pt called in regards to boil in vaginal area. Concerned and want to get it assessed. Pt scheduled 7/31 @ [facility] with Dr. Charles CLAYTON LPN  OB/GYN

## 2024-07-30 NOTE — TELEPHONE ENCOUNTER
----- Message from Laney Mora sent at 7/30/2024 12:53 PM CDT -----  Contact: Anca  Type:  Sooner Apoointment Request    Caller is requesting a sooner appointment.  Caller declined first available appointment listed below.  Caller will not accept being placed on the waitlist and is requesting a message be sent to doctor.  Name of Caller: Anca  When is the first available appointment? 8/13/2024  Symptoms: Boil, cyst neat the vaginal area  Would the patient rather a call back or a response via [facility]? Call back   Best Call Back Number: Please call her at  [phone]  Additional Information:

## 2024-07-31 ENCOUNTER — OFFICE VISIT (OUTPATIENT)
Dept: OBSTETRICS AND GYNECOLOGY | Facility: CLINIC | Age: 79
End: 2024-07-31
Payer: MEDICARE

## 2024-07-31 VITALS
BODY MASS INDEX: 27.16 KG/M2 | WEIGHT: 173.06 LBS | HEIGHT: 67 IN | DIASTOLIC BLOOD PRESSURE: 80 MMHG | SYSTOLIC BLOOD PRESSURE: 118 MMHG

## 2024-07-31 DIAGNOSIS — N81.10 BADEN-WALKER GRADE 2 CYSTOCELE: Primary | ICD-10-CM

## 2024-07-31 PROCEDURE — 99203 OFFICE O/P NEW LOW 30 MIN: CPT | Mod: S$GLB,,, | Performed by: OBSTETRICS & GYNECOLOGY

## 2024-07-31 PROCEDURE — 99999 PR PBB SHADOW E&M-EST. PATIENT-LVL III: CPT | Mod: PBBFAC,,, | Performed by: OBSTETRICS & GYNECOLOGY

## 2024-07-31 PROCEDURE — 1159F MED LIST DOCD IN RCRD: CPT | Mod: CPTII,S$GLB,, | Performed by: OBSTETRICS & GYNECOLOGY

## 2024-07-31 PROCEDURE — 1101F PT FALLS ASSESS-DOCD LE1/YR: CPT | Mod: CPTII,S$GLB,, | Performed by: OBSTETRICS & GYNECOLOGY

## 2024-07-31 PROCEDURE — 3072F LOW RISK FOR RETINOPATHY: CPT | Mod: CPTII,S$GLB,, | Performed by: OBSTETRICS & GYNECOLOGY

## 2024-07-31 PROCEDURE — 3288F FALL RISK ASSESSMENT DOCD: CPT | Mod: CPTII,S$GLB,, | Performed by: OBSTETRICS & GYNECOLOGY

## 2024-07-31 PROCEDURE — 3079F DIAST BP 80-89 MM HG: CPT | Mod: CPTII,S$GLB,, | Performed by: OBSTETRICS & GYNECOLOGY

## 2024-07-31 PROCEDURE — 1126F AMNT PAIN NOTED NONE PRSNT: CPT | Mod: CPTII,S$GLB,, | Performed by: OBSTETRICS & GYNECOLOGY

## 2024-07-31 PROCEDURE — 3074F SYST BP LT 130 MM HG: CPT | Mod: CPTII,S$GLB,, | Performed by: OBSTETRICS & GYNECOLOGY

## 2024-07-31 NOTE — PROGRESS NOTES
"  Subjective:       Patient ID: Anca Mcclellan is a 79 y.o. female.    Chief Complaint:  No chief complaint on file.      History of Present Illness  HPI  Concerned about possible mass of the genital area     Health Maintenance   Topic Date Due    Lipid Panel  09/29/2022    Hemoglobin A1c  10/29/2022    Foot Exam  02/15/2024    DEXA Scan  03/10/2024    Eye Exam  09/27/2024    TETANUS VACCINE  12/10/2024    Aspirin/Antiplatelet Therapy  07/10/2025    Shingles Vaccine  Completed     GYN & OB History  No LMP recorded. Patient is postmenopausal.   Date of Last Pap: No result found    OB History   No obstetric history on file.       Review of Systems  Review of Systems        Objective:   /80   Ht 5' 7" (1.702 m)   Wt 78.5 kg (173 lb 1 oz)   BMI 27.11 kg/m²    Physical Exam:               Genitourinary:    Pelvic exam was performed with patient supine.   The external female genitalia was normal.     Labial bartholins normal.There is no lesion on the right labia. There is no lesion on the left labia. There is cystocele in the vagina. No rectocele or prolapse of vaginal walls in the vagina. Vagina was moist.                Grade 2 cystocele      Assessment:        1. Gilbert-Walker grade 2 cystocele                Plan:      Reassured and educated patient about the finding       Diagnoses and all orders for this visit:    Gilbert-Walker grade 2 cystocele  Comments:  asymptomatic, will follow        "

## 2024-08-01 DIAGNOSIS — M33.20 POLYMYOSITIS ASSOCIATED WITH AUTOIMMUNE DISEASE: Primary | ICD-10-CM

## 2024-08-01 DIAGNOSIS — M35.9 POLYMYOSITIS ASSOCIATED WITH AUTOIMMUNE DISEASE: Primary | ICD-10-CM

## 2024-08-01 NOTE — TELEPHONE ENCOUNTER
Pt is asking can you please  send her a prescription in for medrol dose pack to [facility] (aneesh darnell). She is having knee pain .

## 2024-08-02 RX ORDER — METHYLPREDNISOLONE 4 MG/1
TABLET ORAL
Qty: 21 EACH | Refills: 0 | Status: SHIPPED | OUTPATIENT
Start: 2024-08-02 | End: 2024-08-23

## 2024-08-19 NOTE — PROCEDURES
Home Sleep Studies    Date/Time: 4/25/2022 8:00 AM  Performed by: Jim Orr MD  Authorized by: Jaymie Kinney PA-C       PHYSICIAN INTERPRETATION AND COMMENTS: Findings are consistent with moderate obstructive sleep apnea (LUCRETIA). CPAP  therapy indicated.  CLINICAL HISTORY: 77 year old female presented with: 13.5 inch neck, BMI of 28.1, an [place] sleepiness score of 8, history  of heart disease, diabetes and symptoms of nocturnal waking up choking. Based on the clinical history, the patient has a  high pre-test probability of having Mild LUCRETIA.  SLEEP STUDY FINDINGS: Patient underwent a 1 night Home Sleep Test and by behavioral criteria, slept for approximately  6.55 hours, with a sleep latency of 3 minutes and a sleep efficiency of 59.7%. Mild sleep disordered breathing (AHI=13) is  noted based on a 4% hypopnea desaturation criteria. When considering more subtle measures of sleep disordered  breathing, the overall respiratory disturbance index is moderate(RDI=22) based on a 1% hypopnea desaturation criteria  with confirmation by surrogate arousal indicators. The apneas/hypopneas are accompanied by minimal oxygen  desaturation (percent time below 90% SpO2: 2.4%, Min SpO2: 81.3%). The average desaturation across all sleep disordered  breathing events is 3.2%. Snoring occurs for 41.2% (30 dB) of the study, 21.1% is very loud. The mean pulse rate is 74.9 BPM,  with infrequent pulse rate variability (27 events with >= 6 BPM increase/decrease per hour).  TREATMENT CONSIDERATIONS: Consider an attended CPAP titration study, given the reported Heart disease. Consider nasal  continuous positive airway pressure based on the RDI severity and co-morbidities. A mandibular advancement splint  (MAS) or referral to an ENT surgeon for modification to the airway should be considered to reduce the potential  contribution of LUCRETIA on existing diseases if the patient prefers an alternative therapy or the CPAP trial is  Vera Lehman is a 19 year old female presenting to the walk-in clinic today for dizziness and lightheadedness. States symptoms get worse with activity- such as standing up and going up stairs. Patient delivered baby in April and was diagnosed with preeclampsia- was taking blood pressure medications during pregnancy.    Swabs/Specimens collected during triage process:  None    Patient informed they will only be called with positive results (COVID/FLU/STREP):  NA    BP greater than 140/90: No   Recheck completed: NA    Triaged to: room 5    Patient would like communication of their results via:    Face to Face Live    Cell Phone:   Telephone Information:   Mobile [phone]     Okay to leave a message containing results? Yes   unsuccessful.

## 2024-08-25 ENCOUNTER — CLINICAL SUPPORT (OUTPATIENT)
Dept: CARDIOLOGY | Facility: HOSPITAL | Age: 79
End: 2024-08-25
Attending: INTERNAL MEDICINE
Payer: MEDICARE

## 2024-08-25 ENCOUNTER — CLINICAL SUPPORT (OUTPATIENT)
Dept: CARDIOLOGY | Facility: HOSPITAL | Age: 79
End: 2024-08-25
Payer: MEDICARE

## 2024-08-25 DIAGNOSIS — I49.5 SICK SINUS SYNDROME: ICD-10-CM

## 2024-08-25 DIAGNOSIS — Z95.0 PRESENCE OF CARDIAC PACEMAKER: ICD-10-CM

## 2024-08-25 PROCEDURE — 93294 REM INTERROG EVL PM/LDLS PM: CPT | Mod: S$GLB,,, | Performed by: INTERNAL MEDICINE

## 2024-08-25 PROCEDURE — 93296 REM INTERROG EVL PM/IDS: CPT | Performed by: INTERNAL MEDICINE

## 2024-08-29 ENCOUNTER — OFFICE VISIT (OUTPATIENT)
Dept: OBSTETRICS AND GYNECOLOGY | Facility: CLINIC | Age: 79
End: 2024-08-29
Payer: MEDICARE

## 2024-08-29 VITALS
DIASTOLIC BLOOD PRESSURE: 84 MMHG | WEIGHT: 170.75 LBS | HEIGHT: 67 IN | BODY MASS INDEX: 26.8 KG/M2 | SYSTOLIC BLOOD PRESSURE: 118 MMHG

## 2024-08-29 DIAGNOSIS — N81.4 CYSTOCELE WITH PROLAPSE: Primary | ICD-10-CM

## 2024-08-29 PROCEDURE — 3079F DIAST BP 80-89 MM HG: CPT | Mod: CPTII,S$GLB,, | Performed by: OBSTETRICS & GYNECOLOGY

## 2024-08-29 PROCEDURE — 3288F FALL RISK ASSESSMENT DOCD: CPT | Mod: CPTII,S$GLB,, | Performed by: OBSTETRICS & GYNECOLOGY

## 2024-08-29 PROCEDURE — 1159F MED LIST DOCD IN RCRD: CPT | Mod: CPTII,S$GLB,, | Performed by: OBSTETRICS & GYNECOLOGY

## 2024-08-29 PROCEDURE — 99213 OFFICE O/P EST LOW 20 MIN: CPT | Mod: S$GLB,,, | Performed by: OBSTETRICS & GYNECOLOGY

## 2024-08-29 PROCEDURE — 3072F LOW RISK FOR RETINOPATHY: CPT | Mod: CPTII,S$GLB,, | Performed by: OBSTETRICS & GYNECOLOGY

## 2024-08-29 PROCEDURE — 1101F PT FALLS ASSESS-DOCD LE1/YR: CPT | Mod: CPTII,S$GLB,, | Performed by: OBSTETRICS & GYNECOLOGY

## 2024-08-29 PROCEDURE — 99999 PR PBB SHADOW E&M-EST. PATIENT-LVL III: CPT | Mod: PBBFAC,,, | Performed by: OBSTETRICS & GYNECOLOGY

## 2024-08-29 PROCEDURE — 3074F SYST BP LT 130 MM HG: CPT | Mod: CPTII,S$GLB,, | Performed by: OBSTETRICS & GYNECOLOGY

## 2024-08-29 NOTE — PROGRESS NOTES
Subjective:       Patient ID: Anca Mcclellan is a 79 y.o. female.    Chief Complaint:  Vaginal Prolapse and Mass      History of Present Illness  Mass      Here for problem  C/o feeling something hanging down in vagina for past month  Denies problems passing stool or urine  Denies vaginal bleeding  Hx of hysterectomy years ago  Reports no currently sexually active    GYN & OB History  No LMP recorded. Patient is postmenopausal.   Date of Last Pap: No result found    OB History   No obstetric history on file.       Review of Systems  Review of Systems   All other systems reviewed and are negative.          Objective:      Physical Exam:   Constitutional: She appears well-developed.     Eyes: Pupils are equal, round, and reactive to light. Conjunctivae and EOM are normal.      Pulmonary/Chest: Effort normal. Right breast exhibits no mass, no nipple discharge, no skin change, no tenderness and presence. Left breast exhibits no mass, no nipple discharge, no skin change, no tenderness and presence. Breasts are symmetrical.        Abdominal: Soft.     Genitourinary:    Urethra, vagina, right adnexa, left adnexa and rectum normal.      Pelvic exam was performed with patient supine.   The external female genitalia was normal.     Labial bartholins normal.There is cystocele in the vagina. Vaginal atrophy noted. Cervix is absent.Uterus is absent. Normal urethral meatus.          Musculoskeletal: Normal range of motion.       Neurological: She is alert.    Skin: Skin is warm.    Psychiatric: She has a normal mood and affect.           Assessment:     Encounter Diagnosis   Name Primary?    Cystocele with prolapse Yes               Plan:      Reassurance given  Reviewed kegel exercises  Accepts referral to PT for pelvic floor therapy

## 2024-08-31 LAB
OHS CV AF BURDEN PERCENT: < 1
OHS CV DC REMOTE DEVICE TYPE: NORMAL
OHS CV ICD SHOCK: NO
OHS CV RV PACING PERCENT: 1 %

## 2024-09-09 ENCOUNTER — CLINICAL SUPPORT (OUTPATIENT)
Dept: REHABILITATION | Facility: HOSPITAL | Age: 79
End: 2024-09-09
Attending: OBSTETRICS & GYNECOLOGY
Payer: MEDICARE

## 2024-09-09 DIAGNOSIS — N81.4 CYSTOCELE WITH PROLAPSE: ICD-10-CM

## 2024-09-09 DIAGNOSIS — M62.89 PELVIC FLOOR DYSFUNCTION: Primary | ICD-10-CM

## 2024-09-09 PROCEDURE — 97161 PT EVAL LOW COMPLEX 20 MIN: CPT | Mod: PN

## 2024-09-09 PROCEDURE — 97530 THERAPEUTIC ACTIVITIES: CPT | Mod: PN

## 2024-09-09 NOTE — PATIENT INSTRUCTIONS
PELVIC FLOOR PRESSURE MANAGEMENT         Your pelvic floor muscles and your diaphragm work closely together to regulate the pressure in your body. Each time you inhale, your diaphragm contracts, flattens, and moves downward. In response, your pelvic floor muscles relax and drop down as well. When you exhale, both muscles lift upwards. Throughout a diaphragmatic breath cycle, your pelvic floor goes through a full range of motion that contributes to its optimal function.    Think of your body like a canister, with one opening at the top where your mouth is and another opening at the bottom through your pelvic floor. If the top of the canister is closed off (as with straining during bowel movements or Valsalva maneuver during lifting) all of the pressure moves downward. If your pelvic floor muscles are not strong enough to counteract this increased pressure, you may experience leaking or increased sensations of pelvic organ prolapse.     This is why it is extremely important to implement the following pressure management techniques:  Avoiding Constipation - make high-fiber foods part of your regular diet (fruits, vegetables, bran, and beans), reduce the amount of processed foods in your diet, drink plenty of water and fluids every day, exercise daily, and manage your stress with meditation or other relaxation techniques.  No Straining! Straining (bearing down and holding your breath) puts additional downward pressure on our organs, causing increased prolapse and pelvic pressure. Instead, blow out the candles as you gently push down. Do NOT hold your breath!  Use a Stool - Utilize a squat position when voiding. Get your knees up higher than your hips. Squatting helps relax the pelvic floor muscles and reduces straining.  Avoid heavy lifting and high-impact activities until you can strengthen your core and pelvic floor muscles appropriately. When you do have to lift, hold the load close to your body to reduce strain  and do not hold your breath when lifting.  Do not Valsalva (hold your breath and push) at any time.  Use diaphragmatic breathing (belly breathing) to manage stress, help empty bladder, help empty bowels, and help lightly stretch pelvic floor muscles.     DOUBLE VOIDING    Sometimes after you urinate, you may feel the urge to go again immediately or soon after. However, when you go back to the bathroom, only a few drops come out. This can be due to incomplete emptying of the bladder. Double voiding is a technique that may assist the bladder to empty more effectively when urine is left in the bladder at the end of urination. It involves passing urine more than once each time that you go to the toilet. This makes sure that the bladder is completely empty.    Here are 3 strategies you can try to fully empty your bladder.  You do not have to do all of these things every single time. Find which ones work best for you.     Check to make sure your pelvic floor is relaxed, which is required for voiding completely.   Do a body scan - make sure your legs, buttocks, and abdominals are relaxed.  Take a couple deep, slow breaths to encourage your pelvic floor muscles to DROP (i.e., try diaphragmatic breathing).  Gently apply pressure over your bladder.  Change your pelvic position: lean forward, rock your pelvis forward and backward 2x and side to side 2x, stand up [facility] then sit back down 2x.     Wait at least 15-30 seconds to see if a second urine stream begins. If not, you may get up and leave the bathroom.

## 2024-09-09 NOTE — PLAN OF CARE
[facility]   Pelvic Health Physical Therapy Initial Evaluation     Date: 2024   Name: Anca Frost [place]  Clinic Number: [id number]    Therapy Diagnosis:   Encounter Diagnoses   Name Primary?    Cystocele with prolapse     Pelvic floor dysfunction Yes     Physician: Zohra Rhoades MD    Physician Orders: PT Eval and Treat   Medical Diagnosis from Referral: Cystocele with prolapse [N81.4]   Evaluation Date: 2024  Authorization Period Expiration: 2025  Plan of Care Expiration: 2024  Progress Note Due: 10/09/2024  Visit # / Visits authorized:  eval   FOTO: Issued Visit #: 1 /3    Precautions: Standard and pacemaker    Time In: 9:50  Time Out: 10:30  Total Appointment Time (timed & untimed codes): 40 minutes    SUBJECTIVE     Date of onset: a few months ago     History of current condition - Anca reports: noticing her underwear wasn't feeling right a few months ago. She also felt a bulge when wiping after voiding. She recently saw a GYN doctor and confirmed that she has a bladder prolapse. She states that the bulge changes size at times. When she first noticed the bulge it was close to being outside of the vaginal canal, however it has not been that low in awhile. Patient also reports of a chalky secretion in her underwear that she forgot to tell the doctor about. Patient states that she just in case voids and has trouble emptying her bladder fully in mornings.     OB/GYN History: , vaginal delivery, cystocele prolapse, and partial hysterectomy in   Sexually active? No  Pain with vaginal exams, intercourse or tampon use? No    Bladder History: trouble emptying bladder completely  Frequency of urination:   Daytime: 3-5           Nighttime: 0-1  Difficulty initiating urine stream: No  Urine stream: strong  Complete emptying: sometimes ( in the morning there is a little bit left)  Urinary urgency: Yes, Able to delay the urge for at least 10 minute(s)  Bladder  leakage: No  Pain/Bleeding: No    Bowel History:   Frequency of bowel movements: varies, but feels normal  Difficulty initiating BM: No  Quality/Shape of BM: [place] Stool Chart Type 3-4  Complete emptying: Yes  Fecal urgency: Yes, Able to delay the urge for at least 30 minute(s)  Colon leakage: No  OTC medication/supplementation? Yes,  suppositories   Pain/Bleeding: No    Form of protection: none  Number of pads required in 24 hours: 0    Pain:  No pain reported today.    Imaging : see chart     Prior Therapy: yes   Social History:  lives with their spouse  Current exercise: moving around her home most of the day, very active  Occupation: Retired  Prior Level of Function: Pt was independent with all ADLs and iADLs without pain, no reports of incontinence of bowel or bladder.  Current Level of Function: Increased pelvic pressure and incomplete bladder emptying     Types of fluid intake: water daily, hot/iced tea occasionally, soda  Diet: regular   Habitus: well developed, well nourished  Abuse/Neglect: No     Patients goals: improvements in prolapse     Medical History: Anca  has a past medical history of Bilateral carotid artery stenosis (10/15/2019), Hyperlipidemia, and LUCRETIA (obstructive sleep apnea) (5/6/2022).     Surgical History: Anca Mcclellan  has a past surgical history that includes Hysterectomy (1988); A-V cardiac pacemaker insertion (Left, 06/03/2021); Insertion of pacemaker (06/03/2021); Esophagogastroduodenoscopy (N/A, 09/17/2021); and Cataract extraction.    Medications: Anca has a current medication list which includes the following prescription(s): aspirin, atorvastatin, donepezil, ferrous sulfate, fluocinonide, ketoconazole, metformin, midodrine, and multivitamin.    Allergies:   Review of patient's allergies indicates:  No Known Allergies     OBJECTIVE     Deferred intravaginal assessment till next visit due to time constraints.     ORTHO SCREEN  Posture in sitting: slouched   Posture in  standing: forward and rounded shoulders     Limitation/Restriction for FOTO Pelvic Floor Survey    Therapist reviewed FOTO scores for Anca Mcclellan on 9/9/2024.   FOTO documents entered into [facility] - see Media section.    Limitation Score: PFDI Prolapse 0       TREATMENT     Total Treatment time (time-based codes) separate from Evaluation: 10 minutes     Therapeutic Activity to improve dynamic functional performance for 10 minutes including:    Reviewed HEP:   Education on Pressure management  Education on Double Voiding Techniques     PATIENT EDUCATION AND HOME EXERCISES     Education provided:   general anatomy/physiology of urinary/ bowel  system and benefits of treatment were discussed with the patient. Additionally, anatomy/physiology of pelvic floor, posture/body mechanices, diaphragmatic breathing, and double voiding techniques were reviewed.     Written Home Exercises provided: yes.  Exercises were reviewed and Anca was able to demonstrate them prior to the end of the session. Anca demonstrated fair  understanding of the education provided. See EMR under Patient Instructions for exercises provided during therapy sessions.    ASSESSMENT     Anca is a 79 y.o. female referred to outpatient Physical Therapy with a medical diagnosis of Cystocele with prolapse [N81.4] . Pt presents with altered posture, poor knowledge of body mechanics and posture, poor trunk stability, decreased pelvic muscle strength, decreased phasic ability of the pelvic muscles, and incomplete urination.       Patient prognosis is Good.   Patient will benefit from skilled outpatient Physical Therapy to address the deficits stated above and in the chart below, provide patient/family education, and to maximize patient's level of independence.     Plan of care discussed with patient: Yes  Patient's spiritual, cultural and educational needs considered and patient is agreeable to the plan of care and goals as stated below:     Anticipated  Barriers for therapy: none    Medical Necessity is demonstrated by the following:    History  Co-morbidities and personal factors that may impact the plan of care [] LOW: no personal factors / co-morbidities  [x] MODERATE: 1-2 personal factors / co-morbidities  [] HIGH: 3+ personal factors / co-morbidities    Moderate / High Support Documentation:   Co-morbidities affecting plan of care: Anca  has a past medical history of Bilateral carotid artery stenosis (10/15/2019), Hyperlipidemia, and LUCRETIA (obstructive sleep apnea) (5/6/2022).     Personal Factors:   age     Examination  Body Structures and Functions, activity limitations and participation restrictions that may impact the plan of care [] LOW: addressing 1-2 elements  [x] MODERATE: 3+ elements  [] HIGH: 4+ elements (please support below)    Moderate / High Support Documentation: altered posture, poor knowledge of body mechanics and posture, poor trunk stability, decreased pelvic muscle strength, decreased phasic ability of the pelvic muscles, and incomplete urination     Clinical Presentation [x] LOW: stable  [] MODERATE: Evolving  [] HIGH: Unstable     Decision Making/ Complexity Score: low        Goals:  Short Term Goals: 6 weeks   - Pt will demonstrate excellent knowledge and adherence to HEP to facilitate optimal recovery.  - Pt will demonstrate proper PFM contraction, relaxation, and lengthening coordinated with TA and breath for improved muscle coordination needed for functional activity.  - Pt to report a 25% decrease in intensity of pelvic pressure and fullness at the end of the day to demonstrate improving pelvic support of pelvic structures.    Long Term Goals: 10 weeks   - Pt will demonstrate excellent knowledge and adherence to HEP for continued self-maintenance of symptoms.  - Pt to demonstrate proper pressure management with all functional mobility including blowing out with exertion and activating pelvic floor + transverse abdominus to demonstrate  improving pelvic organ support for improved ADL participation.  - Pt to report a 25-50% decrease in intensity of pelvic pressure and fullness at the end of the day to demonstrate improving pelvic support of pelvic structures.  - Pt will report ability to delay urinary urge for at least 15 minutes to maintain continence with ADL/IADLs.   - Pt will demonstrate PFM strength of at least 3/5 MMT for improved strength needed to maintain continence.     PLAN     Plan of care Certification: 9/9/2024 to 11/18/2024.    Outpatient Physical Therapy 1 time(s) every week(s) for 10 weeks to include the following interventions: Cervical/Lumbar Traction, Electrical Stimulation TENS/IFC, Manual Therapy, Moist Heat/ Ice, Neuromuscular Re-ed, Patient Education, Self Care, Therapeutic Activities, Therapeutic Exercise, and ASTYM, and FDN.     Edna Levine, PT      I CERTIFY THE NEED FOR THESE SERVICES FURNISHED UNDER THIS PLAN OF TREATMENT AND WHILE UNDER MY CARE   Physician's comments:     Physician's Signature: ___________________________________________________

## 2024-09-09 NOTE — PROGRESS NOTES
[facility]   Pelvic Health Physical Therapy Initial Evaluation     Date: 2024   Name: Anca Frost [place]  Clinic Number: [id number]    Therapy Diagnosis:   Encounter Diagnoses   Name Primary?    Cystocele with prolapse     Pelvic floor dysfunction Yes     Physician: Zohra Rhoades MD    Physician Orders: PT Eval and Treat   Medical Diagnosis from Referral: Cystocele with prolapse [N81.4]   Evaluation Date: 2024  Authorization Period Expiration: 2025  Plan of Care Expiration: 2024  Progress Note Due: 10/09/2024  Visit # / Visits authorized:  eval   FOTO: Issued Visit #: 1 /3    Precautions: Standard and pacemaker    Time In: 9:50  Time Out: 10:30  Total Appointment Time (timed & untimed codes): 40 minutes    SUBJECTIVE     Date of onset: a few months ago     History of current condition - Anca reports: noticing her underwear wasn't feeling right a few months ago. She also felt a bulge when wiping after voiding. She recently saw a GYN doctor and confirmed that she has a bladder prolapse. She states that the bulge changes size at times. When she first noticed the bulge it was close to being outside of the vaginal canal, however it has not been that low in awhile. Patient also reports of a chalky secretion in her underwear that she forgot to tell the doctor about. Patient states that she just in case voids and has trouble emptying her bladder fully in mornings.     OB/GYN History: , vaginal delivery, cystocele prolapse, and partial hysterectomy in   Sexually active? No  Pain with vaginal exams, intercourse or tampon use? No    Bladder History: trouble emptying bladder completely  Frequency of urination:   Daytime: 3-5           Nighttime: 0-1  Difficulty initiating urine stream: No  Urine stream: strong  Complete emptying: sometimes ( in the morning there is a little bit left)  Urinary urgency: Yes, Able to delay the urge for at least 10 minute(s)  Bladder  leakage: No  Pain/Bleeding: No    Bowel History:   Frequency of bowel movements:  varies, but feels normal  Difficulty initiating BM: No  Quality/Shape of BM: [place] Stool Chart Type 3-4  Complete emptying: Yes  Fecal urgency: Yes, Able to delay the urge for at least 30 minute(s)  Colon leakage: No  OTC medication/supplementation? Yes,    suppositories   Pain/Bleeding: No    Form of protection: none  Number of pads required in 24 hours: 0    Pain:  No pain reported today.    Imaging : see chart     Prior Therapy: yes   Social History:  lives with their spouse  Current exercise: moving around her home most of the day, very active  Occupation: Retired  Prior Level of Function: Pt was independent with all ADLs and iADLs without pain, no reports of incontinence of bowel or bladder.  Current Level of Function: Increased pelvic pressure and incomplete bladder emptying     Types of fluid intake: water daily, hot/iced tea occasionally, soda  Diet: regular   Habitus: well developed, well nourished  Abuse/Neglect: No     Patients goals: improvements in prolapse     Medical History: Anca  has a past medical history of Bilateral carotid artery stenosis (10/15/2019), Hyperlipidemia, and LUCRETIA (obstructive sleep apnea) (5/6/2022).     Surgical History: Anca Mcclellan  has a past surgical history that includes Hysterectomy (1988); A-V cardiac pacemaker insertion (Left, 06/03/2021); Insertion of pacemaker (06/03/2021); Esophagogastroduodenoscopy (N/A, 09/17/2021); and Cataract extraction.    Medications: Anca has a current medication list which includes the following prescription(s): aspirin, atorvastatin, donepezil, ferrous sulfate, fluocinonide, ketoconazole, metformin, midodrine, and multivitamin.    Allergies:   Review of patient's allergies indicates:  No Known Allergies     OBJECTIVE     Deferred intravaginal assessment till next visit due to time constraints.     ORTHO SCREEN  Posture in sitting: slouched   Posture in  standing: forward and rounded shoulders     Limitation/Restriction for FOTO Pelvic Floor Survey    Therapist reviewed FOTO scores for Anca Mcclellan on 9/9/2024.   FOTO documents entered into [employer] - see Media section.    Limitation Score: PFDI Prolapse 0       TREATMENT     Total Treatment time (time-based codes) separate from Evaluation: 10 minutes     Therapeutic Activity to improve dynamic functional performance for 10 minutes including:    Reviewed HEP:   Education on Pressure management  Education on Double Voiding Techniques     PATIENT EDUCATION AND HOME EXERCISES     Education provided:   general anatomy/physiology of urinary/ bowel  system and benefits of treatment were discussed with the patient. Additionally, anatomy/physiology of pelvic floor, posture/body mechanices, diaphragmatic breathing, and double voiding techniques were reviewed.     Written Home Exercises provided: yes.  Exercises were reviewed and Anca was able to demonstrate them prior to the end of the session. Anca demonstrated fair  understanding of the education provided. See EMR under Patient Instructions for exercises provided during therapy sessions.    ASSESSMENT     Anca is a 79 y.o. female referred to outpatient Physical Therapy with a medical diagnosis of Cystocele with prolapse [N81.4] . Pt presents with altered posture, poor knowledge of body mechanics and posture, poor trunk stability, decreased pelvic muscle strength, decreased phasic ability of the pelvic muscles, and incomplete urination.       Patient prognosis is Good.   Patient will benefit from skilled outpatient Physical Therapy to address the deficits stated above and in the chart below, provide patient/family education, and to maximize patient's level of independence.     Plan of care discussed with patient: Yes  Patient's spiritual, cultural and educational needs considered and patient is agreeable to the plan of care and goals as stated below:     Anticipated  Barriers for therapy: none    Medical Necessity is demonstrated by the following:    History  Co-morbidities and personal factors that may impact the plan of care [] LOW: no personal factors / co-morbidities  [x] MODERATE: 1-2 personal factors / co-morbidities  [] HIGH: 3+ personal factors / co-morbidities    Moderate / High Support Documentation:   Co-morbidities affecting plan of care: Anca  has a past medical history of Bilateral carotid artery stenosis (10/15/2019), Hyperlipidemia, and LUCRETIA (obstructive sleep apnea) (5/6/2022).     Personal Factors:   age     Examination  Body Structures and Functions, activity limitations and participation restrictions that may impact the plan of care [] LOW: addressing 1-2 elements  [x] MODERATE: 3+ elements  [] HIGH: 4+ elements (please support below)    Moderate / High Support Documentation: altered posture, poor knowledge of body mechanics and posture, poor trunk stability, decreased pelvic muscle strength, decreased phasic ability of the pelvic muscles, and incomplete urination     Clinical Presentation [x] LOW: stable  [] MODERATE: Evolving  [] HIGH: Unstable     Decision Making/ Complexity Score: low        Goals:  Short Term Goals: 6 weeks   - Pt will demonstrate excellent knowledge and adherence to HEP to facilitate optimal recovery.  - Pt will demonstrate proper PFM contraction, relaxation, and lengthening coordinated with TA and breath for improved muscle coordination needed for functional activity.  - Pt to report a 25% decrease in intensity of pelvic pressure and fullness at the end of the day to demonstrate improving pelvic support of pelvic structures.    Long Term Goals: 10 weeks   - Pt will demonstrate excellent knowledge and adherence to HEP for continued self-maintenance of symptoms.  - Pt to demonstrate proper pressure management with all functional mobility including blowing out with exertion and activating pelvic floor + transverse abdominus to demonstrate  improving pelvic organ support for improved ADL participation.  - Pt to report a 25-50% decrease in intensity of pelvic pressure and fullness at the end of the day to demonstrate improving pelvic support of pelvic structures.  - Pt will report ability to delay urinary urge for at least 15 minutes to maintain continence with ADL/IADLs.   - Pt will demonstrate PFM strength of at least 3/5 MMT for improved strength needed to maintain continence.     PLAN     Plan of care Certification: 9/9/2024 to 11/18/2024.    Outpatient Physical Therapy 1 time(s) every week(s) for 10 weeks to include the following interventions: Cervical/Lumbar Traction, Electrical Stimulation TENS/IFC, Manual Therapy, Moist Heat/ Ice, Neuromuscular Re-ed, Patient Education, Self Care, Therapeutic Activities, Therapeutic Exercise, and ASTYM, and FDN .     Edna Levine, PT      I CERTIFY THE NEED FOR THESE SERVICES FURNISHED UNDER THIS PLAN OF TREATMENT AND WHILE UNDER MY CARE   Physician's comments:     Physician's Signature: ___________________________________________________

## 2024-09-19 ENCOUNTER — CLINICAL SUPPORT (OUTPATIENT)
Dept: REHABILITATION | Facility: HOSPITAL | Age: 79
End: 2024-09-19
Payer: MEDICARE

## 2024-09-19 DIAGNOSIS — M62.89 PELVIC FLOOR DYSFUNCTION: Primary | ICD-10-CM

## 2024-09-19 PROCEDURE — 97112 NEUROMUSCULAR REEDUCATION: CPT | Mod: PN

## 2024-09-19 PROCEDURE — 97530 THERAPEUTIC ACTIVITIES: CPT | Mod: PN

## 2024-09-19 NOTE — PROGRESS NOTES
Pelvic Health Physical Therapy   Treatment Note     Name: Anca Frost [place]  Clinic Number: [id number]    Therapy Diagnosis:   Encounter Diagnosis   Name Primary?    Pelvic floor dysfunction Yes     Physician: Zohra Rhoades MD    Visit Date: 9/19/2024    Physician Orders: PT Eval and Treat   Medical Diagnosis from Referral: Cystocele with prolapse [N81.4]   Evaluation Date: 9/9/2024  Authorization Period Expiration: 8/29/2025  Plan of Care Expiration: 11/18/2024  Progress Note Due: 10/09/2024  Visit # / Visits authorized: 1/20 + 1/1 eval   FOTO: Issued Visit #: 1 /3     Precautions: Standard and pacemaker    Time In: 9:45  Time Out: 10:30  Total Billable Time: 45 minutes      Subjective     Pt reports: feeling about the same. No new complaints.  She was compliant with home exercise program.  Response to previous treatment: 1st treatment since initial evaluation   Functional change: no change     Pain: 0/10  Location: generalized     Objective     VAGINAL PELVIC FLOOR EXAM    EXTERNAL ASSESSMENT  Introitus: gaping, prolapse present  Skin condition: WNL  Scarring: none   Sensation: WNL   Pain:  none  Voluntary contraction: visible lift and accessory muscle use  Voluntary relaxation: visible drop  Involuntary contraction: reflex tightening  Bearing down: bulge and prolapse  Perineal descent: present      INTERNAL ASSESSMENT  Pain: none   Sensation: able to localized pressure appropriately   Vaginal vault: stenotic due to prolapse  Muscle Bulk: atrophy   Muscle Power: 2-/5  Muscle Endurance: 2 sec  # Reps To Fatigue: 1    Fast Contractions in 10 seconds: Not assessed today      Quality of contraction: slow rise   Specificity: patient contracts: glutes and adductors   Coordination: tends to hold breath during PFM contration   Prolapse check:Grade 2 cystocele    Treatment       Anca participated in neuromuscular re-education activities to develop Coordination, Control, Proprioception, Kinesthetic, and Sense for 15  minutes including: pelvic floor mm contractions focus on activation at 3 layers sequentially in isolation and then sequentially      Anca participated in dynamic functional therapeutic activities to improve functional performance for 25  minutes, including:    Reviewed Double Voiding Techniques  Reviewed Kegels with Layers  Reviewed Bladder diary          Home Exercises Provided and Patient Education Provided     Education provided:   - anatomy/physiology of pelvic floor, posture/body mechanices, diaphragmatic breathing, double voiding techniques, isometric abdominal exercises, and kegels  Discussed progression of plan of care with patient; educated pt in activity modification; reviewed HEP with pt. Pt demonstrated and verbalized understanding of all instruction and was provided with a handout of HEP (see Patient Instructions).  Education on Pressure management  Education on Double Voiding Techniques     Written Home Exercises Provided: Patient instructed to cont prior HEP.  Exercises were reviewed and Anca was able to demonstrate them prior to the end of the session.  Anca demonstrated fair  understanding of the education provided.     See EMR under Patient Instructions for exercises provided 9/19/2024 and prior visit.    Assessment     Anca presents tolerated therapy session well with no new complaints. Pelvic floor assessment performed with patients verbal and written consent. Noted cystocele prolapse at introitus and increased protrusion with bearing down. Patient stating she sits on the toilet and reads when she is having a bowel movement. Recommend patient spend only needed time on the commode to decrease progression of prolapse. Education provided on kegels with layers as well as reviewed Double Voiding Techniques.     Anca Is progressing well towards her goals.   Pt prognosis is Fair.     Pt will continue to benefit from skilled outpatient physical therapy to address the deficits listed in the problem  list box on initial evaluation, provide pt/family education and to maximize pt's level of independence in the home and community environment.     Pt's spiritual, cultural and educational needs considered and pt agreeable to plan of care and goals.     Anticipated barriers to physical therapy: none    Goals:  Short Term Goals: 6 weeks   - Pt will demonstrate excellent knowledge and adherence to HEP to facilitate optimal recovery.  - Pt will demonstrate proper PFM contraction, relaxation, and lengthening coordinated with TA and breath for improved muscle coordination needed for functional activity.  - Pt to report a 25% decrease in intensity of pelvic pressure and fullness at the end of the day to demonstrate improving pelvic support of pelvic structures.     Long Term Goals: 10 weeks   - Pt will demonstrate excellent knowledge and adherence to HEP for continued self-maintenance of symptoms.  - Pt to demonstrate proper pressure management with all functional mobility including blowing out with exertion and activating pelvic floor + transverse abdominus to demonstrate improving pelvic organ support for improved ADL participation.  - Pt to report a 25-50% decrease in intensity of pelvic pressure and fullness at the end of the day to demonstrate improving pelvic support of pelvic structures.  - Pt will report ability to delay urinary urge for at least 15 minutes to maintain continence with ADL/IADLs.   - Pt will demonstrate PFM strength of at least 3/5 MMT for improved strength needed to maintain continence.      PLAN      Plan of care Certification: 9/9/2024 to 11/18/2024.     Outpatient Physical Therapy 1 time(s) every week(s) for 10 weeks to include the following interventions: Cervical/Lumbar Traction, Electrical Stimulation TENS/IFC, Manual Therapy, Moist Heat/ Ice, Neuromuscular Re-ed, Patient Education, Self Care, Therapeutic Activities, Therapeutic Exercise, and ASTYM, and FDN.        Edna Levine, PT

## 2024-09-19 NOTE — PATIENT INSTRUCTIONS
([email or web address])    Initially it may be useful to practice squeezing at different layers of your pelvic floor muscles to help you find them, as each layer plays an important role in pelvic floor function. Eventually you want to be doing your kegel contractions to include squeezing at all 3 layers, and this should become one smooth movement.    To isolate layer 1: think about closing your openings (around vagina and anus) and nodding the clitoris    To isolate layer 2: imagine a drawstring in your urethra that you are pulling up inside or that you are telescoping the urethra in on itself.     To isolate layer 3: pull your tailbone up and forward towards your pubic bone    When putting it all together, it can be helpful to think about closing your openings and lifting up and in.      So when practicing this at home:   1. Inhale and let the muscles relax   2. Exhale and perform a kegel (closing your openings and lifting up and in)   3. Hold for 5 seconds without holding your breath   4. Inhale and release. Drop these muscles away fully before repeating   5. Perform 30x spread throughout the day

## 2024-09-22 NOTE — PROGRESS NOTES
"Subjective:       Patient ID: Anca Mcclellan is a 79 y.o. female.      Chief Complaint: "Neuropathy"        HPI    HPI 79 Years old Female with PMHx of Cervical Radiculopathy / LUCRETIA / OA / Type 2 DM / Cardiac Pacemaker / HLD /  and other medical conditions came for the evaluation and recommendation of "Neuropathy".    -Patient referred by Cardiologist.    The patient started complaining of bilateral symmetric feet paroxysmal tingling and burning pain (localized to toes) about 1 year ago. The patient reports frequency has significantly decreased within the last 2-3 months with the exception of 1 time occurrence  2-3 days ago where she experienced "needles and coldness" to bilateral feet with a duration of a few minutes. Patient reports it was alleviated with applying socks and a blanket to her feet.     The patient usually notices the pain after prolonged sitting, worse at night and during cold temperature changes. Not associated   after significant physical activity or prolonged standing. The pain is paroxysmal and not persistent but is getting much worse with time and becoming uncomfortable but not disabling. The pain and tingling involve the whole feet, mainly the sole area without progression to the very distal part of the legs. No falls.     The patient denies any hand symptoms. No ankle or foot weakness. The patient denies any low back with radiation to the legs.  The patient has good control on her bowel and bladder. Does have history of DM Type 2 managed on Metformin 500 mg Daily.     No known history of thyroid disease, but does have family history of thyroid disease. No history of malignancies. No history of toxic environmental exposure. Does have history of autoimmune disease (Polymyositis) - followed by Rheumatology. No history of excessive alcohol or recreational drug abuse. No history of chronic hepatitis or HIV. No history of malabsorption or nutritional deficiencies.         Review of Systems "   Constitutional:  Negative for activity change, appetite change, chills, diaphoresis, fatigue, fever and unexpected weight change.   HENT:  Negative for congestion, dental problem, drooling, ear discharge, ear pain, facial swelling, hearing loss, mouth sores, nosebleeds, postnasal drip, rhinorrhea, sinus pressure, sinus pain, sneezing, sore throat, tinnitus, trouble swallowing and voice change.    Eyes:  Negative for photophobia, pain, discharge, redness, itching and visual disturbance.   Respiratory:  Negative for cough, chest tightness, shortness of breath and wheezing.    Cardiovascular:  Negative for chest pain, palpitations and leg swelling.   Gastrointestinal:  Negative for abdominal distention, abdominal pain, blood in stool, constipation, diarrhea, nausea and vomiting.   Endocrine: Negative for cold intolerance, heat intolerance, polydipsia, polyphagia and polyuria.   Genitourinary:  Negative for decreased urine volume, difficulty urinating, dysuria, flank pain, frequency, hematuria, pelvic pain, urgency and vaginal discharge.   Musculoskeletal:  Negative for arthralgias, back pain, gait problem, joint swelling, myalgias, neck pain and neck stiffness.   Skin:  Negative for color change and rash.   Allergic/Immunologic: Negative for immunocompromised state.   Neurological:  Positive for numbness. Negative for dizziness, tremors, seizures, syncope, facial asymmetry, speech difficulty, weakness, light-headedness and headaches.   Hematological:  Negative for adenopathy. Does not bruise/bleed easily.   Psychiatric/Behavioral:  Negative for agitation, behavioral problems, confusion, decreased concentration, dysphoric mood, hallucinations, self-injury, sleep disturbance and suicidal ideas. The patient is not nervous/anxious and is not hyperactive.    All other systems reviewed and are negative.                Current Outpatient Medications:     aspirin (ECOTRIN) 81 MG EC tablet, Take 81 mg by mouth once daily.,  Disp: , Rfl:     atorvastatin (LIPITOR) 40 MG tablet, Take 40 mg by mouth once daily., Disp: , Rfl:     donepeziL (ARICEPT) 5 MG tablet, Take 5 mg by mouth every evening., Disp: , Rfl:     ferrous sulfate (FEOSOL) 325 mg (65 mg iron) Tab tablet, 1 tablet, Disp: , Rfl:     fluocinonide (LIDEX) 0.05 % external solution, AAA scalp qday - bid prn pruritus, Disp: 60 mL, Rfl: 11    ketoconazole (NIZORAL) 2 % shampoo, Apply topically once a week. Lather in for 5-10 min before rinsing, Disp: 120 mL, Rfl: 5    metFORMIN (GLUCOPHAGE-XR) 500 MG ER 24hr tablet, Take 500 mg by mouth once daily., Disp: , Rfl:     midodrine (PROAMATINE) 10 MG tablet, Take 1 tablet (10 mg total) by mouth 3 (three) times daily with meals., Disp: 360 tablet, Rfl: 2    multivitamin (THERAGRAN) per tablet, Take 1 tablet by mouth once daily., Disp: , Rfl:     Past Medical History:   Diagnosis Date    Bilateral carotid artery stenosis 10/15/2019    Hyperlipidemia     LUCRETIA (obstructive sleep apnea) 5/6/2022       Past Surgical History:   Procedure Laterality Date    A-V CARDIAC PACEMAKER INSERTION Left 06/03/2021    Procedure: INSERTION, CARDIAC PACEMAKER, DUAL CHAMBER;  Surgeon: Arnold Martinez MD;  Location: [facility] CATH LAB;  Service: Cardiology;  Laterality: Left;  COVID-19, MRNA, LN-S, PF ([employer]) 2/4/2021, 1/14/2021//Gil ching notified    CATARACT EXTRACTION      ESOPHAGOGASTRODUODENOSCOPY N/A 09/17/2021    Procedure: EGD (ESOPHAGOGASTRODUODENOSCOPY);  Surgeon: Rimma Mccracken MD;  Location: [facility] ENDO;  Service: Endoscopy;  Laterality: N/A;    HYSTERECTOMY  1988    INSERTION OF PACEMAKER  06/03/2021       Social History     Socioeconomic History    Marital status:    Tobacco Use    Smoking status: Never    Smokeless tobacco: Never   Substance and Sexual Activity    Alcohol use: Not Currently     Comment: seldom    Drug use: Never    Sexual activity: Not Currently   Other Topics Concern    Are you pregnant or think you may be? No     Breast-feeding No         Past/Current Medical/Surgical History, Past/Current Social History, Past/Current Family History and Past/Current Medications were reviewed in detail.    Objective:           VITAL SIGNS WERE REVIEWED      GENERAL APPEARANCE:     The patient looks comfortable.    BMI    No signs of respiratory distress.    Normal breathing pattern.    No dysmorphic features    Normal eye contact.       GENERAL MEDICAL EXAM:    HEENT:  Head is atraumatic normocephalic.     FUNDUSCOPIC (OPHTHALMOSCOPIC) EXAMINATION showed no disc edema (papilledema).      NECK: No JVD. No visible lesions or goiters.     CHEST-CARDIOPULMONARY: No cyanosis. No tachypnea. Normal respiratory effort.    [id number]: No jaundice. No stomas or lesions. No visible hernias. No catheters.     SKIN, HAIR, NAILS: No pathognomonic skin rash.No neurofibromatosis. No visible lesions.No stigmata of autoimmune disease. No clubbing.    LIMBS: No varicose veins. No visible swelling.    MUSCULOSKELETAL: No visible deformities.No visible lesions.             Neurologic Exam     Mental Status   Oriented to person, place, and time.   Oriented to person.   Oriented to place. Oriented to country, city and area.   Oriented to time. Oriented to year, month, date, day and season.   Registration: recalls 3 of 3 objects. Recall at 5 minutes: recalls 3 of 3 objects. Follows 3 step commands.   Attention: normal. Concentration: normal.   Speech: speech is normal   Level of consciousness: alert  Knowledge: good. Able to perform simple calculations.   Able to name object. Able to read. Able to repeat. Able to write. Normal comprehension.     Cranial Nerves     CN II   Visual fields full to confrontation.   Visual acuity: normal  Right visual field deficit: none  Left visual field deficit: none     CN III, IV, VI   Pupils are equal, round, and reactive to light.  Extraocular motions are normal.   Right pupil: Size: 2 mm. Shape: regular.  Reactivity: brisk. Consensual response: intact. Accommodation: intact.   Left pupil: Size: 2 mm. Shape: regular. Reactivity: brisk. Consensual response: intact. Accommodation: intact.   CN III: no CN III palsy  CN VI: no CN VI palsy  Nystagmus: none   Diplopia: none  Ophthalmoparesis: none  Upgaze: normal  Downgaze: normal  Conjugate gaze: present  Vestibulo-ocular reflex: present    CN V   Facial sensation intact.   Right facial sensation deficit: none  Left facial sensation deficit: none    CN VII   Facial expression full, symmetric.   Right facial weakness: none  Left facial weakness: none    CN VIII   CN VIII normal.   Hearing: intact    CN IX, X   CN IX normal.   CN X normal.   Palate: symmetric    CN XI   CN XI normal.   Right sternocleidomastoid strength: normal  Left sternocleidomastoid strength: normal  Right trapezius strength: normal  Left trapezius strength: normal    CN XII   CN XII normal.   Tongue: not atrophic  Fasciculations: absent  Tongue deviation: none    Motor Exam   Muscle bulk: normal  Overall muscle tone: normal  Right arm pronator drift: absent  Left arm pronator drift: absent    Strength   Strength 5/5 throughout.   Right neck flexion: 5/5  Left neck flexion: 5/5  Right neck extension: 5/5  Left neck extension: 5/5  Right deltoid: 5/5  Left deltoid: 5/5  Right biceps: 5/5  Left biceps: 5/5  Right triceps: 5/5  Left triceps: 5/5  Right wrist flexion: 5/5  Left wrist flexion: 5/5  Right wrist extension: 5/5  Left wrist extension: 5/5  Right interossei: 5/5  Left interossei: 5/5  Right abdominals: 5/5  Left abdominals: 5/5  Right iliopsoas: 5/5  Left iliopsoas: 5/5  Right quadriceps: 5/5  Left quadriceps: 5/5  Right hamstrin/5  Left hamstrin/5  Right glutei: 5/5  Left glutei: 5/5  Right anterior tibial: 5/5  Left anterior tibial: 5/5  Right posterior tibial: 5/5  Left posterior tibial: 5/5  Right peroneal: 5/5  Left peroneal: 5/5  Right gastroc: 5/5  Left gastroc: 5/5    Sensory Exam    Light touch normal.   Right arm vibration: normal  Left arm vibration: normal  Right leg vibration: decreased from knee  Left leg vibration: normal  Proprioception normal.   Right arm pinprick: normal  Left arm pinprick: normal  Right leg pinprick: decreased from knee  Left leg pinprick: normal  Graphesthesia: normal  Stereognosis: normal    Gait, Coordination, and Reflexes     Gait  Gait: normal    Coordination   Romberg: negative  Finger to nose coordination: normal  Heel to shin coordination: normal  Tandem walking coordination: normal    Tremor   Resting tremor: absent  Intention tremor: absent  Action tremor: absent    Reflexes   Reflexes 2+ except as noted.   Right brachioradialis: 2+  Left brachioradialis: 2+  Right biceps: 2+  Left biceps: 2+  Right triceps: 2+  Left triceps: 2+  Right patellar: 2+  Left patellar: 2+  Right achilles: 0  Left achilles: 0  Right : 2+  Left : 2+  Right plantar: normal  Left plantar: normal  Right Willett: absent  Left Willett: absent  Right ankle clonus: absent  Left ankle clonus: absent  Right pendular knee jerk: absent  Left pendular knee jerk: absent        Lab Results   Component Value Date    WBC 3.54 (L) 01/17/2024    HGB 13.2 01/17/2024    HCT 39.9 01/17/2024    MCV 91 01/17/2024     01/17/2024       Sodium   Date Value Ref Range Status   01/17/2024 137 136 - 145 mmol/L Final     Potassium   Date Value Ref Range Status   01/17/2024 4.0 3.5 - 5.1 mmol/L Final     Chloride   Date Value Ref Range Status   01/17/2024 105 95 - 110 mmol/L Final     CO2   Date Value Ref Range Status   01/17/2024 24 23 - 29 mmol/L Final     Glucose   Date Value Ref Range Status   01/17/2024 120 (H) 70 - 110 mg/dL Final     BUN   Date Value Ref Range Status   01/17/2024 10 8 - 23 mg/dL Final     Creatinine   Date Value Ref Range Status   01/17/2024 0.7 0.5 - 1.4 mg/dL Final     Calcium   Date Value Ref Range Status   01/17/2024 9.7 8.7 - 10.5 mg/dL Final     Total Protein   Date  "Value Ref Range Status   01/17/2024 9.7 (H) 6.0 - 8.4 g/dL Final     Albumin   Date Value Ref Range Status   01/17/2024 4.0 3.5 - 5.2 g/dL Final     Total Bilirubin   Date Value Ref Range Status   01/17/2024 0.7 0.1 - 1.0 mg/dL Final     Comment:     For infants and newborns, interpretation of results should be based  on gestational age, weight and in agreement with clinical  observations.    Premature Infant recommended reference ranges:  Up to 24 hours.............<8.0 mg/dL  Up to 48 hours............<12.0 mg/dL  3-5 days..................<15.0 mg/dL  6-29 days.................<15.0 mg/dL       Alkaline Phosphatase   Date Value Ref Range Status   01/17/2024 97 55 - 135 U/L Final     AST   Date Value Ref Range Status   01/17/2024 53 (H) 10 - 40 U/L Final     ALT   Date Value Ref Range Status   01/17/2024 39 10 - 44 U/L Final     Anion Gap   Date Value Ref Range Status   01/17/2024 8 8 - 16 mmol/L Final     eGFR if    Date Value Ref Range Status   04/29/2022 >60 >60 mL/min/1.73 m^2 Final     eGFR if non    Date Value Ref Range Status   04/29/2022 >60 >60 mL/min/1.73 m^2 Final     Comment:     Calculation used to obtain the estimated glomerular filtration  rate (eGFR) is the CKD-EPI equation.          Lab Results   Component Value Date    [id number] 471 10/15/2019       Lab Results   Component Value Date    TSH 0.652 12/01/2020    FREET4 0.89 02/28/2019       No results found in the last 24 hours.    No results found in the last 24 hours.    Reviewed the neuroimaging independently       Assessment:   79 Years old Female with PMH as above came for an evaluation of "Neuropathy"    Polyneuropathy, unspecified     Bilateral cold feet     Neuropathy     Neuropathy of both feet     Type 2 diabetes mellitus with diabetic neuropathy, without long-term current use of insulin     Plan:   Patient Neurological Assessment is remarkable for decreased vibration and pinprick to the right lower " extremity below the knee. Patient's history and physical point to rule out Peripheral Diabetic Neuropathy.     -Start Gabapentin 100 mg PO QHS PRN for Peripheral Neuropathy Management. Side effects Discussed. Patient verbalized understanding.         WORKUP-EVALUATION       NCS/EMG RUE, RLE, LLE    Endocrine RODRIGUEZ: HA1C, TSH-T4    Vitamins and Minerals:  B12-MMA, FA, B1, B2, B6, Cu-Ceruloplasmin.     AID RODRIGUEZ Cryoglobulins, RF, Sjögren's syndrome A-B     ID RODRIGUEZ: HIV, HCV, Lyme.    Metabolic RODRIGUEZ: Heavy Metals, Ceruloplasmin    Invasive: LP for CSF (Protein>Age suggests CIDP) , Muscle and Nerve Biopsy.             LABORATORY EVALUATION    Labs: (2024) Ferritin / Iron and TIBC / Cryoglobulin / Immunofixation Electrophoresis / CMP / CBC / CRP /    -personally reviewed -non-significant abnormalities except     Sed Rate (66) - elevated / Positive VALDEZ / - Followed by Rheumatology     Protein Electrophoresis Serum (9.1) - elevated - Followed by Rheumatology        RADIOLOGY EVALUATION     Personally Reviewed X-Ray Lumbar Spine - done 2021 - Five lumbar type vertebral bodies. Old-appearing insufficiency fracture at the L2 level with smooth depression superior endplate and anterior superior marginal spurring. Remaining vertebral bodies demonstrate normal height. There is multilevel loss of disc height with marginal spurring. Prominent posterior spurring at the L3-4 and L5-S1 levels. No additional fracture or subluxation. No pars defects. Prominent facet arthropathy at the L4-5 and L5-S1 levels. Pedicles and SI joints intact. Degenerative change partially visualized hip joints. Straightening of the normal lumbar curvature noted     Personally Reviewed Brain MRI W WO Contrast- done 2019 - no acute abnormalities - minimal chronic microvascular ischemia.     Personally Reviewed EMG-NCS - done 2019 - EMG is consistent with mild Poly myositis. Possible right tibial motor neuropathy, possible technical issue.       NEUROPHYSIOLOGY  EVALUATION       PATHOLOGY EVALUATION        NEUROCOGNITIVE AND NEUROPSYCHOLOGY EVALUATION              LENGTH-DEPENDENT SENSORIMOTOR PERIPHERAL POLYNEUROPATHY           MANAGEMENT     Will prescribe compounded creams PRN.    BS Control.    Alcohol cessation.    In terms of management I counseled the patient that neuropathic pain meds target is 40% reduction of pain.     I encouraged the patient to read the leaflet for any newly prescribed medication for more details and report any side effect whether listed or not listed.    FIRST LINE OPTIONS:    Gabapentin/GBP-Neurontin which can cause sedation, weight gain, swelling and abnormal movements. Will titrate slowly to 300 mg TID with a maximum of 1200 mg TID. The patient verbalized understanding.    Pregabalin/PGB-Lyrica which can cause sedation, weight gain, swelling and abnormal movements. Will titrate slowly to 100 mg BID with maximum of 300 mg BID. The patient verbalized understanding.    Amitriptyline/Elavil which can cause sleepiness, dry eyes, dry mouth, urinary retention and rarely cardiac arrhythmias. Will titrate to 75 mg QHS. The patient verbalized understanding.    Duloxetine/Cymbalta which causes sedation, weight gain and sexual dysfunction and potentially interactions can cause serotonin syndrome. Will titrate slowly to 60 mg daily. The patient verbalized understanding.    Zonisamide/Zonegran 100-400 mg QHS is a good alternative to traditional choices in case of SE/AE and weight gain.      SECOND LINE OPTIONS:    Lamotrigine/Lamictal LTG which can cause serious skin rash (SJS) and cardiac arrhythmias per recent reports. Will titrate slowly to a maximum of 300 mg BID.    Carbamazepine/Tegretol CBZ which can cause liver and bone marrow toxicity. Will titrate slowly to a maximum of 300 mg BID.       LAST OPTIONS:    Interventional Pain Management utilizing Neuro stimulators (Nevro HFX for DPN).        POLYNEUROPATHY PRECAUTIONS:      Discussed with the  patient some of the neuropathy precautions like:    Always use oven mitts.    Test water with an unaffected hand or foot.    Use caution when trimming nails. File sharp areas    Wear shoes that fit well to avoid pressure points, blisters, and ulcers.    Inspect your hands and feet carefully (including the soles of your feet and between your toes) at least once a week.       MEDICAL/SURGICAL COMORBIDITIES     All relevant medical comorbidities noted and managed by primary care physician and medical care team.          HEALTHY LIFESTYLE AND PREVENTATIVE CARE    The patient to adhere to the age-appropriate health maintenance guidelines including screening tests and vaccinations. The patient to adhere to  healthy lifestyle, optimal weight, exercise, healthy diet, good sleep hygiene and avoiding drugs including smoking, alcohol and recreational drugs.    I spent a total of 57 minutes on the day of the visit.This includes face to face time and non-face to face time preparing to see the patient (eg, review of tests), obtaining and/or reviewing separately obtained history, documenting clinical information in the electronic or other health record, independently interpreting results and communicating results to the patient/family/caregiver, or care coordinator.     Please do not hesitate to contact me with any updates, questions or concerns.    [id number]    Rayne Mane, MSN, FNP-C    General Neurology

## 2024-09-23 ENCOUNTER — OFFICE VISIT (OUTPATIENT)
Dept: NEUROLOGY | Facility: CLINIC | Age: 79
End: 2024-09-23
Payer: MEDICARE

## 2024-09-23 ENCOUNTER — LAB VISIT (OUTPATIENT)
Dept: LAB | Facility: HOSPITAL | Age: 79
End: 2024-09-23
Payer: MEDICARE

## 2024-09-23 VITALS
HEART RATE: 81 BPM | HEIGHT: 67 IN | OXYGEN SATURATION: 99 % | DIASTOLIC BLOOD PRESSURE: 81 MMHG | BODY MASS INDEX: 27.02 KG/M2 | RESPIRATION RATE: 16 BRPM | WEIGHT: 172.19 LBS | SYSTOLIC BLOOD PRESSURE: 123 MMHG

## 2024-09-23 DIAGNOSIS — E53.8 FOLATE DEFICIENCY: ICD-10-CM

## 2024-09-23 DIAGNOSIS — E53.8 B12 DEFICIENCY: ICD-10-CM

## 2024-09-23 DIAGNOSIS — E53.0 VITAMIN B2 DEFICIENCY: ICD-10-CM

## 2024-09-23 DIAGNOSIS — G62.9 NEUROPATHY: ICD-10-CM

## 2024-09-23 DIAGNOSIS — E11.69 TYPE 2 DIABETES MELLITUS WITH OTHER SPECIFIED COMPLICATION, UNSPECIFIED WHETHER LONG TERM INSULIN USE: ICD-10-CM

## 2024-09-23 DIAGNOSIS — E53.1 VITAMIN B6 DEFICIENCY: ICD-10-CM

## 2024-09-23 DIAGNOSIS — G57.93 NEUROPATHY OF BOTH FEET: ICD-10-CM

## 2024-09-23 DIAGNOSIS — G62.9 POLYNEUROPATHY, UNSPECIFIED: ICD-10-CM

## 2024-09-23 DIAGNOSIS — G62.9 POLYNEUROPATHY, UNSPECIFIED: Primary | ICD-10-CM

## 2024-09-23 DIAGNOSIS — E11.40 TYPE 2 DIABETES MELLITUS WITH DIABETIC NEUROPATHY, WITHOUT LONG-TERM CURRENT USE OF INSULIN: ICD-10-CM

## 2024-09-23 DIAGNOSIS — R20.9 BILATERAL COLD FEET: ICD-10-CM

## 2024-09-23 LAB
CERULOPLASMIN SERPL-MCNC: 31 MG/DL (ref 15–45)
ESTIMATED AVG GLUCOSE: 128 MG/DL (ref 68–131)
FOLATE SERPL-MCNC: 17.8 NG/ML (ref 4–24)
HBA1C MFR BLD: 6.1 % (ref 4–5.6)
HCV AB SERPL QL IA: NORMAL
HIV 1+2 AB+HIV1 P24 AG SERPL QL IA: NORMAL
RHEUMATOID FACT SERPL-ACNC: <13 IU/ML (ref 0–15)
T4 FREE SERPL-MCNC: 0.75 NG/DL (ref 0.71–1.51)
TSH SERPL DL<=0.005 MIU/L-ACNC: 0.78 UIU/ML (ref 0.4–4)
VIT B12 SERPL-MCNC: 763 PG/ML (ref 210–950)

## 2024-09-23 PROCEDURE — 84425 ASSAY OF VITAMIN B-1: CPT

## 2024-09-23 PROCEDURE — 86803 HEPATITIS C AB TEST: CPT

## 2024-09-23 PROCEDURE — 86235 NUCLEAR ANTIGEN ANTIBODY: CPT

## 2024-09-23 PROCEDURE — 82300 ASSAY OF CADMIUM: CPT

## 2024-09-23 PROCEDURE — 36415 COLL VENOUS BLD VENIPUNCTURE: CPT

## 2024-09-23 PROCEDURE — 83921 ORGANIC ACID SINGLE QUANT: CPT

## 2024-09-23 PROCEDURE — 3072F LOW RISK FOR RETINOPATHY: CPT | Mod: CPTII,S$GLB,,

## 2024-09-23 PROCEDURE — 84252 ASSAY OF VITAMIN B-2: CPT

## 2024-09-23 PROCEDURE — 82390 ASSAY OF CERULOPLASMIN: CPT

## 2024-09-23 PROCEDURE — 86431 RHEUMATOID FACTOR QUANT: CPT

## 2024-09-23 PROCEDURE — 82746 ASSAY OF FOLIC ACID SERUM: CPT

## 2024-09-23 PROCEDURE — 83036 HEMOGLOBIN GLYCOSYLATED A1C: CPT

## 2024-09-23 PROCEDURE — 1101F PT FALLS ASSESS-DOCD LE1/YR: CPT | Mod: CPTII,S$GLB,,

## 2024-09-23 PROCEDURE — 87389 HIV-1 AG W/HIV-1&-2 AB AG IA: CPT

## 2024-09-23 PROCEDURE — 84439 ASSAY OF FREE THYROXINE: CPT

## 2024-09-23 PROCEDURE — 82607 VITAMIN B-12: CPT

## 2024-09-23 PROCEDURE — 3288F FALL RISK ASSESSMENT DOCD: CPT | Mod: CPTII,S$GLB,,

## 2024-09-23 PROCEDURE — 3079F DIAST BP 80-89 MM HG: CPT | Mod: CPTII,S$GLB,,

## 2024-09-23 PROCEDURE — 84207 ASSAY OF VITAMIN B-6: CPT

## 2024-09-23 PROCEDURE — 1126F AMNT PAIN NOTED NONE PRSNT: CPT | Mod: CPTII,S$GLB,,

## 2024-09-23 PROCEDURE — 99999 PR PBB SHADOW E&M-EST. PATIENT-LVL V: CPT | Mod: PBBFAC,,,

## 2024-09-23 PROCEDURE — 99204 OFFICE O/P NEW MOD 45 MIN: CPT | Mod: S$GLB,,,

## 2024-09-23 PROCEDURE — 1160F RVW MEDS BY RX/DR IN RCRD: CPT | Mod: CPTII,S$GLB,,

## 2024-09-23 PROCEDURE — 83655 ASSAY OF LEAD: CPT

## 2024-09-23 PROCEDURE — 3074F SYST BP LT 130 MM HG: CPT | Mod: CPTII,S$GLB,,

## 2024-09-23 PROCEDURE — 83825 ASSAY OF MERCURY: CPT

## 2024-09-23 PROCEDURE — 1159F MED LIST DOCD IN RCRD: CPT | Mod: CPTII,S$GLB,,

## 2024-09-23 PROCEDURE — 84443 ASSAY THYROID STIM HORMONE: CPT

## 2024-09-23 PROCEDURE — 83655 ASSAY OF LEAD: CPT | Mod: 91

## 2024-09-23 PROCEDURE — 86235 NUCLEAR ANTIGEN ANTIBODY: CPT | Mod: 59

## 2024-09-23 PROCEDURE — 87798 DETECT AGENT NOS DNA AMP: CPT

## 2024-09-23 RX ORDER — GABAPENTIN 100 MG/1
100 CAPSULE ORAL NIGHTLY PRN
Qty: 30 CAPSULE | Refills: 0 | Status: SHIPPED | OUTPATIENT
Start: 2024-09-23 | End: 2024-10-23

## 2024-09-25 LAB
ARSENIC BLD-MCNC: <1 NG/ML
CADMIUM BLD-MCNC: 0.7 NG/ML
CITY: NORMAL
CITY: NORMAL
COUNTY: NORMAL
COUNTY: NORMAL
GUARDIAN FIRST NAME: NORMAL
GUARDIAN FIRST NAME: NORMAL
GUARDIAN LAST NAME: NORMAL
GUARDIAN LAST NAME: NORMAL
HOME PHONE: NORMAL
LEAD BLD-MCNC: 1.5 MCG/DL
LEAD BLD-MCNC: NORMAL UG/DL
MERCURY BLD-MCNC: <1 NG/ML
PHONE #: NORMAL
POSTAL CODE: NORMAL
RACE: NORMAL
RACE: NORMAL
STATE OF RESIDENCE: NORMAL
STATE: NORMAL
STREET ADDRESS: NORMAL
STREET ADDRESS: NORMAL
VENOUS/CAPILLARY: NORMAL
ZIP: NORMAL

## 2024-09-26 ENCOUNTER — CLINICAL SUPPORT (OUTPATIENT)
Dept: REHABILITATION | Facility: HOSPITAL | Age: 79
End: 2024-09-26
Payer: MEDICARE

## 2024-09-26 DIAGNOSIS — M62.89 PELVIC FLOOR DYSFUNCTION: Primary | ICD-10-CM

## 2024-09-26 LAB
ANTI-SSA ANTIBODY: 4.56 RATIO (ref 0–0.99)
ANTI-SSA INTERPRETATION: POSITIVE
ANTI-SSB ANTIBODY: 0.22 RATIO (ref 0–0.99)
ANTI-SSB INTERPRETATION: NEGATIVE
B BURGDOR DNA BLD QL NAA+PROBE: NEGATIVE
B GAR+AFZ OPPA1 BLD QL NAA+NON-PROBE: NEGATIVE
B MAYONII OPPA1 BLD QL NAA+NON-PROBE: NEGATIVE
MICROBIOLOGIST REVIEW: NORMAL
VIT B2 SERPL-MCNC: 4 MCG/L (ref 1–19)

## 2024-09-26 PROCEDURE — 97112 NEUROMUSCULAR REEDUCATION: CPT | Mod: PN

## 2024-09-26 NOTE — PROGRESS NOTES
Pelvic Health Physical Therapy   Treatment Note     Name: Anca Frost [place]  Clinic Number: [id number]    Therapy Diagnosis:   Encounter Diagnosis   Name Primary?    Pelvic floor dysfunction Yes       Physician: Zohra Rhoades MD    Visit Date: 9/26/2024    Physician Orders: PT Eval and Treat   Medical Diagnosis from Referral: Cystocele with prolapse [N81.4]   Evaluation Date: 9/9/2024  Authorization Period Expiration: 8/29/2025  Plan of Care Expiration: 11/18/2024  Progress Note Due: 10/09/2024  Visit # / Visits authorized: 2/20 + 1/1 eval   FOTO: Issued Visit #: 1 /3     Precautions: Standard and pacemaker    Time In: 9:55 AM  Time Out: 10:30 AM   Total Billable Time: 35 minutes      Subjective     Pt reports: feeling about the same. Able to work on homework.  Double Voiding Techniques helps sometimes. Frequency is about the same.  feeling about the same. No new complaints.  She was compliant with home exercise program.  Response to previous treatment: no adverse symptoms   Functional change: no change     Pain: 0/10  Location: generalized     Objective     Objective Measures updated at progress report unless specified.     Treatment       Anca participated in neuromuscular re-education activities to develop Coordination, Control, Proprioception, Kinesthetic, and Sense for 35 minutes including:     Incliend Diaphragmatic breathing  Inclined TA contraction  Inclined clamshells red TheraBand 2x10  Inclined hip adduction with ball squeeze 2x10  Inclined Kegels with Diaphragmatic breathing x10  Seated TA contraction with push into ball x10  Standing hip 3 way x10 each - cues for posture  Seated rows red TheraBand 2x10      Anca participated in dynamic functional therapeutic activities to improve functional performance for 00  minutes, including:    Reviewed Double Voiding Techniques  Reviewed Kegels with Layers  Reviewed Bladder diary          Home Exercises Provided and Patient Education Provided     Education  provided:   - anatomy/physiology of pelvic floor, posture/body mechanices, diaphragmatic breathing, double voiding techniques, isometric abdominal exercises, and kegels  Discussed progression of plan of care with patient; educated pt in activity modification; reviewed HEP with pt. Pt demonstrated and verbalized understanding of all instruction and was provided with a handout of HEP (see Patient Instructions).  Education on Pressure management  Education on Double Voiding Techniques     Written Home Exercises Provided: Patient instructed to cont prior HEP.  Exercises were reviewed and Anca was able to demonstrate them prior to the end of the session.  Anca demonstrated fair  understanding of the education provided.     See EMR under Patient Instructions for exercises provided 9/19/2024 and prior visit.    Assessment     Anca tolerated therapy session well with no new complaints. She reports no reports of pelvic pressure/heaviness. Continued focus on progression of core and hip strengthening interventions as well as Kegels with proper Diaphragmatic breathing. Continue progressing in POC as tolerated.     Anca Is progressing well towards her goals.   Pt prognosis is Fair.     Pt will continue to benefit from skilled outpatient physical therapy to address the deficits listed in the problem list box on initial evaluation, provide pt/family education and to maximize pt's level of independence in the home and community environment.     Pt's spiritual, cultural and educational needs considered and pt agreeable to plan of care and goals.     Anticipated barriers to physical therapy: none    Goals:  Short Term Goals: 6 weeks   - Pt will demonstrate excellent knowledge and adherence to HEP to facilitate optimal recovery.  - Pt will demonstrate proper PFM contraction, relaxation, and lengthening coordinated with TA and breath for improved muscle coordination needed for functional activity.  - Pt to report a 25% decrease in  intensity of pelvic pressure and fullness at the end of the day to demonstrate improving pelvic support of pelvic structures.     Long Term Goals: 10 weeks   - Pt will demonstrate excellent knowledge and adherence to HEP for continued self-maintenance of symptoms.  - Pt to demonstrate proper pressure management with all functional mobility including blowing out with exertion and activating pelvic floor + transverse abdominus to demonstrate improving pelvic organ support for improved ADL participation.  - Pt to report a 25-50% decrease in intensity of pelvic pressure and fullness at the end of the day to demonstrate improving pelvic support of pelvic structures.  - Pt will report ability to delay urinary urge for at least 15 minutes to maintain continence with ADL/IADLs.   - Pt will demonstrate PFM strength of at least 3/5 MMT for improved strength needed to maintain continence.      PLAN      Plan of care Certification: 9/9/2024 to 11/18/2024.     Outpatient Physical Therapy 1 time(s) every week(s) for 10 weeks to include the following interventions: Cervical/Lumbar Traction, Electrical Stimulation TENS/IFC, Manual Therapy, Moist Heat/ Ice, Neuromuscular Re-ed, Patient Education, Self Care, Therapeutic Activities, Therapeutic Exercise, and ASTYM, and FDN.        Edna Levine, PT

## 2024-09-27 DIAGNOSIS — M35.00 SJOGREN'S SYNDROME, WITH UNSPECIFIED ORGAN INVOLVEMENT: Primary | ICD-10-CM

## 2024-09-27 LAB
METHYLMALONATE SERPL-SCNC: 0.14 UMOL/L
PYRIDOXAL SERPL-MCNC: 12 UG/L (ref 5–50)
VIT B1 BLD-MCNC: 49 UG/L (ref 38–122)

## 2024-10-03 ENCOUNTER — CLINICAL SUPPORT (OUTPATIENT)
Dept: REHABILITATION | Facility: HOSPITAL | Age: 79
End: 2024-10-03
Payer: MEDICARE

## 2024-10-03 DIAGNOSIS — M62.89 PELVIC FLOOR DYSFUNCTION: Primary | ICD-10-CM

## 2024-10-03 PROCEDURE — 97112 NEUROMUSCULAR REEDUCATION: CPT | Mod: PN

## 2024-10-03 PROCEDURE — 97110 THERAPEUTIC EXERCISES: CPT | Mod: PN

## 2024-10-03 NOTE — PROGRESS NOTES
Pelvic Health Physical Therapy   Treatment Note     Name: Anca Frost [place]  Clinic Number: [id number]    Therapy Diagnosis:   Encounter Diagnosis   Name Primary?    Pelvic floor dysfunction Yes       Physician: Zohra Rhoades MD    Visit Date: 10/3/2024    Physician Orders: PT Eval and Treat   Medical Diagnosis from Referral: Cystocele with prolapse [N81.4]   Evaluation Date: 9/9/2024  Authorization Period Expiration: 8/29/2025  Plan of Care Expiration: 11/18/2024  Progress Note Due: 10/09/2024  Visit # / Visits authorized: 2/20 + 1/1 eval   FOTO: Issued Visit #: 1 /3     Precautions: Standard and pacemaker    Time In: 9:45 AM  Time Out: 10:20 AM   Total Billable Time: 35 minutes      Subjective     Pt reports: no complaints. Has not been feeling any heaviness recently.      She was compliant with home exercise program.  Response to previous treatment: no adverse symptoms   Functional change: no change     Pain: 0/10    Location: generalized     Objective     Objective Measures updated at progress report unless specified.     Treatment     Anca received therapeutic exercises to develop  strength, endurance, ROM, and core stabilization for 10 minutes including:     Standing hip 3 way x10 each YTB- cues for posture  Inclined isometric hip abd/add x10, hold 10 seconds   Seated clamshells red TheraBand 3x10 - added to HEP    Anca participated in neuromuscular re-education activities to develop Coordination, Control, Proprioception, Kinesthetic, and Sense for 25 minutes including:     Incliend Diaphragmatic breathing  Inclined TA contraction - patient manjeet during inhale, moderate cues needed for proper contraction  Seated TA contraction with push into ball 2x10  Seated rows red TheraBand 2x15  Standing heel raises + Kegel      Anca participated in dynamic functional therapeutic activities to improve functional performance for 00  minutes, including:    Reviewed Double Voiding Techniques  Reviewed Kegels with  Layers  Reviewed Bladder diary          Home Exercises Provided and Patient Education Provided     Education provided:   - anatomy/physiology of pelvic floor, posture/body mechanices, diaphragmatic breathing, double voiding techniques, isometric abdominal exercises, and kegels  Discussed progression of plan of care with patient; educated pt in activity modification; reviewed HEP with pt. Pt demonstrated and verbalized understanding of all instruction and was provided with a handout of HEP (see Patient Instructions).  Education on Pressure management  Education on Double Voiding Techniques     Written Home Exercises Provided: Patient instructed to cont prior HEP.  Exercises were reviewed and Anca was able to demonstrate them prior to the end of the session.  Anca demonstrated fair  understanding of the education provided.     See EMR under Patient Instructions for exercises provided 9/19/2024 and prior visit.    Assessment     Anca presents to therapy today with reports of no pelvic pressure or heaviness recently. Continued focus on core stabilization as well as hip and pelvic floor strengthening. Moderated verbal cues need for proper core contraction with Diaphragmatic breathing. Patient reports she will be out of town for 10 days. Reviewed possible discharge next visit if symptoms continue to improve.     Anca Is progressing well towards her goals.   Pt prognosis is Fair.     Pt will continue to benefit from skilled outpatient physical therapy to address the deficits listed in the problem list box on initial evaluation, provide pt/family education and to maximize pt's level of independence in the home and community environment.     Pt's spiritual, cultural and educational needs considered and pt agreeable to plan of care and goals.     Anticipated barriers to physical therapy: none    Goals:  Short Term Goals: 6 weeks   - Pt will demonstrate excellent knowledge and adherence to HEP to facilitate optimal  recovery.  - Pt will demonstrate proper PFM contraction, relaxation, and lengthening coordinated with TA and breath for improved muscle coordination needed for functional activity.  - Pt to report a 25% decrease in intensity of pelvic pressure and fullness at the end of the day to demonstrate improving pelvic support of pelvic structures.     Long Term Goals: 10 weeks   - Pt will demonstrate excellent knowledge and adherence to HEP for continued self-maintenance of symptoms.  - Pt to demonstrate proper pressure management with all functional mobility including blowing out with exertion and activating pelvic floor + transverse abdominus to demonstrate improving pelvic organ support for improved ADL participation.  - Pt to report a 25-50% decrease in intensity of pelvic pressure and fullness at the end of the day to demonstrate improving pelvic support of pelvic structures.  - Pt will report ability to delay urinary urge for at least 15 minutes to maintain continence with ADL/IADLs.   - Pt will demonstrate PFM strength of at least 3/5 MMT for improved strength needed to maintain continence.      PLAN      Plan of care Certification: 9/9/2024 to 11/18/2024.     Outpatient Physical Therapy 1 time(s) every week(s) for 10 weeks to include the following interventions: Cervical/Lumbar Traction, Electrical Stimulation TENS/IFC, Manual Therapy, Moist Heat/ Ice, Neuromuscular Re-ed, Patient Education, Self Care, Therapeutic Activities, Therapeutic Exercise, and ASTYM, and FDN.        Edna Levine, PT

## 2024-10-09 DIAGNOSIS — Z95.0 PRESENCE OF CARDIAC PACEMAKER: Primary | ICD-10-CM

## 2024-10-09 DIAGNOSIS — I49.5 SICK SINUS SYNDROME: ICD-10-CM

## 2024-10-17 ENCOUNTER — CLINICAL SUPPORT (OUTPATIENT)
Dept: REHABILITATION | Facility: HOSPITAL | Age: 79
End: 2024-10-17
Payer: MEDICARE

## 2024-10-17 DIAGNOSIS — M62.89 PELVIC FLOOR DYSFUNCTION: Primary | ICD-10-CM

## 2024-10-17 PROCEDURE — 97530 THERAPEUTIC ACTIVITIES: CPT | Mod: PN

## 2024-10-17 NOTE — PLAN OF CARE
[facility]  Physical Therapy Discharge Note    Name: Anca Mcclellan  Clinic Number: [id number]    Therapy Diagnosis:   Encounter Diagnosis   Name Primary?    Pelvic floor dysfunction Yes     Physician: Zohra Rhoades MD    Physician Orders: PT Eval and Treat   Medical Diagnosis from Referral: Cystocele with prolapse [N81.4]   Evaluation Date: 9/9/2024    Date of Last visit: 10/17/2024  Total Visits Received: 5    ASSESSMENT      Anca presents to therapy today ready for discharge. Patient demonstrates understanding of HEP, Double Voiding Techniques, as well as Kegels in different positions. Recommended patient reach out to referring provider if symptoms worsen.     Discharge reason: Patient requested discharge    Discharge FOTO Score: PFDI Prolapse 0    Short Term Goals: 6 weeks   - Pt will demonstrate excellent knowledge and adherence to HEP to facilitate optimal recovery. MET 10/17/2024  - Pt will demonstrate proper PFM contraction, relaxation, and lengthening coordinated with TA and breath for improved muscle coordination needed for functional activity. MET 10/17/2024  - Pt to report a 25% decrease in intensity of pelvic pressure and fullness at the end of the day to demonstrate improving pelvic support of pelvic structures. MET 10/17/2024     Long Term Goals: 10 weeks   - Pt will demonstrate excellent knowledge and adherence to HEP for continued self-maintenance of symptoms. MET 10/17/2024  - Pt to demonstrate proper pressure management with all functional mobility including blowing out with exertion and activating pelvic floor + transverse abdominus to demonstrate improving pelvic organ support for improved ADL participation. MET 10/17/2024  - Pt to report a 25-50% decrease in intensity of pelvic pressure and fullness at the end of the day to demonstrate improving pelvic support of pelvic structures. MET 10/17/2024  - Pt will report ability to delay urinary urge for at least 15  minutes to maintain continence with ADL/IADLs. MET 10/17/2024  - Pt will demonstrate PFM strength of at least 3/5 MMT for improved strength needed to maintain continence. NOT MET    PLAN   This patient is discharged from Physical Therapy      Edna Levine, PT

## 2024-10-17 NOTE — PROGRESS NOTES
Pelvic Health Physical Therapy   Treatment Note     Name: Anca Mcclellan  Clinic Number: [id number]    Therapy Diagnosis:   Encounter Diagnosis   Name Primary?    Pelvic floor dysfunction Yes       Physician: Zohra Rhoades MD    Visit Date: 10/17/2024    Physician Orders: PT Eval and Treat   Medical Diagnosis from Referral: Cystocele with prolapse [N81.4]   Evaluation Date: 9/9/2024  Authorization Period Expiration: 8/29/2025  Plan of Care Expiration: 11/18/2024  Progress Note Due: 10/09/2024  Visit # / Visits authorized: 4/20 + 1/1 eval   FOTO: Issued Visit #: 2 /3     Precautions: Standard and pacemaker    Time In: 9:48 AM  Time Out: 10:10 AM   Total Billable Time: 28 minutes      Subjective     Pt reports: reports cystocele is large this morning and she can feel its presence. She understands her exercises and is ready for discharge today.     She was compliant with home exercise program.  Response to previous treatment: no adverse symptoms   Functional change: no change     Pain: 0/10    Location: generalized     Objective     Limitation/Restriction for FOTO Pelvic Floor Survey     Therapist reviewed FOTO scores for Anca Mcclellan on 9/9/2024.   FOTO documents entered into [employer] - see Media section.     Limitation Score: PFDI Prolapse 0            Treatment     Anca participated in dynamic functional therapeutic activities to improve functional performance for 15 minutes, including:    Reviewed HEP and reviewed POC   Reviewed Double Voiding Techniques  Reviewed Kegels in different positions      Home Exercises Provided and Patient Education Provided     Education provided:   - anatomy/physiology of pelvic floor, posture/body mechanices, diaphragmatic breathing, double voiding techniques, isometric abdominal exercises, and kegels  Discussed progression of plan of care with patient; educated pt in activity modification; reviewed HEP with pt. Pt demonstrated and verbalized understanding of all instruction  and was provided with a handout of HEP (see Patient Instructions).  Education on Pressure management  Education on Double Voiding Techniques     Written Home Exercises Provided: Patient instructed to cont prior HEP.  Exercises were reviewed and Anca was able to demonstrate them prior to the end of the session.  Anca demonstrated fair  understanding of the education provided.     See EMR under Patient Instructions for exercises provided 9/19/2024 and prior visit.    Assessment     Anca presents to therapy today ready for discharge. Patient demonstrates understanding of HEP, Double Voiding Techniques, as well as Kegels in different positions. Recommended patient reach out to referring provider if symptoms worsen.     Anca Is progressing well towards her goals.   Pt prognosis is Fair.     Pt will continue to benefit from skilled outpatient physical therapy to address the deficits listed in the problem list box on initial evaluation, provide pt/family education and to maximize pt's level of independence in the home and community environment.     Pt's spiritual, cultural and educational needs considered and pt agreeable to plan of care and goals.     Anticipated barriers to physical therapy: none    Goals:  Short Term Goals: 6 weeks   - Pt will demonstrate excellent knowledge and adherence to HEP to facilitate optimal recovery. MET 10/17/2024  - Pt will demonstrate proper PFM contraction, relaxation, and lengthening coordinated with TA and breath for improved muscle coordination needed for functional activity. MET 10/17/2024  - Pt to report a 25% decrease in intensity of pelvic pressure and fullness at the end of the day to demonstrate improving pelvic support of pelvic structures. MET 10/17/2024     Long Term Goals: 10 weeks   - Pt will demonstrate excellent knowledge and adherence to HEP for continued self-maintenance of symptoms. MET 10/17/2024  - Pt to demonstrate proper pressure management with all functional  mobility including blowing out with exertion and activating pelvic floor + transverse abdominus to demonstrate improving pelvic organ support for improved ADL participation. MET 10/17/2024  - Pt to report a 25-50% decrease in intensity of pelvic pressure and fullness at the end of the day to demonstrate improving pelvic support of pelvic structures. MET 10/17/2024  - Pt will report ability to delay urinary urge for at least 15 minutes to maintain continence with ADL/IADLs. MET 10/17/2024  - Pt will demonstrate PFM strength of at least 3/5 MMT for improved strength needed to maintain continence. NOT MET     PLAN      Plan of care Certification: 9/9/2024 to 11/18/2024.     Outpatient Physical Therapy 1 time(s) every week(s) for 10 weeks to include the following interventions: Cervical/Lumbar Traction, Electrical Stimulation TENS/IFC, Manual Therapy, Moist Heat/ Ice, Neuromuscular Re-ed, Patient Education, Self Care, Therapeutic Activities, Therapeutic Exercise, and ASTYM, and FDN.        Edna Levine, PT

## 2024-10-21 ENCOUNTER — TELEPHONE (OUTPATIENT)
Dept: NEUROLOGY | Facility: CLINIC | Age: 79
End: 2024-10-21
Payer: MEDICARE

## 2024-10-21 NOTE — PROGRESS NOTES
"Subjective:       Patient ID: Anca Mcclellan is a 79 y.o. female.      Chief Complaint: "Neuropathy"        HPI    HPI 79 Years old Female with PMHx of Cervical Radiculopathy / LUCRETIA / OA / Type 2 DM / Cardiac Pacemaker / HLD /  and other medical conditions came for the re-evaluation and recommendation of "Neuropathy".    Interval History: (09/23/2024) - Started Gabapentin 100 mg PO QHS PRN for Peripheral Neuropathy Management. Ordered NCS/EMG RUE, RLE, LLE / Endocrine RODRIGUEZ: HA1C, TSH-T4 / Vitamins and Minerals:  B12-MMA, FA, B1, B2, B6, Cu-Ceruloplasmin / AID RODRIGUEZ Cryoglobulins, RF, Sjögren's syndrome A-B / ID RODRIGUEZ: HIV, HCV, Lyme / Metabolic RODRIGUEZ: Heavy Metals, Ceruloplasmin.     -Patient referred by Cardiologist.    The patient started complaining of bilateral symmetric feet paroxysmal tingling and burning pain (localized to toes) about 1 year ago. The patient reports frequency has significantly decreased within the last 2-3 months with the exception of 1 time occurrence  2-3 days ago where she experienced "needles and coldness" to bilateral feet with a duration of a few minutes. Patient reports it was alleviated with applying socks and a blanket to her feet.     The patient usually notices the pain after prolonged sitting, worse at night and during cold temperature changes. Not associated   after significant physical activity or prolonged standing. The pain is paroxysmal and not persistent but is getting much worse with time and becoming uncomfortable but not disabling. The pain and tingling involve the whole feet, mainly the sole area without progression to the very distal part of the legs. No falls.     The patient denies any hand symptoms. No ankle or foot weakness. The patient denies any low back with radiation to the legs.  The patient has good control on her bowel and bladder. Does have history of DM Type 2 managed on Metformin 500 mg Daily.     No known history of thyroid disease, but does have family history " of thyroid disease. No history of malignancies. No history of toxic environmental exposure. Does have history of autoimmune disease (Polymyositis) - followed by Rheumatology. No history of excessive alcohol or recreational drug abuse. No history of chronic hepatitis or HIV. No history of malabsorption or nutritional deficiencies.       New Issues: (10/22/2024) -     No new issues per patient. The patient notes improvement in bilateral foot neuropathy since the last visit and reports no occurences in the last 2 weeks. Keeping her feet covered has been beneficial in managing pain. There have been no falls reported.    Medication: Gabapentin 100 mg PO QHS PRN has been prescribed for peripheral neuropathy management. The patient has not initiated this medication due to concerns about potential side effects.        Review of Systems   Constitutional:  Negative for activity change, appetite change, chills, diaphoresis, fatigue, fever and unexpected weight change.   HENT:  Negative for congestion, dental problem, drooling, ear discharge, ear pain, facial swelling, hearing loss, mouth sores, nosebleeds, postnasal drip, rhinorrhea, sinus pressure, sinus pain, sneezing, sore throat, tinnitus, trouble swallowing and voice change.    Eyes:  Negative for photophobia, pain, discharge, redness, itching and visual disturbance.   Respiratory:  Negative for cough, chest tightness, shortness of breath and wheezing.    Cardiovascular:  Negative for chest pain, palpitations and leg swelling.   Gastrointestinal:  Negative for abdominal distention, abdominal pain, blood in stool, constipation, diarrhea, nausea and vomiting.   Endocrine: Negative for cold intolerance, heat intolerance, polydipsia, polyphagia and polyuria.   Genitourinary:  Negative for decreased urine volume, difficulty urinating, dysuria, flank pain, frequency, hematuria, pelvic pain, urgency and vaginal discharge.   Musculoskeletal:  Negative for arthralgias, back pain,  gait problem, joint swelling, myalgias, neck pain and neck stiffness.   Skin:  Negative for color change and rash.   Allergic/Immunologic: Negative for immunocompromised state.   Neurological:  Positive for numbness. Negative for dizziness, tremors, seizures, syncope, facial asymmetry, speech difficulty, weakness, light-headedness and headaches.   Hematological:  Negative for adenopathy. Does not bruise/bleed easily.   Psychiatric/Behavioral:  Negative for agitation, behavioral problems, confusion, decreased concentration, dysphoric mood, hallucinations, self-injury, sleep disturbance and suicidal ideas. The patient is not nervous/anxious and is not hyperactive.    All other systems reviewed and are negative.                Current Outpatient Medications:     aspirin (ECOTRIN) 81 MG EC tablet, Take 81 mg by mouth once daily., Disp: , Rfl:     atorvastatin (LIPITOR) 40 MG tablet, Take 40 mg by mouth once daily., Disp: , Rfl:     donepeziL (ARICEPT) 5 MG tablet, Take 5 mg by mouth every evening., Disp: , Rfl:     ferrous sulfate (FEOSOL) 325 mg (65 mg iron) Tab tablet, 1 tablet, Disp: , Rfl:     fluocinonide (LIDEX) 0.05 % external solution, AAA scalp qday - bid prn pruritus, Disp: 60 mL, Rfl: 11    gabapentin (NEURONTIN) 100 MG capsule, Take 1 capsule (100 mg total) by mouth nightly as needed (Neuropathy)., Disp: 30 capsule, Rfl: 0    ketoconazole (NIZORAL) 2 % shampoo, Apply topically once a week. Lather in for 5-10 min before rinsing, Disp: 120 mL, Rfl: 5    metFORMIN (GLUCOPHAGE-XR) 500 MG ER 24hr tablet, Take 500 mg by mouth once daily., Disp: , Rfl:     midodrine (PROAMATINE) 10 MG tablet, Take 1 tablet (10 mg total) by mouth 3 (three) times daily with meals., Disp: 360 tablet, Rfl: 2    multivitamin (THERAGRAN) per tablet, Take 1 tablet by mouth once daily., Disp: , Rfl:     Past Medical History:   Diagnosis Date    Bilateral carotid artery stenosis 10/15/2019    Hyperlipidemia     LUCRETIA (obstructive sleep apnea)  5/6/2022       Past Surgical History:   Procedure Laterality Date    A-V CARDIAC PACEMAKER INSERTION Left 06/03/2021    Procedure: INSERTION, CARDIAC PACEMAKER, DUAL CHAMBER;  Surgeon: Arnold Martinez MD;  Location: [facility] CATH LAB;  Service: Cardiology;  Laterality: Left;  COVID-19, MRNA, LN-S, PF ([employer]) 2/4/2021, 1/14/2021//Gil ching notified    CATARACT EXTRACTION      ESOPHAGOGASTRODUODENOSCOPY N/A 09/17/2021    Procedure: EGD (ESOPHAGOGASTRODUODENOSCOPY);  Surgeon: Rimma Mccracken MD;  Location: [facility] ENDO;  Service: Endoscopy;  Laterality: N/A;    HYSTERECTOMY  1988    INSERTION OF PACEMAKER  06/03/2021       Social History     Socioeconomic History    Marital status:    Tobacco Use    Smoking status: Never    Smokeless tobacco: Never   Substance and Sexual Activity    Alcohol use: Not Currently     Comment: seldom    Drug use: Never    Sexual activity: Not Currently   Other Topics Concern    Are you pregnant or think you may be? No    Breast-feeding No         Past/Current Medical/Surgical History, Past/Current Social History, Past/Current Family History and Past/Current Medications were reviewed in detail.    Objective:           VITAL SIGNS WERE REVIEWED      GENERAL APPEARANCE:     The patient looks comfortable.    BMI    No signs of respiratory distress.    Normal breathing pattern.    No dysmorphic features    Normal eye contact.       GENERAL MEDICAL EXAM:    HEENT:  Head is atraumatic normocephalic.     FUNDUSCOPIC (OPHTHALMOSCOPIC) EXAMINATION showed no disc edema (papilledema).      NECK: No JVD. No visible lesions or goiters.     CHEST-CARDIOPULMONARY: No cyanosis. No tachypnea. Normal respiratory effort.    [id number]-[id number]-DAYJOOGLNY: No jaundice. No stomas or lesions. No visible hernias. No catheters.     SKIN, HAIR, NAILS: No pathognomonic skin rash.No neurofibromatosis. No visible lesions.No stigmata of autoimmune disease. No clubbing.    LIMBS: No varicose veins.  No visible swelling.    MUSCULOSKELETAL: No visible deformities.No visible lesions.             Neurologic Exam     Mental Status   Oriented to person, place, and time.   Oriented to person.   Oriented to place. Oriented to country, city and area.   Oriented to time. Oriented to year, month, date, day and season.   Registration: recalls 3 of 3 objects. Recall at 5 minutes: recalls 3 of 3 objects. Follows 3 step commands.   Attention: normal. Concentration: normal.   Speech: speech is normal   Level of consciousness: alert  Knowledge: good. Able to perform simple calculations.   Able to name object. Able to read. Able to repeat. Able to write. Normal comprehension.     Cranial Nerves     CN II   Visual fields full to confrontation.   Visual acuity: normal  Right visual field deficit: none  Left visual field deficit: none     CN III, IV, VI   Pupils are equal, round, and reactive to light.  Extraocular motions are normal.   Right pupil: Size: 2 mm. Shape: regular. Reactivity: brisk. Consensual response: intact. Accommodation: intact.   Left pupil: Size: 2 mm. Shape: regular. Reactivity: brisk. Consensual response: intact. Accommodation: intact.   CN III: no CN III palsy  CN VI: no CN VI palsy  Nystagmus: none   Diplopia: none  Ophthalmoparesis: none  Upgaze: normal  Downgaze: normal  Conjugate gaze: present  Vestibulo-ocular reflex: present    CN V   Facial sensation intact.   Right facial sensation deficit: none  Left facial sensation deficit: none    CN VII   Facial expression full, symmetric.   Right facial weakness: none  Left facial weakness: none    CN VIII   CN VIII normal.   Hearing: intact    CN IX, X   CN IX normal.   CN X normal.   Palate: symmetric    CN XI   CN XI normal.   Right sternocleidomastoid strength: normal  Left sternocleidomastoid strength: normal  Right trapezius strength: normal  Left trapezius strength: normal    CN XII   CN XII normal.   Tongue: not atrophic  Fasciculations: absent  Tongue  deviation: none    Motor Exam   Muscle bulk: normal  Overall muscle tone: normal  Right arm pronator drift: absent  Left arm pronator drift: absent    Strength   Strength 5/5 throughout.   Right neck flexion: 5/5  Left neck flexion: 5/5  Right neck extension: 5/5  Left neck extension: 5/5  Right deltoid: 5/5  Left deltoid: 5/5  Right biceps: 5/5  Left biceps: 5/5  Right triceps: 5/5  Left triceps: 5/5  Right wrist flexion: 5/5  Left wrist flexion: 5/5  Right wrist extension: 5/5  Left wrist extension: 5/5  Right interossei: 5/5  Left interossei: 5/5  Right abdominals: 5/5  Left abdominals: 5/5  Right iliopsoas: 5/5  Left iliopsoas: 5/5  Right quadriceps: 5/5  Left quadriceps: 5/5  Right hamstrin/5  Left hamstrin/5  Right glutei: 5/5  Left glutei: 5/5  Right anterior tibial: 5/5  Left anterior tibial: 5/5  Right posterior tibial: 5/5  Left posterior tibial: 5/5  Right peroneal: 5/5  Left peroneal: 5/5  Right gastroc: 5/5  Left gastroc: 5/5    Sensory Exam   Light touch normal.   Right arm vibration: normal  Left arm vibration: normal  Right leg vibration: decreased from knee  Left leg vibration: normal  Proprioception normal.   Right arm pinprick: normal  Left arm pinprick: normal  Right leg pinprick: decreased from knee  Left leg pinprick: normal  Graphesthesia: normal  Stereognosis: normal    Gait, Coordination, and Reflexes     Gait  Gait: normal    Coordination   Romberg: negative  Finger to nose coordination: normal  Heel to shin coordination: normal  Tandem walking coordination: normal    Tremor   Resting tremor: absent  Intention tremor: absent  Action tremor: absent    Reflexes   Reflexes 2+ except as noted.   Right brachioradialis: 2+  Left brachioradialis: 2+  Right biceps: 2+  Left biceps: 2+  Right triceps: 2+  Left triceps: 2+  Right patellar: 2+  Left patellar: 2+  Right achilles: 0  Left achilles: 0  Right : 2+  Left : 2+  Right plantar: normal  Left plantar: normal  Right Willett:  absent  Left Willett: absent  Right ankle clonus: absent  Left ankle clonus: absent  Right pendular knee jerk: absent  Left pendular knee jerk: absent        Lab Results   Component Value Date    WBC 3.54 (L) 01/17/2024    HGB 13.2 01/17/2024    HCT 39.9 01/17/2024    MCV 91 01/17/2024     01/17/2024       Sodium   Date Value Ref Range Status   01/17/2024 137 136 - 145 mmol/L Final     Potassium   Date Value Ref Range Status   01/17/2024 4.0 3.5 - 5.1 mmol/L Final     Chloride   Date Value Ref Range Status   01/17/2024 105 95 - 110 mmol/L Final     CO2   Date Value Ref Range Status   01/17/2024 24 23 - 29 mmol/L Final     Glucose   Date Value Ref Range Status   01/17/2024 120 (H) 70 - 110 mg/dL Final     BUN   Date Value Ref Range Status   01/17/2024 10 8 - 23 mg/dL Final     Creatinine   Date Value Ref Range Status   01/17/2024 0.7 0.5 - 1.4 mg/dL Final     Calcium   Date Value Ref Range Status   01/17/2024 9.7 8.7 - 10.5 mg/dL Final     Total Protein   Date Value Ref Range Status   01/17/2024 9.7 (H) 6.0 - 8.4 g/dL Final     Albumin   Date Value Ref Range Status   01/17/2024 4.0 3.5 - 5.2 g/dL Final     Total Bilirubin   Date Value Ref Range Status   01/17/2024 0.7 0.1 - 1.0 mg/dL Final     Comment:     For infants and newborns, interpretation of results should be based  on gestational age, weight and in agreement with clinical  observations.    Premature Infant recommended reference ranges:  Up to 24 hours.............<8.0 mg/dL  Up to 48 hours............<12.0 mg/dL  3-5 days..................<15.0 mg/dL  6-29 days.................<15.0 mg/dL       Alkaline Phosphatase   Date Value Ref Range Status   01/17/2024 97 55 - 135 U/L Final     AST   Date Value Ref Range Status   01/17/2024 53 (H) 10 - 40 U/L Final     ALT   Date Value Ref Range Status   01/17/2024 39 10 - 44 U/L Final     Anion Gap   Date Value Ref Range Status   01/17/2024 8 8 - 16 mmol/L Final     eGFR if    Date Value Ref  "Range Status   04/29/2022 >60 >60 mL/min/1.73 m^2 Final     eGFR if non    Date Value Ref Range Status   04/29/2022 >60 >60 mL/min/1.73 m^2 Final     Comment:     Calculation used to obtain the estimated glomerular filtration  rate (eGFR) is the CKD-EPI equation.          Lab Results   Component Value Date    [id number] 763 09/23/2024       Lab Results   Component Value Date    TSH 0.777 09/23/2024    FREET4 0.75 09/23/2024       No results found in the last 24 hours.    No results found in the last 24 hours.    Reviewed the neuroimaging independently       Assessment:   79 Years old Female with PMH as above came for an evaluation of "Neuropathy"    Polyneuropathy, unspecified     Bilateral cold feet     Neuropathy     Neuropathy of both feet     Type 2 diabetes mellitus with diabetic neuropathy, without long-term current use of insulin     Plan:   Patient Neurological Assessment is remarkable for decreased vibration and pinprick to the right lower extremity below the knee. Patient's history and physical point to rule out Peripheral Diabetic Neuropathy.     -Discontinue Gabapentin 100 mg PO QHS PRN for Peripheral Neuropathy Management - Patient not receptive to PO Therapy at this time. Discussed various alternative medication options and side effects. Patient declines and will let provider know when she is ready to discuss treatment options. She reports that for now Neuropathy has significantly improved and does not require treatment.      -Continue Rheumatology referral for further evaluation and recommendation of Sjögren's syndrome A (Positive)      -Offered Referral to Pain Management for topical Neuropathy treatments. Patient declines at this time     -Continue with EMG-NCS - pending       WORKUP-EVALUATION     Reviewed all results with patient in detail. She verbalized understanding.     NCS/EMG RUE, RLE, LLE    Endocrine RODRIGUEZ: HA1C, TSH-T4    Vitamins and Minerals:  B12-MMA, FA, B1, B2, B6, " Cu-Ceruloplasmin.     AID RODRIGUEZ Cryoglobulins, RF, Sjögren's syndrome A-B     ID RODRIGUEZ: HIV, HCV, Lyme.    Metabolic RODRIGUEZ: Heavy Metals, Ceruloplasmin    Invasive: LP for CSF (Protein>Age suggests CIDP) , Muscle and Nerve Biopsy.             LABORATORY EVALUATION    Labs: (2024) TSH-T4 / B12-MMA / FA / B1 / B2 / B6 / Cu-Ceruloplasmin / Cryoglobulins / RF / Sjögren's syndrome B / HIV / HCV / Lyme / Heavy Metals / Ceruloplasmin / Ferritin / Iron and TIBC / Cryoglobulin / Immunofixation Electrophoresis / CMP / CBC / CRP /   -personally reviewed -non-significant abnormalities except     Sjögren's syndrome A (Positive) - Patient referred to Rheumatologist    A1C (6.1) - elevated - followed by PCP - Managed on Metformin 500 mg Daily.     Sed Rate (66) - elevated / Positive VALDEZ / - Followed by Rheumatology     Protein Electrophoresis Serum (9.1) - elevated - Followed by Rheumatology        RADIOLOGY EVALUATION     EMG-NCS pending - ordered 09/2024     Personally Reviewed X-Ray Lumbar Spine - done 2021 - Five lumbar type vertebral bodies. Old-appearing insufficiency fracture at the L2 level with smooth depression superior endplate and anterior superior marginal spurring. Remaining vertebral bodies demonstrate normal height. There is multilevel loss of disc height with marginal spurring. Prominent posterior spurring at the L3-4 and L5-S1 levels. No additional fracture or subluxation. No pars defects. Prominent facet arthropathy at the L4-5 and L5-S1 levels. Pedicles and SI joints intact. Degenerative change partially visualized hip joints. Straightening of the normal lumbar curvature noted     Personally Reviewed Brain MRI W WO Contrast- done 2019 - no acute abnormalities - minimal chronic microvascular ischemia.     Personally Reviewed EMG-NCS - done 2019 - EMG is consistent with mild Poly myositis. Possible right tibial motor neuropathy, possible technical issue.       NEUROPHYSIOLOGY EVALUATION       PATHOLOGY EVALUATION         NEUROCOGNITIVE AND NEUROPSYCHOLOGY EVALUATION              LENGTH-DEPENDENT SENSORIMOTOR PERIPHERAL POLYNEUROPATHY           MANAGEMENT     Will prescribe compounded creams PRN.    BS Control.    Alcohol cessation.    In terms of management I counseled the patient that neuropathic pain meds target is 40% reduction of pain.     I encouraged the patient to read the leaflet for any newly prescribed medication for more details and report any side effect whether listed or not listed.    FIRST LINE OPTIONS:    Gabapentin/GBP-Neurontin which can cause sedation, weight gain, swelling and abnormal movements. Will titrate slowly to 300 mg TID with a maximum of 1200 mg TID. The patient verbalized understanding.    Pregabalin/PGB-Lyrica which can cause sedation, weight gain, swelling and abnormal movements. Will titrate slowly to 100 mg BID with maximum of 300 mg BID. The patient verbalized understanding.    Amitriptyline/Elavil which can cause sleepiness, dry eyes, dry mouth, urinary retention and rarely cardiac arrhythmias. Will titrate to 75 mg QHS. The patient verbalized understanding.    Duloxetine/Cymbalta which causes sedation, weight gain and sexual dysfunction and potentially interactions can cause serotonin syndrome. Will titrate slowly to 60 mg daily. The patient verbalized understanding.    Zonisamide/Zonegran 100-400 mg QHS is a good alternative to traditional choices in case of SE/AE and weight gain.      SECOND LINE OPTIONS:    Lamotrigine/Lamictal LTG which can cause serious skin rash (SJS) and cardiac arrhythmias per recent reports. Will titrate slowly to a maximum of 300 mg BID.    Carbamazepine/Tegretol CBZ which can cause liver and bone marrow toxicity. Will titrate slowly to a maximum of 300 mg BID.       LAST OPTIONS:    Interventional Pain Management utilizing Neuro stimulators (Nevro HFX for DPN).        POLYNEUROPATHY PRECAUTIONS:      Discussed with the patient some of the neuropathy precautions  like:    Always use oven mitts.    Test water with an unaffected hand or foot.    Use caution when trimming nails. File sharp areas    Wear shoes that fit well to avoid pressure points, blisters, and ulcers.    Inspect your hands and feet carefully (including the soles of your feet and between your toes) at least once a week.       MEDICAL/SURGICAL COMORBIDITIES     All relevant medical comorbidities noted and managed by primary care physician and medical care team.          HEALTHY LIFESTYLE AND PREVENTATIVE CARE    The patient to adhere to the age-appropriate health maintenance guidelines including screening tests and vaccinations. The patient to adhere to  healthy lifestyle, optimal weight, exercise, healthy diet, good sleep hygiene and avoiding drugs including smoking, alcohol and recreational drugs.    I spent a total of 17 minutes on the day of the visit.This includes face to face time and non-face to face time preparing to see the patient (eg, review of tests), obtaining and/or reviewing separately obtained history, documenting clinical information in the electronic or other health record, independently interpreting results and communicating results to the patient/family/caregiver, or care coordinator.     Please do not hesitate to contact me with any updates, questions or concerns.    [id number]    Rayne Mane, MSN, FNP-C    General Neurology

## 2024-10-22 ENCOUNTER — OFFICE VISIT (OUTPATIENT)
Dept: NEUROLOGY | Facility: CLINIC | Age: 79
End: 2024-10-22
Payer: MEDICARE

## 2024-10-22 ENCOUNTER — TELEPHONE (OUTPATIENT)
Dept: NEUROLOGY | Facility: CLINIC | Age: 79
End: 2024-10-22

## 2024-10-22 VITALS
BODY MASS INDEX: 27.09 KG/M2 | HEIGHT: 67 IN | WEIGHT: 172.63 LBS | HEART RATE: 83 BPM | SYSTOLIC BLOOD PRESSURE: 116 MMHG | DIASTOLIC BLOOD PRESSURE: 74 MMHG

## 2024-10-22 DIAGNOSIS — G62.9 POLYNEUROPATHY, UNSPECIFIED: Primary | ICD-10-CM

## 2024-10-22 DIAGNOSIS — R20.9 BILATERAL COLD FEET: ICD-10-CM

## 2024-10-22 DIAGNOSIS — E11.40 TYPE 2 DIABETES MELLITUS WITH DIABETIC NEUROPATHY, WITHOUT LONG-TERM CURRENT USE OF INSULIN: ICD-10-CM

## 2024-10-22 DIAGNOSIS — G62.9 NEUROPATHY: ICD-10-CM

## 2024-10-22 DIAGNOSIS — G57.93 NEUROPATHY OF BOTH FEET: ICD-10-CM

## 2024-10-22 PROCEDURE — 99212 OFFICE O/P EST SF 10 MIN: CPT | Mod: S$GLB,,,

## 2024-10-22 PROCEDURE — 1159F MED LIST DOCD IN RCRD: CPT | Mod: CPTII,S$GLB,,

## 2024-10-22 PROCEDURE — 3072F LOW RISK FOR RETINOPATHY: CPT | Mod: CPTII,S$GLB,,

## 2024-10-22 PROCEDURE — 3288F FALL RISK ASSESSMENT DOCD: CPT | Mod: CPTII,S$GLB,,

## 2024-10-22 PROCEDURE — 99999 PR PBB SHADOW E&M-EST. PATIENT-LVL III: CPT | Mod: PBBFAC,,,

## 2024-10-22 PROCEDURE — 1101F PT FALLS ASSESS-DOCD LE1/YR: CPT | Mod: CPTII,S$GLB,,

## 2024-10-22 PROCEDURE — 3074F SYST BP LT 130 MM HG: CPT | Mod: CPTII,S$GLB,,

## 2024-10-22 PROCEDURE — 3078F DIAST BP <80 MM HG: CPT | Mod: CPTII,S$GLB,,

## 2024-10-22 PROCEDURE — 1126F AMNT PAIN NOTED NONE PRSNT: CPT | Mod: CPTII,S$GLB,,

## 2024-10-22 PROCEDURE — 1160F RVW MEDS BY RX/DR IN RCRD: CPT | Mod: CPTII,S$GLB,,

## 2024-10-23 DIAGNOSIS — E11.40 DIABETIC NEUROPATHY: Primary | ICD-10-CM

## 2024-10-23 DIAGNOSIS — G62.89 OTHER SPECIFIED POLYNEUROPATHIES: ICD-10-CM

## 2024-10-28 ENCOUNTER — PROCEDURE VISIT (OUTPATIENT)
Dept: NEUROLOGY | Facility: CLINIC | Age: 79
End: 2024-10-28
Payer: MEDICARE

## 2024-10-28 DIAGNOSIS — E11.40 DIABETIC NEUROPATHY: ICD-10-CM

## 2024-10-28 DIAGNOSIS — R20.2 PARESTHESIAS: Primary | ICD-10-CM

## 2024-10-28 DIAGNOSIS — G62.89 OTHER SPECIFIED POLYNEUROPATHIES: ICD-10-CM

## 2024-10-28 PROCEDURE — 95911 NRV CNDJ TEST 9-10 STUDIES: CPT | Mod: S$GLB,,, | Performed by: PSYCHIATRY & NEUROLOGY

## 2024-11-08 ENCOUNTER — OFFICE VISIT (OUTPATIENT)
Dept: OPHTHALMOLOGY | Facility: CLINIC | Age: 79
End: 2024-11-08
Payer: MEDICARE

## 2024-11-08 DIAGNOSIS — Z96.1 PSEUDOPHAKIA OF BOTH EYES: ICD-10-CM

## 2024-11-08 DIAGNOSIS — H04.123 DRY EYES, BILATERAL: ICD-10-CM

## 2024-11-08 DIAGNOSIS — H43.813 PVD (POSTERIOR VITREOUS DETACHMENT), BOTH EYES: ICD-10-CM

## 2024-11-08 DIAGNOSIS — E11.9 TYPE 2 DIABETES MELLITUS WITHOUT COMPLICATION, WITHOUT LONG-TERM CURRENT USE OF INSULIN: Primary | ICD-10-CM

## 2024-11-08 PROCEDURE — 99999 PR PBB SHADOW E&M-EST. PATIENT-LVL II: CPT | Mod: PBBFAC,,, | Performed by: STUDENT IN AN ORGANIZED HEALTH CARE EDUCATION/TRAINING PROGRAM

## 2024-11-08 NOTE — PROGRESS NOTES
HPI     Annual Exam     Additional comments: Pt in today for a annual exam. Pt states her vision   looks like a screen is over it. Also states her eyes has been tearing &   blurry.  No pain.            Comments    1. PVD OS  2. Vitreous Degeneration OU  3. Glaucoma Suspect OU  -Asymmetry Analysis 30/29  4. Pseudophakia OU  5. DM 2012           Last edited by Alejandra Carpenter on 11/8/2024 10:04 AM.            Assessment /Plan     For exam results, see Encounter Report.    Type 2 diabetes mellitus without complication, without long-term current use of insulin- last A1c 6.1   Strict BG control, f/u w/ PCP, and annual DFE  Stressed importance of DM control to preserve vision    Pseudophakia of both eyes- Follow    PVD (posterior vitreous detachment), both eyes- No tears or breaks were seen after careful retinal evaluation. Present >3 months per patient report.   RT/RD precautions    Discussed retinal detachment signs and symptoms including flashes of lights, floaters, perceived curtains or veils. Advised to patient to monitor visual status including increase in flashes and floaters, or the development of visual field changes including curtain and /or veils. Advised patient to RTC urgently if these symptoms occur. Explained the need for follow up exams to the patient even if there are no changes in the symptoms.      Dry eyes, bilateral- ATs QID and lid hygiene w/ baby shampoo      Return to clinic in 1 year DFE or PRN

## 2024-11-18 ENCOUNTER — OFFICE VISIT (OUTPATIENT)
Dept: CARDIOLOGY | Facility: CLINIC | Age: 79
End: 2024-11-18
Payer: MEDICARE

## 2024-11-18 ENCOUNTER — HOSPITAL ENCOUNTER (OUTPATIENT)
Dept: CARDIOLOGY | Facility: HOSPITAL | Age: 79
Discharge: HOME | End: 2024-11-18
Attending: INTERNAL MEDICINE
Payer: MEDICARE

## 2024-11-18 VITALS
WEIGHT: 173.31 LBS | HEIGHT: 67 IN | HEART RATE: 85 BPM | DIASTOLIC BLOOD PRESSURE: 88 MMHG | SYSTOLIC BLOOD PRESSURE: 140 MMHG | BODY MASS INDEX: 27.2 KG/M2 | OXYGEN SATURATION: 100 %

## 2024-11-18 DIAGNOSIS — Z95.0 PRESENCE OF CARDIAC PACEMAKER: ICD-10-CM

## 2024-11-18 DIAGNOSIS — Z95.0 CARDIAC PACEMAKER IN SITU: ICD-10-CM

## 2024-11-18 DIAGNOSIS — I49.5 SINUS NODE DYSFUNCTION: ICD-10-CM

## 2024-11-18 DIAGNOSIS — R06.09 DYSPNEA ON EXERTION: Primary | ICD-10-CM

## 2024-11-18 DIAGNOSIS — M62.89 PELVIC FLOOR DYSFUNCTION: ICD-10-CM

## 2024-11-18 DIAGNOSIS — Z95.0 PRESENCE OF CARDIAC PACEMAKER: Primary | ICD-10-CM

## 2024-11-18 DIAGNOSIS — I49.5 SICK SINUS SYNDROME: ICD-10-CM

## 2024-11-18 DIAGNOSIS — I44.4 LAFB (LEFT ANTERIOR FASCICULAR BLOCK): ICD-10-CM

## 2024-11-18 PROCEDURE — 99999 PR PBB SHADOW E&M-EST. PATIENT-LVL III: CPT | Mod: PBBFAC,,, | Performed by: NURSE PRACTITIONER

## 2024-11-18 PROCEDURE — 1159F MED LIST DOCD IN RCRD: CPT | Mod: CPTII,S$GLB,, | Performed by: NURSE PRACTITIONER

## 2024-11-18 PROCEDURE — 93280 PM DEVICE PROGR EVAL DUAL: CPT

## 2024-11-18 PROCEDURE — 99214 OFFICE O/P EST MOD 30 MIN: CPT | Mod: S$GLB,,, | Performed by: NURSE PRACTITIONER

## 2024-11-18 PROCEDURE — 1126F AMNT PAIN NOTED NONE PRSNT: CPT | Mod: CPTII,S$GLB,, | Performed by: NURSE PRACTITIONER

## 2024-11-18 PROCEDURE — 1101F PT FALLS ASSESS-DOCD LE1/YR: CPT | Mod: CPTII,S$GLB,, | Performed by: NURSE PRACTITIONER

## 2024-11-18 PROCEDURE — 3288F FALL RISK ASSESSMENT DOCD: CPT | Mod: CPTII,S$GLB,, | Performed by: NURSE PRACTITIONER

## 2024-11-18 PROCEDURE — 3079F DIAST BP 80-89 MM HG: CPT | Mod: CPTII,S$GLB,, | Performed by: NURSE PRACTITIONER

## 2024-11-18 PROCEDURE — 3077F SYST BP >= 140 MM HG: CPT | Mod: CPTII,S$GLB,, | Performed by: NURSE PRACTITIONER

## 2024-11-18 NOTE — PROGRESS NOTES
Subjective:   Patient ID:  Anca Mcclellan is a 79 y.o. female who presents for evaluation of No chief complaint on file.              Anca Mcclellan is a 76 year old female who presents to cardiology clinic for post device 1 week wound check s/p PPM implant. Her current medical conditions include HTN, near syncope, s/p PPM implant (Biotronik), LAFB, HLP. She returns today and states she is doing well.     Denies chest pain or anginal equivalents. No shortness of breath, DE LA FUENTE or palpitations. Denies orthopnea, PND or abdominal bloating. Reports regular walking without any issues lately. NO leg swelling or claudications. No recent falls, syncope or near syncopal events. Reports compliance with medications and dietary restrictions. NO CNS complaints to suggest TIA or CVA today. No signs of abnormal bleeding on ASA.     Device check completed in office today. Wound reviewed with Dr Martinez, no need for additional dressing today.     Reviewed post device instructions in detail with patient, all questions answered in detail.       3/25/2022 update    She returns today and states she is doing ok.   On M[place]as, she was at a parade and had a syncopal event and was brought to ED for IV fluids. Since that time she has felt weak and dizzy. On that AM, she had not eaten breakfast that AM.     BP at home 115-120/70s    Tries to stay hydrated throughout the day. Has issues with dizziness upon standing.     She has never started wearing compression socks as were previously recommended in October 2021.     She does not feel any better since changing her device settings to DDDR in October as well. Continues to have issues with daily fatigue and felt better when she was on her previous settings. Will change her back to DDD CLS 70 today and assess response at next visit    4/29/2022 update    Anca Mcclellan returns today for 4 week follow up and is doing ok.   Most of the time, if she has to make any sudden movement  then she is very dizzy.   As the day progresses, she reports worsening dizziness episodes. Is staying hydrated and drinking >2 L of fluids daily.     Still has issues with dizziness. Discussed with Dr Martinez today.   Device spot check completed, 0% RV pacing. No arrhythmias. Well functioning device today.    Will plan for further evaluation with labs and urine to rule out amyloidosis.     11/9/2022 update    Anca Mcclellan returns for follow up with device check.     She continues to have some episodes of dizziness with position changes.     Recommendation to add compression stockings.     Had a prolonged episode of chest dullness last week which suddenly went away.     NO chest pain today in office. BP stable today in office.     No leg swelling on exam today. Has been doing well since last visit. Had only been taking midodrine daily but will start taking at least twice a day.     Device interrogation completed today in office  Well functioning device, no arrhythmias noted.     11/28/2023 update    Anca Manjarrez Cyzohra Davidson returns for follow up with device check.   Device interrogation- well functioning device, 46% A pacing, 0% RV pacing, Underlying SR with PACS, no arrhythmias since Feb 2023.     Denies chest pain or anginal equivalents. No shortness of breath, DE LA FUENTE or palpitations. Denies orthopnea, PND or abdominal bloating. Reports regular walking without any issues lately. NO leg swelling or claudications. No recent falls, syncope or near syncopal events. Reports compliance with medications and dietary restrictions. NO CNS complaints to suggest TIA or CVA today. No signs of abnormal bleeding on ASA daily.       BP well controlled today.     Reports improvement in dizziness since midodrine. She stays active and walks regularly during the day.     Denies any recurrent syncopal events.       11/18/2024 update    Anca St Ministerio Davidson returns for annual follow up doing well CV wise.     Device check - well  functioning device today in office.     Still has some dizziness with positional changes. Denies any león syncopal events.   Needs to use compression stockings more.     Denies chest pain or anginal equivalents. No shortness of breath, DE LA FUENTE or palpitations. Denies orthopnea, PND or abdominal bloating. Reports regular walking without any issues lately. NO leg swelling or claudications. No recent falls, syncope or near syncopal events. Reports compliance with medications and dietary restrictions. NO CNS complaints to suggest TIA or CVA today. No signs of abnormal bleeding on ASA daily.     Past Medical History:   Diagnosis Date    Bilateral carotid artery stenosis 10/15/2019    Hyperlipidemia     LUCRETIA (obstructive sleep apnea) 5/6/2022       Past Surgical History:   Procedure Laterality Date    A-V CARDIAC PACEMAKER INSERTION Left 06/03/2021    Procedure: INSERTION, CARDIAC PACEMAKER, DUAL CHAMBER;  Surgeon: Arnold Martinez MD;  Location: [facility] CATH LAB;  Service: Cardiology;  Laterality: Left;  COVID-19, MRNA, LN-S, PF (Pfizer) 2/4/2021, 1/14/2021//Gil ching notified    CATARACT EXTRACTION      ESOPHAGOGASTRODUODENOSCOPY N/A 09/17/2021    Procedure: EGD (ESOPHAGOGASTRODUODENOSCOPY);  Surgeon: Rimma Mccracken MD;  Location: [facility] ENDO;  Service: Endoscopy;  Laterality: N/A;    HYSTERECTOMY  1988    INSERTION OF PACEMAKER  06/03/2021       Social History     Tobacco Use    Smoking status: Never    Smokeless tobacco: Never   Substance Use Topics    Alcohol use: Not Currently     Comment: seldom    Drug use: Never       Family History   Problem Relation Name Age of Onset    Cataracts Mother      Heart disease Mother      Diabetes type II Mother      Dementia Father      Blindness Father      Heart disease Brother      Migraines Neg Hx      Melanoma Neg Hx      Lupus Neg Hx      Psoriasis Neg Hx       Wt Readings from Last 3 Encounters:   11/18/24 78.6 kg (173 lb 4.5 oz)   10/22/24 78.3 kg (172 lb 9.9 oz)    09/23/24 78.1 kg (172 lb 2.9 oz)     Temp Readings from Last 3 Encounters:   08/16/23 97.1 °F (36.2 °C) (Tympanic)   08/08/23 98.6 °F (37 °C) (Tympanic)   10/31/22 98.1 °F (36.7 °C) (Temporal)     BP Readings from Last 3 Encounters:   11/18/24 (!) 140/88   10/22/24 116/74   09/23/24 123/81     Pulse Readings from Last 3 Encounters:   11/18/24 85   10/22/24 83   09/23/24 81     Current Outpatient Medications on File Prior to Visit   Medication Sig Dispense Refill    aspirin (ECOTRIN) 81 MG EC tablet Take 81 mg by mouth once daily.      atorvastatin (LIPITOR) 40 MG tablet Take 40 mg by mouth once daily.      donepeziL (ARICEPT) 5 MG tablet Take 5 mg by mouth every evening.      ferrous sulfate (FEOSOL) 325 mg (65 mg iron) Tab tablet 1 tablet      fluocinonide (LIDEX) 0.05 % external solution AAA scalp qday - bid prn pruritus 60 mL 11    ketoconazole (NIZORAL) 2 % shampoo Apply topically once a week. Lather in for 5-10 min before rinsing 120 mL 5    metFORMIN (GLUCOPHAGE-XR) 500 MG ER 24hr tablet Take 500 mg by mouth once daily.      midodrine (PROAMATINE) 10 MG tablet Take 1 tablet (10 mg total) by mouth 3 (three) times daily with meals. 360 tablet 2    multivitamin (THERAGRAN) per tablet Take 1 tablet by mouth once daily.       No current facility-administered medications on file prior to visit.         Review of Systems   Constitutional: Negative for malaise/fatigue.   HENT: Negative.  Negative for hearing loss and hoarse voice.    Eyes:  Negative for blurred vision and visual disturbance.   Cardiovascular:  Positive for palpitations (rare). Negative for chest pain, claudication, dyspnea on exertion, irregular heartbeat, leg swelling, near-syncope, orthopnea, paroxysmal nocturnal dyspnea and syncope.   Respiratory:  Negative for cough, hemoptysis, shortness of breath, sleep disturbances due to breathing, snoring and wheezing.    Endocrine: Negative for cold intolerance and heat intolerance.  "  Hematologic/Lymphatic: Does not bruise/bleed easily.   Skin: Negative.  Negative for color change, dry skin and nail changes.   Musculoskeletal: Negative.  Negative for back pain, joint pain and myalgias.   Gastrointestinal: Negative.  Negative for bloating, abdominal pain, constipation, nausea and vomiting.   Genitourinary: Negative.  Negative for dysuria, flank pain, hematuria and hesitancy.   Neurological:  Positive for dizziness. Negative for headaches, light-headedness, loss of balance, numbness, paresthesias and weakness.   Psychiatric/Behavioral: Negative.  Negative for altered mental status.    Allergic/Immunologic: Negative for environmental allergies.   All other systems reviewed and are negative.    BP (!) 140/88 (BP Location: Left arm, Patient Position: Sitting)   Pulse 85   Ht 5' 7" (1.702 m)   Wt 78.6 kg (173 lb 4.5 oz)   SpO2 100%   BMI 27.14 kg/m²     Objective:   Physical Exam  Vitals and nursing note reviewed.   Constitutional:       General: She is not in acute distress.     Appearance: Normal appearance. She is well-developed. She is not ill-appearing or diaphoretic.   HENT:      Head: Normocephalic and atraumatic.      Nose: Nose normal.      Mouth/Throat:      Mouth: Mucous membranes are moist.   Eyes:      General: No scleral icterus.     Conjunctiva/sclera: Conjunctivae normal.      Pupils: Pupils are equal, round, and reactive to light.   Neck:      Thyroid: No thyromegaly.      Vascular: No JVD.      Trachea: No tracheal deviation.   Cardiovascular:      Rate and Rhythm: Normal rate and regular rhythm.      Chest Wall: PMI is not displaced.      Pulses: Intact distal pulses.           Radial pulses are 2+ on the right side and 2+ on the left side.        Dorsalis pedis pulses are 2+ on the right side and 2+ on the left side.      Heart sounds: S1 normal and S2 normal. Heart sounds not distant. No murmur heard.     No friction rub. No gallop. No S3 or S4 sounds.      Comments: Left " chest wall PPM site well healed, no signs of infection noted.   Pulmonary:      Effort: Pulmonary effort is normal. No respiratory distress.      Breath sounds: Normal breath sounds. No stridor. No decreased breath sounds, wheezing or rales.   Chest:      Chest wall: No tenderness.   Abdominal:      General: Bowel sounds are normal. There is no distension.      Palpations: Abdomen is soft. There is no mass.      Tenderness: There is no abdominal tenderness. There is no rebound.   Genitourinary:     Comments: Deferred  Musculoskeletal:         General: No tenderness or deformity. Normal range of motion.      Cervical back: Full passive range of motion without pain, normal range of motion and neck supple.      Right ankle: No swelling.      Left ankle: No swelling.   Lymphadenopathy:      Cervical: No cervical adenopathy.   Skin:     General: Skin is warm and dry.      Capillary Refill: Capillary refill takes less than 2 seconds.      Coloration: Skin is not pale.      Findings: No erythema or rash.      Nails: There is no clubbing.   Neurological:      General: No focal deficit present.      Mental Status: She is alert and oriented to person, place, and time.      Motor: No abnormal muscle tone.      Coordination: Coordination normal.   Psychiatric:         Mood and Affect: Mood normal.         Speech: Speech normal.         Behavior: Behavior normal.         Thought Content: Thought content normal.         Judgment: Judgment normal.         Lab Results   Component Value Date    CHOL 148 09/29/2021    CHOL 140 02/28/2019     Lab Results   Component Value Date    HDL 49 09/29/2021    HDL 49 02/28/2019     Lab Results   Component Value Date    LDLCALC 82.2 09/29/2021    LDLCALC 79.6 02/28/2019     Lab Results   Component Value Date    TRIG 84 09/29/2021    TRIG 57 02/28/2019     Lab Results   Component Value Date    CHOLHDL 33.1 09/29/2021    CHOLHDL 35.0 02/28/2019       Chemistry        Component Value Date/Time      01/17/2024 1432    K 4.0 01/17/2024 1432     01/17/2024 1432    CO2 24 01/17/2024 1432    BUN 10 01/17/2024 1432    CREATININE 0.7 01/17/2024 1432     (H) 01/17/2024 1432        Component Value Date/Time    CALCIUM 9.7 01/17/2024 1432    ALKPHOS 97 01/17/2024 1432    AST 53 (H) 01/17/2024 1432    ALT 39 01/17/2024 1432    BILITOT 0.7 01/17/2024 1432    ESTGFRAFRICA >60 04/29/2022 1028    EGFRNONAA >60 04/29/2022 1028          Lab Results   Component Value Date    TSH 0.777 09/23/2024     Lab Results   Component Value Date    INR 1.0 05/18/2021     Lab Results   Component Value Date    WBC 3.54 (L) 01/17/2024    HGB 13.2 01/17/2024    HCT 39.9 01/17/2024    MCV 91 01/17/2024     01/17/2024   1.TTE  Results for orders placed during the hospital encounter of 04/25/22    Echo    Interpretation Summary  · Mild tricuspid regurgitation.  · Normal systolic function.  · The estimated ejection fraction is 55%.  · Normal left ventricular diastolic function.  · Normal right ventricular size.  · Normal central venous pressure (3 mmHg).  · The estimated PA systolic pressure is 32 mmHg.       Assessment:      1. Dyspnea on exertion    2. LAFB (left anterior fascicular block)    3. Cardiac pacemaker in situ    4. Pelvic floor dysfunction            Plan:     Continue ASA Statin Midodrine  Profile BP at home  Encourage daily walking  RTC in 1 year with Device check.Continue home monitoring as scheduled.     Nicole May, FNP-C [facility]

## 2024-11-24 ENCOUNTER — CLINICAL SUPPORT (OUTPATIENT)
Dept: CARDIOLOGY | Facility: HOSPITAL | Age: 79
End: 2024-11-24
Attending: INTERNAL MEDICINE
Payer: MEDICARE

## 2024-11-24 ENCOUNTER — CLINICAL SUPPORT (OUTPATIENT)
Dept: CARDIOLOGY | Facility: HOSPITAL | Age: 79
End: 2024-11-24

## 2024-11-24 DIAGNOSIS — I49.5 SICK SINUS SYNDROME: ICD-10-CM

## 2024-11-24 DIAGNOSIS — Z95.0 PRESENCE OF CARDIAC PACEMAKER: ICD-10-CM

## 2024-11-24 PROCEDURE — 93294 REM INTERROG EVL PM/LDLS PM: CPT | Mod: S$GLB,,, | Performed by: INTERNAL MEDICINE

## 2024-11-24 PROCEDURE — 93296 REM INTERROG EVL PM/IDS: CPT | Performed by: INTERNAL MEDICINE

## 2025-01-07 ENCOUNTER — TELEPHONE (OUTPATIENT)
Facility: CLINIC | Age: 80
End: 2025-01-07
Payer: MEDICARE

## 2025-01-07 NOTE — TELEPHONE ENCOUNTER
F/u with ENT pending   Called pt to reschedule appt due to provider changing locations. Pt did not answer. Left vm.

## 2025-01-08 DIAGNOSIS — I49.9 IRREGULAR HEART BEAT: Primary | ICD-10-CM

## 2025-01-08 DIAGNOSIS — I45.10 RBBB: ICD-10-CM

## 2025-01-10 ENCOUNTER — OFFICE VISIT (OUTPATIENT)
Dept: CARDIOLOGY | Facility: CLINIC | Age: 80
End: 2025-01-10
Payer: MEDICARE

## 2025-01-10 ENCOUNTER — HOSPITAL ENCOUNTER (OUTPATIENT)
Dept: CARDIOLOGY | Facility: HOSPITAL | Age: 80
Discharge: HOME | End: 2025-01-10
Attending: INTERNAL MEDICINE
Payer: MEDICARE

## 2025-01-10 VITALS
WEIGHT: 170.63 LBS | OXYGEN SATURATION: 98 % | HEART RATE: 85 BPM | BODY MASS INDEX: 26.73 KG/M2 | DIASTOLIC BLOOD PRESSURE: 86 MMHG | SYSTOLIC BLOOD PRESSURE: 126 MMHG

## 2025-01-10 DIAGNOSIS — I49.5 SINUS NODE DYSFUNCTION: ICD-10-CM

## 2025-01-10 DIAGNOSIS — R42 POSTURAL DIZZINESS WITH PRESYNCOPE: ICD-10-CM

## 2025-01-10 DIAGNOSIS — M79.605 PAIN IN BOTH LOWER EXTREMITIES: Primary | ICD-10-CM

## 2025-01-10 DIAGNOSIS — M79.604 PAIN IN BOTH LOWER EXTREMITIES: Primary | ICD-10-CM

## 2025-01-10 DIAGNOSIS — I44.4 LAFB (LEFT ANTERIOR FASCICULAR BLOCK): ICD-10-CM

## 2025-01-10 DIAGNOSIS — G62.9 NEUROPATHY: ICD-10-CM

## 2025-01-10 DIAGNOSIS — Z95.0 PRESENCE OF CARDIAC PACEMAKER: ICD-10-CM

## 2025-01-10 DIAGNOSIS — I70.0 ATHEROSCLEROSIS OF AORTA: ICD-10-CM

## 2025-01-10 DIAGNOSIS — R55 POSTURAL DIZZINESS WITH PRESYNCOPE: ICD-10-CM

## 2025-01-10 DIAGNOSIS — E11.69 TYPE 2 DIABETES MELLITUS WITH OTHER SPECIFIED COMPLICATION, UNSPECIFIED WHETHER LONG TERM INSULIN USE: ICD-10-CM

## 2025-01-10 DIAGNOSIS — Z95.0 CARDIAC PACEMAKER IN SITU: ICD-10-CM

## 2025-01-10 DIAGNOSIS — E78.49 OTHER HYPERLIPIDEMIA: ICD-10-CM

## 2025-01-10 DIAGNOSIS — R55 SYNCOPE AND COLLAPSE: ICD-10-CM

## 2025-01-10 DIAGNOSIS — M79.671 PAIN IN BOTH FEET: ICD-10-CM

## 2025-01-10 DIAGNOSIS — R06.09 DYSPNEA ON EXERTION: ICD-10-CM

## 2025-01-10 DIAGNOSIS — I49.5 SICK SINUS SYNDROME: ICD-10-CM

## 2025-01-10 DIAGNOSIS — I45.3 TRIFASCICULAR BLOCK: ICD-10-CM

## 2025-01-10 DIAGNOSIS — I45.10 RBBB: ICD-10-CM

## 2025-01-10 DIAGNOSIS — R20.9 BILATERAL COLD FEET: ICD-10-CM

## 2025-01-10 DIAGNOSIS — I49.9 IRREGULAR HEART BEAT: ICD-10-CM

## 2025-01-10 DIAGNOSIS — D50.0 IRON DEFICIENCY ANEMIA DUE TO CHRONIC BLOOD LOSS: ICD-10-CM

## 2025-01-10 DIAGNOSIS — G47.33 OSA (OBSTRUCTIVE SLEEP APNEA): ICD-10-CM

## 2025-01-10 DIAGNOSIS — I20.9 ANGINA PECTORIS: ICD-10-CM

## 2025-01-10 DIAGNOSIS — I65.23 BILATERAL CAROTID ARTERY STENOSIS: ICD-10-CM

## 2025-01-10 DIAGNOSIS — M79.672 PAIN IN BOTH FEET: ICD-10-CM

## 2025-01-10 LAB
OHS QRS DURATION: 134 MS
OHS QTC CALCULATION: 483 MS

## 2025-01-10 PROCEDURE — 93005 ELECTROCARDIOGRAM TRACING: CPT

## 2025-01-10 PROCEDURE — 99999 PR PBB SHADOW E&M-EST. PATIENT-LVL III: CPT | Mod: PBBFAC,,, | Performed by: INTERNAL MEDICINE

## 2025-01-10 PROCEDURE — 93010 ELECTROCARDIOGRAM REPORT: CPT | Mod: ,,, | Performed by: INTERNAL MEDICINE

## 2025-01-10 RX ORDER — MIDODRINE HYDROCHLORIDE 10 MG/1
10 TABLET ORAL
Qty: 360 TABLET | Refills: 2 | Status: SHIPPED | OUTPATIENT
Start: 2025-01-10 | End: 2026-01-10

## 2025-01-10 NOTE — PROGRESS NOTES
Subjective:   Patient ID:  Anca Mcclellan is a 79 y.o. female who presents for cardiac consult of No chief complaint on file.      The patient came in today for cardiac consult of No chief complaint on file.    Anca Mcclellan is a 79 y.o. female pt with current medical conditions CHB s/p DC PPM 6/2021, carotid artery disease, CPAP,  HLD, DM, polymyositis, MGUS presents for follow up CV evaluation.       7/10/24  LE arterial and venous u/s neg 2/2024 with normal ANYA.   BP and HR stable. BMI 26  Overall is active. No CP/SOB. Legs are stable.   Is not able to tolerate CPAP.   Overall stable with device interrogation in May.   ECG - A paced , prolonged AV conduction    1/10/25    BP and HR stable. BMI 26 - 170 lbs  She has ongoing foot/leg pain - is cold - had neg EMGs 10/2024.     ECG - Apaced, prolonged AV, RBBB, LAFB      Presenting EGM As/Ap - Vs.  Battery status is OK.  Measured values in normal range.  Since last transmission  The total number of ATR episodes is 1.  The most recent episode of AT was on 06/09/2024 for 1 minute and  18 seconds.  0% AT/AF [place].      Conclusion 1/2023    Bilateral normal anya and waveforms bilaterally.essentially within normal study w/o any significant stenosis    Home Sleep Studies     Date/Time: 4/25/2022 8:00 AM  Performed by: Jim Orr MD  Authorized by: LADY HaywoodC        PHYSICIAN INTERPRETATION AND COMMENTS: Findings are consistent with moderate obstructive sleep apnea (LUCRETIA). CPAP  therapy indicated.  CLINICAL HISTORY: 77 year old female presented with: 13.5 inch neck, BMI of 28.1, an [place] sleepiness score of 8, history  of heart disease, diabetes and symptoms of nocturnal waking up choking. Based on the clinical history, the patient has a  high pre-test probability of having Mild LUCRETIA.    Results for orders placed during the hospital encounter of 04/25/22    Echo    Interpretation Summary  · Mild tricuspid regurgitation.  · Normal systolic  function.  · The estimated ejection fraction is 55%.  · Normal left ventricular diastolic function.  · Normal right ventricular size.  · Normal central venous pressure (3 mmHg).  · The estimated PA systolic pressure is 32 mmHg.          Nuclear Quantitative Functional Analysis:   LVEF: 63 %  LVED Volume: 85 ml  LVES Volume: 31 ml    Impression: NORMAL MYOCARDIAL PERFUSION  1. The perfusion scan is free of evidence for myocardial ischemia or injury.   2. Resting wall motion is physiologic.   3. Resting LV function is normal.   4. The ventricular volumes are normal at rest and stress.   5. The extracardiac distribution of radioactivity is normal.     This document has been electronically    SIGNED BY: Lloyd Francis MD On: 11/12/2019 16:58      CONCLUSIONS   There is 40 - 49% right Internal Carotid stenosis.  There is 40 - 49% left Internal Carotid stenosis.    This document has been electronically    SIGNED BY: Ham Taylor MD On: 11/06/2019 18:40        Past Medical History:   Diagnosis Date    Bilateral carotid artery stenosis 10/15/2019    Hyperlipidemia     LUCRETIA (obstructive sleep apnea) 5/6/2022       Past Surgical History:   Procedure Laterality Date    A-V CARDIAC PACEMAKER INSERTION Left 06/03/2021    Procedure: INSERTION, CARDIAC PACEMAKER, DUAL CHAMBER;  Surgeon: Arnold Martinez MD;  Location: [facility] CATH LAB;  Service: Cardiology;  Laterality: Left;  COVID-19, MRNA, LN-S, PF (Pf[employer]) 2/4/2021, 1/14/2021//Gil ching notified    CATARACT EXTRACTION      ESOPHAGOGASTRODUODENOSCOPY N/A 09/17/2021    Procedure: EGD (ESOPHAGOGASTRODUODENOSCOPY);  Surgeon: Rimma Mccracken MD;  Location: [facility] ENDO;  Service: Endoscopy;  Laterality: N/A;    HYSTERECTOMY  1988    INSERTION OF PACEMAKER  06/03/2021       Social History     Tobacco Use    Smoking status: Never    Smokeless tobacco: Never   Substance Use Topics    Alcohol use: Not Currently     Comment: seldom    Drug use: Never       Family History   Problem  Relation Name Age of Onset    Cataracts Mother      Heart disease Mother      Diabetes type II Mother      Dementia Father      Blindness Father      Heart disease Brother      Migraines Neg Hx      Melanoma Neg Hx      Lupus Neg Hx      Psoriasis Neg Hx         Patient's Medications   New Prescriptions    No medications on file   Previous Medications    ASPIRIN (ECOTRIN) 81 MG EC TABLET    Take 81 mg by mouth once daily.    ATORVASTATIN (LIPITOR) 40 MG TABLET    Take 40 mg by mouth once daily.    DONEPEZIL (ARICEPT) 5 MG TABLET    Take 5 mg by mouth every evening.    FERROUS SULFATE (FEOSOL) 325 MG (65 MG IRON) TAB TABLET    1 tablet    FLUOCINONIDE (LIDEX) 0.05 % EXTERNAL SOLUTION    AAA scalp qday - bid prn pruritus    KETOCONAZOLE (NIZORAL) 2 % SHAMPOO    Apply topically once a week. Lather in for 5-10 min before rinsing    METFORMIN (GLUCOPHAGE-XR) 500 MG ER 24HR TABLET    Take 500 mg by mouth once daily.    MIDODRINE (PROAMATINE) 10 MG TABLET    Take 1 tablet (10 mg total) by mouth 3 (three) times daily with meals.    MULTIVITAMIN (THERAGRAN) PER TABLET    Take 1 tablet by mouth once daily.   Modified Medications    No medications on file   Discontinued Medications    No medications on file       Review of Systems   HENT: Negative.     Eyes:  Negative for blurred vision.   Respiratory:  Positive for shortness of breath.    Cardiovascular:  Positive for palpitations (rare). Negative for chest pain and leg swelling.   Gastrointestinal: Negative.    Genitourinary: Negative.    Musculoskeletal: Negative.    Skin: Negative.    Neurological:  Negative for dizziness.   Endo/Heme/Allergies: Negative.    Psychiatric/Behavioral: Negative.     All 12 systems otherwise negative.      Wt Readings from Last 3 Encounters:   11/18/24 78.6 kg (173 lb 4.5 oz)   10/22/24 78.3 kg (172 lb 9.9 oz)   09/23/24 78.1 kg (172 lb 2.9 oz)     Temp Readings from Last 3 Encounters:   08/16/23 97.1 °F (36.2 °C) (Tympanic)   08/08/23 98.6 °F  (37 °C) (Tympanic)   10/31/22 98.1 °F (36.7 °C) (Temporal)     BP Readings from Last 3 Encounters:   11/18/24 (!) 140/88   10/22/24 116/74   09/23/24 123/81     Pulse Readings from Last 3 Encounters:   11/18/24 85   10/22/24 83   09/23/24 81       There were no vitals taken for this visit.    Objective:   Physical Exam  Vitals and nursing note reviewed.   Constitutional:       General: She is not in acute distress.     Appearance: She is well-developed. She is not diaphoretic.   HENT:      Head: Normocephalic and atraumatic.      Nose: Nose normal.   Eyes:      General: No scleral icterus.     Conjunctiva/sclera: Conjunctivae normal.   Neck:      Thyroid: No thyromegaly.      Vascular: No JVD.   Cardiovascular:      Rate and Rhythm: Normal rate and regular rhythm.      Heart sounds: S1 normal and S2 normal. No murmur heard.     No friction rub. No gallop. No S3 or S4 sounds.   Pulmonary:      Effort: Pulmonary effort is normal. No respiratory distress.      Breath sounds: Normal breath sounds. No stridor. No wheezing or rales.   Chest:      Chest wall: No tenderness.   Abdominal:      General: Bowel sounds are normal. There is no distension.      Palpations: Abdomen is soft. There is no mass.      Tenderness: There is no abdominal tenderness. There is no rebound.   Genitourinary:     Comments: Deferred  Musculoskeletal:         General: No tenderness or deformity. Normal range of motion.      Cervical back: Normal range of motion and neck supple.   Lymphadenopathy:      Cervical: No cervical adenopathy.   Skin:     General: Skin is warm and dry.      Coloration: Skin is not pale.      Findings: No erythema or rash.   Neurological:      Mental Status: She is alert and oriented to person, place, and time.      Motor: No abnormal muscle tone.      Coordination: Coordination normal.   Psychiatric:         Behavior: Behavior normal.         Thought Content: Thought content normal.         Judgment: Judgment normal.          Lab Results   Component Value Date     01/17/2024    K 4.0 01/17/2024     01/17/2024    CO2 24 01/17/2024    BUN 10 01/17/2024    CREATININE 0.7 01/17/2024     (H) 01/17/2024    HGBA1C 6.1 (H) 09/23/2024    MG 2.1 06/16/2021    AST 53 (H) 01/17/2024    ALT 39 01/17/2024    ALBUMIN 4.0 01/17/2024    PROT 9.7 (H) 01/17/2024    BILITOT 0.7 01/17/2024    WBC 3.54 (L) 01/17/2024    HGB 13.2 01/17/2024    HCT 39.9 01/17/2024    MCV 91 01/17/2024     01/17/2024    INR 1.0 05/18/2021    TSH 0.777 09/23/2024    CHOL 148 09/29/2021    HDL 49 09/29/2021    LDLCALC 82.2 09/29/2021    TRIG 84 09/29/2021    BNP 17 04/29/2022     Assessment:      1. Pain in both lower extremities    2. Sick sinus syndrome    3. Dyspnea on exertion    4. LAFB (left anterior fascicular block)    5. Irregular heart beat    6. Presence of cardiac pacemaker    7. Sinus node dysfunction    8. Cardiac pacemaker in situ    9. RBBB    10. Bilateral cold feet    11. Type 2 diabetes mellitus with other specified complication, unspecified whether long term insulin use    12. Iron deficiency anemia due to chronic blood loss    13. Bilateral carotid artery stenosis    14. Other hyperlipidemia    15. Pain in both feet    16. Neuropathy    17. Postural dizziness with presyncope    18. LUCRETIA (obstructive sleep apnea)    19. Atherosclerosis of aorta    20. Angina pectoris    21. Syncope and collapse    22. Trifascicular block          Plan:   1. High degree AVB s/p PPM 6/2021  - cont f/u at PPM clinic and EP as needed  - stable     2. H/o Syncope -presyncope - syncope during MG 2022  - ECHO in 4/2022 with normal bi V function, PASP 32 mmHg.   - Holter - negative  - s/p ENT - no further eval  - increase fluid intake  - cont Midodrine 2.5 mg BID - increase to 5mg BID --increased 10mg TID  - rec salt intake if BP is lower    3. HLD   - cont Lipitor    4. Polymyositis  - cont meds per PCP/Rheum    5. Carotid artery disease  - cont asa,  statin  - carotid u/s stable 1/2022    6. Fatigue with anemia, EGD with ulcers/bleeding,. MGUS  - f/u hemeonc and GI   - cont iron tabs  - repeat iron levels   - ECHO in 4/2022 with normal bi V function, PASP 32 mmHg.   - had bone marrow biopsy  - MGUS ?     7. Chest pain - resolved   - pharm nuclear stress test - prior neg     8. DM A1c 6.5 --> 6.0  - cont tx    9. Moderate LUCRETIA  - needs CPAP machine/supplies - has not gotten     10. LE feet cold  - LE arterial u/s and COURTNEY  - neg 1/2023  - LE arterial and venous u/s neg 2/2024 with normal COURTNEY.   - refer to podiatry and neuro - had neg EMGs 10/2024.     Visit today included increased complexity associated with the care of the episodic problem leg pain addressed and managing the longitudinal care of the patient due to the serious and/or complex managed problem(s) .      Thank you for allowing me to participate in this patient's care. Please do not hesitate to contact me with any questions or concerns. Consult note has been forwarded to the referral physician.     Lloyd Francis MD, [facility]  Cardiovascular Disease  [facility], [place]  [phone] (P)

## 2025-01-16 ENCOUNTER — TELEPHONE (OUTPATIENT)
Dept: CARDIOLOGY | Facility: CLINIC | Age: 80
End: 2025-01-16
Payer: MEDICARE

## 2025-01-16 DIAGNOSIS — E78.5 HYPERLIPIDEMIA, UNSPECIFIED HYPERLIPIDEMIA TYPE: Primary | ICD-10-CM

## 2025-01-16 NOTE — TELEPHONE ENCOUNTER
[facility] Staff  Caller: Unspecified (Today,  9:02 AM)  Name of Who is Calling: Pt        What is the request in detail:Pt would like a call back in regards to chest pain pt has been having at night. Please advise thank you        Can the clinic reply by [facility]:no        What Number to Call Back if not in [facility]:Telephone Information:  Mobile          [phone]    Spoke to patient states she had chest pain last night and this morning scale of 6 pain middle of chest  no other symptoms just wanted to let the  Know because this is not normal for her would like to know what to do when that happens.     Spoke to patient, appt scheduled wanted to let Dr. Francis know also. Schedule 1/19/2025

## 2025-01-17 ENCOUNTER — HOSPITAL ENCOUNTER (OUTPATIENT)
Dept: CARDIOLOGY | Facility: HOSPITAL | Age: 80
Discharge: HOME | End: 2025-01-17
Payer: MEDICARE

## 2025-01-17 ENCOUNTER — OFFICE VISIT (OUTPATIENT)
Dept: CARDIOLOGY | Facility: CLINIC | Age: 80
End: 2025-01-17
Payer: MEDICARE

## 2025-01-17 VITALS
SYSTOLIC BLOOD PRESSURE: 118 MMHG | BODY MASS INDEX: 26.61 KG/M2 | HEIGHT: 67 IN | DIASTOLIC BLOOD PRESSURE: 80 MMHG | HEART RATE: 91 BPM | WEIGHT: 169.56 LBS

## 2025-01-17 DIAGNOSIS — Z95.0 CARDIAC PACEMAKER IN SITU: ICD-10-CM

## 2025-01-17 DIAGNOSIS — I49.5 SINUS NODE DYSFUNCTION: ICD-10-CM

## 2025-01-17 DIAGNOSIS — D50.0 IRON DEFICIENCY ANEMIA DUE TO CHRONIC BLOOD LOSS: ICD-10-CM

## 2025-01-17 DIAGNOSIS — R55 SYNCOPE AND COLLAPSE: ICD-10-CM

## 2025-01-17 DIAGNOSIS — E78.5 HYPERLIPIDEMIA, UNSPECIFIED HYPERLIPIDEMIA TYPE: ICD-10-CM

## 2025-01-17 DIAGNOSIS — I45.10 RBBB: ICD-10-CM

## 2025-01-17 DIAGNOSIS — Z95.0 PRESENCE OF CARDIAC PACEMAKER: ICD-10-CM

## 2025-01-17 DIAGNOSIS — E78.49 OTHER HYPERLIPIDEMIA: ICD-10-CM

## 2025-01-17 DIAGNOSIS — R42 POSTURAL DIZZINESS WITH PRESYNCOPE: ICD-10-CM

## 2025-01-17 DIAGNOSIS — M79.671 PAIN IN BOTH FEET: ICD-10-CM

## 2025-01-17 DIAGNOSIS — K21.9 GASTROESOPHAGEAL REFLUX DISEASE, UNSPECIFIED WHETHER ESOPHAGITIS PRESENT: ICD-10-CM

## 2025-01-17 DIAGNOSIS — I20.9 ANGINA PECTORIS: ICD-10-CM

## 2025-01-17 DIAGNOSIS — M79.672 PAIN IN BOTH FEET: ICD-10-CM

## 2025-01-17 DIAGNOSIS — R06.09 DYSPNEA ON EXERTION: ICD-10-CM

## 2025-01-17 DIAGNOSIS — R20.9 BILATERAL COLD FEET: ICD-10-CM

## 2025-01-17 DIAGNOSIS — E11.69 TYPE 2 DIABETES MELLITUS WITH OTHER SPECIFIED COMPLICATION, UNSPECIFIED WHETHER LONG TERM INSULIN USE: ICD-10-CM

## 2025-01-17 DIAGNOSIS — R07.9 CHEST PAIN, UNSPECIFIED TYPE: Primary | ICD-10-CM

## 2025-01-17 DIAGNOSIS — I49.9 IRREGULAR HEART BEAT: ICD-10-CM

## 2025-01-17 DIAGNOSIS — G47.33 OSA (OBSTRUCTIVE SLEEP APNEA): ICD-10-CM

## 2025-01-17 DIAGNOSIS — I70.0 ATHEROSCLEROSIS OF AORTA: ICD-10-CM

## 2025-01-17 DIAGNOSIS — I45.3 TRIFASCICULAR BLOCK: ICD-10-CM

## 2025-01-17 DIAGNOSIS — I44.4 LAFB (LEFT ANTERIOR FASCICULAR BLOCK): ICD-10-CM

## 2025-01-17 DIAGNOSIS — R55 POSTURAL DIZZINESS WITH PRESYNCOPE: ICD-10-CM

## 2025-01-17 DIAGNOSIS — I49.5 SICK SINUS SYNDROME: ICD-10-CM

## 2025-01-17 DIAGNOSIS — G62.9 NEUROPATHY: ICD-10-CM

## 2025-01-17 PROCEDURE — G2211 COMPLEX E/M VISIT ADD ON: HCPCS | Mod: S$GLB,,, | Performed by: INTERNAL MEDICINE

## 2025-01-17 PROCEDURE — 3074F SYST BP LT 130 MM HG: CPT | Mod: CPTII,S$GLB,, | Performed by: INTERNAL MEDICINE

## 2025-01-17 PROCEDURE — 1126F AMNT PAIN NOTED NONE PRSNT: CPT | Mod: CPTII,S$GLB,, | Performed by: INTERNAL MEDICINE

## 2025-01-17 PROCEDURE — 3079F DIAST BP 80-89 MM HG: CPT | Mod: CPTII,S$GLB,, | Performed by: INTERNAL MEDICINE

## 2025-01-17 PROCEDURE — 99999 PR PBB SHADOW E&M-EST. PATIENT-LVL III: CPT | Mod: PBBFAC,,, | Performed by: INTERNAL MEDICINE

## 2025-01-17 PROCEDURE — 1160F RVW MEDS BY RX/DR IN RCRD: CPT | Mod: CPTII,S$GLB,, | Performed by: INTERNAL MEDICINE

## 2025-01-17 PROCEDURE — 99215 OFFICE O/P EST HI 40 MIN: CPT | Mod: S$GLB,,, | Performed by: INTERNAL MEDICINE

## 2025-01-17 PROCEDURE — 3072F LOW RISK FOR RETINOPATHY: CPT | Mod: CPTII,S$GLB,, | Performed by: INTERNAL MEDICINE

## 2025-01-17 PROCEDURE — 1159F MED LIST DOCD IN RCRD: CPT | Mod: CPTII,S$GLB,, | Performed by: INTERNAL MEDICINE

## 2025-01-17 RX ORDER — PANTOPRAZOLE SODIUM 40 MG/1
40 TABLET, DELAYED RELEASE ORAL DAILY
Qty: 30 TABLET | Refills: 3 | Status: SHIPPED | OUTPATIENT
Start: 2025-01-17 | End: 2026-01-17

## 2025-01-17 NOTE — PROGRESS NOTES
Subjective:   Patient ID:  Anca Mcclellan is a 79 y.o. female who presents for cardiac consult of No chief complaint on file.      The patient came in today for cardiac consult of No chief complaint on file.    Anca Mcclellan is a 79 y.o. female pt with current medical conditions CHB s/p DC PPM 6/2021, carotid artery disease, CPAP,  HLD, DM, polymyositis, MGUS presents for follow up CV evaluation.       7/10/24  LE arterial and venous u/s neg 2/2024 with normal ANYA.   BP and HR stable. BMI 26  Overall is active. No CP/SOB. Legs are stable.   Is not able to tolerate CPAP.   Overall stable with device interrogation in May.   ECG - A paced , prolonged AV conduction    1/10/25  BP and HR stable. BMI 26 - 170 lbs  She has ongoing foot/leg pain - is cold - had neg EMGs 10/2024.   ECG - Apaced, prolonged AV, RBBB, LAFB    1/17/25    From pt - Spoke to patient states she had chest pain last night and this morning scale of 6 pain middle of chest  no other symptoms just wanted to let the [facility] Know because this is not normal for her would like to know what to do when that happens.   Spoke to patient, appt scheduled wanted to let Dr. Francis know also. Schedule 1/19/2025    She had dull chest pain as she was laying down. Lasted for a while - she got up from laying on left side and sat up and got better.       Presenting EGM As/Ap - Vs.  Battery status is OK.  Measured values in normal range.  Since last transmission  The total number of ATR episodes is 1.  The most recent episode of AT was on 06/09/2024 for 1 minute and  18 seconds.  0% AT/AF [place].      Conclusion 1/2023    Bilateral normal anya and waveforms bilaterally.essentially within normal study w/o any significant stenosis    Home Sleep Studies     Date/Time: 4/25/2022 8:00 AM  Performed by: Jim Orr MD  Authorized by: LADY HaywoodC        PHYSICIAN INTERPRETATION AND COMMENTS: Findings are consistent with moderate obstructive sleep apnea  (LUCRETIA). CPAP  therapy indicated.  CLINICAL HISTORY: 77 year old female presented with: 13.5 inch neck, BMI of 28.1, an [place] sleepiness score of 8, history  of heart disease, diabetes and symptoms of nocturnal waking up choking. Based on the clinical history, the patient has a  high pre-test probability of having Mild LUCRETIA.    Results for orders placed during the hospital encounter of 04/25/22    Echo    Interpretation Summary  · Mild tricuspid regurgitation.  · Normal systolic function.  · The estimated ejection fraction is 55%.  · Normal left ventricular diastolic function.  · Normal right ventricular size.  · Normal central venous pressure (3 mmHg).  · The estimated PA systolic pressure is 32 mmHg.          Nuclear Quantitative Functional Analysis:   LVEF: 63 %  LVED Volume: 85 ml  LVES Volume: 31 ml    Impression: NORMAL MYOCARDIAL PERFUSION  1. The perfusion scan is free of evidence for myocardial ischemia or injury.   2. Resting wall motion is physiologic.   3. Resting LV function is normal.   4. The ventricular volumes are normal at rest and stress.   5. The extracardiac distribution of radioactivity is normal.     This document has been electronically    SIGNED BY: Lloyd Francis MD On: 11/12/2019 16:58      CONCLUSIONS   There is 40 - 49% right Internal Carotid stenosis.  There is 40 - 49% left Internal Carotid stenosis.    This document has been electronically    SIGNED BY: Ham Taylor MD On: 11/06/2019 18:40        Past Medical History:   Diagnosis Date    Bilateral carotid artery stenosis 10/15/2019    Hyperlipidemia     LUCRETIA (obstructive sleep apnea) 5/6/2022       Past Surgical History:   Procedure Laterality Date    A-V CARDIAC PACEMAKER INSERTION Left 06/03/2021    Procedure: INSERTION, CARDIAC PACEMAKER, DUAL CHAMBER;  Surgeon: Arnold Martinez MD;  Location: [facility] CATH LAB;  Service: Cardiology;  Laterality: Left;  COVID-19, MRNA, LN-S, PF (Pfizer) 2/4/2021, 1/14/2021//[employer] notified     CATARACT EXTRACTION      ESOPHAGOGASTRODUODENOSCOPY N/A 09/17/2021    Procedure: EGD (ESOPHAGOGASTRODUODENOSCOPY);  Surgeon: Rimma Mccracken MD;  Location: [facility];  Service: Endoscopy;  Laterality: N/A;    HYSTERECTOMY  1988    INSERTION OF PACEMAKER  06/03/2021       Social History     Tobacco Use    Smoking status: Never    Smokeless tobacco: Never   Substance Use Topics    Alcohol use: Not Currently     Comment: seldom    Drug use: Never       Family History   Problem Relation Name Age of Onset    Cataracts Mother      Heart disease Mother      Diabetes type II Mother      Dementia Father      Blindness Father      Heart disease Brother      Migraines Neg Hx      Melanoma Neg Hx      Lupus Neg Hx      Psoriasis Neg Hx         Patient's Medications   New Prescriptions    No medications on file   Previous Medications    ASPIRIN (ECOTRIN) 81 MG EC TABLET    Take 81 mg by mouth once daily.    ATORVASTATIN (LIPITOR) 40 MG TABLET    Take 40 mg by mouth once daily.    DONEPEZIL (ARICEPT) 5 MG TABLET    Take 5 mg by mouth every evening.    FERROUS SULFATE (FEOSOL) 325 MG (65 MG IRON) TAB TABLET    1 tablet    FLUOCINONIDE (LIDEX) 0.05 % EXTERNAL SOLUTION    AAA scalp qday - bid prn pruritus    KETOCONAZOLE (NIZORAL) 2 % SHAMPOO    Apply topically once a week. Lather in for 5-10 min before rinsing    METFORMIN (GLUCOPHAGE-XR) 500 MG ER 24HR TABLET    Take 500 mg by mouth once daily.    MIDODRINE (PROAMATINE) 10 MG TABLET    Take 1 tablet (10 mg total) by mouth 3 (three) times daily with meals.    MULTIVITAMIN (THERAGRAN) PER TABLET    Take 1 tablet by mouth once daily.   Modified Medications    No medications on file   Discontinued Medications    No medications on file       Review of Systems   HENT: Negative.     Eyes:  Negative for blurred vision.   Respiratory:  Positive for shortness of breath.    Cardiovascular:  Positive for palpitations (rare). Negative for chest pain and leg swelling.   Gastrointestinal:  "Negative.    Genitourinary: Negative.    Musculoskeletal: Negative.    Skin: Negative.    Neurological:  Negative for dizziness.   Endo/Heme/Allergies: Negative.    Psychiatric/Behavioral: Negative.     All 12 systems otherwise negative.      Wt Readings from Last 3 Encounters:   01/17/25 76.9 kg (169 lb 8.5 oz)   01/10/25 77.4 kg (170 lb 10.2 oz)   11/18/24 78.6 kg (173 lb 4.5 oz)     Temp Readings from Last 3 Encounters:   08/16/23 97.1 °F (36.2 °C) (Tympanic)   08/08/23 98.6 °F (37 °C) (Tympanic)   10/31/22 98.1 °F (36.7 °C) (Temporal)     BP Readings from Last 3 Encounters:   01/17/25 118/80   01/10/25 126/86   11/18/24 (!) 140/88     Pulse Readings from Last 3 Encounters:   01/17/25 91   01/10/25 85   11/18/24 85       /80 (BP Location: Left arm, Patient Position: Sitting)   Pulse 91   Ht 5' 7" (1.702 m)   Wt 76.9 kg (169 lb 8.5 oz)   BMI 26.55 kg/m²     Objective:   Physical Exam  Vitals and nursing note reviewed.   Constitutional:       General: She is not in acute distress.     Appearance: She is well-developed. She is not diaphoretic.   HENT:      Head: Normocephalic and atraumatic.      Nose: Nose normal.   Eyes:      General: No scleral icterus.     Conjunctiva/sclera: Conjunctivae normal.   Neck:      Thyroid: No thyromegaly.      Vascular: No JVD.   Cardiovascular:      Rate and Rhythm: Normal rate and regular rhythm.      Heart sounds: S1 normal and S2 normal. No murmur heard.     No friction rub. No gallop. No S3 or S4 sounds.   Pulmonary:      Effort: Pulmonary effort is normal. No respiratory distress.      Breath sounds: Normal breath sounds. No stridor. No wheezing or rales.   Chest:      Chest wall: No tenderness.   Abdominal:      General: Bowel sounds are normal. There is no distension.      Palpations: Abdomen is soft. There is no mass.      Tenderness: There is no abdominal tenderness. There is no rebound.   Genitourinary:     Comments: Deferred  Musculoskeletal:         General: No " tenderness or deformity. Normal range of motion.      Cervical back: Normal range of motion and neck supple.   Lymphadenopathy:      Cervical: No cervical adenopathy.   Skin:     General: Skin is warm and dry.      Coloration: Skin is not pale.      Findings: No erythema or rash.   Neurological:      Mental Status: She is alert and oriented to person, place, and time.      Motor: No abnormal muscle tone.      Coordination: Coordination normal.   Psychiatric:         Behavior: Behavior normal.         Thought Content: Thought content normal.         Judgment: Judgment normal.         Lab Results   Component Value Date     01/10/2025    K 4.3 01/10/2025     01/10/2025    CO2 26 01/10/2025    BUN 10 01/10/2025    CREATININE 0.7 01/10/2025     01/10/2025    HGBA1C 6.1 (H) 09/23/2024    MG 2.1 06/16/2021     (H) 01/10/2025    ALT 78 (H) 01/10/2025    ALBUMIN 3.6 01/10/2025    PROT 10.0 (H) 01/10/2025    BILITOT 0.8 01/10/2025    WBC 3.29 (L) 01/10/2025    HGB 12.7 01/10/2025    HCT 38.4 01/10/2025    MCV 93 01/10/2025     01/10/2025    INR 1.0 05/18/2021    TSH 0.777 09/23/2024    CHOL 148 09/29/2021    HDL 49 09/29/2021    LDLCALC 82.2 09/29/2021    TRIG 84 09/29/2021    BNP 17 04/29/2022     Assessment:      1. Chest pain, unspecified type    2. Dyspnea on exertion    3. Presence of cardiac pacemaker    4. LAFB (left anterior fascicular block)    5. Sick sinus syndrome    6. Hyperlipidemia, unspecified hyperlipidemia type    7. Irregular heart beat    8. RBBB    9. Sinus node dysfunction    10. Cardiac pacemaker in situ    11. Bilateral cold feet    12. Type 2 diabetes mellitus with other specified complication, unspecified whether long term insulin use    13. Iron deficiency anemia due to chronic blood loss    14. Postural dizziness with presyncope    15. Atherosclerosis of aorta    16. LUCRETIA (obstructive sleep apnea)    17. Syncope and collapse    18. Trifascicular block    19.  Neuropathy    20. Other hyperlipidemia    21. Pain in both feet    22. Angina pectoris            Plan:   1. High degree AVB s/p PPM 6/2021  - cont f/u at PPM clinic and EP as needed  - stable     2. H/o Syncope -presyncope - syncope during MG 2022  - ECHO in 4/2022 with normal bi V function, PASP 32 mmHg.   - Holter - negative  - s/p ENT - no further eval  - increase fluid intake  - cont Midodrine 2.5 mg BID - increase to 5mg BID --increased 10mg TID  - rec salt intake if BP is lower    3. HLD   - cont Lipitor    4. Polymyositis  - cont meds per PCP/Rheum    5. Carotid artery disease  - cont asa, statin  - carotid u/s stable 1/2022    6. Fatigue with anemia, EGD with ulcers/bleeding,. MGUS  - f/u hemeonc and GI   - cont iron tabs  - repeat iron levels   - ECHO in 4/2022 with normal bi V function, PASP 32 mmHg.   - had bone marrow biopsy  - MGUS ?   - start PPI -     7. Chest pain - intermittent   -prior pharm nuclear stress test negative in 2019  - order pharm nuclear stress test , pt cannot walk on treadmill  - order ECHO r/o effusion and eval valvse    8. DM A1c 6.5 --> 6.0  - cont tx    9. Moderate LUCRETIA  - needs CPAP machine/supplies - has not gotten     10. LE feet cold  - LE arterial u/s and COURTNEY  - neg 1/2023  - LE arterial and venous u/s neg 2/2024 with normal COURTNEY.   - refer to podiatry and neuro - had neg EMGs 10/2024.     Visit today included increased complexity associated with the care of the episodic problem leg pain addressed and managing the longitudinal care of the patient due to the serious and/or complex managed problem(s) .      Thank you for allowing me to participate in this patient's care. Please do not hesitate to contact me with any questions or concerns. Consult note has been forwarded to the referral physician.     Lloyd Francis MD, [facility]  Cardiovascular Disease  [facility], [place]  [phone] (P)

## 2025-01-19 ENCOUNTER — PATIENT MESSAGE (OUTPATIENT)
Dept: RHEUMATOLOGY | Facility: CLINIC | Age: 80
End: 2025-01-19
Payer: MEDICARE

## 2025-01-24 ENCOUNTER — OFFICE VISIT (OUTPATIENT)
Dept: RHEUMATOLOGY | Facility: CLINIC | Age: 80
End: 2025-01-24
Payer: MEDICARE

## 2025-01-24 ENCOUNTER — LAB VISIT (OUTPATIENT)
Dept: LAB | Facility: HOSPITAL | Age: 80
End: 2025-01-24
Attending: INTERNAL MEDICINE
Payer: MEDICARE

## 2025-01-24 VITALS
WEIGHT: 168 LBS | HEIGHT: 67 IN | DIASTOLIC BLOOD PRESSURE: 85 MMHG | HEART RATE: 116 BPM | BODY MASS INDEX: 26.37 KG/M2 | SYSTOLIC BLOOD PRESSURE: 122 MMHG

## 2025-01-24 DIAGNOSIS — D47.2 MGUS (MONOCLONAL GAMMOPATHY OF UNKNOWN SIGNIFICANCE): ICD-10-CM

## 2025-01-24 DIAGNOSIS — M17.12 PRIMARY OSTEOARTHRITIS OF LEFT KNEE: ICD-10-CM

## 2025-01-24 DIAGNOSIS — M35.9 POLYMYOSITIS ASSOCIATED WITH AUTOIMMUNE DISEASE: ICD-10-CM

## 2025-01-24 DIAGNOSIS — R74.8 ABNORMAL LIVER ENZYMES: ICD-10-CM

## 2025-01-24 DIAGNOSIS — M35.9 POLYMYOSITIS ASSOCIATED WITH AUTOIMMUNE DISEASE: Primary | ICD-10-CM

## 2025-01-24 DIAGNOSIS — M11.20 CHONDROCALCINOSIS: ICD-10-CM

## 2025-01-24 DIAGNOSIS — M33.20 POLYMYOSITIS ASSOCIATED WITH AUTOIMMUNE DISEASE: ICD-10-CM

## 2025-01-24 DIAGNOSIS — M15.0 PRIMARY OSTEOARTHRITIS INVOLVING MULTIPLE JOINTS: ICD-10-CM

## 2025-01-24 DIAGNOSIS — R70.0 ELEVATED ERYTHROCYTE SEDIMENTATION RATE: ICD-10-CM

## 2025-01-24 DIAGNOSIS — M33.20 POLYMYOSITIS ASSOCIATED WITH AUTOIMMUNE DISEASE: Primary | ICD-10-CM

## 2025-01-24 LAB
ALBUMIN SERPL BCP-MCNC: 4 G/DL (ref 3.5–5.2)
ALP SERPL-CCNC: 124 U/L (ref 40–150)
ALT SERPL W/O P-5'-P-CCNC: 118 U/L (ref 10–44)
ANION GAP SERPL CALC-SCNC: 8 MMOL/L (ref 8–16)
AST SERPL-CCNC: 189 U/L (ref 10–40)
BILIRUB SERPL-MCNC: 1.1 MG/DL (ref 0.1–1)
BUN SERPL-MCNC: 15 MG/DL (ref 8–23)
CALCIUM SERPL-MCNC: 9.9 MG/DL (ref 8.7–10.5)
CHLORIDE SERPL-SCNC: 104 MMOL/L (ref 95–110)
CK SERPL-CCNC: 51 U/L (ref 20–180)
CO2 SERPL-SCNC: 22 MMOL/L (ref 23–29)
CREAT SERPL-MCNC: 0.9 MG/DL (ref 0.5–1.4)
CRP SERPL-MCNC: 3 MG/L (ref 0–8.2)
ERYTHROCYTE [SEDIMENTATION RATE] IN BLOOD BY WESTERGREN METHOD: 41 MM/HR (ref 0–36)
EST. GFR  (NO RACE VARIABLE): >60 ML/MIN/1.73 M^2
GLUCOSE SERPL-MCNC: 99 MG/DL (ref 70–110)
POTASSIUM SERPL-SCNC: 3.8 MMOL/L (ref 3.5–5.1)
PROT SERPL-MCNC: 11.3 G/DL (ref 6–8.4)
SODIUM SERPL-SCNC: 134 MMOL/L (ref 136–145)

## 2025-01-24 PROCEDURE — 99999 PR PBB SHADOW E&M-EST. PATIENT-LVL IV: CPT | Mod: PBBFAC,,, | Performed by: INTERNAL MEDICINE

## 2025-01-24 PROCEDURE — 36415 COLL VENOUS BLD VENIPUNCTURE: CPT | Performed by: INTERNAL MEDICINE

## 2025-01-24 PROCEDURE — 1159F MED LIST DOCD IN RCRD: CPT | Mod: CPTII,S$GLB,, | Performed by: INTERNAL MEDICINE

## 2025-01-24 PROCEDURE — 85651 RBC SED RATE NONAUTOMATED: CPT | Performed by: INTERNAL MEDICINE

## 2025-01-24 PROCEDURE — 80053 COMPREHEN METABOLIC PANEL: CPT | Performed by: INTERNAL MEDICINE

## 2025-01-24 PROCEDURE — 99215 OFFICE O/P EST HI 40 MIN: CPT | Mod: S$GLB,,, | Performed by: INTERNAL MEDICINE

## 2025-01-24 PROCEDURE — 3079F DIAST BP 80-89 MM HG: CPT | Mod: CPTII,S$GLB,, | Performed by: INTERNAL MEDICINE

## 2025-01-24 PROCEDURE — 1101F PT FALLS ASSESS-DOCD LE1/YR: CPT | Mod: CPTII,S$GLB,, | Performed by: INTERNAL MEDICINE

## 2025-01-24 PROCEDURE — 1160F RVW MEDS BY RX/DR IN RCRD: CPT | Mod: CPTII,S$GLB,, | Performed by: INTERNAL MEDICINE

## 2025-01-24 PROCEDURE — 1125F AMNT PAIN NOTED PAIN PRSNT: CPT | Mod: CPTII,S$GLB,, | Performed by: INTERNAL MEDICINE

## 2025-01-24 PROCEDURE — 3074F SYST BP LT 130 MM HG: CPT | Mod: CPTII,S$GLB,, | Performed by: INTERNAL MEDICINE

## 2025-01-24 PROCEDURE — G2211 COMPLEX E/M VISIT ADD ON: HCPCS | Mod: S$GLB,,, | Performed by: INTERNAL MEDICINE

## 2025-01-24 PROCEDURE — 82550 ASSAY OF CK (CPK): CPT | Performed by: INTERNAL MEDICINE

## 2025-01-24 PROCEDURE — 3288F FALL RISK ASSESSMENT DOCD: CPT | Mod: CPTII,S$GLB,, | Performed by: INTERNAL MEDICINE

## 2025-01-24 PROCEDURE — 3072F LOW RISK FOR RETINOPATHY: CPT | Mod: CPTII,S$GLB,, | Performed by: INTERNAL MEDICINE

## 2025-01-24 PROCEDURE — 86140 C-REACTIVE PROTEIN: CPT | Performed by: INTERNAL MEDICINE

## 2025-01-24 PROCEDURE — 82085 ASSAY OF ALDOLASE: CPT | Performed by: INTERNAL MEDICINE

## 2025-01-24 RX ORDER — METHYLPREDNISOLONE 4 MG/1
TABLET ORAL
Qty: 21 TABLET | Refills: 0 | Status: SHIPPED | OUTPATIENT
Start: 2025-01-24

## 2025-01-24 NOTE — PROGRESS NOTES
RHEUMATOLOGY CLINIC FOLLOW UP VISIT  Chief complaints, HPI, ROS, EXAM, Assessment & Plans:-  Anca Kirkland a 79 y.o. pleasant female comes in for follow-up visit.  She follows up in the Rheumatology Clinic for polymyositis which has been in remission.  Back in 2022 she was seen with worsening left knee joint pain associated with stiffness and swelling and synovitis which was injected with Kenalog.  She reports doing well except mild activity-related left knee pain.  No significant swelling or stiffness.  No parotitis.  No systemic B symptoms.  No malar rash.  No sicca syndrome.  Rheumatological review of system negative otherwise.  Exam shows chronic deformities but no active synovitis of bilateral knees.  No photosensitive rash.  Mild joint line tenderness of left knee.  No effusion.  No parotitis.  No submandibular lymphadenopathy.    1. Polymyositis associated with autoimmune disease    2. Chondrocalcinosis    3. Primary osteoarthritis involving multiple joints    4. Primary osteoarthritis of left knee    5. Abnormal liver enzymes    6. Elevated erythrocyte sedimentation rate    7. MGUS (monoclonal gammopathy of unknown significance)      Problem List Items Addressed This Visit       Abnormal liver enzymes    Relevant Orders    CK    Aldolase    Comprehensive Metabolic Panel    Chondrocalcinosis    Relevant Medications    methylPREDNISolone (MEDROL DOSEPACK) 4 mg tablet    Elevated erythrocyte sedimentation rate    Relevant Orders    CT Chest Abdomen Pelvis W W/O Contrast (XPD)    MGUS (monoclonal gammopathy of unknown significance)    Polymyositis associated with autoimmune disease - Primary    Relevant Orders    CK    Aldolase    Sedimentation rate    C-Reactive Protein    CT Chest Abdomen Pelvis W W/O Contrast (XPD)    Primary osteoarthritis involving multiple joints    Primary osteoarthritis of left knee    Relevant Medications     methylPREDNISolone (MEDROL DOSEPACK) 4 mg tablet      Latest Reference Range & Units Most Recent   VALDEZ Screen Negative <1:80  Positive !  1/17/24 14:32   VALDEZ HEP-2 Titer  Positive >=1:2560 Homogeneous  10/31/19 13:48   VALDEZ Titer 1  >2560  1/17/24 14:32   VALDEZ PATTERN 1  Homogeneous  1/17/24 14:32   ds DNA Ab Negative 1:10  Positive !  1/17/24 14:32   DNA Titer  >=1:5120  1/17/24 14:32   Anti-SSA Antibody 0.00 - 0.99 Ratio 4.56 (H)  9/23/24 10:40   Anti-SSA Interpretation Negative  Positive !  9/23/24 10:40   Anti-SSB Antibody 0.00 - 0.99 Ratio 0.22  9/23/24 10:40   Anti-SSB Interpretation Negative  Negative  9/23/24 10:40   Anti Sm Antibody 0.00 - 0.99 Ratio 0.14  1/17/24 14:32   Anti-Sm Interpretation Negative  Negative  1/17/24 14:32   Anti Sm/RNP Antibody 0.00 - 0.99 Ratio 0.34  1/17/24 14:32   Anti-Sm/RNP Interpretation Negative  Negative  1/17/24 14:32   Complement (C-3) 50 - 180 mg/dL 139  10/31/19 13:28   Complement (C-4) 11 - 44 mg/dL 35  10/31/19 13:28   IgG 650 - 1600 mg/dL 3473 (H)  1/31/24 11:16   IgM 50 - 300 mg/dL 69  1/31/24 11:16   IgA Level 40 - 350 mg/dL 273  1/31/24 11:16   Protein, Serum 6.0 - 8.4 g/dL 9.1 (H)  1/31/24 11:16   Albumin grams/dl 3.35 - 5.55 g/dL 4.09  1/31/24 11:16   Alpha-1 grams/dl 0.17 - 0.41 g/dL 0.27  1/31/24 11:16   Alpha-2 0.43 - 0.99 g/dL 0.82  1/31/24 11:16   Beta 0.50 - 1.10 g/dL 0.81  1/31/24 11:16   Gamma 0.67 - 1.58 g/dL 3.11 (H)  1/31/24 11:16   Pathologist Interpretation SPE  REVIEWED  1/31/24 11:16   Pathologist Interpretation PETAR  REVIEWED  1/31/24 11:16   Immunofix Interp.  SEE COMMENT  1/31/24 11:16   Pathologist Interpretation UPE  REVIEWED  5/2/22 10:19   Protein Electrophoresis, Ur  SEE COMMENT  5/2/22 10:19   Cryoglobulin, Qual Absent  Absent  1/31/24 11:16   Anti-Emilia-1 Antibody <20 Units <20  4/21/16 15:43   Anti-PM/Scl Ab <20 Units <20  4/21/16 15:43   Anti-SS-A 52 kD Ab, IgG <20 Units <20  4/21/16 15:43   Anti-U1-RNP  Ab <20 Units <20  4/21/16 15:43   EJ  Negative  Negative  4/21/16 15:43   Fibrillarin (U3 RNP) Negative  Negative  4/21/16 15:43   HMGCR Antibody 0 - 19 Units <3  4/21/16 15:43   Ku Negative  Negative  4/21/16 15:43   MDA-5 (P140) (CADM-140) <20 Units <20  4/21/16 15:43   MI-2 Negative  Weak Positive !  4/21/16 15:43   NXP-2 (P140) <20 Units <20  4/21/16 15:43   OJ Negative  Negative  4/21/16 15:43   PL-12 Negative  Negative  4/21/16 15:43   PL-7 Negative  Negative  4/21/16 15:43   Rheumatoid Factor 0.0 - 15.0 IU/mL <13.0  9/23/24 10:40   SRP Negative  Negative  4/21/16 15:43   TIF1 GAMMA (P155/140) <20 Units <20  4/21/16 15:43   U2 snRNP Negative  Negative  4/21/16 15:43   !: Data is abnormal  (H): Data is abnormally high     Latest Reference Range & Units 01/10/25 11:14 01/24/25 13:44   WBC 3.90 - 12.70 K/uL 3.29 (L)    RBC 4.00 - 5.40 M/uL 4.13    Hemoglobin 12.0 - 16.0 g/dL 12.7    Hematocrit 37.0 - 48.5 % 38.4    MCV 82 - 98 fL 93    MCH 27.0 - 31.0 pg 30.8    MCHC 32.0 - 36.0 g/dL 33.1    RDW 11.5 - 14.5 % 14.2    Platelet Count 150 - 450 K/uL 153    MPV 9.2 - 12.9 fL 9.8    Gran % 38.0 - 73.0 % 47.4    Lymph % 18.0 - 48.0 % 34.3    Mono % 4.0 - 15.0 % 12.2    Eos % 0.0 - 8.0 % 4.9    Basophil % 0.0 - 1.9 % 1.2    Immature Granulocytes 0.0 - 0.5 % 0.0    Gran # (ANC) 1.8 - 7.7 K/uL 1.6 (L)    Lymph # 1.0 - 4.8 K/uL 1.1    Mono # 0.3 - 1.0 K/uL 0.4    Eos # 0.0 - 0.5 K/uL 0.2    Baso # 0.00 - 0.20 K/uL 0.04    Immature Grans (Abs) 0.00 - 0.04 K/uL 0.00    nRBC 0 /100 WBC 0    Differential Method  Automated    Sed Rate 0 - 36 mm/Hr 105 (H)    Sodium 136 - 145 mmol/L 137 134 (L)   Potassium 3.5 - 5.1 mmol/L 4.3 3.8   Chloride 95 - 110 mmol/L 107 104   CO2 23 - 29 mmol/L 26 22 (L)   Anion Gap 8 - 16 mmol/L 4 (L) 8   BUN 8 - 23 mg/dL 10 15   Creatinine 0.5 - 1.4 mg/dL 0.7 0.9   eGFR >60 mL/min/1.73 m^2 >60 >60   Glucose 70 - 110 mg/dL 102 99   Calcium 8.7 - 10.5 mg/dL 9.7 9.9   ALP 40 - 150 U/L 121 124   PROTEIN TOTAL 6.0 - 8.4 g/dL 10.0 (H) 11.3 (H)    Albumin 3.5 - 5.2 g/dL 3.6 4.0   BILIRUBIN TOTAL 0.1 - 1.0 mg/dL 0.8 1.1 (H)   AST 10 - 40 U/L 135 (H) 189 (H)   ALT 10 - 44 U/L 78 (H) 118 (H)   CRP 0.0 - 8.2 mg/L 2.4 3.0   CPK 20 - 180 U/L  51   (L): Data is abnormally low  (H): Data is abnormally high        Mild recurrence of left knee pain without significant effusion.  Try Medrol Dosepak before considering intra-articular corticosteroid injection.  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  Normal muscle strength bilateral upper and lower proximal and distal including neck flexors.  Repeat muscle enzymes since liver enzymes showed abnormality.  Polymyositis probably in remission.  Monitor clinically.    Significant worsening liver enzymes.  On atorvastatin.  Would recommend holding statin and repeating liver enzymes in 4 weeks.  Significantly worsening sedimentation rate .  Even though her dsDNA antibody was positive she does not have any clinical features of active lupus.  Normal renal functions.  No proteinuria.  No arthritis.  Since her SSA antibody is positive, ordered CT chest abdomen pelvis to rule out early lymphoma.  Continue follow-up with hematologist for MGUS.    # Follow up in about 6 months (around 7/24/2025).      Disclaimer: This note was prepared using voice recognition system and is likely to have sound alike errors and is not proof read.  Please call me with any questions.

## 2025-01-24 NOTE — PROGRESS NOTES
Liver enzymes shows significant worsening.  Discontinue atorvastatin immediately and repeat liver enzymes in 2 weeks.  Please schedule repeat CMP in 2 weeks.

## 2025-01-26 LAB — ALDOLASE SERPL-CCNC: 7.5 U/L (ref 1.2–7.6)

## 2025-01-27 ENCOUNTER — TELEPHONE (OUTPATIENT)
Dept: RHEUMATOLOGY | Facility: CLINIC | Age: 80
End: 2025-01-27
Payer: MEDICARE

## 2025-01-27 NOTE — TELEPHONE ENCOUNTER
----- Message from Chaz Lock MD sent at 1/24/2025  4:40 PM CST -----  Liver enzymes shows significant worsening.  Discontinue atorvastatin immediately and repeat liver enzymes in 2 weeks.  Please schedule repeat CMP in 2 weeks.

## 2025-01-28 ENCOUNTER — TELEPHONE (OUTPATIENT)
Dept: CARDIOLOGY | Facility: CLINIC | Age: 80
End: 2025-01-28
Payer: MEDICARE

## 2025-01-28 NOTE — TELEPHONE ENCOUNTER
[facility] Staff  Caller: VIDYA ALVAREZ CYR [[id number]] (Today,  9:07 AM)  .Type:  Patient Requesting Call    Who Called:VIDYA ALVAREZ [[id number]]  Does the patient know what this is regarding?:Patient requesting a call back in regards to chest pain at night when she goes to bed. Patient states she is on a pace maker and it started a few nights ago.  Would the patient rather a call back or a response via [facility]? Call back  Best Call Back Number:.[phone] (home)    Additional Information:    Last 3 nights pt has been having pain in her arm and in the area where her pacemaker is the pain is causing her to toss and turn all night she is also feeling fatigue no dizziness or SOB palpitations. Taking all medications as prescribe except for the atorvastatin Had a lab test levels were out of range Dr. Parrish advised her to stop immediately

## 2025-01-29 ENCOUNTER — HOSPITAL ENCOUNTER (OUTPATIENT)
Dept: RADIOLOGY | Facility: HOSPITAL | Age: 80
Discharge: HOME | End: 2025-01-29
Attending: INTERNAL MEDICINE
Payer: MEDICARE

## 2025-01-29 ENCOUNTER — HOSPITAL ENCOUNTER (OUTPATIENT)
Dept: CARDIOLOGY | Facility: HOSPITAL | Age: 80
Discharge: HOME | End: 2025-01-29
Attending: INTERNAL MEDICINE
Payer: MEDICARE

## 2025-01-29 VITALS
DIASTOLIC BLOOD PRESSURE: 80 MMHG | WEIGHT: 167 LBS | BODY MASS INDEX: 26.21 KG/M2 | HEIGHT: 67 IN | SYSTOLIC BLOOD PRESSURE: 118 MMHG

## 2025-01-29 DIAGNOSIS — I44.4 LAFB (LEFT ANTERIOR FASCICULAR BLOCK): ICD-10-CM

## 2025-01-29 DIAGNOSIS — R06.09 DYSPNEA ON EXERTION: ICD-10-CM

## 2025-01-29 DIAGNOSIS — E11.69 TYPE 2 DIABETES MELLITUS WITH OTHER SPECIFIED COMPLICATION, UNSPECIFIED WHETHER LONG TERM INSULIN USE: ICD-10-CM

## 2025-01-29 DIAGNOSIS — I45.10 RBBB: ICD-10-CM

## 2025-01-29 DIAGNOSIS — G47.33 OSA (OBSTRUCTIVE SLEEP APNEA): ICD-10-CM

## 2025-01-29 DIAGNOSIS — I49.5 SINUS NODE DYSFUNCTION: ICD-10-CM

## 2025-01-29 DIAGNOSIS — D50.0 IRON DEFICIENCY ANEMIA DUE TO CHRONIC BLOOD LOSS: ICD-10-CM

## 2025-01-29 DIAGNOSIS — R55 SYNCOPE AND COLLAPSE: ICD-10-CM

## 2025-01-29 DIAGNOSIS — R07.9 CHEST PAIN, UNSPECIFIED TYPE: ICD-10-CM

## 2025-01-29 DIAGNOSIS — I49.9 IRREGULAR HEART BEAT: ICD-10-CM

## 2025-01-29 DIAGNOSIS — R55 POSTURAL DIZZINESS WITH PRESYNCOPE: ICD-10-CM

## 2025-01-29 DIAGNOSIS — R20.9 BILATERAL COLD FEET: ICD-10-CM

## 2025-01-29 DIAGNOSIS — I45.3 TRIFASCICULAR BLOCK: ICD-10-CM

## 2025-01-29 DIAGNOSIS — Z95.0 PRESENCE OF CARDIAC PACEMAKER: ICD-10-CM

## 2025-01-29 DIAGNOSIS — M79.672 PAIN IN BOTH FEET: ICD-10-CM

## 2025-01-29 DIAGNOSIS — Z95.0 CARDIAC PACEMAKER IN SITU: ICD-10-CM

## 2025-01-29 DIAGNOSIS — I49.5 SICK SINUS SYNDROME: ICD-10-CM

## 2025-01-29 DIAGNOSIS — M79.671 PAIN IN BOTH FEET: ICD-10-CM

## 2025-01-29 DIAGNOSIS — G62.9 NEUROPATHY: ICD-10-CM

## 2025-01-29 DIAGNOSIS — E78.49 OTHER HYPERLIPIDEMIA: ICD-10-CM

## 2025-01-29 DIAGNOSIS — I20.9 ANGINA PECTORIS: ICD-10-CM

## 2025-01-29 DIAGNOSIS — E78.5 HYPERLIPIDEMIA, UNSPECIFIED HYPERLIPIDEMIA TYPE: ICD-10-CM

## 2025-01-29 DIAGNOSIS — R42 POSTURAL DIZZINESS WITH PRESYNCOPE: ICD-10-CM

## 2025-01-29 DIAGNOSIS — I70.0 ATHEROSCLEROSIS OF AORTA: ICD-10-CM

## 2025-01-29 LAB
AV INDEX (PROSTH): 0.85
AV MEAN GRADIENT: 4 MMHG
AV PEAK GRADIENT: 7 MMHG
AV VALVE AREA BY VELOCITY RATIO: 2.7 CM²
AV VALVE AREA: 2.7 CM²
AV VELOCITY RATIO: 0.85
BSA FOR ECHO PROCEDURE: 1.89 M2
CV ECHO LV RWT: 0.58 CM
CV STRESS BASE HR: 74 BPM
DIASTOLIC BLOOD PRESSURE: 91 MMHG
DOP CALC AO PEAK VEL: 1.3 M/S
DOP CALC AO VTI: 25.6 CM
DOP CALC LVOT AREA: 3.1 CM2
DOP CALC LVOT DIAMETER: 2 CM
DOP CALC LVOT PEAK VEL: 1.1 M/S
DOP CALC LVOT STROKE VOLUME: 68.1 CM3
DOP CALC RVOT PEAK VEL: 0.82 M/S
DOP CALC RVOT VTI: 17.8 CM
DOP CALCLVOT PEAK VEL VTI: 21.7 CM
E WAVE DECELERATION TIME: 272 MSEC
E/A RATIO: 0.75
E/E' RATIO: 13 M/S
ECHO LV POSTERIOR WALL: 1.1 CM (ref 0.6–1.1)
EJECTION FRACTION: 55 %
FRACTIONAL SHORTENING: 26.3 % (ref 28–44)
INTERVENTRICULAR SEPTUM: 1.1 CM (ref 0.6–1.1)
IVRT: 108 MSEC
LA MAJOR: 6 CM
LA MINOR: 6 CM
LA WIDTH: 3.5 CM
LEFT ATRIUM AREA SYSTOLIC (APICAL 2 CHAMBER): 19.62 CM2
LEFT ATRIUM AREA SYSTOLIC (APICAL 4 CHAMBER): 24.97 CM2
LEFT ATRIUM SIZE: 3.2 CM
LEFT ATRIUM VOLUME INDEX MOD: 34 ML/M2
LEFT ATRIUM VOLUME INDEX: 31 ML/M2
LEFT ATRIUM VOLUME MOD: 63 ML
LEFT ATRIUM VOLUME: 57 CM3
LEFT INTERNAL DIMENSION IN SYSTOLE: 2.8 CM (ref 2.1–4)
LEFT VENTRICLE DIASTOLIC VOLUME INDEX: 32.06 ML/M2
LEFT VENTRICLE DIASTOLIC VOLUME: 59.95 ML
LEFT VENTRICLE END SYSTOLIC VOLUME APICAL 2 CHAMBER: 49.13 ML
LEFT VENTRICLE END SYSTOLIC VOLUME APICAL 4 CHAMBER: 80.66 ML
LEFT VENTRICLE MASS INDEX: 72 G/M2
LEFT VENTRICLE SYSTOLIC VOLUME INDEX: 16.3 ML/M2
LEFT VENTRICLE SYSTOLIC VOLUME: 30.39 ML
LEFT VENTRICULAR INTERNAL DIMENSION IN DIASTOLE: 3.8 CM (ref 3.5–6)
LEFT VENTRICULAR MASS: 134.7 G
LV LATERAL E/E' RATIO: 12.8 M/S
LV SEPTAL E/E' RATIO: 12.8 M/S
LVED V (TEICH): 59.95 ML
LVES V (TEICH): 30.39 ML
LVOT MG: 2.91 MMHG
LVOT MV: 0.8 CM/S
MV PEAK A VEL: 1.02 M/S
MV PEAK E VEL: 0.77 M/S
NUC REST EJECTION FRACTION: 71
NUC STRESS EJECTION FRACTION: 75 %
OHS CV AF BURDEN PERCENT: < 1
OHS CV CPX 85 PERCENT MAX PREDICTED HEART RATE MALE: 119
OHS CV CPX ESTIMATED METS: 1
OHS CV CPX MAX PREDICTED HEART RATE: 140
OHS CV CPX PATIENT IS FEMALE: 1
OHS CV CPX PATIENT IS MALE: 0
OHS CV CPX PEAK DIASTOLIC BLOOD PRESSURE: 79 MMHG
OHS CV CPX PEAK HEAR RATE: 112 BPM
OHS CV CPX PEAK RATE PRESSURE PRODUCT: NORMAL
OHS CV CPX PEAK SYSTOLIC BLOOD PRESSURE: 135 MMHG
OHS CV CPX PERCENT MAX PREDICTED HEART RATE ACHIEVED: 83
OHS CV CPX RATE PRESSURE PRODUCT PRESENTING: 9620
OHS CV DC REMOTE DEVICE TYPE: NORMAL
OHS CV INITIAL DOSE: 9.5 MCG/KG/MIN
OHS CV PEAK DOSE: 28.5 MCG/KG/MIN
OHS CV RV PACING PERCENT: 1 %
OHS CV RV/LV RATIO: 0.66 CM
PISA TR MAX VEL: 3.2 M/S
PV MEAN GRADIENT: 1 MMHG
PV MV: 0.64 M/S
PV PEAK GRADIENT: 4 MMHG
PV PEAK VELOCITY: 1.02 M/S
RA MAJOR: 5.58 CM
RA PRESSURE ESTIMATED: 3 MMHG
RA WIDTH: 3.5 CM
RIGHT VENTRICLE DIASTOLIC BASEL DIMENSION: 2.5 CM
RIGHT VENTRICULAR END-DIASTOLIC DIMENSION: 2.47 CM
RV TB RVSP: 6 MMHG
SINUS: 2.58 CM
STJ: 2.78 CM
STRESS ECHO POST EXERCISE DUR SEC: 50 SECONDS
SYSTOLIC BLOOD PRESSURE: 130 MMHG
TDI LATERAL: 0.06 M/S
TDI SEPTAL: 0.06 M/S
TDI: 0.06 M/S
TR MAX PG: 41 MMHG
TRICUSPID ANNULAR PLANE SYSTOLIC EXCURSION: 2.17 CM
TV REST PULMONARY ARTERY PRESSURE: 44 MMHG
Z-SCORE OF LEFT VENTRICULAR DIMENSION IN END DIASTOLE: -3.16
Z-SCORE OF LEFT VENTRICULAR DIMENSION IN END SYSTOLE: -1.09

## 2025-01-29 PROCEDURE — 93306 TTE W/DOPPLER COMPLETE: CPT | Mod: 26,,, | Performed by: INTERNAL MEDICINE

## 2025-01-29 PROCEDURE — 78452 HT MUSCLE IMAGE SPECT MULT: CPT

## 2025-01-29 PROCEDURE — 93017 CV STRESS TEST TRACING ONLY: CPT

## 2025-01-29 PROCEDURE — 63600175 PHARM REV CODE 636 W HCPCS: Performed by: INTERNAL MEDICINE

## 2025-01-29 PROCEDURE — A9502 TC99M TETROFOSMIN: HCPCS | Performed by: INTERNAL MEDICINE

## 2025-01-29 PROCEDURE — 93306 TTE W/DOPPLER COMPLETE: CPT

## 2025-01-29 RX ORDER — AMINOPHYLLINE 25 MG/ML
75 INJECTION, SOLUTION INTRAVENOUS
Status: COMPLETED | OUTPATIENT
Start: 2025-01-29 | End: 2025-01-29

## 2025-01-29 RX ORDER — REGADENOSON 0.08 MG/ML
0.4 INJECTION, SOLUTION INTRAVENOUS
Status: COMPLETED | OUTPATIENT
Start: 2025-01-29 | End: 2025-01-29

## 2025-01-29 RX ADMIN — REGADENOSON 0.4 MG: 0.08 INJECTION, SOLUTION INTRAVENOUS at 10:01

## 2025-01-29 RX ADMIN — TETROFOSMIN 28.5 MILLICURIE: 1.38 INJECTION, POWDER, LYOPHILIZED, FOR SOLUTION INTRAVENOUS at 11:01

## 2025-01-29 RX ADMIN — AMINOPHYLLINE 75 MG: 25 INJECTION, SOLUTION INTRAVENOUS at 10:01

## 2025-01-29 RX ADMIN — TETROFOSMIN 9.5 MILLICURIE: 1.38 INJECTION, POWDER, LYOPHILIZED, FOR SOLUTION INTRAVENOUS at 08:01

## 2025-01-30 ENCOUNTER — TELEPHONE (OUTPATIENT)
Dept: RHEUMATOLOGY | Facility: CLINIC | Age: 80
End: 2025-01-30
Payer: MEDICARE

## 2025-01-30 NOTE — TELEPHONE ENCOUNTER
Patient came to clinic requesting injection    Last OV 1/24/2025    Mild recurrence of left knee pain without significant effusion. Try Medrol Dosepak before considering intra-articular corticosteroid injection. 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       Please advise.

## 2025-01-31 ENCOUNTER — HOSPITAL ENCOUNTER (OUTPATIENT)
Dept: RADIOLOGY | Facility: HOSPITAL | Age: 80
Discharge: HOME | End: 2025-01-31
Attending: INTERNAL MEDICINE
Payer: MEDICARE

## 2025-01-31 DIAGNOSIS — M35.9 POLYMYOSITIS ASSOCIATED WITH AUTOIMMUNE DISEASE: ICD-10-CM

## 2025-01-31 DIAGNOSIS — M33.20 POLYMYOSITIS ASSOCIATED WITH AUTOIMMUNE DISEASE: ICD-10-CM

## 2025-01-31 DIAGNOSIS — R70.0 ELEVATED ERYTHROCYTE SEDIMENTATION RATE: ICD-10-CM

## 2025-01-31 PROCEDURE — 74177 CT ABD & PELVIS W/CONTRAST: CPT | Mod: 26,,, | Performed by: RADIOLOGY

## 2025-01-31 PROCEDURE — 25500020 PHARM REV CODE 255: Performed by: INTERNAL MEDICINE

## 2025-01-31 PROCEDURE — 71260 CT THORAX DX C+: CPT | Mod: 26,,, | Performed by: RADIOLOGY

## 2025-01-31 PROCEDURE — 74177 CT ABD & PELVIS W/CONTRAST: CPT | Mod: TC

## 2025-01-31 RX ADMIN — IOHEXOL 30 ML: 350 INJECTION, SOLUTION INTRAVENOUS at 07:01

## 2025-01-31 RX ADMIN — IOHEXOL 75 ML: 350 INJECTION, SOLUTION INTRAVENOUS at 08:01

## 2025-02-02 NOTE — PROGRESS NOTES
No significant abnormalities other than mild abnormalities of prior inflammation in the lungs. We will repeat lung scan in 3 months or if you notice any worsening shortness of breath.Please call us for knee joint injection if knee pain returns.

## 2025-02-04 ENCOUNTER — TELEPHONE (OUTPATIENT)
Dept: DERMATOLOGY | Facility: CLINIC | Age: 80
End: 2025-02-04
Payer: MEDICARE

## 2025-02-04 ENCOUNTER — TELEPHONE (OUTPATIENT)
Dept: NEUROLOGY | Facility: CLINIC | Age: 80
End: 2025-02-04
Payer: MEDICARE

## 2025-02-04 ENCOUNTER — TELEPHONE (OUTPATIENT)
Dept: HEPATOLOGY | Facility: CLINIC | Age: 80
End: 2025-02-04
Payer: MEDICARE

## 2025-02-04 NOTE — TELEPHONE ENCOUNTER
Called pt to reschedule appt due to provider changing locations. The pt verbalized understanding.

## 2025-02-04 NOTE — TELEPHONE ENCOUNTER
----- Message from Marina sent at 2/4/2025 11:41 AM CST -----  Name of Who is Calling:VIDYA ALVAREZ CYR [[id number]]        What is the request in detail:Pt requesting a call back to get schedule with a doctor if possible.Pt test results showed damage to liver.        Can the clinic reply by [facility]:Call        What Number to Call Back if not in [facility]:Telephone Information:  Mobile          [phone]

## 2025-02-04 NOTE — TELEPHONE ENCOUNTER
Called pt regarding scheduling an appointment. Pt successfully scheduled for next available. Pt appointment added to wait list. Pt verbalized understanding.     ----- Message from Marina sent at 2/4/2025 11:46 AM CST -----  Sooner Appointment Request     Caller is requesting a sooner appointment.  Caller declined first available appointment listed below.  Caller will not accept being placed on the waitlist and is requesting a message be sent to doctor.  Name of Caller:VIDYA ALVAREZ [[id number]]  When is the first available appointment?N/A  Symptoms:hair loss/ check up  Would the patient rather a call back or a response via [facility]? Call  Best Call Back Number:Telephone Information:  Mobile          [phone]

## 2025-02-06 ENCOUNTER — OFFICE VISIT (OUTPATIENT)
Dept: RHEUMATOLOGY | Facility: CLINIC | Age: 80
End: 2025-02-06
Payer: MEDICARE

## 2025-02-06 ENCOUNTER — LAB VISIT (OUTPATIENT)
Dept: LAB | Facility: HOSPITAL | Age: 80
End: 2025-02-06
Attending: INTERNAL MEDICINE
Payer: MEDICARE

## 2025-02-06 VITALS
BODY MASS INDEX: 26.38 KG/M2 | DIASTOLIC BLOOD PRESSURE: 71 MMHG | WEIGHT: 168.44 LBS | HEART RATE: 88 BPM | SYSTOLIC BLOOD PRESSURE: 102 MMHG

## 2025-02-06 DIAGNOSIS — R74.8 ABNORMAL LIVER ENZYMES: ICD-10-CM

## 2025-02-06 DIAGNOSIS — M25.562 ACUTE PAIN OF LEFT KNEE: ICD-10-CM

## 2025-02-06 DIAGNOSIS — M17.12 PRIMARY OSTEOARTHRITIS OF LEFT KNEE: Primary | ICD-10-CM

## 2025-02-06 DIAGNOSIS — D89.0 POLYCLONAL HYPERGAMMAGLOBULINEMIA: ICD-10-CM

## 2025-02-06 DIAGNOSIS — R76.8 POSITIVE ANA (ANTINUCLEAR ANTIBODY): ICD-10-CM

## 2025-02-06 LAB
ALT SERPL W/O P-5'-P-CCNC: 38 U/L (ref 10–44)
AST SERPL-CCNC: 47 U/L (ref 10–40)

## 2025-02-06 PROCEDURE — 36415 COLL VENOUS BLD VENIPUNCTURE: CPT | Performed by: INTERNAL MEDICINE

## 2025-02-06 PROCEDURE — 3072F LOW RISK FOR RETINOPATHY: CPT | Mod: CPTII,S$GLB,, | Performed by: INTERNAL MEDICINE

## 2025-02-06 PROCEDURE — 20610 DRAIN/INJ JOINT/BURSA W/O US: CPT | Mod: LT,S$GLB,, | Performed by: INTERNAL MEDICINE

## 2025-02-06 PROCEDURE — 84450 TRANSFERASE (AST) (SGOT): CPT | Performed by: INTERNAL MEDICINE

## 2025-02-06 PROCEDURE — 84460 ALANINE AMINO (ALT) (SGPT): CPT | Performed by: INTERNAL MEDICINE

## 2025-02-06 PROCEDURE — 1160F RVW MEDS BY RX/DR IN RCRD: CPT | Mod: CPTII,S$GLB,, | Performed by: INTERNAL MEDICINE

## 2025-02-06 PROCEDURE — 3074F SYST BP LT 130 MM HG: CPT | Mod: CPTII,S$GLB,, | Performed by: INTERNAL MEDICINE

## 2025-02-06 PROCEDURE — 1125F AMNT PAIN NOTED PAIN PRSNT: CPT | Mod: CPTII,S$GLB,, | Performed by: INTERNAL MEDICINE

## 2025-02-06 PROCEDURE — 99214 OFFICE O/P EST MOD 30 MIN: CPT | Mod: 25,S$GLB,, | Performed by: INTERNAL MEDICINE

## 2025-02-06 PROCEDURE — 1159F MED LIST DOCD IN RCRD: CPT | Mod: CPTII,S$GLB,, | Performed by: INTERNAL MEDICINE

## 2025-02-06 PROCEDURE — 3078F DIAST BP <80 MM HG: CPT | Mod: CPTII,S$GLB,, | Performed by: INTERNAL MEDICINE

## 2025-02-06 PROCEDURE — 99999 PR PBB SHADOW E&M-EST. PATIENT-LVL III: CPT | Mod: PBBFAC,,, | Performed by: INTERNAL MEDICINE

## 2025-02-06 RX ORDER — TRIAMCINOLONE ACETONIDE 40 MG/ML
40 INJECTION, SUSPENSION INTRA-ARTICULAR; INTRAMUSCULAR
Status: DISCONTINUED | OUTPATIENT
Start: 2025-02-06 | End: 2025-02-06 | Stop reason: HOSPADM

## 2025-02-06 RX ADMIN — TRIAMCINOLONE ACETONIDE 40 MG: 40 INJECTION, SUSPENSION INTRA-ARTICULAR; INTRAMUSCULAR at 11:02

## 2025-02-06 NOTE — TELEPHONE ENCOUNTER
scheduled patient May 2025, Dr ROCHA requesting her to be seen sooner.  Can you please contact her for a sooner appt, either location is okay with patient.      Thank you

## 2025-02-06 NOTE — PROGRESS NOTES
RHEUMATOLOGY CLINIC FOLLOW UP VISIT  Chief complaints, HPI, ROS, EXAM, Assessment & Plans:-  Anca Kirkland a 79 y.o. pleasant female comes in for follow-up visit.  She follows up in the Rheumatology Clinic for polymyositis which has been in remission.  Back in 2022 she was seen with worsening left knee joint pain associated with stiffness and swelling and synovitis which was injected with Kenalog.  She reports doing well except mild activity-related left knee pain.  No significant swelling or stiffness.  No parotitis.  No systemic B symptoms.  No malar rash.  No sicca syndrome.  Rheumatological review of system negative otherwise.  Exam shows chronic deformities but no active synovitis of bilateral knees.  No photosensitive rash.  Mild joint line tenderness of left knee.  No effusion.  No parotitis.  No submandibular lymphadenopathy.    1. Primary osteoarthritis of left knee    2. Abnormal liver enzymes    3. Acute pain of left knee      Problem List Items Addressed This Visit       Abnormal liver enzymes    Relevant Orders    AST (SGOT)    ALT (SGPT)    Acute pain of left knee    Primary osteoarthritis of left knee - Primary      Latest Reference Range & Units Most Recent   VALDEZ Screen Negative <1:80  Positive !  1/17/24 14:32   VALDEZ HEP-2 Titer  Positive >=1:2560 Homogeneous  10/31/19 13:48   VALDEZ Titer 1  >2560  1/17/24 14:32   VALDEZ PATTERN 1  Homogeneous  1/17/24 14:32   ds DNA Ab Negative 1:10  Positive !  1/17/24 14:32   DNA Titer  >=1:5120  1/17/24 14:32   Anti-SSA Antibody 0.00 - 0.99 Ratio 4.56 (H)  9/23/24 10:40   Anti-SSA Interpretation Negative  Positive !  9/23/24 10:40   Anti-SSB Antibody 0.00 - 0.99 Ratio 0.22  9/23/24 10:40   Anti-SSB Interpretation Negative  Negative  9/23/24 10:40   Anti Sm Antibody 0.00 - 0.99 Ratio 0.14  1/17/24 14:32   Anti-Sm Interpretation Negative  Negative  1/17/24 14:32   Anti Sm/RNP Antibody 0.00 - 0.99 Ratio  0.34  1/17/24 14:32   Anti-Sm/RNP Interpretation Negative  Negative  1/17/24 14:32   Complement (C-3) 50 - 180 mg/dL 139  10/31/19 13:28   Complement (C-4) 11 - 44 mg/dL 35  10/31/19 13:28   IgG 650 - 1600 mg/dL 3473 (H)  1/31/24 11:16   IgM 50 - 300 mg/dL 69  1/31/24 11:16   IgA Level 40 - 350 mg/dL 273  1/31/24 11:16   Protein, Serum 6.0 - 8.4 g/dL 9.1 (H)  1/31/24 11:16   Albumin grams/dl 3.35 - 5.55 g/dL 4.09  1/31/24 11:16   Alpha-1 grams/dl 0.17 - 0.41 g/dL 0.27  1/31/24 11:16   Alpha-2 0.43 - 0.99 g/dL 0.82  1/31/24 11:16   Beta 0.50 - 1.10 g/dL 0.81  1/31/24 11:16   Gamma 0.67 - 1.58 g/dL 3.11 (H)  1/31/24 11:16   Pathologist Interpretation SPE  REVIEWED  1/31/24 11:16   Pathologist Interpretation PETAR  REVIEWED  1/31/24 11:16   Immunofix Interp.  SEE COMMENT  1/31/24 11:16   Pathologist Interpretation UPE  REVIEWED  5/2/22 10:19   Protein Electrophoresis, Ur  SEE COMMENT  5/2/22 10:19   Cryoglobulin, Qual Absent  Absent  1/31/24 11:16   Anti-Emilia-1 Antibody <20 Units <20  4/21/16 15:43   Anti-PM/Scl Ab <20 Units <20  4/21/16 15:43   Anti-SS-A 52 kD Ab, IgG <20 Units <20  4/21/16 15:43   Anti-U1-RNP  Ab <20 Units <20  4/21/16 15:43   EJ Negative  Negative  4/21/16 15:43   Fibrillarin (U3 RNP) Negative  Negative  4/21/16 15:43   HMGCR Antibody 0 - 19 Units <3  4/21/16 15:43   Ku Negative  Negative  4/21/16 15:43   MDA-5 (P140) (CADM-140) <20 Units <20  4/21/16 15:43   MI-2 Negative  Weak Positive !  4/21/16 15:43   NXP-2 (P140) <20 Units <20  4/21/16 15:43   OJ Negative  Negative  4/21/16 15:43   PL-12 Negative  Negative  4/21/16 15:43   PL-7 Negative  Negative  4/21/16 15:43   Rheumatoid Factor 0.0 - 15.0 IU/mL <13.0  9/23/24 10:40   SRP Negative  Negative  4/21/16 15:43   TIF1 GAMMA (P155/140) <20 Units <20  4/21/16 15:43   U2 snRNP Negative  Negative  4/21/16 15:43   !: Data is abnormal  (H): Data is abnormally high     Latest Reference Range & Units 01/10/25 11:14 01/24/25 13:44   WBC 3.90 - 12.70 K/uL 3.29  (L)    RBC 4.00 - 5.40 M/uL 4.13    Hemoglobin 12.0 - 16.0 g/dL 12.7    Hematocrit 37.0 - 48.5 % 38.4    MCV 82 - 98 fL 93    MCH 27.0 - 31.0 pg 30.8    MCHC 32.0 - 36.0 g/dL 33.1    RDW 11.5 - 14.5 % 14.2    Platelet Count 150 - 450 K/uL 153    MPV 9.2 - 12.9 fL 9.8    Gran % 38.0 - 73.0 % 47.4    Lymph % 18.0 - 48.0 % 34.3    Mono % 4.0 - 15.0 % 12.2    Eos % 0.0 - 8.0 % 4.9    Basophil % 0.0 - 1.9 % 1.2    Immature Granulocytes 0.0 - 0.5 % 0.0    Gran # (ANC) 1.8 - 7.7 K/uL 1.6 (L)    Lymph # 1.0 - 4.8 K/uL 1.1    Mono # 0.3 - 1.0 K/uL 0.4    Eos # 0.0 - 0.5 K/uL 0.2    Baso # 0.00 - 0.20 K/uL 0.04    Immature Grans (Abs) 0.00 - 0.04 K/uL 0.00    nRBC 0 /100 WBC 0    Differential Method  Automated    Sed Rate 0 - 36 mm/Hr 105 (H)    Sodium 136 - 145 mmol/L 137 134 (L)   Potassium 3.5 - 5.1 mmol/L 4.3 3.8   Chloride 95 - 110 mmol/L 107 104   CO2 23 - 29 mmol/L 26 22 (L)   Anion Gap 8 - 16 mmol/L 4 (L) 8   BUN 8 - 23 mg/dL 10 15   Creatinine 0.5 - 1.4 mg/dL 0.7 0.9   eGFR >60 mL/min/1.73 m^2 >60 >60   Glucose 70 - 110 mg/dL 102 99   Calcium 8.7 - 10.5 mg/dL 9.7 9.9   ALP 40 - 150 U/L 121 124   PROTEIN TOTAL 6.0 - 8.4 g/dL 10.0 (H) 11.3 (H)   Albumin 3.5 - 5.2 g/dL 3.6 4.0   BILIRUBIN TOTAL 0.1 - 1.0 mg/dL 0.8 1.1 (H)   AST 10 - 40 U/L 135 (H) 189 (H)   ALT 10 - 44 U/L 78 (H) 118 (H)   CRP 0.0 - 8.2 mg/L 2.4 3.0   CPK 20 - 180 U/L  51   (L): Data is abnormally low  (H): Data is abnormally high        Severe worsening knee pain. Inject CS today.   Abnormal LFT on statin. Statin discontinued last week. Repeat AST/ALT today. Normal muscle enzymes and no weakness on exam.    Even though her dsDNA antibody was positive she does not have any clinical features of active lupus.  Normal renal functions.  No proteinuria.  No arthritis.  Since her SSA antibody is positive, ordered CT chest abdomen pelvis to rule out early lymphoma.  Continue follow-up with hematologist for MGUS.    Large Joint Aspiration/Injection: L  knee    Date/Time: 2/6/2025 11:15 AM    Performed by: Chaz Lock MD  Authorized by: Chaz Lock MD    Consent Done?:  Yes (Verbal)  Indications:  Joint swelling and pain  Site marked: the procedure site was marked    Timeout: prior to procedure the correct patient, procedure, and site was verified      Local anesthesia used?: Yes    Anesthesia:  Local infiltration  Local anesthetic:  Lidocaine 2% with epinephrine  Anesthetic total (ml):  2      Details:  Needle Size:  25 G  Ultrasonic Guidance for needle placement?: No    Approach:  Anterolateral  Location:  Knee  Site:  L knee  Medications:  40 mg triamcinolone acetonide 40 mg/mL  Patient tolerance:  Patient tolerated the procedure well with no immediate complications      # Follow up in about 6 months (around 8/6/2025).      Disclaimer: This note was prepared using voice recognition system and is likely to have sound alike errors and is not proof read.  Please call me with any questions.

## 2025-02-10 ENCOUNTER — LAB VISIT (OUTPATIENT)
Dept: LAB | Facility: HOSPITAL | Age: 80
End: 2025-02-10
Attending: INTERNAL MEDICINE
Payer: MEDICARE

## 2025-02-10 ENCOUNTER — TELEPHONE (OUTPATIENT)
Dept: RHEUMATOLOGY | Facility: CLINIC | Age: 80
End: 2025-02-10
Payer: MEDICARE

## 2025-02-10 DIAGNOSIS — M33.20 POLYMYOSITIS ASSOCIATED WITH AUTOIMMUNE DISEASE: ICD-10-CM

## 2025-02-10 DIAGNOSIS — R74.8 ABNORMAL LIVER ENZYMES: ICD-10-CM

## 2025-02-10 DIAGNOSIS — M35.9 POLYMYOSITIS ASSOCIATED WITH AUTOIMMUNE DISEASE: ICD-10-CM

## 2025-02-10 LAB
ALBUMIN SERPL BCP-MCNC: 3.4 G/DL (ref 3.5–5.2)
ALP SERPL-CCNC: 63 U/L (ref 40–150)
ALT SERPL W/O P-5'-P-CCNC: 36 U/L (ref 10–44)
ANION GAP SERPL CALC-SCNC: 3 MMOL/L (ref 8–16)
AST SERPL-CCNC: 37 U/L (ref 10–40)
BILIRUB SERPL-MCNC: 0.4 MG/DL (ref 0.1–1)
BUN SERPL-MCNC: 20 MG/DL (ref 8–23)
CALCIUM SERPL-MCNC: 9.1 MG/DL (ref 8.7–10.5)
CHLORIDE SERPL-SCNC: 107 MMOL/L (ref 95–110)
CK SERPL-CCNC: 22 U/L (ref 20–180)
CO2 SERPL-SCNC: 26 MMOL/L (ref 23–29)
CREAT SERPL-MCNC: 0.7 MG/DL (ref 0.5–1.4)
CRP SERPL-MCNC: 0.4 MG/L (ref 0–8.2)
ERYTHROCYTE [SEDIMENTATION RATE] IN BLOOD BY WESTERGREN METHOD: 89 MM/HR (ref 0–36)
EST. GFR  (NO RACE VARIABLE): >60 ML/MIN/1.73 M^2
GLUCOSE SERPL-MCNC: 117 MG/DL (ref 70–110)
POTASSIUM SERPL-SCNC: 4.1 MMOL/L (ref 3.5–5.1)
PROT SERPL-MCNC: 8.7 G/DL (ref 6–8.4)
SODIUM SERPL-SCNC: 136 MMOL/L (ref 136–145)

## 2025-02-10 PROCEDURE — 80053 COMPREHEN METABOLIC PANEL: CPT | Performed by: INTERNAL MEDICINE

## 2025-02-10 PROCEDURE — 82550 ASSAY OF CK (CPK): CPT | Performed by: INTERNAL MEDICINE

## 2025-02-10 PROCEDURE — 86140 C-REACTIVE PROTEIN: CPT | Performed by: INTERNAL MEDICINE

## 2025-02-10 PROCEDURE — 36415 COLL VENOUS BLD VENIPUNCTURE: CPT | Performed by: INTERNAL MEDICINE

## 2025-02-10 PROCEDURE — 85651 RBC SED RATE NONAUTOMATED: CPT | Performed by: INTERNAL MEDICINE

## 2025-02-10 NOTE — PROGRESS NOTES
Liver enzymes have improved with discontinuation of cholesterol medication. Cancel referral to liver clinic. Do not restart statin medication.

## 2025-02-10 NOTE — TELEPHONE ENCOUNTER
----- Message from Chaz Lock MD sent at 2/9/2025  9:27 PM CST -----  Liver enzymes have improved with discontinuation of cholesterol medication. Cancel referral to liver clinic. Do not restart statin medication.

## 2025-02-12 ENCOUNTER — OFFICE VISIT (OUTPATIENT)
Dept: PODIATRY | Facility: CLINIC | Age: 80
End: 2025-02-12
Payer: MEDICARE

## 2025-02-12 VITALS — HEIGHT: 67 IN | WEIGHT: 168.44 LBS | BODY MASS INDEX: 26.44 KG/M2

## 2025-02-12 DIAGNOSIS — M21.611 BILATERAL BUNIONS: ICD-10-CM

## 2025-02-12 DIAGNOSIS — M20.42 HAMMER TOES OF BOTH FEET: Primary | ICD-10-CM

## 2025-02-12 DIAGNOSIS — M20.41 HAMMER TOES OF BOTH FEET: Primary | ICD-10-CM

## 2025-02-12 DIAGNOSIS — M21.621 TAILOR'S BUNION OF BOTH FEET: ICD-10-CM

## 2025-02-12 DIAGNOSIS — M21.612 BILATERAL BUNIONS: ICD-10-CM

## 2025-02-12 DIAGNOSIS — M21.622 TAILOR'S BUNION OF BOTH FEET: ICD-10-CM

## 2025-02-12 DIAGNOSIS — E11.8 TYPE 2 DIABETES MELLITUS WITH COMPLICATIONS: ICD-10-CM

## 2025-02-12 PROCEDURE — 99999 PR PBB SHADOW E&M-EST. PATIENT-LVL III: CPT | Mod: PBBFAC,,, | Performed by: PODIATRIST

## 2025-02-12 PROCEDURE — 3288F FALL RISK ASSESSMENT DOCD: CPT | Mod: CPTII,S$GLB,, | Performed by: PODIATRIST

## 2025-02-12 PROCEDURE — 1101F PT FALLS ASSESS-DOCD LE1/YR: CPT | Mod: CPTII,S$GLB,, | Performed by: PODIATRIST

## 2025-02-12 PROCEDURE — 3072F LOW RISK FOR RETINOPATHY: CPT | Mod: CPTII,S$GLB,, | Performed by: PODIATRIST

## 2025-02-12 PROCEDURE — 99213 OFFICE O/P EST LOW 20 MIN: CPT | Mod: S$GLB,,, | Performed by: PODIATRIST

## 2025-02-12 PROCEDURE — 1126F AMNT PAIN NOTED NONE PRSNT: CPT | Mod: CPTII,S$GLB,, | Performed by: PODIATRIST

## 2025-02-12 PROCEDURE — 1159F MED LIST DOCD IN RCRD: CPT | Mod: CPTII,S$GLB,, | Performed by: PODIATRIST

## 2025-02-12 PROCEDURE — 1160F RVW MEDS BY RX/DR IN RCRD: CPT | Mod: CPTII,S$GLB,, | Performed by: PODIATRIST

## 2025-02-12 NOTE — PROGRESS NOTES
Subjective:       Patient ID: Anca Mcclellan is a 79 y.o. female.    Chief Complaint: Foot Pain (Foot pain , rates pain 5, diabetic , )      HPI: Anca Mcclellan presents to the office today, with complaints of pains to the left and right foot. The pains are stated as moderate to severe at the B/L 2nd and 5th toes. Patient states limping with gait at times due to the pains. Pains are exacerbated by walking and standing. Sitting and limited WB does alleviate and/or decrease the pains. The patient does have hammer toe contractures. Also states painful bunions. States alternation of shoe gear has not been helpful. Patient's Primary Care Provider is Tony Natarajan DO.     Review of patient's allergies indicates:  No Known Allergies    Past Medical History:   Diagnosis Date    Bilateral carotid artery stenosis 10/15/2019    Hyperlipidemia     LUCRETIA (obstructive sleep apnea) 5/6/2022       Family History   Problem Relation Name Age of Onset    Cataracts Mother      Heart disease Mother      Diabetes type II Mother      Dementia Father      Blindness Father      Heart disease Brother      Migraines Neg Hx      Melanoma Neg Hx      Lupus Neg Hx      Psoriasis Neg Hx         Social History     Socioeconomic History    Marital status:    Tobacco Use    Smoking status: Never    Smokeless tobacco: Never   Substance and Sexual Activity    Alcohol use: Not Currently     Comment: seldom    Drug use: Never    Sexual activity: Not Currently   Other Topics Concern    Are you pregnant or think you may be? No    Breast-feeding No       Past Surgical History:   Procedure Laterality Date    A-V CARDIAC PACEMAKER INSERTION Left 06/03/2021    Procedure: INSERTION, CARDIAC PACEMAKER, DUAL CHAMBER;  Surgeon: Arnold Martinez MD;  Location: [facility] CATH LAB;  Service: Cardiology;  Laterality: Left;  COVID-19, MRNA, LN-S, PF ([employer]) 2/4/2021, 1/14/2021//Gil ching notified    CATARACT EXTRACTION       "ESOPHAGOGASTRODUODENOSCOPY N/A 09/17/2021    Procedure: EGD (ESOPHAGOGASTRODUODENOSCOPY);  Surgeon: Rimma Mccracken MD;  Location: [facility];  Service: Endoscopy;  Laterality: N/A;    HYSTERECTOMY  1988    INSERTION OF PACEMAKER  06/03/2021       Review of Systems   Constitutional:  Negative for chills, fatigue and fever.   HENT:  Negative for hearing loss.    Eyes:  Negative for photophobia and visual disturbance.   Respiratory:  Negative for cough, chest tightness, shortness of breath and wheezing.    Cardiovascular:  Negative for chest pain and palpitations.   Gastrointestinal:  Negative for constipation, diarrhea, nausea and vomiting.   Endocrine: Negative for cold intolerance and heat intolerance.   Genitourinary:  Negative for flank pain.   Musculoskeletal:  Positive for arthralgias and gait problem. Negative for neck pain and neck stiffness.   Neurological:  Negative for light-headedness and headaches.   Psychiatric/Behavioral:  Negative for sleep disturbance.          Objective:   Ht 5' 7" (1.702 m)   Wt 76.4 kg (168 lb 6.9 oz)   BMI 26.38 kg/m²     Physical Exam    LOWER EXTREMITY PHYSICAL EXAMINATION    DERMATOLOGY: Skin is supple, dry and intact. Callus formation, RLE, distal lateral 5th toe.    ORTHOPEDIC: Manual Muscle Testing is 5/5 in all planes on the left and right foot, without pains, with and without resistance. Gait pattern is antalgic. There is mild to moderate semi-rigid hammer toe contracture to the left and right foot at the 2nd toe and 5th toe. Associated metatarsalgia is noted. Metatarsalgia is noted to palpation. Plantar fat pad atrophy with displacement is noted. Equinus contracture is noted. Rectus foot type is noted. B/L HAV is noted. B/L Tailor's Bunion is noted.    VASCULAR: The right dorsalis pedis pulse is 2/4 and the posterior tibial pulse is 1/4. The left dorsalis pedis pulse is 2/4 and the posterior tibial pulse is 1/4. Hair growth is noted on the dorsal foot and digits. " Proximal to distal, warm to warm. Capillary refill time is WNL at less than 3s    Assessment:     1. Hammer toes of both feet    2. Bilateral bunions    3. Tailor's bunion of both feet          Plan:     Hammer toes of both feet    Bilateral bunions    Tailor's bunion of both feet      This patient does have hammertoe (digital) contractures. I did advise the patient to ambulate with shoe gear that is high in the tox box to allow for extra room and depth in the sagittal plane, in order to alleviate and lessen the potential for dorsal digital break down at the IPJs. I do also recommended shoe gear that is soft and supple in the foot bed as to lessen the potential for plantar distal digital break down at the contracted digits. If the patient does not feel the aforementioned is necessary, he or she may also purchase OTC padding devices to be worn across the MTPJ, at the distal aspects of the digits, and/or at the dorsal aspects of the IPJs. The patient does acknowledge understanding and is said to be amenable to compliance.     Rec. shoe gear with soft and accommodative foot bed and with a high toe box for alleviation of symptoms. Possible EE or EEE in width as well for alleviation of symptoms.          Future Appointments   Date Time Provider Department Center   3/5/2025  8:00 AM Lloyd Francis MD [facility] CARDIO [facility]   4/23/2025  9:40 AM Rayne Mane NP [facility] NEURO [facility]   4/30/2025 11:15 AM Haritha Estrada MD [facility] DERM [facility]   7/11/2025 10:00 AM Lloyd Francis MD [facility] CARDIO [facility]   7/30/2025  9:55 AM LABORATORY, [facility] LAB [facility]   8/6/2025 11:45 AM Chaz Lock MD [facility] RHEUM [facility]   11/19/2025 10:00 AM Debbie Louis FNP-C [facility]   11/19/2025 10:00 AM PACEMAKER CLINIC, [facility]

## 2025-02-23 ENCOUNTER — CLINICAL SUPPORT (OUTPATIENT)
Dept: CARDIOLOGY | Facility: HOSPITAL | Age: 80
End: 2025-02-23
Payer: MEDICARE

## 2025-02-23 ENCOUNTER — CLINICAL SUPPORT (OUTPATIENT)
Dept: CARDIOLOGY | Facility: HOSPITAL | Age: 80
End: 2025-02-23
Attending: INTERNAL MEDICINE
Payer: MEDICARE

## 2025-02-23 DIAGNOSIS — I49.5 SICK SINUS SYNDROME: ICD-10-CM

## 2025-02-23 DIAGNOSIS — Z95.0 PRESENCE OF CARDIAC PACEMAKER: ICD-10-CM

## 2025-02-23 PROCEDURE — 93294 REM INTERROG EVL PM/LDLS PM: CPT | Mod: S$GLB,,, | Performed by: INTERNAL MEDICINE

## 2025-02-23 PROCEDURE — 93296 REM INTERROG EVL PM/IDS: CPT | Performed by: INTERNAL MEDICINE

## 2025-03-01 LAB
OHS CV AF BURDEN PERCENT: < 1
OHS CV DC REMOTE DEVICE TYPE: NORMAL
OHS CV RV PACING PERCENT: 0 %

## 2025-03-05 ENCOUNTER — LAB VISIT (OUTPATIENT)
Dept: LAB | Facility: HOSPITAL | Age: 80
End: 2025-03-05
Attending: INTERNAL MEDICINE
Payer: MEDICARE

## 2025-03-05 ENCOUNTER — OFFICE VISIT (OUTPATIENT)
Dept: CARDIOLOGY | Facility: CLINIC | Age: 80
End: 2025-03-05
Payer: MEDICARE

## 2025-03-05 VITALS
DIASTOLIC BLOOD PRESSURE: 77 MMHG | HEIGHT: 67 IN | HEART RATE: 77 BPM | OXYGEN SATURATION: 98 % | BODY MASS INDEX: 26.71 KG/M2 | WEIGHT: 170.19 LBS | SYSTOLIC BLOOD PRESSURE: 114 MMHG

## 2025-03-05 DIAGNOSIS — R55 POSTURAL DIZZINESS WITH PRESYNCOPE: ICD-10-CM

## 2025-03-05 DIAGNOSIS — R07.9 CHEST PAIN, UNSPECIFIED TYPE: ICD-10-CM

## 2025-03-05 DIAGNOSIS — R42 POSTURAL DIZZINESS WITH PRESYNCOPE: ICD-10-CM

## 2025-03-05 DIAGNOSIS — Z95.0 PRESENCE OF CARDIAC PACEMAKER: ICD-10-CM

## 2025-03-05 DIAGNOSIS — E78.49 OTHER HYPERLIPIDEMIA: ICD-10-CM

## 2025-03-05 DIAGNOSIS — I49.9 IRREGULAR HEART BEAT: ICD-10-CM

## 2025-03-05 DIAGNOSIS — I49.5 SICK SINUS SYNDROME: ICD-10-CM

## 2025-03-05 DIAGNOSIS — Z95.0 CARDIAC PACEMAKER IN SITU: ICD-10-CM

## 2025-03-05 DIAGNOSIS — D50.0 IRON DEFICIENCY ANEMIA DUE TO CHRONIC BLOOD LOSS: ICD-10-CM

## 2025-03-05 DIAGNOSIS — R55 SYNCOPE AND COLLAPSE: ICD-10-CM

## 2025-03-05 DIAGNOSIS — E78.5 HYPERLIPIDEMIA, UNSPECIFIED HYPERLIPIDEMIA TYPE: ICD-10-CM

## 2025-03-05 DIAGNOSIS — I45.3 TRIFASCICULAR BLOCK: ICD-10-CM

## 2025-03-05 DIAGNOSIS — R06.09 DYSPNEA ON EXERTION: Primary | ICD-10-CM

## 2025-03-05 DIAGNOSIS — I45.10 RBBB: ICD-10-CM

## 2025-03-05 DIAGNOSIS — E11.69 TYPE 2 DIABETES MELLITUS WITH OTHER SPECIFIED COMPLICATION, UNSPECIFIED WHETHER LONG TERM INSULIN USE: ICD-10-CM

## 2025-03-05 DIAGNOSIS — I49.5 SINUS NODE DYSFUNCTION: ICD-10-CM

## 2025-03-05 DIAGNOSIS — I70.0 ATHEROSCLEROSIS OF AORTA: ICD-10-CM

## 2025-03-05 DIAGNOSIS — G47.33 OSA (OBSTRUCTIVE SLEEP APNEA): ICD-10-CM

## 2025-03-05 DIAGNOSIS — K21.9 GASTROESOPHAGEAL REFLUX DISEASE, UNSPECIFIED WHETHER ESOPHAGITIS PRESENT: ICD-10-CM

## 2025-03-05 DIAGNOSIS — I44.4 LAFB (LEFT ANTERIOR FASCICULAR BLOCK): ICD-10-CM

## 2025-03-05 LAB
CHOLEST SERPL-MCNC: 160 MG/DL (ref 120–199)
CHOLEST/HDLC SERPL: 3.4 {RATIO} (ref 2–5)
HDLC SERPL-MCNC: 47 MG/DL (ref 40–75)
HDLC SERPL: 29.4 % (ref 20–50)
LDLC SERPL CALC-MCNC: 101.8 MG/DL (ref 63–159)
NONHDLC SERPL-MCNC: 113 MG/DL
TRIGL SERPL-MCNC: 56 MG/DL (ref 30–150)

## 2025-03-05 PROCEDURE — 80061 LIPID PANEL: CPT | Performed by: INTERNAL MEDICINE

## 2025-03-05 PROCEDURE — 3288F FALL RISK ASSESSMENT DOCD: CPT | Mod: CPTII,S$GLB,, | Performed by: INTERNAL MEDICINE

## 2025-03-05 PROCEDURE — 1160F RVW MEDS BY RX/DR IN RCRD: CPT | Mod: CPTII,S$GLB,, | Performed by: INTERNAL MEDICINE

## 2025-03-05 PROCEDURE — 36415 COLL VENOUS BLD VENIPUNCTURE: CPT | Performed by: INTERNAL MEDICINE

## 2025-03-05 PROCEDURE — 1126F AMNT PAIN NOTED NONE PRSNT: CPT | Mod: CPTII,S$GLB,, | Performed by: INTERNAL MEDICINE

## 2025-03-05 PROCEDURE — 99214 OFFICE O/P EST MOD 30 MIN: CPT | Mod: S$GLB,,, | Performed by: INTERNAL MEDICINE

## 2025-03-05 PROCEDURE — G2211 COMPLEX E/M VISIT ADD ON: HCPCS | Mod: S$GLB,,, | Performed by: INTERNAL MEDICINE

## 2025-03-05 PROCEDURE — 1101F PT FALLS ASSESS-DOCD LE1/YR: CPT | Mod: CPTII,S$GLB,, | Performed by: INTERNAL MEDICINE

## 2025-03-05 PROCEDURE — 99999 PR PBB SHADOW E&M-EST. PATIENT-LVL III: CPT | Mod: PBBFAC,,, | Performed by: INTERNAL MEDICINE

## 2025-03-05 PROCEDURE — 1159F MED LIST DOCD IN RCRD: CPT | Mod: CPTII,S$GLB,, | Performed by: INTERNAL MEDICINE

## 2025-03-05 PROCEDURE — 3078F DIAST BP <80 MM HG: CPT | Mod: CPTII,S$GLB,, | Performed by: INTERNAL MEDICINE

## 2025-03-05 PROCEDURE — 3072F LOW RISK FOR RETINOPATHY: CPT | Mod: CPTII,S$GLB,, | Performed by: INTERNAL MEDICINE

## 2025-03-05 PROCEDURE — 3074F SYST BP LT 130 MM HG: CPT | Mod: CPTII,S$GLB,, | Performed by: INTERNAL MEDICINE

## 2025-03-05 NOTE — PROGRESS NOTES
Subjective:   Patient ID:  Anca Mcclellan is a 79 y.o. female who presents for cardiac consult of No chief complaint on file.      The patient came in today for cardiac consult of No chief complaint on file.    Anca Mcclellan is a 79 y.o. female pt with current medical conditions CHB s/p DC PPM 6/2021, carotid artery disease, CPAP,  HLD, DM, polymyositis, MGUS presents for follow up CV evaluation.     1/10/25  BP and HR stable. BMI 26 - 170 lbs  She has ongoing foot/leg pain - is cold - had neg EMGs 10/2024.   ECG - Apaced, prolonged AV, RBBB, LAFB    1/17/25    From pt - Spoke to patient states she had chest pain last night and this morning scale of 6 pain middle of chest  no other symptoms just wanted to let the [facility] Know because this is not normal for her would like to know what to do when that happens.   Spoke to patient, appt scheduled wanted to let Dr. Francis know also. Schedule 1/19/2025  She had dull chest pain as she was laying down. Lasted for a while - she got up from laying on left side and sat up and got better.       3/5/25  ECHO 1/2025 with normal bi V function, trace TR, PASP 44mmHg.   Nuc stress 1/2025 neg for ischemia, normal EF    BP and Hr stable. BMI 26  She still has chest discomfort. She went for a few dances at [employer]  - felt music caused more CP.   Lipitor was stopped due to elevated AST.     Presenting EGM Ap - Vs.  Battery status is OK.  Measured values in normal range.  Since last transmission, no new episodes.  0% AT/AF [place].    Results for orders placed during the hospital encounter of 01/29/25    Nuclear Stress - Cardiology Interpreted    Interpretation Summary    Normal myocardial perfusion scan. There is no evidence of myocardial ischemia or infarction.    The gated perfusion images showed an ejection fraction of 71% at rest. The gated perfusion images showed an ejection fraction of 75% post stress.    The study's ECG is uninterpretable due to rate related left bundle  branch block resolved at end of study    There were no arrhythmias during stress.        Results for orders placed during the hospital encounter of 01/29/25    Echo    Interpretation Summary    Left Ventricle: The left ventricle is normal in size. Normal wall thickness. There is concentric remodeling. There is normal systolic function with a visually estimated ejection fraction of 55 - 60%. Ejection fraction is approximately 55%.    Right Ventricle: Normal right ventricular cavity size. Wall thickness is normal. Systolic function is normal.    Tricuspid Valve: There is trace regurgitation. There is mild pulmonary hypertension.    Pulmonary Artery: The estimated pulmonary artery systolic pressure is 44 mmHg.    IVC/SVC: Normal venous pressure at 3 mmHg.      Conclusion 1/2023    Bilateral normal anya and waveforms bilaterally.essentially within normal study w/o any significant stenosis    Home Sleep Studies     Date/Time: 4/25/2022 8:00 AM  Performed by: Jim Orr MD  Authorized by: Jaymie Kinney PA-C        PHYSICIAN INTERPRETATION AND COMMENTS: Findings are consistent with moderate obstructive sleep apnea (LUCRETIA). CPAP  therapy indicated.  CLINICAL HISTORY: 77 year old female presented with: 13.5 inch neck, BMI of 28.1, an [place] sleepiness score of 8, history  of heart disease, diabetes and symptoms of nocturnal waking up choking. Based on the clinical history, the patient has a  high pre-test probability of having Mild LUCRETIA.    Results for orders placed during the hospital encounter of 04/25/22    Echo    Interpretation Summary  · Mild tricuspid regurgitation.  · Normal systolic function.  · The estimated ejection fraction is 55%.  · Normal left ventricular diastolic function.  · Normal right ventricular size.  · Normal central venous pressure (3 mmHg).  · The estimated PA systolic pressure is 32 mmHg.          Nuclear Quantitative Functional Analysis:   LVEF: 63 %  LVED Volume: 85 ml  LVES Volume:  31 ml    Impression: NORMAL MYOCARDIAL PERFUSION  1. The perfusion scan is free of evidence for myocardial ischemia or injury.   2. Resting wall motion is physiologic.   3. Resting LV function is normal.   4. The ventricular volumes are normal at rest and stress.   5. The extracardiac distribution of radioactivity is normal.     This document has been electronically    SIGNED BY: Lloyd Francis MD On: 11/12/2019 16:58      CONCLUSIONS   There is 40 - 49% right Internal Carotid stenosis.  There is 40 - 49% left Internal Carotid stenosis.    This document has been electronically    SIGNED BY: Hma Taylor MD On: 11/06/2019 18:40        Past Medical History:   Diagnosis Date    Bilateral carotid artery stenosis 10/15/2019    Hyperlipidemia     LUCRETIA (obstructive sleep apnea) 5/6/2022       Past Surgical History:   Procedure Laterality Date    A-V CARDIAC PACEMAKER INSERTION Left 06/03/2021    Procedure: INSERTION, CARDIAC PACEMAKER, DUAL CHAMBER;  Surgeon: Arnold Martinez MD;  Location: [facility] CATH LAB;  Service: Cardiology;  Laterality: Left;  COVID-19, MRNA, LN-S, PF (Pfizer) 2/4/2021, 1/14/2021//[facility]    CATARACT EXTRACTION      ESOPHAGOGASTRODUODENOSCOPY N/A 09/17/2021    Procedure: EGD (ESOPHAGOGASTRODUODENOSCOPY);  Surgeon: Rimma Mccracken MD;  Location: [facility] ENDO;  Service: Endoscopy;  Laterality: N/A;    HYSTERECTOMY  1988    INSERTION OF PACEMAKER  06/03/2021       Social History     Tobacco Use    Smoking status: Never    Smokeless tobacco: Never   Substance Use Topics    Alcohol use: Not Currently     Comment: seldom    Drug use: Never       Family History   Problem Relation Name Age of Onset    Cataracts Mother      Heart disease Mother      Diabetes type II Mother      Dementia Father      Blindness Father      Heart disease Brother      Migraines Neg Hx      Melanoma Neg Hx      Lupus Neg Hx      Psoriasis Neg Hx         Patient's Medications   New Prescriptions    No medications on  file   Previous Medications    ASPIRIN (ECOTRIN) 81 MG EC TABLET    Take 81 mg by mouth once daily.    DONEPEZIL (ARICEPT) 5 MG TABLET    Take 5 mg by mouth every evening.    FERROUS SULFATE (FEOSOL) 325 MG (65 MG IRON) TAB TABLET    1 tablet    FLUOCINONIDE (LIDEX) 0.05 % EXTERNAL SOLUTION    AAA scalp qday - bid prn pruritus    KETOCONAZOLE (NIZORAL) 2 % SHAMPOO    Apply topically once a week. Lather in for 5-10 min before rinsing    METFORMIN (GLUCOPHAGE-XR) 500 MG ER 24HR TABLET    Take 500 mg by mouth once daily.    METHYLPREDNISOLONE (MEDROL DOSEPACK) 4 MG TABLET    use as directed    MIDODRINE (PROAMATINE) 10 MG TABLET    Take 1 tablet (10 mg total) by mouth 3 (three) times daily with meals.    MULTIVITAMIN (THERAGRAN) PER TABLET    Take 1 tablet by mouth once daily.    PANTOPRAZOLE (PROTONIX) 40 MG TABLET    Take 1 tablet (40 mg total) by mouth once daily.   Modified Medications    No medications on file   Discontinued Medications    ATORVASTATIN (LIPITOR) 40 MG TABLET    Take 40 mg by mouth once daily.       Review of Systems   HENT: Negative.     Eyes:  Negative for blurred vision.   Respiratory:  Positive for shortness of breath.    Cardiovascular:  Positive for palpitations (rare). Negative for chest pain and leg swelling.   Gastrointestinal: Negative.    Genitourinary: Negative.    Musculoskeletal: Negative.    Skin: Negative.    Neurological:  Negative for dizziness.   Endo/Heme/Allergies: Negative.    Psychiatric/Behavioral: Negative.     All 12 systems otherwise negative.      Wt Readings from Last 3 Encounters:   03/05/25 77.2 kg (170 lb 3.1 oz)   02/12/25 76.4 kg (168 lb 6.9 oz)   02/06/25 76.4 kg (168 lb 6.9 oz)     Temp Readings from Last 3 Encounters:   08/16/23 97.1 °F (36.2 °C) (Tympanic)   08/08/23 98.6 °F (37 °C) (Tympanic)   10/31/22 98.1 °F (36.7 °C) (Temporal)     BP Readings from Last 3 Encounters:   03/05/25 114/77   02/06/25 102/71   01/29/25 118/80     Pulse Readings from Last 3  "Encounters:   03/05/25 77   02/06/25 88   01/24/25 (!) 116       /77 (BP Location: Right arm, Patient Position: Sitting)   Pulse 77   Ht 5' 7" (1.702 m)   Wt 77.2 kg (170 lb 3.1 oz)   SpO2 98%   BMI 26.66 kg/m²     Objective:   Physical Exam  Vitals and nursing note reviewed.   Constitutional:       General: She is not in acute distress.     Appearance: She is well-developed. She is not diaphoretic.   HENT:      Head: Normocephalic and atraumatic.      Nose: Nose normal.   Eyes:      General: No scleral icterus.     Conjunctiva/sclera: Conjunctivae normal.   Neck:      Thyroid: No thyromegaly.      Vascular: No JVD.   Cardiovascular:      Rate and Rhythm: Normal rate and regular rhythm.      Heart sounds: S1 normal and S2 normal. No murmur heard.     No friction rub. No gallop. No S3 or S4 sounds.   Pulmonary:      Effort: Pulmonary effort is normal. No respiratory distress.      Breath sounds: Normal breath sounds. No stridor. No wheezing or rales.   Chest:      Chest wall: No tenderness.   Abdominal:      General: Bowel sounds are normal. There is no distension.      Palpations: Abdomen is soft. There is no mass.      Tenderness: There is no abdominal tenderness. There is no rebound.   Genitourinary:     Comments: Deferred  Musculoskeletal:         General: No tenderness or deformity. Normal range of motion.      Cervical back: Normal range of motion and neck supple.   Lymphadenopathy:      Cervical: No cervical adenopathy.   Skin:     General: Skin is warm and dry.      Coloration: Skin is not pale.      Findings: No erythema or rash.   Neurological:      Mental Status: She is alert and oriented to person, place, and time.      Motor: No abnormal muscle tone.      Coordination: Coordination normal.   Psychiatric:         Behavior: Behavior normal.         Thought Content: Thought content normal.         Judgment: Judgment normal.         Lab Results   Component Value Date     02/10/2025    K 4.1 " 02/10/2025     02/10/2025    CO2 26 02/10/2025    BUN 20 02/10/2025    CREATININE 0.7 02/10/2025     (H) 02/10/2025    HGBA1C 6.1 (H) 09/23/2024    MG 2.1 06/16/2021    AST 37 02/10/2025    ALT 36 02/10/2025    ALBUMIN 3.4 (L) 02/10/2025    PROT 8.7 (H) 02/10/2025    BILITOT 0.4 02/10/2025    WBC 3.29 (L) 01/10/2025    HGB 12.7 01/10/2025    HCT 38.4 01/10/2025    MCV 93 01/10/2025     01/10/2025    INR 1.0 05/18/2021    TSH 0.777 09/23/2024    CHOL 148 09/29/2021    HDL 49 09/29/2021    LDLCALC 82.2 09/29/2021    TRIG 84 09/29/2021    BNP 17 04/29/2022     Assessment:      1. Dyspnea on exertion    2. Presence of cardiac pacemaker    3. LAFB (left anterior fascicular block)    4. RBBB    5. Sinus node dysfunction    6. Hyperlipidemia, unspecified hyperlipidemia type    7. Chest pain, unspecified type    8. Sick sinus syndrome    9. Irregular heart beat    10. Cardiac pacemaker in situ    11. Type 2 diabetes mellitus with other specified complication, unspecified whether long term insulin use    12. Iron deficiency anemia due to chronic blood loss    13. Postural dizziness with presyncope    14. Atherosclerosis of aorta    15. LUCRETIA (obstructive sleep apnea)    16. Syncope and collapse    17. Other hyperlipidemia    18. Trifascicular block    19. Gastroesophageal reflux disease, unspecified whether esophagitis present          Plan:   1. High degree AVB s/p PPM 6/2021  - cont f/u at PPM clinic and EP as needed  - stable     2. H/o Syncope -presyncope - syncope during MG 2022  - ECHO in 4/2022 with normal bi V function, PASP 32 mmHg.   - Holter - negative  - s/p ENT - no further eval  - increase fluid intake  - cont Midodrine 2.5 mg BID - increase to 5mg BID --increased 10mg TID  - rec salt intake if BP is lower    3. HLD   - stop Lipitor due to elevated ALT   - repeat lipids     4. Polymyositis  - cont meds per PCP/Rheum    5. Carotid artery disease  - cont asa, statin  - carotid u/s stable  1/2022    6. Fatigue with anemia, EGD with ulcers/bleeding,. MGUS  - f/u hemeonc and GI   - cont iron tabs  - repeat iron levels   - ECHO in 4/2022 with normal bi V function, PASP 32 mmHg.   - had bone marrow biopsy  - MGUS ?   - started PPI -     7. Chest pain - intermittent   -prior pharm nuclear stress test negative in 2019  - Nuc stress 1/2025 neg for ischemia, normal EF  - ECHO 1/2025 with normal bi V function, trace TR, PASP 44mmHg.     8. DM A1c 6.5 --> 6.0  - cont tx    9. Moderate LUCRETIA  - needs CPAP machine/supplies - has not gotten     10. LE feet cold  - LE arterial u/s and COURTNEY  - neg 1/2023  - LE arterial and venous u/s neg 2/2024 with normal COURTNEY.   - refer to podiatry and neuro - had neg EMGs 10/2024.     Visit today included increased complexity associated with the care of the episodic problem leg pain addressed and managing the longitudinal care of the patient due to the serious and/or complex managed problem(s) .      Thank you for allowing me to participate in this patient's care. Please do not hesitate to contact me with any questions or concerns. Consult note has been forwarded to the referral physician.     Lloyd Francis MD, [facility]  Cardiovascular Disease  [facility], [place]  [phone] (P)

## 2025-03-06 ENCOUNTER — RESULTS FOLLOW-UP (OUTPATIENT)
Dept: CARDIOLOGY | Facility: CLINIC | Age: 80
End: 2025-03-06
Payer: MEDICARE

## 2025-03-06 NOTE — TELEPHONE ENCOUNTER
Spoke with patient to advise that per Dr. Francis: results of lipids reveal mild increase, will continue to monitor as needed, continue low cholesterol diet and follow up as scheduled. Your PCP may repeat labs sooner if needed but otherwise I will at follow up visit. Patient voiced understanding.         ----- Message from Lloyd Francis MD sent at 3/6/2025  8:45 AM CST -----  Please contact the patient and let them know that their results of lipids reveal mild increase, will continue to monitor as needed, continue low cholesterol diet and follow up as scheduled. Your PCP   may repeat labs sooner if needed but otherwise I will at follow up visit.   ----- Message -----  From: [employer] [facility]  Sent: 3/5/2025   4:57 PM CST  To: Lloyd Francis MD

## 2025-03-15 ENCOUNTER — HOSPITAL ENCOUNTER (EMERGENCY)
Facility: HOSPITAL | Age: 80
Discharge: HOME | End: 2025-03-15
Attending: EMERGENCY MEDICINE
Payer: MEDICARE

## 2025-03-15 VITALS
HEART RATE: 73 BPM | TEMPERATURE: 98 F | BODY MASS INDEX: 26.69 KG/M2 | OXYGEN SATURATION: 97 % | WEIGHT: 170.38 LBS | DIASTOLIC BLOOD PRESSURE: 63 MMHG | SYSTOLIC BLOOD PRESSURE: 110 MMHG | RESPIRATION RATE: 18 BRPM

## 2025-03-15 DIAGNOSIS — K30 INDIGESTION: ICD-10-CM

## 2025-03-15 DIAGNOSIS — R07.9 CHEST PAIN, UNSPECIFIED TYPE: Primary | ICD-10-CM

## 2025-03-15 DIAGNOSIS — R07.9 CHEST PAIN: ICD-10-CM

## 2025-03-15 DIAGNOSIS — R10.13 DYSPEPSIA: ICD-10-CM

## 2025-03-15 LAB
ALBUMIN SERPL BCP-MCNC: 3.7 G/DL (ref 3.5–5.2)
ALP SERPL-CCNC: 63 U/L (ref 40–150)
ALT SERPL W/O P-5'-P-CCNC: 19 U/L (ref 10–44)
ANION GAP SERPL CALC-SCNC: 9 MMOL/L (ref 8–16)
AST SERPL-CCNC: 27 U/L (ref 10–40)
BASOPHILS # BLD AUTO: 0.03 K/UL (ref 0–0.2)
BASOPHILS NFR BLD: 0.8 % (ref 0–1.9)
BILIRUB SERPL-MCNC: 0.7 MG/DL (ref 0.1–1)
BILIRUB UR QL STRIP: NEGATIVE
BUN SERPL-MCNC: 13 MG/DL (ref 8–23)
CALCIUM SERPL-MCNC: 9.4 MG/DL (ref 8.7–10.5)
CHLORIDE SERPL-SCNC: 103 MMOL/L (ref 95–110)
CLARITY UR: CLEAR
CO2 SERPL-SCNC: 24 MMOL/L (ref 23–29)
COLOR UR: COLORLESS
CREAT SERPL-MCNC: 0.8 MG/DL (ref 0.5–1.4)
D DIMER PPP IA.FEU-MCNC: 0.47 MG/L FEU
DIFFERENTIAL METHOD BLD: ABNORMAL
EOSINOPHIL # BLD AUTO: 0 K/UL (ref 0–0.5)
EOSINOPHIL NFR BLD: 0.8 % (ref 0–8)
ERYTHROCYTE [DISTWIDTH] IN BLOOD BY AUTOMATED COUNT: 12.8 % (ref 11.5–14.5)
EST. GFR  (NO RACE VARIABLE): >60 ML/MIN/1.73 M^2
GLUCOSE SERPL-MCNC: 104 MG/DL (ref 70–110)
GLUCOSE UR QL STRIP: NEGATIVE
HCT VFR BLD AUTO: 36.5 % (ref 37–48.5)
HGB BLD-MCNC: 12.4 G/DL (ref 12–16)
HGB UR QL STRIP: NEGATIVE
IMM GRANULOCYTES # BLD AUTO: 0.01 K/UL (ref 0–0.04)
IMM GRANULOCYTES NFR BLD AUTO: 0.3 % (ref 0–0.5)
KETONES UR QL STRIP: NEGATIVE
LEUKOCYTE ESTERASE UR QL STRIP: NEGATIVE
LIPASE SERPL-CCNC: 43 U/L (ref 4–60)
LYMPHOCYTES # BLD AUTO: 1.5 K/UL (ref 1–4.8)
LYMPHOCYTES NFR BLD: 38 % (ref 18–48)
MCH RBC QN AUTO: 30.2 PG (ref 27–31)
MCHC RBC AUTO-ENTMCNC: 34 G/DL (ref 32–36)
MCV RBC AUTO: 89 FL (ref 82–98)
MONOCYTES # BLD AUTO: 0.5 K/UL (ref 0.3–1)
MONOCYTES NFR BLD: 11.5 % (ref 4–15)
NEUTROPHILS # BLD AUTO: 2 K/UL (ref 1.8–7.7)
NEUTROPHILS NFR BLD: 48.6 % (ref 38–73)
NITRITE UR QL STRIP: NEGATIVE
NRBC BLD-RTO: 0 /100 WBC
PH UR STRIP: 7 [PH] (ref 5–8)
PLATELET # BLD AUTO: 149 K/UL (ref 150–450)
PMV BLD AUTO: 10.6 FL (ref 9.2–12.9)
POTASSIUM SERPL-SCNC: 3.6 MMOL/L (ref 3.5–5.1)
PROT SERPL-MCNC: 8.4 G/DL (ref 6–8.4)
PROT UR QL STRIP: NEGATIVE
RBC # BLD AUTO: 4.1 M/UL (ref 4–5.4)
SODIUM SERPL-SCNC: 136 MMOL/L (ref 136–145)
SP GR UR STRIP: 1.01 (ref 1–1.03)
TROPONIN I SERPL DL<=0.01 NG/ML-MCNC: <0.006 NG/ML (ref 0–0.03)
URN SPEC COLLECT METH UR: ABNORMAL
UROBILINOGEN UR STRIP-ACNC: NEGATIVE EU/DL
WBC # BLD AUTO: 4 K/UL (ref 3.9–12.7)

## 2025-03-15 PROCEDURE — 84484 ASSAY OF TROPONIN QUANT: CPT | Performed by: EMERGENCY MEDICINE

## 2025-03-15 PROCEDURE — 99285 EMERGENCY DEPT VISIT HI MDM: CPT | Mod: 25

## 2025-03-15 PROCEDURE — 25000003 PHARM REV CODE 250: Performed by: EMERGENCY MEDICINE

## 2025-03-15 PROCEDURE — 93005 ELECTROCARDIOGRAM TRACING: CPT

## 2025-03-15 PROCEDURE — 85025 COMPLETE CBC W/AUTO DIFF WBC: CPT | Performed by: EMERGENCY MEDICINE

## 2025-03-15 PROCEDURE — 83690 ASSAY OF LIPASE: CPT | Performed by: EMERGENCY MEDICINE

## 2025-03-15 PROCEDURE — 81003 URINALYSIS AUTO W/O SCOPE: CPT | Performed by: EMERGENCY MEDICINE

## 2025-03-15 PROCEDURE — 85379 FIBRIN DEGRADATION QUANT: CPT | Performed by: EMERGENCY MEDICINE

## 2025-03-15 PROCEDURE — 93010 ELECTROCARDIOGRAM REPORT: CPT | Mod: ,,, | Performed by: INTERNAL MEDICINE

## 2025-03-15 PROCEDURE — 80053 COMPREHEN METABOLIC PANEL: CPT | Performed by: EMERGENCY MEDICINE

## 2025-03-15 RX ORDER — ONDANSETRON 4 MG/1
4 TABLET, FILM COATED ORAL EVERY 8 HOURS PRN
Qty: 12 TABLET | Refills: 0 | Status: SHIPPED | OUTPATIENT
Start: 2025-03-15 | End: 2025-03-20

## 2025-03-15 RX ORDER — ALUMINUM HYDROXIDE, MAGNESIUM HYDROXIDE, AND SIMETHICONE 1200; 120; 1200 MG/30ML; MG/30ML; MG/30ML
30 SUSPENSION ORAL ONCE
Status: COMPLETED | OUTPATIENT
Start: 2025-03-15 | End: 2025-03-15

## 2025-03-15 RX ORDER — LIDOCAINE HYDROCHLORIDE 20 MG/ML
15 SOLUTION OROPHARYNGEAL ONCE
Status: COMPLETED | OUTPATIENT
Start: 2025-03-15 | End: 2025-03-15

## 2025-03-15 RX ORDER — ASPIRIN 325 MG
325 TABLET ORAL DAILY
Status: DISCONTINUED | OUTPATIENT
Start: 2025-03-15 | End: 2025-03-15 | Stop reason: HOSPADM

## 2025-03-15 RX ADMIN — LIDOCAINE HYDROCHLORIDE 15 ML: 20 SOLUTION ORAL at 10:03

## 2025-03-15 RX ADMIN — ALUMINUM HYDROXIDE, MAGNESIUM HYDROXIDE, AND DIMETHICONE 30 ML: 200; 20; 200 SUSPENSION ORAL at 10:03

## 2025-03-15 RX ADMIN — ASPIRIN 325 MG ORAL TABLET 325 MG: 325 PILL ORAL at 10:03

## 2025-03-15 NOTE — ED PROVIDER NOTES
SCRIBE #1 NOTE: I, Angelo Bliss and Celsa Beckman, am scribing for, and in the presence of, Jonny Silva Jr., MD. I have scribed the entire note.       History     Chief Complaint   Patient presents with    Chest Pain     Describes heaviness started yesterday, states she doesn't feel right, nauseated, dull left side chest pain. Hx of pacemaker.      Review of patient's allergies indicates:  No Known Allergies      History of Present Illness     HPI    3/15/2025, 10:29 AM  History obtained from the patient and medical records      History of Present Illness: Anca Mcclellan is a 79 y.o. female patient with a PMHx of HLD, bilateral carotid artery stenosis pacemaker, Type 2 DM, HLD, and LUCRETIA who presents to the Emergency Department for evaluation of waxing and waning, sub/mid-sternal chest tightness which began Thursday PM after the pt ate food (~30 hours ago). Pt reports she thought it was indigestion based on how it felt, however, her indigestion usually does not persist for this long and it was unrelieved by stephan bey. Sxs are moderate in severity. No mitigating or exacerbating factors reported. Associated sxs include nausea, dizziness, and weakness. Patient denies any belching, diaphoresis, vomiting, SOB, and all other sxs at this time. Pt denies taking aspirin. No further complaints or concerns at this time.       Arrival mode: Personal Transportation    PCP: Tony Natarajan DO        Past Medical History:  Past Medical History:   Diagnosis Date    Bilateral carotid artery stenosis 10/15/2019    Hyperlipidemia     LUCRETIA (obstructive sleep apnea) 5/6/2022       Past Surgical History:  Past Surgical History:   Procedure Laterality Date    A-V CARDIAC PACEMAKER INSERTION Left 06/03/2021    Procedure: INSERTION, CARDIAC PACEMAKER, DUAL CHAMBER;  Surgeon: Arnold Martinez MD;  Location: [facility] CATH LAB;  Service: Cardiology;  Laterality: Left;  COVID-19, MRNA, LN-S, PF ([employer]) 2/4/2021, 1/14/2021//Whoisronik-  jeane notified    CATARACT EXTRACTION      ESOPHAGOGASTRODUODENOSCOPY N/A 09/17/2021    Procedure: EGD (ESOPHAGOGASTRODUODENOSCOPY);  Surgeon: Rimma Mccracken MD;  Location: [facility];  Service: Endoscopy;  Laterality: N/A;    HYSTERECTOMY  1988    INSERTION OF PACEMAKER  06/03/2021         Family History:  Family History   Problem Relation Name Age of Onset    Cataracts Mother      Heart disease Mother      Diabetes type II Mother      Dementia Father      Blindness Father      Heart disease Brother      Migraines Neg Hx      Melanoma Neg Hx      Lupus Neg Hx      Psoriasis Neg Hx         Social History:  Social History     Tobacco Use    Smoking status: Never    Smokeless tobacco: Never   Substance and Sexual Activity    Alcohol use: Not Currently     Comment: seldom    Drug use: Never    Sexual activity: Not Currently        Review of Systems     Review of Systems   Constitutional:  Negative for chills, diaphoresis and fever.   HENT:  Negative for congestion.    Respiratory:  Positive for chest tightness. Negative for cough and shortness of breath.    Cardiovascular:  Positive for chest pain.   Gastrointestinal:  Positive for nausea. Negative for abdominal pain, diarrhea and vomiting.        (-) belching  (+) indigestion   Genitourinary:  Negative for dysuria.   Musculoskeletal:  Negative for back pain.   Skin:  Negative for rash.   Neurological:  Positive for dizziness and weakness. Negative for headaches.   All other systems reviewed and are negative.       Physical Exam     Initial Vitals [03/15/25 0904]   BP Pulse Resp Temp SpO2   115/68 92 20 98.4 °F (36.9 °C) 99 %      MAP       --          Physical Exam  Nursing Notes and Vital Signs Reviewed.  Constitutional: Patient is in no acute distress. Well-developed and well-nourished.  Head: Atraumatic. Normocephalic.  Eyes: EOM intact. Conjunctivae are not pale. No scleral icterus.  ENT: Mucous membranes are moist.   Neck: Supple. Full ROM.  Cardiovascular:  Regular rate. Regular rhythm. No murmurs, rubs, or gallops.  Pulmonary/Chest: No respiratory distress. Clear to auscultation bilaterally. No wheezing or rales.  Abdominal: Soft and non-distended.  There is no tenderness.  No rebound, guarding, or rigidity.  Musculoskeletal: Moves all extremities. No obvious deformities. No edema.  Skin: Warm and dry.  Neurological:  Alert, awake, and appropriate.  Normal speech.  No acute focal neurological deficits are appreciated.  Psychiatric: Normal affect. Good eye contact. Appropriate in content.     ED Course   Procedures  ED Vital Signs:  Vitals:    03/15/25 0904 03/15/25 1015 03/15/25 1030 03/15/25 1100   BP: 115/68 118/63 121/69 115/76   Pulse: 92 74 72 75   Resp: 20 16 20 19   Temp: 98.4 °F (36.9 °C)      TempSrc: Oral      SpO2: 99% 100% 97% 99%   Weight: 77.3 kg (170 lb 6.4 oz)          Abnormal Lab Results:  Labs Reviewed   CBC W/ AUTO DIFFERENTIAL - Abnormal       Result Value    WBC 4.00      RBC 4.10      Hemoglobin 12.4      Hematocrit 36.5 (*)     MCV 89      MCH 30.2      MCHC 34.0      RDW 12.8      Platelets 149 (*)     MPV 10.6      Immature Granulocytes 0.3      Gran # (ANC) 2.0      Immature Grans (Abs) 0.01      Lymph # 1.5      Mono # 0.5      Eos # 0.0      Baso # 0.03      nRBC 0      Gran % 48.6      Lymph % 38.0      Mono % 11.5      Eosinophil % 0.8      Basophil % 0.8      Differential Method Automated     URINALYSIS, REFLEX TO URINE CULTURE - Abnormal    Specimen UA Urine, Clean Catch      Color, UA Colorless (*)     Appearance, UA Clear      pH, UA 7.0      Specific Gravity, UA 1.010      Protein, UA Negative      Glucose, UA Negative      Ketones, UA Negative      Bilirubin (UA) Negative      Occult Blood UA Negative      Nitrite, UA Negative      Urobilinogen, UA Negative      Leukocytes, UA Negative      Narrative:     Specimen Source->Urine   COMPREHENSIVE METABOLIC PANEL    Sodium 136      Potassium 3.6      Chloride 103      CO2 24       Glucose 104      BUN 13      Creatinine 0.8      Calcium 9.4      Total Protein 8.4      Albumin 3.7      Total Bilirubin 0.7      Alkaline Phosphatase 63      AST 27      ALT 19      eGFR >60      Anion Gap 9     D DIMER, QUANTITATIVE    D-Dimer 0.47     TROPONIN I    Troponin I <0.006     LIPASE    Lipase 43            All Lab Results:  Results for orders placed or performed during the hospital encounter of 03/15/25   CBC auto differential    Collection Time: 03/15/25 10:58 AM   Result Value Ref Range    WBC 4.00 3.90 - 12.70 K/uL    RBC 4.10 4.00 - 5.40 M/uL    Hemoglobin 12.4 12.0 - 16.0 g/dL    Hematocrit 36.5 (L) 37.0 - 48.5 %    MCV 89 82 - 98 fL    MCH 30.2 27.0 - 31.0 pg    MCHC 34.0 32.0 - 36.0 g/dL    RDW 12.8 11.5 - 14.5 %    Platelets 149 (L) 150 - 450 K/uL    MPV 10.6 9.2 - 12.9 fL    Immature Granulocytes 0.3 0.0 - 0.5 %    Gran # (ANC) 2.0 1.8 - 7.7 K/uL    Immature Grans (Abs) 0.01 0.00 - 0.04 K/uL    Lymph # 1.5 1.0 - 4.8 K/uL    Mono # 0.5 0.3 - 1.0 K/uL    Eos # 0.0 0.0 - 0.5 K/uL    Baso # 0.03 0.00 - 0.20 K/uL    nRBC 0 0 /100 WBC    Gran % 48.6 38.0 - 73.0 %    Lymph % 38.0 18.0 - 48.0 %    Mono % 11.5 4.0 - 15.0 %    Eosinophil % 0.8 0.0 - 8.0 %    Basophil % 0.8 0.0 - 1.9 %    Differential Method Automated    Comprehensive metabolic panel    Collection Time: 03/15/25 10:58 AM   Result Value Ref Range    Sodium 136 136 - 145 mmol/L    Potassium 3.6 3.5 - 5.1 mmol/L    Chloride 103 95 - 110 mmol/L    CO2 24 23 - 29 mmol/L    Glucose 104 70 - 110 mg/dL    BUN 13 8 - 23 mg/dL    Creatinine 0.8 0.5 - 1.4 mg/dL    Calcium 9.4 8.7 - 10.5 mg/dL    Total Protein 8.4 6.0 - 8.4 g/dL    Albumin 3.7 3.5 - 5.2 g/dL    Total Bilirubin 0.7 0.1 - 1.0 mg/dL    Alkaline Phosphatase 63 40 - 150 U/L    AST 27 10 - 40 U/L    ALT 19 10 - 44 U/L    eGFR >60 >60 mL/min/1.73 m^2    Anion Gap 9 8 - 16 mmol/L   D dimer, quantitative    Collection Time: 03/15/25 10:58 AM   Result Value Ref Range    D-Dimer 0.47 <0.50  mg/L FEU   Troponin I    Collection Time: 03/15/25 10:58 AM   Result Value Ref Range    Troponin I <0.006 0.000 - 0.026 ng/mL   Lipase    Collection Time: 03/15/25 10:58 AM   Result Value Ref Range    Lipase 43 4 - 60 U/L   Urinalysis, Reflex to Urine Culture Urine, Clean Catch    Collection Time: 03/15/25 11:39 AM    Specimen: Urine, Clean Catch   Result Value Ref Range    Specimen UA Urine, Clean Catch     Color, UA Colorless (A) Yellow, Straw, Sintia    Appearance, UA Clear Clear    pH, UA 7.0 5.0 - 8.0    Specific Gravity, UA 1.010 1.005 - 1.030    Protein, UA Negative Negative    Glucose, UA Negative Negative    Ketones, UA Negative Negative    Bilirubin (UA) Negative Negative    Occult Blood UA Negative Negative    Nitrite, UA Negative Negative    Urobilinogen, UA Negative <2.0 EU/dL    Leukocytes, UA Negative Negative       Imaging Results:  Imaging Results              X-Ray Chest AP Portable (Final result)  Result time 03/15/25 11:38:31      Final result by Benjamin Mccoy III, MD (03/15/25 11:38:31)                   Impression:     No change since previous examination.    Finalized on: 3/15/2025 11:38 AM By:  Benjamin Mccoy MD  [facility]# [id number]      2025-03-15 11:40:39.644     [facility]               Narrative:    EXAM: XR CHEST AP PORTABLE    CLINICAL HISTORY: Chest pain unspecified.    COMPARISON: 06/16/2021    Chest x-ray, one view.    FINDINGS:    Clear lungs.  Cardiac silhouette enlarged.  Vascular calcification the aorta.  Dual-lead left-sided pacemaker.  Regional bones are unremarkable.  Overall most of the change in comparison to prior examination.                                         The EKG was ordered, reviewed, and independently interpreted by the ED provider.  Interpretation time: 9:02 AM  Rate: 86 BPM  Rhythm: Sinus rhythm with 1st degree A-V block  Interpretation: RB BB. Left anterior fascicular block. Bifascicular block. No STEMI.             The Emergency Provider reviewed the  vital signs and test results, which are outlined above.     ED Discussion     11:50 AM: Discussed pt's case with Dr. Chen (Cardiology) who agrees with current plan of care.    11:51 AM: Reassessed pt at this time. Pt reports sxs were improved after GI cocktail in ED. Pt says she thinks her sxs are related to the stomach. Pt takes 81 mg of aspirin daily and is on protonix. Pt saw Dr. Francis in January and had a negative stress test. Pt to be d/c home with PPI and home meds. F/u outpatient.    Discussed with patient and/or family/caretaker all pertinent ED information and results. Discussed pt dx and plan of tx. Gave the patient all f/u and return to the ED instructions. All questions and concerns were addressed at this time. Patient and/or family/caretaker expresses understanding of information and instructions, and is comfortable with plan to discharge. Pt is stable for discharge.     I discussed with patient and/or family/caretaker that evaluation in the ED does not suggest any emergent or life threatening medical conditions requiring immediate intervention beyond what was provided in the ED, and I believe patient is safe for discharge.  Regardless, an unremarkable evaluation in the ED does not preclude the development or presence of a serious of life threatening condition. As such, I instructed that the patient is to return immediately for any worsening or change in current symptoms.       Medical Decision Making  Dr Chen pt here with substernal / epigastric chest heaviness with nausea onset 2 days ago . pt sees Dr Francis had negative nuclear stress test 1/2025  symptoms improved after GI cocktail here will start pt on ppi and refer her to follow up with her pcp/ Dr Francis  EKG shows LAFB/RBBB unchanged from previous    Amount and/or Complexity of Data Reviewed  Labs: ordered. Decision-making details documented in ED Course.  Radiology: ordered and independent interpretation performed. Decision-making details  documented in ED Course.     Details: Cardiomegaly on dual chamber pacemaker.  ECG/medicine tests: ordered and independent interpretation performed. Decision-making details documented in ED Course.    Risk  OTC drugs.  Prescription drug management.                ED Medication(s):  Medications   aspirin tablet 325 mg (325 mg Oral Given 3/15/25 1056)   aluminum-magnesium hydroxide-simethicone 200-200-20 mg/5 mL suspension 30 mL (30 mLs Oral Given 3/15/25 1057)     And   LIDOcaine viscous HCl 2% oral solution 15 mL (15 mLs Oral Given 3/15/25 1057)       New Prescriptions    ONDANSETRON (ZOFRAN) 4 MG TABLET    Take 1 tablet (4 mg total) by mouth every 8 (eight) hours as needed for Nausea.        Follow-up Information       Call  Tony Natarajan DO.    Specialty: Family Medicine  Why: and schedule appt to see Dr Natarajan this week for recheck  Contact information:  [address] [facility] [id number] [phone]               Call  Lloyd Francis MD.    Specialties: Cardiology, Internal Medicine  Why: As needed to schedule appt for recheck if chest pain doesnt improve with treatment as discussed here  Contact information:  [address] [facility] [address] [phone]                                 Scribe Attestation:   Scribe #1: I performed the above scribed service and the documentation accurately describes the services I performed. I attest to the accuracy of the note.     Attending:   Physician Attestation Statement for Scribe #1: I, Jonny Silva Jr., MD, personally performed the services described in this documentation, as scribed by Angelo Bliss and Celsa Beckman, in my presence, and it is both accurate and complete.           Clinical Impression       ICD-10-CM ICD-9-CM   1. Chest pain, unspecified type  R07.9 786.50   2. Chest pain  R07.9 786.50   3. Dyspepsia  R10.13 536.8   4. Indigestion  K30 536.8       Disposition:   Disposition: Discharged  Condition: Stable       Jonny Silva  MD Pia  03/15/25 2301

## 2025-03-15 NOTE — DISCHARGE INSTRUCTIONS
Continue to take current home meds including  protonix daily as previously directed     Use ondansetron daily as directed for nausea

## 2025-03-24 ENCOUNTER — TELEPHONE (OUTPATIENT)
Dept: CARDIOLOGY | Facility: CLINIC | Age: 80
End: 2025-03-24
Payer: MEDICARE

## 2025-03-24 NOTE — TELEPHONE ENCOUNTER
Spoke with patient to advise that I received a Chronic Conditions Verification form from [facility]. Advised that Dr. Francis has signed the form but there is a signature required for her. Patient states that she will come by [place] tomorrow and sign, then it needs to be faxed to [facility].

## 2025-04-09 ENCOUNTER — TELEPHONE (OUTPATIENT)
Dept: NEUROLOGY | Facility: CLINIC | Age: 80
End: 2025-04-09
Payer: MEDICARE

## 2025-04-15 DIAGNOSIS — K21.9 GASTROESOPHAGEAL REFLUX DISEASE, UNSPECIFIED WHETHER ESOPHAGITIS PRESENT: ICD-10-CM

## 2025-04-15 RX ORDER — PANTOPRAZOLE SODIUM 40 MG/1
40 TABLET, DELAYED RELEASE ORAL DAILY
Qty: 90 TABLET | Refills: 1 | Status: SHIPPED | OUTPATIENT
Start: 2025-04-15 | End: 2026-04-15

## 2025-04-23 ENCOUNTER — OFFICE VISIT (OUTPATIENT)
Dept: NEUROLOGY | Facility: CLINIC | Age: 80
End: 2025-04-23
Payer: MEDICARE

## 2025-04-23 VITALS
HEIGHT: 67 IN | WEIGHT: 170.44 LBS | DIASTOLIC BLOOD PRESSURE: 79 MMHG | RESPIRATION RATE: 16 BRPM | BODY MASS INDEX: 26.75 KG/M2 | OXYGEN SATURATION: 99 % | SYSTOLIC BLOOD PRESSURE: 123 MMHG | HEART RATE: 77 BPM

## 2025-04-23 DIAGNOSIS — G57.93 NEUROPATHY OF BOTH FEET: Primary | ICD-10-CM

## 2025-04-23 DIAGNOSIS — E11.40 TYPE 2 DIABETES MELLITUS WITH DIABETIC NEUROPATHY, WITHOUT LONG-TERM CURRENT USE OF INSULIN: ICD-10-CM

## 2025-04-23 DIAGNOSIS — R20.9 BILATERAL COLD FEET: ICD-10-CM

## 2025-04-23 PROCEDURE — 1160F RVW MEDS BY RX/DR IN RCRD: CPT | Mod: CPTII,S$GLB,,

## 2025-04-23 PROCEDURE — 3288F FALL RISK ASSESSMENT DOCD: CPT | Mod: CPTII,S$GLB,,

## 2025-04-23 PROCEDURE — 3072F LOW RISK FOR RETINOPATHY: CPT | Mod: CPTII,S$GLB,,

## 2025-04-23 PROCEDURE — 3074F SYST BP LT 130 MM HG: CPT | Mod: CPTII,S$GLB,,

## 2025-04-23 PROCEDURE — 1159F MED LIST DOCD IN RCRD: CPT | Mod: CPTII,S$GLB,,

## 2025-04-23 PROCEDURE — 1126F AMNT PAIN NOTED NONE PRSNT: CPT | Mod: CPTII,S$GLB,,

## 2025-04-23 PROCEDURE — 1101F PT FALLS ASSESS-DOCD LE1/YR: CPT | Mod: CPTII,S$GLB,,

## 2025-04-23 PROCEDURE — 3078F DIAST BP <80 MM HG: CPT | Mod: CPTII,S$GLB,,

## 2025-04-23 PROCEDURE — 99214 OFFICE O/P EST MOD 30 MIN: CPT | Mod: S$GLB,,,

## 2025-04-23 PROCEDURE — 99999 PR PBB SHADOW E&M-EST. PATIENT-LVL IV: CPT | Mod: PBBFAC,,,

## 2025-04-23 RX ORDER — ONDANSETRON 4 MG/1
4 TABLET, FILM COATED ORAL EVERY 8 HOURS PRN
COMMUNITY
Start: 2025-03-15

## 2025-04-23 NOTE — PROGRESS NOTES
"Subjective:       Patient ID: Anca Mcclellan is a 80 y.o. female.      Chief Complaint: "Neuropathy"        HPI    HPI 79 Years old Female with PMHx of Cervical Radiculopathy / LUCRETIA / OA / Type 2 DM / Cardiac Pacemaker / HLD /  and other medical conditions came for the [id number] and recommendation of "Neuropathy".    Interval History: (09/23/2024) - Started Gabapentin 100 mg PO QHS PRN for Peripheral Neuropathy Management. Ordered NCS/EMG RUE, RLE, LLE / Endocrine RODRIGUEZ: HA1C, TSH-T4 / Vitamins and Minerals:  B12-MMA, FA, B1, B2, B6, Cu-Ceruloplasmin / AID RODRIGUEZ Cryoglobulins, RF, Sjögren's syndrome A-B / ID RODRIGUEZ: HIV, HCV, Lyme / Metabolic RODRIGUEZ: Heavy Metals, Ceruloplasmin.     -Patient referred by Cardiologist.    The patient started complaining of bilateral symmetric feet paroxysmal tingling and burning pain (localized to toes) about 1 year ago. The patient reports frequency has significantly decreased within the last 2-3 months with the exception of 1 time occurrence  2-3 days ago where she experienced "needles and coldness" to bilateral feet with a duration of a few minutes. Patient reports it was alleviated with applying socks and a blanket to her feet.     The patient usually notices the pain after prolonged sitting, worse at night and during cold temperature changes. Not associated   after significant physical activity or prolonged standing. The pain is paroxysmal and not persistent but is getting much worse with time and becoming uncomfortable but not disabling. The pain and tingling involve the whole feet, mainly the sole area without progression to the very distal part of the legs. No falls.     The patient denies any hand symptoms. No ankle or foot weakness. The patient denies any low back with radiation to the legs.  The patient has good control on her bowel and bladder. Does have history of DM Type 2 managed on Metformin 500 mg Daily.     No known history of thyroid disease, but does have family " history of thyroid disease. No history of malignancies. No history of toxic environmental exposure. Does have history of autoimmune disease (Polymyositis) - followed by Rheumatology. No history of excessive alcohol or recreational drug abuse. No history of chronic hepatitis or HIV. No history of malabsorption or nutritional deficiencies.       Interval History: (10/22/2024) - Discontinued Gabapentin 100 mg PO QHS PRN - Patient not receptive to PO Therapy at this time. Continued with EMG-NCS and Rheumatology Referral.     The patient notes improvement in bilateral foot neuropathy since the last visit and reports no occurences in the last 2 weeks. Keeping her feet covered has been beneficial in managing pain. There have been no falls reported.    Medication: Gabapentin 100 mg PO QHS PRN has been prescribed for peripheral neuropathy management. The patient has not initiated this medication due to concerns about potential side effects.        New Issues: (04/23/2025) -     No new issues reported by the patient.    Neuropathy:  The patient reports significant improvement in bilateral foot neuropathy, which is currently managed with the use of socks and elevating the feet. Symptoms have markedly improved. The patient denies any falls. Cold weather is noted to exacerbate the neuropathic pain, whereas warmer weather has not been associated with any issues at this time. The patient does not feel the need to initiate pharmacologic treatment for neuropathy.    Review of Systems   Constitutional:  Negative for activity change, appetite change, chills, diaphoresis, fatigue, fever and unexpected weight change.   HENT:  Negative for congestion, dental problem, drooling, ear discharge, ear pain, facial swelling, hearing loss, mouth sores, nosebleeds, postnasal drip, rhinorrhea, sinus pressure, sinus pain, sneezing, sore throat, tinnitus, trouble swallowing and voice change.    Eyes:  Negative for photophobia, pain, discharge,  redness, itching and visual disturbance.   Respiratory:  Negative for cough, chest tightness, shortness of breath and wheezing.    Cardiovascular:  Negative for chest pain, palpitations and leg swelling.   Gastrointestinal:  Negative for abdominal distention, abdominal pain, blood in stool, constipation, diarrhea, nausea and vomiting.   Endocrine: Negative for cold intolerance, heat intolerance, polydipsia, polyphagia and polyuria.   Genitourinary:  Negative for decreased urine volume, difficulty urinating, dysuria, flank pain, frequency, hematuria, pelvic pain, urgency and vaginal discharge.   Musculoskeletal:  Negative for arthralgias, back pain, gait problem, joint swelling, myalgias, neck pain and neck stiffness.   Skin:  Negative for color change and rash.   Allergic/Immunologic: Negative for immunocompromised state.   Neurological:  Positive for numbness. Negative for dizziness, tremors, seizures, syncope, facial asymmetry, speech difficulty, weakness, light-headedness and headaches.   Hematological:  Negative for adenopathy. Does not bruise/bleed easily.   Psychiatric/Behavioral:  Negative for agitation, behavioral problems, confusion, decreased concentration, dysphoric mood, hallucinations, self-injury, sleep disturbance and suicidal ideas. The patient is not nervous/anxious and is not hyperactive.    All other systems reviewed and are negative.                Current Outpatient Medications:     aspirin (ECOTRIN) 81 MG EC tablet, Take 81 mg by mouth once daily., Disp: , Rfl:     donepeziL (ARICEPT) 5 MG tablet, Take 5 mg by mouth every evening., Disp: , Rfl:     ferrous sulfate (FEOSOL) 325 mg (65 mg iron) Tab tablet, 1 tablet, Disp: , Rfl:     fluocinonide (LIDEX) 0.05 % external solution, AAA scalp qday - bid prn pruritus, Disp: 60 mL, Rfl: 11    ketoconazole (NIZORAL) 2 % shampoo, Apply topically once a week. Lather in for 5-10 min before rinsing, Disp: 120 mL, Rfl: 5    metFORMIN (GLUCOPHAGE-XR) 500 MG  ER 24hr tablet, Take 500 mg by mouth once daily., Disp: , Rfl:     methylPREDNISolone (MEDROL DOSEPACK) 4 mg tablet, use as directed, Disp: 21 tablet, Rfl: 0    midodrine (PROAMATINE) 10 MG tablet, Take 1 tablet (10 mg total) by mouth 3 (three) times daily with meals., Disp: 360 tablet, Rfl: 2    multivitamin (THERAGRAN) per tablet, Take 1 tablet by mouth once daily., Disp: , Rfl:     pantoprazole (PROTONIX) 40 MG tablet, Take 1 tablet (40 mg total) by mouth once daily., Disp: 90 tablet, Rfl: 1    Past Medical History:   Diagnosis Date    Bilateral carotid artery stenosis 10/15/2019    Hyperlipidemia     LUCRETIA (obstructive sleep apnea) 5/6/2022       Past Surgical History:   Procedure Laterality Date    A-V CARDIAC PACEMAKER INSERTION Left 06/03/2021    Procedure: INSERTION, CARDIAC PACEMAKER, DUAL CHAMBER;  Surgeon: Arnold Martinez MD;  Location: [facility] CATH LAB;  Service: Cardiology;  Laterality: Left;  COVID-19, MRNA, LN-S, PF ([employer]) 2/4/2021, 1/14/2021//PAAYReilly ching notified    CATARACT EXTRACTION      ESOPHAGOGASTRODUODENOSCOPY N/A 09/17/2021    Procedure: EGD (ESOPHAGOGASTRODUODENOSCOPY);  Surgeon: Rimma Mccracken MD;  Location: [facility] ENDO;  Service: Endoscopy;  Laterality: N/A;    HYSTERECTOMY  1988    INSERTION OF PACEMAKER  06/03/2021       Social History     Socioeconomic History    Marital status:    Tobacco Use    Smoking status: Never    Smokeless tobacco: Never   Substance and Sexual Activity    Alcohol use: Not Currently     Comment: seldom    Drug use: Never    Sexual activity: Not Currently   Other Topics Concern    Are you pregnant or think you may be? No    Breast-feeding No         Past/Current Medical/Surgical History, Past/Current Social History, Past/Current Family History and Past/Current Medications were reviewed in detail.    Objective:           VITAL SIGNS WERE REVIEWED      GENERAL APPEARANCE:     The patient looks comfortable.    BMI    No signs of respiratory  distress.    Normal breathing pattern.    No dysmorphic features    Normal eye contact.       GENERAL MEDICAL EXAM:    HEENT:  Head is atraumatic normocephalic.     FUNDUSCOPIC (OPHTHALMOSCOPIC) EXAMINATION showed no disc edema (papilledema).      NECK: No JVD. No visible lesions or goiters.     CHEST-CARDIOPULMONARY: No cyanosis. No tachypnea. Normal respiratory effort.    PWEFLVD-[id number]-[id number]: No jaundice. No stomas or lesions. No visible hernias. No catheters.     SKIN, HAIR, NAILS: No pathognomonic skin rash.No neurofibromatosis. No visible lesions.No stigmata of autoimmune disease. No clubbing.    LIMBS: No varicose veins. No visible swelling.    MUSCULOSKELETAL: No visible deformities.No visible lesions.             Neurologic Exam     Mental Status   Oriented to person, place, and time.   Oriented to person.   Oriented to place. Oriented to country, city and area.   Oriented to time. Oriented to year, month, date, day and season.   Registration: recalls 3 of 3 objects. Recall at 5 minutes: recalls 3 of 3 objects. Follows 3 step commands.   Attention: normal. Concentration: normal.   Speech: speech is normal   Level of consciousness: alert  Knowledge: good. Able to perform simple calculations.   Able to name object. Able to read. Able to repeat. Able to write. Normal comprehension.     Cranial Nerves     CN II   Visual fields full to confrontation.   Visual acuity: normal  Right visual field deficit: none  Left visual field deficit: none     CN III, IV, VI   Pupils are equal, round, and reactive to light.  Extraocular motions are normal.   Right pupil: Size: 2 mm. Shape: regular. Reactivity: brisk. Consensual response: intact. Accommodation: intact.   Left pupil: Size: 2 mm. Shape: regular. Reactivity: brisk. Consensual response: intact. Accommodation: intact.   CN III: no CN III palsy  CN VI: no CN VI palsy  Nystagmus: none   Diplopia: none  Ophthalmoparesis: none  Upgaze: normal  Downgaze:  normal  Conjugate gaze: present  Vestibulo-ocular reflex: present    CN V   Facial sensation intact.   Right facial sensation deficit: none  Left facial sensation deficit: none    CN VII   Facial expression full, symmetric.   Right facial weakness: none  Left facial weakness: none    CN VIII   CN VIII normal.   Hearing: intact    CN IX, X   CN IX normal.   CN X normal.   Palate: symmetric    CN XI   CN XI normal.   Right sternocleidomastoid strength: normal  Left sternocleidomastoid strength: normal  Right trapezius strength: normal  Left trapezius strength: normal    CN XII   CN XII normal.   Tongue: not atrophic  Fasciculations: absent  Tongue deviation: none    Motor Exam   Muscle bulk: normal  Overall muscle tone: normal  Right arm pronator drift: absent  Left arm pronator drift: absent    Strength   Strength 5/5 throughout.   Right neck flexion: 5/5  Left neck flexion: 5/5  Right neck extension: 5/5  Left neck extension: 5/5  Right deltoid: 5/5  Left deltoid: 5/5  Right biceps: 5/5  Left biceps: 5/5  Right triceps: 5/5  Left triceps: 5/5  Right wrist flexion: 5/5  Left wrist flexion: 5/5  Right wrist extension: 5/5  Left wrist extension: 5/5  Right interossei: 5/5  Left interossei: 5/5  Right abdominals: 5/5  Left abdominals: 5/5  Right iliopsoas: 5/5  Left iliopsoas: 5/5  Right quadriceps: 5/5  Left quadriceps: 5/5  Right hamstrin/5  Left hamstrin/5  Right glutei: 5/5  Left glutei: 5/5  Right anterior tibial: 5/5  Left anterior tibial: 5/5  Right posterior tibial: 5/5  Left posterior tibial: 5/5  Right peroneal: 5/5  Left peroneal: 5/5  Right gastroc: 5/5  Left gastroc: 5/5    Sensory Exam   Light touch normal.   Right arm vibration: normal  Left arm vibration: normal  Right leg vibration: decreased from knee  Left leg vibration: normal  Proprioception normal.   Right arm pinprick: normal  Left arm pinprick: normal  Right leg pinprick: decreased from knee  Left leg pinprick: normal  Graphesthesia:  normal  Stereognosis: normal    Gait, Coordination, and Reflexes     Gait  Gait: normal    Coordination   Romberg: negative  Finger to nose coordination: normal  Heel to shin coordination: normal  Tandem walking coordination: normal    Tremor   Resting tremor: absent  Intention tremor: absent  Action tremor: absent    Reflexes   Reflexes 2+ except as noted.   Right brachioradialis: 2+  Left brachioradialis: 2+  Right biceps: 2+  Left biceps: 2+  Right triceps: 2+  Left triceps: 2+  Right patellar: 2+  Left patellar: 2+  Right achilles: 0  Left achilles: 0  Right : 2+  Left : 2+  Right plantar: normal  Left plantar: normal  Right Willett: absent  Left Willett: absent  Right ankle clonus: absent  Left ankle clonus: absent  Right pendular knee jerk: absent  Left pendular knee jerk: absent        Lab Results   Component Value Date    WBC 4.00 03/15/2025    HGB 12.4 03/15/2025    HCT 36.5 (L) 03/15/2025    MCV 89 03/15/2025     (L) 03/15/2025       Sodium   Date Value Ref Range Status   03/15/2025 136 136 - 145 mmol/L Final     Potassium   Date Value Ref Range Status   03/15/2025 3.6 3.5 - 5.1 mmol/L Final     Chloride   Date Value Ref Range Status   03/15/2025 103 95 - 110 mmol/L Final     CO2   Date Value Ref Range Status   03/15/2025 24 23 - 29 mmol/L Final     Glucose   Date Value Ref Range Status   03/15/2025 104 70 - 110 mg/dL Final     BUN   Date Value Ref Range Status   03/15/2025 13 8 - 23 mg/dL Final     Creatinine   Date Value Ref Range Status   03/15/2025 0.8 0.5 - 1.4 mg/dL Final     Calcium   Date Value Ref Range Status   03/15/2025 9.4 8.7 - 10.5 mg/dL Final     Total Protein   Date Value Ref Range Status   03/15/2025 8.4 6.0 - 8.4 g/dL Final     Albumin   Date Value Ref Range Status   03/15/2025 3.7 3.5 - 5.2 g/dL Final     Total Bilirubin   Date Value Ref Range Status   03/15/2025 0.7 0.1 - 1.0 mg/dL Final     Comment:     For infants and newborns, interpretation of results should be  "based  on gestational age, weight and in agreement with clinical  observations.    Premature Infant recommended reference ranges:  Up to 24 hours.............<8.0 mg/dL  Up to 48 hours............<12.0 mg/dL  3-5 days..................<15.0 mg/dL  6-29 days.................<15.0 mg/dL       Alkaline Phosphatase   Date Value Ref Range Status   03/15/2025 63 40 - 150 U/L Final     AST   Date Value Ref Range Status   03/15/2025 27 10 - 40 U/L Final     ALT   Date Value Ref Range Status   03/15/2025 19 10 - 44 U/L Final     Anion Gap   Date Value Ref Range Status   03/15/2025 9 8 - 16 mmol/L Final     eGFR if    Date Value Ref Range Status   04/29/2022 >60 >60 mL/min/1.73 m^2 Final     eGFR if non    Date Value Ref Range Status   04/29/2022 >60 >60 mL/min/1.73 m^2 Final     Comment:     Calculation used to obtain the estimated glomerular filtration  rate (eGFR) is the CKD-EPI equation.          Lab Results   Component Value Date    [id number] 763 09/23/2024       Lab Results   Component Value Date    TSH 0.777 09/23/2024    FREET4 0.75 09/23/2024       No results found in the last 24 hours.    No results found in the last 24 hours.    Reviewed the neuroimaging independently       Assessment:   79 Years old Female with PMH as above came for an evaluation of "Neuropathy"    1. Neuropathy of both feet        2. Bilateral cold feet        3. Type 2 diabetes mellitus with diabetic neuropathy, without long-term current use of insulin           Plan:   Patient Neurological Assessment is remarkable for decreased vibration and pinprick to the right lower extremity below the knee. Patient's history and physical point to Peripheral Diabetic Neuropathy.     -Patient is not receptive to PO Therapy at this time for Neuropathy. Discussed various alternative medication options and side effects. Patient declines and will let provider know when she is ready to discuss treatment options. She reports that " for now Neuropathy has significantly improved and does not require treatment.      -Continue Rheumatology referral for further evaluation and recommendation of Sjögren's syndrome A (Positive)      -Offered Referral to Pain Management for topical Neuropathy treatments. Patient declines at this time     -Reviewed results of EMG-NCS - patient verbalized understanding.      WORKUP-EVALUATION     Reviewed all results with patient in detail. She verbalized understanding.     NCS/EMG RUE, RLE, LLE    Endocrine RODRIGUEZ: HA1C, TSH-T4    Vitamins and Minerals:  B12-MMA, FA, B1, B2, B6, Cu-Ceruloplasmin.     AID RODRIGUEZ Cryoglobulins, RF, Sjögren's syndrome A-B     ID RODRIGUEZ: HIV, HCV, Lyme.    Metabolic RODRIGUEZ: Heavy Metals, Ceruloplasmin    Invasive: LP for CSF (Protein>Age suggests CIDP) , Muscle and Nerve Biopsy.             LABORATORY EVALUATION    Labs: ([id number]) CPK / Lipid Panel / Lipase / Troponin I / D-Dimer / TSH-T4 / B12-MMA / FA / B1 / B2 / B6 / Cu-Ceruloplasmin / Cryoglobulins / RF / Sjögren's syndrome B / HIV / HCV / Lyme / Heavy Metals / Ceruloplasmin / Ferritin / Iron and TIBC / Cryoglobulin / Immunofixation Electrophoresis / CMP / CBC / CRP /   -personally reviewed -non-significant abnormalities except     Sjögren's syndrome A (Positive) - Patient referred to Rheumatologist    A1C (6.1) - elevated - followed by PCP - Managed on Metformin 500 mg Daily.     Sed Rate (89) - elevated / Positive VALDEZ / - Followed by Rheumatology     Protein Electrophoresis Serum (9.1) - elevated - Followed by Rheumatology        RADIOLOGY EVALUATION     Personally Reviewed EMG-NCS - done 10/28/2024 - This is a normal study. There is no electrophysiologic evidence of peripheral neuropathy.     Personally Reviewed X-Ray Lumbar Spine - done 2021 - Five lumbar type vertebral bodies. Old-appearing insufficiency fracture at the L2 level with smooth depression superior endplate and anterior superior marginal spurring. Remaining vertebral bodies  demonstrate normal height. There is multilevel loss of disc height with marginal spurring. Prominent posterior spurring at the L3-4 and L5-S1 levels. No additional fracture or subluxation. No pars defects. Prominent facet arthropathy at the L4-5 and L5-S1 levels. Pedicles and SI joints intact. Degenerative change partially visualized hip joints. Straightening of the normal lumbar curvature noted     Personally Reviewed Brain MRI W WO Contrast- done 2019 - no acute abnormalities - minimal chronic microvascular ischemia.     Personally Reviewed EMG-NCS - done 2019 - EMG is consistent with mild Poly myositis. Possible right tibial motor neuropathy, possible technical issue.       NEUROPHYSIOLOGY EVALUATION       PATHOLOGY EVALUATION        NEUROCOGNITIVE AND NEUROPSYCHOLOGY EVALUATION              LENGTH-DEPENDENT SENSORIMOTOR PERIPHERAL POLYNEUROPATHY           MANAGEMENT     Will prescribe compounded creams PRN.    BS Control.    Alcohol cessation.    In terms of management I counseled the patient that neuropathic pain meds target is 40% reduction of pain.     I encouraged the patient to read the leaflet for any newly prescribed medication for more details and report any side effect whether listed or not listed.    FIRST LINE OPTIONS:    Gabapentin/GBP-Neurontin which can cause sedation, weight gain, swelling and abnormal movements. Will titrate slowly to 300 mg TID with a maximum of 1200 mg TID. The patient verbalized understanding.    Pregabalin/PGB-Lyrica which can cause sedation, weight gain, swelling and abnormal movements. Will titrate slowly to 100 mg BID with maximum of 300 mg BID. The patient verbalized understanding.    Amitriptyline/Elavil which can cause sleepiness, dry eyes, dry mouth, urinary retention and rarely cardiac arrhythmias. Will titrate to 75 mg QHS. The patient verbalized understanding.    Duloxetine/Cymbalta which causes sedation, weight gain and sexual dysfunction and potentially  interactions can cause serotonin syndrome. Will titrate slowly to 60 mg daily. The patient verbalized understanding.    Zonisamide/Zonegran 100-400 mg QHS is a good alternative to traditional choices in case of SE/AE and weight gain.      SECOND LINE OPTIONS:    Lamotrigine/Lamictal LTG which can cause serious skin rash (SJS) and cardiac arrhythmias per recent reports. Will titrate slowly to a maximum of 300 mg BID.    Carbamazepine/Tegretol CBZ which can cause liver and bone marrow toxicity. Will titrate slowly to a maximum of 300 mg BID.       LAST OPTIONS:    Interventional Pain Management utilizing Neuro stimulators (Nevro HFX for DPN).        POLYNEUROPATHY PRECAUTIONS:      Discussed with the patient some of the neuropathy precautions like:    Always use oven mitts.    Test water with an unaffected hand or foot.    Use caution when trimming nails. File sharp areas    Wear shoes that fit well to avoid pressure points, blisters, and ulcers.    Inspect your hands and feet carefully (including the soles of your feet and between your toes) at least once a week.       MEDICAL/SURGICAL COMORBIDITIES     All relevant medical comorbidities noted and managed by primary care physician and medical care team.          HEALTHY LIFESTYLE AND PREVENTATIVE CARE    The patient to adhere to the age-appropriate health maintenance guidelines including screening tests and vaccinations. The patient to adhere to  healthy lifestyle, optimal weight, exercise, healthy diet, good sleep hygiene and avoiding drugs including smoking, alcohol and recreational drugs.    I spent a total of 30 minutes on the day of the visit.This includes face to face time and non-face to face time preparing to see the patient (eg, review of tests), obtaining and/or reviewing separately obtained history, documenting clinical information in the electronic or other health record, independently interpreting results and communicating results to the  patient/family/caregiver, or care coordinator.     Please do not hesitate to contact me with any updates, questions or concerns.    [id number]    Rayne Mane, MSN, FNP-C    General Neurology

## 2025-04-30 ENCOUNTER — OFFICE VISIT (OUTPATIENT)
Dept: DERMATOLOGY | Facility: CLINIC | Age: 80
End: 2025-04-30
Payer: MEDICARE

## 2025-04-30 DIAGNOSIS — L21.9 SEBORRHEIC DERMATITIS: ICD-10-CM

## 2025-04-30 PROCEDURE — 1126F AMNT PAIN NOTED NONE PRSNT: CPT | Mod: CPTII,S$GLB,, | Performed by: STUDENT IN AN ORGANIZED HEALTH CARE EDUCATION/TRAINING PROGRAM

## 2025-04-30 PROCEDURE — 1159F MED LIST DOCD IN RCRD: CPT | Mod: CPTII,S$GLB,, | Performed by: STUDENT IN AN ORGANIZED HEALTH CARE EDUCATION/TRAINING PROGRAM

## 2025-04-30 PROCEDURE — 3288F FALL RISK ASSESSMENT DOCD: CPT | Mod: CPTII,S$GLB,, | Performed by: STUDENT IN AN ORGANIZED HEALTH CARE EDUCATION/TRAINING PROGRAM

## 2025-04-30 PROCEDURE — 99213 OFFICE O/P EST LOW 20 MIN: CPT | Mod: S$GLB,,, | Performed by: STUDENT IN AN ORGANIZED HEALTH CARE EDUCATION/TRAINING PROGRAM

## 2025-04-30 PROCEDURE — 3072F LOW RISK FOR RETINOPATHY: CPT | Mod: CPTII,S$GLB,, | Performed by: STUDENT IN AN ORGANIZED HEALTH CARE EDUCATION/TRAINING PROGRAM

## 2025-04-30 PROCEDURE — 1101F PT FALLS ASSESS-DOCD LE1/YR: CPT | Mod: CPTII,S$GLB,, | Performed by: STUDENT IN AN ORGANIZED HEALTH CARE EDUCATION/TRAINING PROGRAM

## 2025-04-30 PROCEDURE — 99999 PR PBB SHADOW E&M-EST. PATIENT-LVL II: CPT | Mod: PBBFAC,,, | Performed by: STUDENT IN AN ORGANIZED HEALTH CARE EDUCATION/TRAINING PROGRAM

## 2025-04-30 PROCEDURE — 1160F RVW MEDS BY RX/DR IN RCRD: CPT | Mod: CPTII,S$GLB,, | Performed by: STUDENT IN AN ORGANIZED HEALTH CARE EDUCATION/TRAINING PROGRAM

## 2025-04-30 PROCEDURE — G2211 COMPLEX E/M VISIT ADD ON: HCPCS | Mod: S$GLB,,, | Performed by: STUDENT IN AN ORGANIZED HEALTH CARE EDUCATION/TRAINING PROGRAM

## 2025-04-30 RX ORDER — KETOCONAZOLE 20 MG/ML
SHAMPOO, SUSPENSION TOPICAL WEEKLY
Qty: 120 ML | Refills: 11 | Status: SHIPPED | OUTPATIENT
Start: 2025-04-30

## 2025-04-30 RX ORDER — FLUOCINONIDE TOPICAL SOLUTION USP, 0.05% 0.5 MG/ML
SOLUTION TOPICAL
Qty: 60 ML | Refills: 11 | Status: SHIPPED | OUTPATIENT
Start: 2025-04-30

## 2025-04-30 NOTE — PROGRESS NOTES
Patient Information  Name: Anca Mcclellan  : 1945  MRN: [id number]     Referring Physician:  Dr. Robert ref. provider found   Primary Care Physician:  Tony Ewing,    Date of Visit: 2025      Subjective:       Anca Mcclellan is a 80 y.o. female who presents for   Chief Complaint   Patient presents with    Hair Loss     Hair thinning and dandruff follow up        Hx of seb derm, here for follow up. She is currently using topical lidex and ketoconazole shampoo.  Patient was last seen:2023     Prior notes by myself reviewed.   Clinical documentation obtained by nursing staff reviewed.    Review of Systems   Skin:  Negative for itching and rash.        Objective:    Physical Exam   Constitutional: She appears well-developed and well-nourished. No distress.   Neurological: She is alert and oriented to person, place, and time. She is not disoriented.   Psychiatric: She has a normal mood and affect.   Skin:   Areas Examined (abnormalities noted in diagram):   Scalp / Hair Palpated and Inspected              Diagram Legend     Erythematous scaling macule/papule c/w actinic keratosis       Vascular papule c/w angioma      Pigmented verrucoid papule/plaque c/w seborrheic keratosis      Yellow umbilicated papule c/w sebaceous hyperplasia      Irregularly shaped tan macule c/w lentigo     1-2 mm smooth white papules consistent with Milia      Movable subcutaneous cyst with punctum c/w epidermal inclusion cyst      Subcutaneous movable cyst c/w pilar cyst      Firm pink to brown papule c/w dermatofibroma      Pedunculated fleshy papule(s) c/w skin tag(s)      Evenly pigmented macule c/w junctional nevus     Mildly variegated pigmented, slightly irregular-bordered macule c/w mildly atypical nevus      Flesh colored to evenly pigmented papule c/w intradermal nevus       Pink pearly papule/plaque c/w basal cell carcinoma      Erythematous hyperkeratotic cursted plaque c/w SCC      Surgical scar with  no sign of skin cancer recurrence      Open and closed comedones      Inflammatory papules and pustules      Verrucoid papule consistent consistent with wart     Erythematous eczematous patches and plaques     Dystrophic onycholytic nail with subungual debris c/w onychomycosis     Umbilicated papule    Erythematous-base heme-crusted tan verrucoid plaque consistent with inflamed seborrheic keratosis     Erythematous Silvery Scaling Plaque c/w Psoriasis     See annotation      No images are attached to the encounter or orders placed in the encounter.    [] Data reviewed  [] Independent review of test  [] Management discussed with another provider    Assessment / Plan:        Seborrheic dermatitis leading to alopecia, improving  -    Refill fluocinonide (LIDEX) 0.05 % external solution; AAA scalp qday - bid prn pruritus  Dispense: 60 mL; Refill: 11  - Continue ketoconazole shampoo           LOS NUMBER AND COMPLEXITY OF PROBLEMS    COMPLEXITY OF DATA RISK TOTAL TIME (m)   12807  02183 [] 1 self-limited or minor problem [x] Minimal to none [] No treatment recommended or patient to monitor 15-29  10-19   84697  13783 Low  [] 2 or > self limited or minor problems  [x] 1 stable chronic illness  [] 1 acute, uncomplicated illness or injury Limited (2)  [] Prior external notes from each unique source  [] Review result of each unique test  [] Order each unique test []  Low  OTC medications, minor skin biopsy 30-44 20-29   19555  96767 Moderate  []  1 or > chronic illness with progression, exacerbation or SE of treatment  []  2 or more stable chronic illnesses  []  1 acute illness with systemic symptoms  []  1 acute complicated injury  []  1 undiagnosed new problem with uncertain prognosis Moderate (1/3 below)  []  3 or more data items        *Now includes assessment requiring independent historian  []  Independent interpretation of a test  []  Discuss management/test with another provider Moderate  [x]  Prescription drug  mgmt  []  Minor surgery with risk discussed  []  Mgmt limited by social determinates 45-59  30-39   75060  90613 High  []  1 or more chronic illness with severe exacerbation, progression or SE of treatment  []  1 acute or chronic illness/injury that poses a threat to life or bodily function Extensive (2/3 below)  []  3 or more data items        *Now includes assessment requiring independent historian.  []  Independent interpretation of a test  []  Discuss management/test with another provider High  []  Major surgery with risk discussed  []  Drug therapy requiring intensive monitoring for toxicity  []  Hospitalization  []  Decision for DNR 60-74  40-54      No follow-ups on file.    Haritha Estrada MD, FAAD  [facility]

## 2025-05-26 ENCOUNTER — CLINICAL SUPPORT (OUTPATIENT)
Dept: CARDIOLOGY | Facility: HOSPITAL | Age: 80
End: 2025-05-26
Payer: MEDICARE

## 2025-05-26 ENCOUNTER — CLINICAL SUPPORT (OUTPATIENT)
Dept: CARDIOLOGY | Facility: HOSPITAL | Age: 80
End: 2025-05-26
Attending: INTERNAL MEDICINE
Payer: MEDICARE

## 2025-05-26 DIAGNOSIS — I49.5 SICK SINUS SYNDROME: ICD-10-CM

## 2025-05-26 DIAGNOSIS — Z95.0 PRESENCE OF CARDIAC PACEMAKER: ICD-10-CM

## 2025-05-26 PROCEDURE — 93294 REM INTERROG EVL PM/LDLS PM: CPT | Mod: S$GLB,,, | Performed by: INTERNAL MEDICINE

## 2025-05-26 PROCEDURE — 93296 REM INTERROG EVL PM/IDS: CPT | Performed by: INTERNAL MEDICINE

## 2025-05-29 ENCOUNTER — TELEPHONE (OUTPATIENT)
Dept: OPHTHALMOLOGY | Facility: CLINIC | Age: 80
End: 2025-05-29
Payer: MEDICARE

## 2025-05-29 NOTE — TELEPHONE ENCOUNTER
Copied from CRM #[id number]. Topic: Appointments - Appointment Access  >> May 29, 2025 10:09 AM Heavenly wrote:  Patient calling to schedule visit, states having some issues with her eyes. Please call back at .[phone]

## 2025-05-30 ENCOUNTER — OFFICE VISIT (OUTPATIENT)
Dept: OTOLARYNGOLOGY | Facility: CLINIC | Age: 80
End: 2025-05-30
Payer: MEDICARE

## 2025-05-30 VITALS — BODY MASS INDEX: 26.69 KG/M2 | HEIGHT: 67 IN

## 2025-05-30 DIAGNOSIS — H90.3 ASYMMETRICAL SENSORINEURAL HEARING LOSS: Primary | ICD-10-CM

## 2025-05-30 DIAGNOSIS — H61.22 IMPACTED CERUMEN OF LEFT EAR: ICD-10-CM

## 2025-05-30 LAB
OHS CV AF BURDEN PERCENT: < 1
OHS CV DC REMOTE DEVICE TYPE: NORMAL
OHS CV RV PACING PERCENT: 0 %

## 2025-05-30 PROCEDURE — 99999 PR PBB SHADOW E&M-EST. PATIENT-LVL III: CPT | Mod: PBBFAC,,, | Performed by: PHYSICIAN ASSISTANT

## 2025-05-30 NOTE — PROGRESS NOTES
Subjective     Patient ID: Anca Mcclellan is a 80 y.o. female.    Chief Complaint: Cerumen Impaction (Ear cleaning)    Ms. Davidson is a very pleasant 80 year old female here to see me today for evaluation of hearing loss.  She has noticed gradual progression of her hearing loss since she was last here with me in 2023.  She says she's turning up volume on television and often asking others to repeat themselves.  States that her right ear is her better hearing ear.  She has hx of asymmetrical sensorineural hearing loss and had negative MRI Brain in 2019.  No recent ear drainage; has occasional soreness in her ears AD>AS (denies any triggers).  No recent dizziness.  No previous otologic surgery or hx of loud noise exposure.  She denies family hx of hearing loss.  No tinnitus recently (had it many years ago).  She has recently tried a wax removal kit thinking she might have cerumen impactions.      Review of Systems   Constitutional:  Negative for fever.   HENT:  Positive for hearing loss. Negative for ear discharge and ear pain.    Neurological:  Negative for dizziness.          Objective     Physical Exam  Constitutional:       General: She is not in acute distress.     Appearance: She is well-developed.   HENT:      Head: Normocephalic and atraumatic.      Jaw: No trismus.      Right Ear: Tympanic membrane, ear canal and external ear normal. No drainage, swelling or tenderness. No middle ear effusion. Tympanic membrane is not injected or erythematous.      Left Ear: Tympanic membrane, ear canal and external ear normal. No drainage, swelling or tenderness.  No middle ear effusion. There is impacted cerumen (moderate (removal decsribed below)). Tympanic membrane is not injected or erythematous.      Nose: Nose normal. No mucosal edema or rhinorrhea.      Mouth/Throat:      Mouth: Mucous membranes are moist. Mucous membranes are not pale and not dry.      Pharynx: Uvula midline. No oropharyngeal exudate or posterior  oropharyngeal erythema.   Eyes:      General: Lids are normal. No scleral icterus.     Extraocular Movements:      Right eye: Normal extraocular motion.      Left eye: Normal extraocular motion.      Conjunctiva/sclera: Conjunctivae normal.      Right eye: Right conjunctiva is not injected. No chemosis.     Left eye: Left conjunctiva is not injected. No chemosis.     Pupils: Pupils are equal, round, and reactive to light.   Neck:      Trachea: Trachea and phonation normal. No tracheal tenderness.   Pulmonary:      Effort: Pulmonary effort is normal. No respiratory distress.   Lymphadenopathy:      Head:      Right side of head: No preauricular or posterior auricular adenopathy.      Left side of head: No preauricular or posterior auricular adenopathy.      Cervical: No cervical adenopathy.   Skin:     General: Skin is warm and dry.      Findings: No erythema or rash.   Neurological:      Mental Status: She is alert and oriented to person, place, and time.      Cranial Nerves: No cranial nerve deficit.   Psychiatric:         Behavior: Behavior normal. Behavior is cooperative.       Procedure Note    CHIEF COMPLAINT:  Cerumen Impaction    Description:  The patient was seated in an exam chair.  An ear speculum was placed in the left EAC and was examined under the microscope.  Alligator forceps were used to remove a large cerumen impaction.  The tympanic membrane was visualized and was normal in appearance.   The patient tolerated the procedure well.        AUDIOGRAM 2023:        MRI Brain 9/2019:  Impression:     1. No significant MRI abnormality of the temporal bones or cerebellopontine angles.  2. White matter findings consistent with minimal chronic microvascular ischemia.       Assessment and Plan     1. Asymmetrical sensorineural hearing loss    2. Impacted cerumen of left ear        We reviewed the 2023 audiogram in detail; has known left asymmetry.  I would recommend scheduling a repeat audiogram for comparison  and I'll review those results once completed.   We discussed that as humans we are not entirely symmetric, and that many people have one ear that hears better than the other.  She had negative MRI Brain in 2019.  Recommend she consider hearing aid for the left ear when motivated.       Cerumen impaction:  Removed today without difficulty.  I would recommend the use of a wax softening drop, either over the counter Debrox or mineral oil, on a weekly basis.  I also instructed the patient to avoid Qtips.               No follow-ups on file.

## 2025-06-25 ENCOUNTER — TELEPHONE (OUTPATIENT)
Dept: OPHTHALMOLOGY | Facility: CLINIC | Age: 80
End: 2025-06-25
Payer: MEDICARE

## 2025-06-25 NOTE — TELEPHONE ENCOUNTER
Copied from CRM #[id number]. Topic: Appointments - Appointment Access  >> Jun 25, 2025 10:51 AM Evette wrote:  Type:  Sooner Appointment Request    Caller is requesting a sooner appointment.  Caller declined first available appointment listed below.  Caller will not accept being placed on the waitlist and is requesting a message be sent to doctor.    Name of Caller:  pt   When is the first available appointment?  7/31   Symptoms:  watery eye , no pain   Would the patient rather a call back or a response via [facility]? Call   Best Call Back Number:  [phone]    Additional Information:  please advise

## 2025-06-25 NOTE — TELEPHONE ENCOUNTER
----- Message from Yuki sent at 6/25/2025 11:12 AM CDT -----  Pt c/o watery eyes she is schedule 7/31 would like to be advised or worked in for sooner appt please call  [phone]

## 2025-07-28 ENCOUNTER — CLINICAL SUPPORT (OUTPATIENT)
Dept: AUDIOLOGY | Facility: CLINIC | Age: 80
End: 2025-07-28
Payer: MEDICARE

## 2025-07-28 DIAGNOSIS — H90.A22 SENSORINEURAL HEARING LOSS (SNHL) OF LEFT EAR WITH RESTRICTED HEARING OF RIGHT EAR: ICD-10-CM

## 2025-07-28 DIAGNOSIS — H90.3 ASYMMETRICAL SENSORINEURAL HEARING LOSS: ICD-10-CM

## 2025-07-28 DIAGNOSIS — H93.13 TINNITUS, BILATERAL: Primary | ICD-10-CM

## 2025-07-28 PROCEDURE — 92557 COMPREHENSIVE HEARING TEST: CPT | Mod: S$GLB,,, | Performed by: AUDIOLOGIST-HEARING AID FITTER

## 2025-07-28 PROCEDURE — 92567 TYMPANOMETRY: CPT | Mod: S$GLB,,, | Performed by: AUDIOLOGIST-HEARING AID FITTER

## 2025-07-28 PROCEDURE — 99999 PR PBB SHADOW E&M-EST. PATIENT-LVL I: CPT | Mod: PBBFAC,,, | Performed by: AUDIOLOGIST-HEARING AID FITTER

## 2025-07-28 NOTE — PROGRESS NOTES
" Referring provider: YOLIS Carpenter [facility] Ministerio Davidson was seen 07/28/2025 for an audiological evaluation. Per ENT report on 5/30/25, "She has noticed gradual progression of her hearing loss since she was last here with me in 2023. She says she's turning up volume on television and often asking others to repeat themselves. States that her right ear is her better hearing ear. She has hx of asymmetrical sensorineural hearing loss and had negative MRI Brain in 2019. No recent ear drainage; has occasional soreness in her ears AD>AS (denies any triggers). No recent dizziness. No previous otologic surgery or hx of loud noise exposure. She denies family hx of hearing loss. No tinnitus recently (had it many years ago). The patient reported decrease in hearing, AU. Reports ear pressure and fullness bilaterally".     Results reveal a normal-to-mild sensorineural hearing loss from 250-8000Hz for the right ear and a normal-to-severe sensorineural hearing loss from 250-8000Hz for the left ear. Speech Reception Thresholds were  20 dBHL for the right ear and 30 dBHL for the left ear. Word recognition scores were excellent for the right ear and excellent for the left ear.  Tympanograms were Type A for the right ear and Type Ad for the left ear.    Patient was counseled on the above findings. Overall, the patient was satisfied with today's visit.     Recommendations:  Repeat audiological evaluation in 2 years to monitor hearing, or sooner if needed.  If interested, consider a hearing aid consultation for the left ear.  Patient provided with clinic hearing aid information packet and a copy of audiogram. Instructed to contact [facility]'s [employer] for possible coverage.  Wear hearing protection devices when around loud noise.                                  "

## 2025-07-28 NOTE — Clinical Note
For your review. Pt not currently experiencing fullness or vertigo, and hearing stable since last exam so recommended annual and hearing aid when ready. Interestingly, look at her left hearing pattern - possible ELH? We did not discuss that as possibility with pt since all stable now; just pointing out for you're reference.

## 2025-07-30 ENCOUNTER — LAB VISIT (OUTPATIENT)
Dept: LAB | Facility: HOSPITAL | Age: 80
End: 2025-07-30
Attending: INTERNAL MEDICINE
Payer: MEDICARE

## 2025-07-30 DIAGNOSIS — M35.9 POLYMYOSITIS ASSOCIATED WITH AUTOIMMUNE DISEASE: ICD-10-CM

## 2025-07-30 DIAGNOSIS — M33.20 POLYMYOSITIS ASSOCIATED WITH AUTOIMMUNE DISEASE: ICD-10-CM

## 2025-07-30 DIAGNOSIS — R53.83 FATIGUE, UNSPECIFIED TYPE: ICD-10-CM

## 2025-07-30 DIAGNOSIS — R74.8 ABNORMAL LIVER ENZYMES: ICD-10-CM

## 2025-07-30 LAB
ABSOLUTE EOSINOPHIL (OHS): 0.13 K/UL
ABSOLUTE MONOCYTE (OHS): 0.5 K/UL (ref 0.3–1)
ABSOLUTE NEUTROPHIL COUNT (OHS): 1.16 K/UL (ref 1.8–7.7)
ALBUMIN SERPL BCP-MCNC: 3.9 G/DL (ref 3.5–5.2)
ALP SERPL-CCNC: 56 UNIT/L (ref 40–150)
ALT SERPL W/O P-5'-P-CCNC: 10 UNIT/L (ref 10–44)
ANION GAP (OHS): 7 MMOL/L (ref 8–16)
AST SERPL-CCNC: 24 UNIT/L (ref 11–45)
BASOPHILS # BLD AUTO: 0.03 K/UL
BASOPHILS NFR BLD AUTO: 0.9 %
BILIRUB SERPL-MCNC: 0.7 MG/DL (ref 0.1–1)
BUN SERPL-MCNC: 13 MG/DL (ref 8–23)
CALCIUM SERPL-MCNC: 9.8 MG/DL (ref 8.7–10.5)
CHLORIDE SERPL-SCNC: 107 MMOL/L (ref 95–110)
CK SERPL-CCNC: 60 U/L (ref 20–180)
CO2 SERPL-SCNC: 24 MMOL/L (ref 23–29)
CREAT SERPL-MCNC: 0.7 MG/DL (ref 0.5–1.4)
CRP SERPL-MCNC: 0.9 MG/L
ERYTHROCYTE [DISTWIDTH] IN BLOOD BY AUTOMATED COUNT: 15.2 % (ref 11.5–14.5)
ERYTHROCYTE [SEDIMENTATION RATE] IN BLOOD: 23 MM/HR
GFR SERPLBLD CREATININE-BSD FMLA CKD-EPI: >60 ML/MIN/1.73/M2
GLUCOSE SERPL-MCNC: 100 MG/DL (ref 70–110)
HCT VFR BLD AUTO: 40.5 % (ref 37–48.5)
HGB BLD-MCNC: 13.4 GM/DL (ref 12–16)
IMM GRANULOCYTES # BLD AUTO: 0 K/UL (ref 0–0.04)
IMM GRANULOCYTES NFR BLD AUTO: 0 % (ref 0–0.5)
LYMPHOCYTES # BLD AUTO: 1.44 K/UL (ref 1–4.8)
MCH RBC QN AUTO: 29.4 PG (ref 27–31)
MCHC RBC AUTO-ENTMCNC: 33.1 G/DL (ref 32–36)
MCV RBC AUTO: 89 FL (ref 82–98)
NUCLEATED RBC (/100WBC) (OHS): 0 /100 WBC
PLATELET # BLD AUTO: 148 K/UL (ref 150–450)
PMV BLD AUTO: 10.1 FL (ref 9.2–12.9)
POTASSIUM SERPL-SCNC: 4 MMOL/L (ref 3.5–5.1)
PROT SERPL-MCNC: 8.7 GM/DL (ref 6–8.4)
RBC # BLD AUTO: 4.56 M/UL (ref 4–5.4)
RELATIVE EOSINOPHIL (OHS): 4 %
RELATIVE LYMPHOCYTE (OHS): 44.2 % (ref 18–48)
RELATIVE MONOCYTE (OHS): 15.3 % (ref 4–15)
RELATIVE NEUTROPHIL (OHS): 35.6 % (ref 38–73)
SODIUM SERPL-SCNC: 138 MMOL/L (ref 136–145)
WBC # BLD AUTO: 3.26 K/UL (ref 3.9–12.7)

## 2025-07-30 PROCEDURE — 80053 COMPREHEN METABOLIC PANEL: CPT

## 2025-07-30 PROCEDURE — 36415 COLL VENOUS BLD VENIPUNCTURE: CPT

## 2025-07-30 PROCEDURE — 82550 ASSAY OF CK (CPK): CPT

## 2025-07-30 PROCEDURE — 86140 C-REACTIVE PROTEIN: CPT

## 2025-07-30 PROCEDURE — 85025 COMPLETE CBC W/AUTO DIFF WBC: CPT

## 2025-07-30 PROCEDURE — 85652 RBC SED RATE AUTOMATED: CPT

## 2025-07-31 ENCOUNTER — TELEPHONE (OUTPATIENT)
Dept: RHEUMATOLOGY | Facility: CLINIC | Age: 80
End: 2025-07-31
Payer: MEDICARE

## 2025-07-31 NOTE — TELEPHONE ENCOUNTER
Called patient back and rescheduled appointment to virtual appointment    Copied from CRM #[id number]. Topic: Appointments - Appointment Access  >> Jul 31, 2025  8:17 AM Jono wrote:  Type:  Same Day Appointment Request    Caller is requesting a same day appointment.  Caller declined first available appointment listed below.    Name of Caller:Anca Mcclellan  When is the first available appointment?need a later time or another day on 08/06/2025  Symptoms:6 month  Best Call Back Number:[phone]  Additional Information: MRN [id number]

## 2025-08-01 ENCOUNTER — OFFICE VISIT (OUTPATIENT)
Dept: OPHTHALMOLOGY | Facility: CLINIC | Age: 80
End: 2025-08-01
Payer: MEDICARE

## 2025-08-01 DIAGNOSIS — H04.123 DRY EYES, BILATERAL: ICD-10-CM

## 2025-08-01 DIAGNOSIS — Z96.1 PSEUDOPHAKIA OF BOTH EYES: Primary | ICD-10-CM

## 2025-08-01 DIAGNOSIS — H02.403 PTOSIS OF BOTH EYELIDS: ICD-10-CM

## 2025-08-01 PROCEDURE — 99999 PR PBB SHADOW E&M-EST. PATIENT-LVL III: CPT | Mod: PBBFAC,,, | Performed by: STUDENT IN AN ORGANIZED HEALTH CARE EDUCATION/TRAINING PROGRAM

## 2025-08-01 NOTE — PROGRESS NOTES
HPI     Annual Exam     Additional comments: Pt co over watering OU x 3 mo  Pt uses otc gtts prn   Pt co film over OS va on and off x 3mo as well  Pt co night va OU            Comments    1. PVD OS  2. Vitreous Degeneration OU  3. Glaucoma Suspect OU  -Asymmetry Analysis 30/29  4. Pseudophakia OU  5. DM 2012             Last edited by Navneet Friedman on 8/1/2025  1:26 PM.            Assessment /Plan     For exam results, see Encounter Report.    Pseudophakia of both eyes- Stable    Dry eyes, bilateral- ATs QID and lid hygiene w/ baby shampoo    Ptosis of both eyelids- Discussed referral to  for lid eval and patient elects, referral was sent today       RTC for annual exams or PRN

## 2025-08-06 ENCOUNTER — OFFICE VISIT (OUTPATIENT)
Dept: RHEUMATOLOGY | Facility: CLINIC | Age: 80
End: 2025-08-06
Payer: MEDICARE

## 2025-08-06 DIAGNOSIS — M11.20 CHONDROCALCINOSIS: ICD-10-CM

## 2025-08-06 DIAGNOSIS — R91.8 ABNORMAL CT SCAN, LUNG: ICD-10-CM

## 2025-08-06 DIAGNOSIS — M35.9 POLYMYOSITIS ASSOCIATED WITH AUTOIMMUNE DISEASE: Primary | ICD-10-CM

## 2025-08-06 DIAGNOSIS — M15.0 PRIMARY OSTEOARTHRITIS INVOLVING MULTIPLE JOINTS: ICD-10-CM

## 2025-08-06 DIAGNOSIS — M17.12 PRIMARY OSTEOARTHRITIS OF LEFT KNEE: ICD-10-CM

## 2025-08-06 DIAGNOSIS — M33.20 POLYMYOSITIS ASSOCIATED WITH AUTOIMMUNE DISEASE: Primary | ICD-10-CM

## 2025-08-06 DIAGNOSIS — M35.00 SICCA SYNDROME: ICD-10-CM

## 2025-08-06 DIAGNOSIS — D89.0 POLYCLONAL HYPERGAMMAGLOBULINEMIA: ICD-10-CM

## 2025-08-06 PROCEDURE — G2211 COMPLEX E/M VISIT ADD ON: HCPCS | Mod: 95,,, | Performed by: INTERNAL MEDICINE

## 2025-08-06 PROCEDURE — 3072F LOW RISK FOR RETINOPATHY: CPT | Mod: CPTII,95,, | Performed by: INTERNAL MEDICINE

## 2025-08-06 PROCEDURE — 1160F RVW MEDS BY RX/DR IN RCRD: CPT | Mod: CPTII,95,, | Performed by: INTERNAL MEDICINE

## 2025-08-06 PROCEDURE — 98007 SYNCH AUDIO-VIDEO EST HI 40: CPT | Mod: 95,,, | Performed by: INTERNAL MEDICINE

## 2025-08-06 PROCEDURE — 1159F MED LIST DOCD IN RCRD: CPT | Mod: CPTII,95,, | Performed by: INTERNAL MEDICINE

## 2025-08-06 RX ORDER — METHYLPREDNISOLONE 4 MG/1
TABLET ORAL
Qty: 21 TABLET | Refills: 0 | Status: SHIPPED | OUTPATIENT
Start: 2025-08-06

## 2025-08-06 NOTE — PROGRESS NOTES
RHEUMATOLOGY FOLLOW UP - TELE VISIT     The patient location is: [place]  The chief complaint leading to consultation is:  Follow-up for polymyositis and osteoarthritis  Visit type: Virtual visit with synchronous audio and video  Total face to face time spent with patient:  15 minutes  Each patient to whom he or she provides medical services by telemedicine is:  (1) informed of the relationship between the physician and patient and the respective role of any other health care provider with respect to management of the patient; and (2) notified that he or she may decline to receive medical services by telemedicine and may withdraw from such care at any time.    Chief complaints, HPI, ROS, EXAM, Assessment & Plans:-  Anca Kirkland a 80 y.o. pleasant female seen for follow-up visit.  She follows up in the Rheumatology Clinic for polymyositis which has been in remission.  In the past she was seen with worsening left knee joint pain associated with stiffness and swelling and synovitis which was injected with Kenalog.  She reports doing well except mild activity-related left knee pain.  No significant swelling or stiffness.  No parotitis.  No systemic B symptoms.  No malar rash.  Mild dry eyes but no dry mouth. Rheumatological review of system negative otherwise.  Exam shows 100% fist formation bilateral hands.  No malar rash.    1. Polymyositis associated with autoimmune disease    2. Primary osteoarthritis of left knee    3. Polyclonal hypergammaglobulinemia    4. Chondrocalcinosis    5. Primary osteoarthritis involving multiple joints    6. Sicca syndrome    7. Abnormal CT scan, lung      Problem List Items Addressed This Visit       Chondrocalcinosis    Relevant Medications    methylPREDNISolone (MEDROL DOSEPACK) 4 mg tablet    Polyclonal hypergammaglobulinemia    Polymyositis associated with autoimmune disease - Primary    Primary osteoarthritis involving multiple joints    Relevant Medications     methylPREDNISolone (MEDROL DOSEPACK) 4 mg tablet    Primary osteoarthritis of left knee    Relevant Medications    methylPREDNISolone (MEDROL DOSEPACK) 4 mg tablet    Sicca syndrome     Other Visit Diagnoses         Abnormal CT scan, lung        Relevant Orders    CT Chest Without Contrast          Labs reviewed today:-     Latest Reference Range & Units 07/30/25 10:04   WBC 3.90 - 12.70 K/uL 3.26 (L)   RBC 4.00 - 5.40 M/uL 4.56   Hemoglobin 12.0 - 16.0 gm/dL 13.4   Hematocrit 37.0 - 48.5 % 40.5   MCV 82 - 98 fL 89   MCH 27.0 - 31.0 pg 29.4   MCHC 32.0 - 36.0 g/dL 33.1   RDW 11.5 - 14.5 % 15.2 (H)   Platelet Count 150 - 450 K/uL 148 (L)   MPV 9.2 - 12.9 fL 10.1   Neut % 38 - 73 % 35.6 (L)   Lymph % 18 - 48 % 44.2   Mono % 4 - 15 % 15.3 (H)   Eos % <=8 % 4.0   Basophil % <=1.9 % 0.9   Immature Granulocytes 0.0 - 0.5 % 0.0   Gran # (ANC) 1.8 - 7.7 K/uL 1.16 (L)   Lymph # 1 - 4.8 K/uL 1.44   Mono # 0.3 - 1 K/uL 0.50   Eos # <=0.5 K/uL 0.13   Baso # <=0.2 K/uL 0.03   Immature Grans (Abs) 0.00 - 0.04 K/uL 0.00   nRBC <=0 /100 WBC 0   Sed Rate <=36 mm/hr 23   Sodium 136 - 145 mmol/L 138   Potassium 3.5 - 5.1 mmol/L 4.0   Chloride 95 - 110 mmol/L 107   CO2 23 - 29 mmol/L 24   Anion Gap 8 - 16 mmol/L 7 (L)   BUN 8 - 23 mg/dL 13   Creatinine 0.5 - 1.4 mg/dL 0.7   eGFR >60 mL/min/1.73/m2 >60   Glucose 70 - 110 mg/dL 100   Calcium 8.7 - 10.5 mg/dL 9.8   ALP 40 - 150 unit/L 56   PROTEIN TOTAL 6.0 - 8.4 gm/dL 8.7 (H)   Albumin 3.5 - 5.2 g/dL 3.9   BILIRUBIN TOTAL 0.1 - 1.0 mg/dL 0.7   AST 11 - 45 unit/L 24   ALT 10 - 44 unit/L 10   CRP <=8.2 mg/L 0.9   CPK 20 - 180 U/L 60   (L): Data is abnormally low  (H): Data is abnormally high   Latest Reference Range & Units Most Recent   VALDEZ Screen Negative <1:80  Positive !  1/17/24 14:32   VALDEZ HEP-2 Titer  Positive >=1:2560 Homogeneous  10/31/19 13:48   VALDEZ Titer 1  >2560  1/17/24 14:32   VALDEZ PATTERN 1  Homogeneous  1/17/24 14:32   ds DNA Ab Negative 1:10  Positive !  1/17/24 14:32   DNA  Titer  >=1:5120  1/17/24 14:32   Anti-SSA Antibody 0.00 - 0.99 Ratio 4.56 (H)  9/23/24 10:40   Anti-SSA Interpretation Negative  Positive !  9/23/24 10:40   Anti-SSB Antibody 0.00 - 0.99 Ratio 0.22  9/23/24 10:40   Anti-SSB Interpretation Negative  Negative  9/23/24 10:40   Anti Sm Antibody 0.00 - 0.99 Ratio 0.14  1/17/24 14:32   Anti-Sm Interpretation Negative  Negative  1/17/24 14:32   Anti Sm/RNP Antibody 0.00 - 0.99 Ratio 0.34  1/17/24 14:32   Anti-Sm/RNP Interpretation Negative  Negative  1/17/24 14:32   Complement (C-3) 50 - 180 mg/dL 139  10/31/19 13:28   Complement (C-4) 11 - 44 mg/dL 35  10/31/19 13:28   IgG 650 - 1600 mg/dL 3473 (H)  1/31/24 11:16   IgM 50 - 300 mg/dL 69  1/31/24 11:16   IgA Level 40 - 350 mg/dL 273  1/31/24 11:16   Protein, Serum 6.0 - 8.4 g/dL 9.1 (H)  1/31/24 11:16   Albumin grams/dl 3.35 - 5.55 g/dL 4.09  1/31/24 11:16   Alpha-1 grams/dl 0.17 - 0.41 g/dL 0.27  1/31/24 11:16   Alpha-2 0.43 - 0.99 g/dL 0.82  1/31/24 11:16   Beta 0.50 - 1.10 g/dL 0.81  1/31/24 11:16   Gamma 0.67 - 1.58 g/dL 3.11 (H)  1/31/24 11:16   Pathologist Interpretation SPE  REVIEWED  1/31/24 11:16   Pathologist Interpretation PETAR  REVIEWED  1/31/24 11:16   Immunofix Interp.  SEE COMMENT  1/31/24 11:16   Pathologist Interpretation UPE  REVIEWED  5/2/22 10:19   Protein Electrophoresis, Ur  SEE COMMENT  5/2/22 10:19   Cryoglobulin, Qual Absent  Absent  1/31/24 11:16   Anti-Emilia-1 Antibody <20 Units <20  4/21/16 15:43   Anti-PM/Scl Ab <20 Units <20  4/21/16 15:43   Anti-SS-A 52 kD Ab, IgG <20 Units <20  4/21/16 15:43   Anti-U1-RNP  Ab <20 Units <20  4/21/16 15:43   EJ Negative  Negative  4/21/16 15:43   Fibrillarin (U3 RNP) Negative  Negative  4/21/16 15:43   HMGCR Antibody 0 - 19 Units <3  4/21/16 15:43   Ku Negative  Negative  4/21/16 15:43   MDA-5 (P140) (CADM-140) <20 Units <20  4/21/16 15:43   MI-2 Negative  Weak Positive !  4/21/16 15:43   NXP-2 (P140) <20 Units <20  4/21/16 15:43   OJ Negative  Negative  4/21/16  15:43   PL-12 Negative  Negative  4/21/16 15:43   PL-7 Negative  Negative  4/21/16 15:43   Rheumatoid Factor 0.0 - 15.0 IU/mL <13.0  9/23/24 10:40   SRP Negative  Negative  4/21/16 15:43   TIF1 GAMMA (P155/140) <20 Units <20  4/21/16 15:43   U2 snRNP Negative  Negative  4/21/16 15:43   !: Data is abnormal  (H): Data is abnormally high    (H): Data is abnormally high        Stable OA without any significant reccurence of knee pain. PRN medrol dose pack sent today. Inject prn.    Even though her dsDNA antibody was positive she does not have any clinical features of active lupus.  Normal renal functions.  No proteinuria.  No arthritis.  Since her SSA antibody is positive, ordered CT chest abdomen pelvis to rule out early lymphoma.  CT chest abdomen pelvis showed no evidence of malignancy other than mild interstitial abnormalities in both lungs.  Repeat CT for monitoring ordered today.  Continue follow-up with hematologist for MGUS.      # Follow up in about 6 months (around 2/6/2026).      Disclaimer: This note was prepared using voice recognition system and is likely to have sound alike errors and is not proof read.  Please call me with any questions.

## 2025-08-18 ENCOUNTER — HOSPITAL ENCOUNTER (OUTPATIENT)
Dept: RADIOLOGY | Facility: HOSPITAL | Age: 80
Discharge: HOME | End: 2025-08-18
Attending: INTERNAL MEDICINE
Payer: MEDICARE

## 2025-08-18 DIAGNOSIS — R91.8 ABNORMAL CT SCAN, LUNG: ICD-10-CM

## 2025-08-18 PROCEDURE — 71250 CT THORAX DX C-: CPT | Mod: 26,,, | Performed by: RADIOLOGY

## 2025-08-18 PROCEDURE — 71250 CT THORAX DX C-: CPT | Mod: TC

## 2025-08-25 ENCOUNTER — CLINICAL SUPPORT (OUTPATIENT)
Dept: CARDIOLOGY | Facility: HOSPITAL | Age: 80
End: 2025-08-25
Attending: INTERNAL MEDICINE
Payer: MEDICARE

## 2025-08-25 ENCOUNTER — CLINICAL SUPPORT (OUTPATIENT)
Dept: CARDIOLOGY | Facility: HOSPITAL | Age: 80
End: 2025-08-25
Payer: MEDICARE

## 2025-08-25 DIAGNOSIS — Z95.0 PRESENCE OF CARDIAC PACEMAKER: ICD-10-CM

## 2025-08-25 DIAGNOSIS — I49.5 SICK SINUS SYNDROME: ICD-10-CM

## 2025-08-25 PROCEDURE — 93296 REM INTERROG EVL PM/IDS: CPT | Performed by: INTERNAL MEDICINE

## 2025-08-25 PROCEDURE — 93294 REM INTERROG EVL PM/LDLS PM: CPT | Mod: S$GLB,,, | Performed by: INTERNAL MEDICINE

## 2025-08-28 LAB
OHS CV AF BURDEN PERCENT: < 1
OHS CV DC REMOTE DEVICE TYPE: NORMAL
OHS CV RV PACING PERCENT: 1 %

## (undated) DEVICE — SUT 3-0 VICRYL / SH (J416)

## (undated) DEVICE — SYR BULB 60CC IRRIGATION

## (undated) DEVICE — PAD GROUNDING DISPER ELECTRODE

## (undated) DEVICE — SAFESHEATH WORLEY 9FR RIGHT

## (undated) DEVICE — SHEATH INTRODUCER 6FR 11CM

## (undated) DEVICE — PACK PACER PERMANENT

## (undated) DEVICE — SAFESHEATH II 8FR 13CM

## (undated) DEVICE — PAD DEFIB CADENCE ADULT R2

## (undated) DEVICE — ADHESIVE DERMABOND ADVANCED

## (undated) DEVICE — CAUTERY BOVIE PENCIL

## (undated) DEVICE — NDL PERCUTANEOUS 21G 7CM VASC

## (undated) DEVICE — DRESSING AQUACEL AG ADV 3.5X6

## (undated) DEVICE — SCALPEL SZ 10 RETRACTABLE

## (undated) DEVICE — OMNIPAQUE 300MG 50ML

## (undated) DEVICE — SHEATH SAFESHEATH 9FRX25CM

## (undated) DEVICE — GUIDEWIRE WHOLEY HI TORQ 175CM